# Patient Record
Sex: MALE | Race: WHITE | Employment: OTHER | URBAN - NONMETROPOLITAN AREA
[De-identification: names, ages, dates, MRNs, and addresses within clinical notes are randomized per-mention and may not be internally consistent; named-entity substitution may affect disease eponyms.]

---

## 2019-01-28 ENCOUNTER — OFFICE VISIT (OUTPATIENT)
Dept: ORTHOPEDIC SURGERY | Age: 64
End: 2019-01-28
Payer: MEDICARE

## 2019-01-28 VITALS — BODY MASS INDEX: 33.72 KG/M2 | WEIGHT: 249 LBS | HEIGHT: 72 IN

## 2019-01-28 DIAGNOSIS — M48.062 SPINAL STENOSIS OF LUMBAR REGION WITH NEUROGENIC CLAUDICATION: Primary | ICD-10-CM

## 2019-01-28 PROCEDURE — G8419 CALC BMI OUT NRM PARAM NOF/U: HCPCS | Performed by: ORTHOPAEDIC SURGERY

## 2019-01-28 PROCEDURE — 4004F PT TOBACCO SCREEN RCVD TLK: CPT | Performed by: ORTHOPAEDIC SURGERY

## 2019-01-28 PROCEDURE — 4040F PNEUMOC VAC/ADMIN/RCVD: CPT | Performed by: ORTHOPAEDIC SURGERY

## 2019-01-28 PROCEDURE — G8427 DOCREV CUR MEDS BY ELIG CLIN: HCPCS | Performed by: ORTHOPAEDIC SURGERY

## 2019-01-28 PROCEDURE — 99203 OFFICE O/P NEW LOW 30 MIN: CPT | Performed by: ORTHOPAEDIC SURGERY

## 2019-01-28 PROCEDURE — 3017F COLORECTAL CA SCREEN DOC REV: CPT | Performed by: ORTHOPAEDIC SURGERY

## 2019-01-28 PROCEDURE — 1101F PT FALLS ASSESS-DOCD LE1/YR: CPT | Performed by: ORTHOPAEDIC SURGERY

## 2019-01-28 PROCEDURE — G8484 FLU IMMUNIZE NO ADMIN: HCPCS | Performed by: ORTHOPAEDIC SURGERY

## 2019-01-28 PROCEDURE — 1123F ACP DISCUSS/DSCN MKR DOCD: CPT | Performed by: ORTHOPAEDIC SURGERY

## 2019-01-28 RX ORDER — BUPROPION HYDROCHLORIDE 100 MG/1
100 TABLET, EXTENDED RELEASE ORAL 2 TIMES DAILY
COMMUNITY
Start: 2018-11-21

## 2019-01-28 RX ORDER — DILTIAZEM HYDROCHLORIDE 240 MG/1
240 CAPSULE, EXTENDED RELEASE ORAL DAILY
Status: ON HOLD | COMMUNITY
Start: 2018-11-21 | End: 2022-05-02 | Stop reason: HOSPADM

## 2019-01-28 RX ORDER — LEVOTHYROXINE SODIUM 0.15 MG/1
150 TABLET ORAL DAILY
Status: ON HOLD | COMMUNITY
Start: 2019-01-19 | End: 2022-04-22 | Stop reason: CLARIF

## 2019-01-28 RX ORDER — NORTRIPTYLINE HYDROCHLORIDE 10 MG/1
10 CAPSULE ORAL NIGHTLY
Status: ON HOLD | COMMUNITY
Start: 2019-01-19 | End: 2022-06-22 | Stop reason: HOSPADM

## 2019-01-28 RX ORDER — CHLORAL HYDRATE 500 MG
3000 CAPSULE ORAL 3 TIMES DAILY
COMMUNITY

## 2019-01-28 RX ORDER — ATORVASTATIN CALCIUM 20 MG/1
80 TABLET, FILM COATED ORAL DAILY
COMMUNITY
Start: 2019-01-19

## 2019-01-28 RX ORDER — APIXABAN 5 MG/1
5 TABLET, FILM COATED ORAL 2 TIMES DAILY
Status: ON HOLD | COMMUNITY
Start: 2019-01-21 | End: 2022-04-15 | Stop reason: HOSPADM

## 2019-01-28 RX ORDER — LISINOPRIL 20 MG/1
20 TABLET ORAL DAILY
Status: ON HOLD | COMMUNITY
Start: 2019-01-19 | End: 2022-05-02 | Stop reason: HOSPADM

## 2019-01-28 RX ORDER — AMLODIPINE BESYLATE 10 MG/1
10 TABLET ORAL DAILY
Status: ON HOLD | COMMUNITY
Start: 2019-01-04 | End: 2022-04-15 | Stop reason: HOSPADM

## 2019-01-28 RX ORDER — NITROGLYCERIN 0.4 MG/1
0.4 TABLET SUBLINGUAL EVERY 5 MIN PRN
COMMUNITY
Start: 2019-01-04

## 2022-04-05 ENCOUNTER — TELEPHONE (OUTPATIENT)
Dept: PULMONOLOGY | Age: 67
End: 2022-04-05

## 2022-04-05 NOTE — TELEPHONE ENCOUNTER
Called pt daughter Carole Abbott to offer appt Thursday at 3:00 pm, but daughter needs to check with [facility] to make sure pt doesn't have an appt at the same time. Daughter to call back and let us know. If pt can't make it, will have to check with Ramu to see if pt can be scheduled in ICU week.

## 2022-04-07 ENCOUNTER — TELEPHONE (OUTPATIENT)
Dept: PULMONOLOGY | Age: 67
End: 2022-04-07

## 2022-04-07 ENCOUNTER — OFFICE VISIT (OUTPATIENT)
Dept: PULMONOLOGY | Age: 67
End: 2022-04-07
Payer: MEDICARE

## 2022-04-07 VITALS
RESPIRATION RATE: 22 BRPM | OXYGEN SATURATION: 90 % | SYSTOLIC BLOOD PRESSURE: 138 MMHG | BODY MASS INDEX: 23.3 KG/M2 | HEART RATE: 86 BPM | DIASTOLIC BLOOD PRESSURE: 82 MMHG | WEIGHT: 172 LBS | HEIGHT: 72 IN

## 2022-04-07 DIAGNOSIS — G47.34 NOCTURNAL HYPOXEMIA: ICD-10-CM

## 2022-04-07 DIAGNOSIS — R59.0 MEDIASTINAL ADENOPATHY: ICD-10-CM

## 2022-04-07 DIAGNOSIS — R91.8 LUNG MASS: ICD-10-CM

## 2022-04-07 DIAGNOSIS — J44.9 CHRONIC OBSTRUCTIVE PULMONARY DISEASE, UNSPECIFIED COPD TYPE (HCC): Primary | ICD-10-CM

## 2022-04-07 PROCEDURE — 4040F PNEUMOC VAC/ADMIN/RCVD: CPT | Performed by: INTERNAL MEDICINE

## 2022-04-07 PROCEDURE — 3017F COLORECTAL CA SCREEN DOC REV: CPT | Performed by: INTERNAL MEDICINE

## 2022-04-07 PROCEDURE — 99204 OFFICE O/P NEW MOD 45 MIN: CPT | Performed by: INTERNAL MEDICINE

## 2022-04-07 PROCEDURE — 4004F PT TOBACCO SCREEN RCVD TLK: CPT | Performed by: INTERNAL MEDICINE

## 2022-04-07 PROCEDURE — 1123F ACP DISCUSS/DSCN MKR DOCD: CPT | Performed by: INTERNAL MEDICINE

## 2022-04-07 PROCEDURE — 3023F SPIROM DOC REV: CPT | Performed by: INTERNAL MEDICINE

## 2022-04-07 PROCEDURE — G8427 DOCREV CUR MEDS BY ELIG CLIN: HCPCS | Performed by: INTERNAL MEDICINE

## 2022-04-07 PROCEDURE — G8420 CALC BMI NORM PARAMETERS: HCPCS | Performed by: INTERNAL MEDICINE

## 2022-04-07 RX ORDER — METOPROLOL SUCCINATE 25 MG/1
TABLET, EXTENDED RELEASE ORAL
COMMUNITY
Start: 2022-01-26 | End: 2022-04-08 | Stop reason: ALTCHOICE

## 2022-04-07 RX ORDER — DIGOXIN 125 MCG
0.12 TABLET ORAL DAILY
COMMUNITY
Start: 2021-11-08

## 2022-04-07 RX ORDER — DOXYCYCLINE HYCLATE 100 MG
100 TABLET ORAL 2 TIMES DAILY
Qty: 14 TABLET | Refills: 0 | Status: SHIPPED | OUTPATIENT
Start: 2022-04-07 | End: 2022-04-08 | Stop reason: ALTCHOICE

## 2022-04-07 RX ORDER — POTASSIUM CHLORIDE 750 MG/1
CAPSULE, EXTENDED RELEASE ORAL
Status: ON HOLD | COMMUNITY
Start: 2022-01-23 | End: 2022-05-02 | Stop reason: HOSPADM

## 2022-04-07 RX ORDER — TORSEMIDE 100 MG/1
TABLET ORAL
Status: ON HOLD | COMMUNITY
Start: 2022-03-12 | End: 2022-05-02 | Stop reason: HOSPADM

## 2022-04-07 RX ORDER — HYDROCODONE BITARTRATE AND ACETAMINOPHEN 5; 325 MG/1; MG/1
TABLET ORAL
Status: ON HOLD | COMMUNITY
Start: 2022-03-28 | End: 2022-04-22 | Stop reason: CLARIF

## 2022-04-07 RX ORDER — TIZANIDINE 2 MG/1
TABLET ORAL
Status: ON HOLD | COMMUNITY
Start: 2022-03-23 | End: 2022-04-15 | Stop reason: HOSPADM

## 2022-04-07 RX ORDER — HYDRALAZINE HYDROCHLORIDE 25 MG/1
TABLET, FILM COATED ORAL
Status: ON HOLD | COMMUNITY
Start: 2022-03-23 | End: 2022-05-02 | Stop reason: HOSPADM

## 2022-04-07 RX ORDER — PREDNISONE 10 MG/1
TABLET ORAL
Qty: 30 TABLET | Refills: 0 | Status: SHIPPED | OUTPATIENT
Start: 2022-04-07 | End: 2022-04-08 | Stop reason: ALTCHOICE

## 2022-04-07 NOTE — TELEPHONE ENCOUNTER
Bronch  is gone for the day. Will check tomorrow on bronch time. Spoke with Amanda Camacho at Dr. Gigi Mascorro office and Dr. Gigi Mascorro approved pt to hold Eliquis 3 days prior to bronch.

## 2022-04-07 NOTE — PATIENT INSTRUCTIONS
Bronchoscopy has been set up for __________ arrival time __________ at [facility]. Please enter at [facility]. Nothing to eat or drink after midnight prior to the procedure. Must have a  to bring you to and from the procedure. Hold blood thinners as instructed prior to the procedure. Start Spiriva inhaler - 2 puffs once daily. Prednisone taper - 40 mg for 3 days, then 30 mg for 3 days, then 20 mg for 3 days, then 10 mg for 3 days. Doxycycline 100 mg twice daily for 5 days.

## 2022-04-07 NOTE — LETTER
[facility] Pulmonary, Critical Care, and Sleep  [address]", [address]  [address] [address]  Phone: [phone]  Fax: [phone]    Nicky Hutchins MD    April 7, 2022    [address] [place] [address]    To Whom It May Concern:    Please be aware that Hallie Leon was seen in my office today, April 7, 2022. If you have any questions or concerns, please don't hesitate to call.     Sincerely,        Nicky Hutchins MD

## 2022-04-07 NOTE — TELEPHONE ENCOUNTER
Pt needs to be scheduled for bronch Tuesday at 11 am.  Will check date/time and inform pt. Pt has prep, just needs to be informed of date/time. Pt will need to be off Eliquis for 3 days, prescribed by Dr. Dayana Pereira.

## 2022-04-07 NOTE — PROGRESS NOTES
[facility], Critical Care and Sleep [facility]                                                                    CHIEF COMPLAINT: Lung mass     Consulting provider: Dr. Chito Alba     HPI:   Patient was evaluated for SOB. CT chest done 3/22/22 imaging reviewed by me and showed large L sided mass, measuring 12 cm, associated with mediastinal/hilar LAD. Not sure what makes better or worse. Has WILKERSON, able to walk for few feet. Cough with no sputum. No hemoptysis. Lost 50 pounds lately. Smoker 1 ppd for 40 years. Uses O2 at home at night 2LPM     PMH:   Afib   CHF       Past Surgical History:    History reviewed. No pertinent surgical history. Allergies:  is allergic to codeine. Social History:    TOBACCO:   reports that he has been smoking. He has a 40.00 pack-year smoking history. He has never used smokeless tobacco.  ETOH:   reports previous alcohol use.       Family History:   No lung cancer       Current Medications:    Current Outpatient Medications:     torsemide (DEMADEX) 100 MG tablet, TAKE 1/2 (ONE-HALF) TABLET BY MOUTH ONCE DAILY, Disp: , Rfl:     tiZANidine (ZANAFLEX) 2 MG tablet, TAKE 1 TABLET BY MOUTH TWICE DAILY AS NEEDED, Disp: , Rfl:     potassium chloride (MICRO-K) 10 MEQ extended release capsule, TAKE 1 CAPSULE BY MOUTH TWICE DAILY, Disp: , Rfl:     metoprolol succinate (TOPROL XL) 25 MG extended release tablet, TAKE 1 TABLET BY MOUTH ONCE DAILY, Disp: , Rfl:     Meloxicam 15 MG TBDP, Take 1 tablet by mouth daily, Disp: , Rfl:     HYDROcodone-acetaminophen (NORCO) 5-325 MG per tablet, TAKE 1 TABLET BY MOUTH TWICE DAILY AS NEEDED, Disp: , Rfl:     hydrALAZINE (APRESOLINE) 25 MG tablet, TAKE 1 TABLET BY MOUTH THREE TIMES DAILY, Disp: , Rfl:     digoxin (LANOXIN) 125 MCG tablet, Take 0.125 mg by mouth daily, Disp: , Rfl:     amLODIPine (NORVASC) 10 MG tablet, , Disp: , Rfl:     ELIQUIS 5 MG TABS tablet, , Disp: , Rfl:     atorvastatin (LIPITOR) 20 MG tablet, , Disp: , Rfl:     buPROPion (WELLBUTRIN SR) 100 MG extended release tablet, , Disp: , Rfl:     CARTIA  MG extended release capsule, , Disp: , Rfl:     levothyroxine (SYNTHROID) 150 MCG tablet, , Disp: , Rfl:     lisinopril (PRINIVIL;ZESTRIL) 20 MG tablet, , Disp: , Rfl:     metFORMIN (GLUCOPHAGE) 850 MG tablet, , Disp: , Rfl:     metoprolol tartrate (LOPRESSOR) 25 MG tablet, , Disp: , Rfl:     nitroGLYCERIN (NITROSTAT) 0.4 MG SL tablet, , Disp: , Rfl:     nortriptyline (PAMELOR) 10 MG capsule, , Disp: , Rfl:     Omega-3 Fatty Acids (FISH OIL) 1000 MG CAPS, Take 3,000 mg by mouth 3 times daily, Disp: , Rfl:     aspirin 81 MG tablet, Take 81 mg by mouth daily, Disp: , Rfl:       REVIEW OF SYSTEMS:  Constitutional: Negative for fever  HENT: Negative for sore throat  Eyes: Negative for redness   Respiratory:+ dyspnea, cough  Cardiovascular: Negative for chest pain  Gastrointestinal: Negative for vomiting, diarrhea   Genitourinary: Negative for hematuria   Musculoskeletal: +  arthralgias   Skin: Negative for rash  Neurological: Negative for syncope  Hematological: Negative for adenopathy  Psychiatric/Behavorial: Negative for anxiety      Objective:   PHYSICAL EXAM:    Blood pressure 138/82, pulse 86, resp. rate 22, height 6' (1.829 m), weight 172 lb (78 kg), SpO2 90 %.' on RA  Gen: No distress. Ill-appearing  Eyes: PERRL. No sclera icterus. No conjunctival injection. ENT: No discharge. Pharynx clear. Neck: Trachea midline. No obvious mass. Resp: No accessory muscle use. No crackles. Bilateral wheezes. No rhonchi. No dullness on percussion. Good air entry. CV: Regular rate. Regular rhythm. No murmur or rub. No edema. GI: Non-tender. Non-distended. No hernia. Skin: Warm and dry. No nodule on exposed extremities. Lymph: No cervical LAD. No supraclavicular LAD. M/S: No cyanosis. No joint deformity. No clubbing. Neuro: Awake. Alert. Moves all four extremities. Psych: Oriented x 3. No anxiety. DATA reviewed by me:   CT chest 3/22/22  images reviewed by me and showed:   Large left lower lobe/lingula mass measuring 12 cm with mediastinal/hilar adenopathy  Possible pericardial, pleural and chest wall involvement  Partially loculated left-sided effusion  Left mainstem/left lower lobe/lingular airway obstruction      Assessment:       · COPD-not well controlled  · Nocturnal hypoxia - 2L at night   · Left lower lobe/lingula 12 cm mass with hilar/mediastinal adenopathy -likely bronchogenic carcinoma  · Left mainstem/left lower lobe/lingular airway obstruction  · Small loculated pleural effusion-secondary to above  · Probable post obstructive PNA   · 50 pounds weight loss   · Chronic diastolic CHF and Afib followed by cardiology   · Smoker > 1 ppd for 40 years      Plan:       · PET scan as planned tomorrow  · Trial Spiriva Respimat: Two inhalations (5 mcg) once daily (maximum: 2 inhalations per 24 hours)  · Prednisone taper to optimize pulmonary functions before bronchoscopy  · Trial of Albuterol INH/Neb hrs PRN  · Doxycycline 100 mg PO BID for 7 days-suspecting postobstructive pneumonia  · Bronch with EBUS TBNA next week, unless PET scan tomorrow reveals more accessible lesion. The risks and benefits of Bronchoscopy , alternatives to the procedure and doing nothing have been discussed with patient including complications (collapse lung, bleed, infection, mechanical ventilation, hospitalization, cardiopulmonary arrest and death). We also discussed the risks of sedation/anesthesia. It is my assessment that the risk of the invasive procedure is outweighed by the benefit. The patient understands and wishes to proceed. · Off Eliquis for 3 days if okay with cardiology   · O2 2L on exertion. Advised to titrate O2 using her pulse oximeter- target O2 sat 90-92%. · Smoking cessation counseling.

## 2022-04-08 ENCOUNTER — TELEPHONE (OUTPATIENT)
Dept: PULMONOLOGY | Age: 67
End: 2022-04-08

## 2022-04-08 RX ORDER — INSULIN DETEMIR 100 [IU]/ML
90 INJECTION, SOLUTION SUBCUTANEOUS 2 TIMES DAILY
Status: ON HOLD | COMMUNITY
End: 2022-05-02 | Stop reason: HOSPADM

## 2022-04-08 NOTE — TELEPHONE ENCOUNTER
Scheduled bronch for 4/12 arrival time 10:00 am.  Pt needs to hold Eliquis starting 4/9/22. Also made pt f/u appt (if pt can't make it due to transportation, can change to VV/tele). Patient called with message left for patient to call back to office to inform. Pt is having PET scan at [facility] today 4pm, will watch for result and request imaging to be sent to [facility].

## 2022-04-08 NOTE — TELEPHONE ENCOUNTER
Spoke with Brinaalejo Nogueira daughter informed of date time and prep. Aware to hold Eliquis starting 4/9/22. Pt is having PET scan at [facility] today 4pm, will watch for result and request imaging to be sent to [facility].

## 2022-04-11 ENCOUNTER — ANESTHESIA EVENT (OUTPATIENT)
Dept: ENDOSCOPY | Age: 67
DRG: 189 | End: 2022-04-11
Payer: MEDICARE

## 2022-04-11 ASSESSMENT — COPD QUESTIONNAIRES: CAT_SEVERITY: MODERATE

## 2022-04-11 NOTE — TELEPHONE ENCOUNTER
PET scan report rec'd and scanned for review. Imaging is available at [facility]. Pt has bronch 4/12/22. Will watch for results.

## 2022-04-11 NOTE — ANESTHESIA PRE PROCEDURE
Department of Anesthesiology  Preprocedure Note       Name:  Albin Burden   Age:  74 y.o.  :  1955                                          MRN:  [id number]         Date:  2022      Surgeon:  Sinan Fuchs MD    Procedure:  EBUS WF W/CRYSTAL. (11:00)    HPI:  75 y/o male who was evaluated for SOB. CT chest done 3/22/22  which showed a large left sided mass, measuring 12 cm, associated with mediastinal/hilar LAD. He has WILKERSON, able to walk for few feet. Cough with no sputum. No hemoptysis. Lost 50 pounds lately. Smoker 1 ppd for 40 years.  Uses O2 at home at night 2LPM.    [id number]   Intervals                              Axis            Rate:         87                      P:              CT:                                     QRS:       95   QRSD:    104                      T:            19   QT:          374                                      QTc:        450                                                                Atrial fibrillation   Ventricular premature complex versus aberrant be   Right axis deviation   Nonspecific ST changes     Medications prior to admission:    insulin detemir (LEVEMIR) 100 UNIT/ML injection vial Inject into the skin nightly   insulin aspart (NOVOLOG) 100 UNIT/ML injection vial Inject into the skin 3 times daily (before meals)   torsemide (DEMADEX) 100 MG tablet TAKE 1/2 (ONE-HALF) TABLET BY MOUTH ONCE DAILY   tiZANidine (ZANAFLEX) 2 MG tablet TAKE 1 TABLET BY MOUTH TWICE DAILY AS NEEDED   potassium chloride (MICRO-K) 10 MEQ extended release capsule TAKE 1 CAPSULE BY MOUTH TWICE DAILY   Meloxicam 15 MG TBDP Take 1 tablet by mouth daily   HYDROcodone-acetaminophen (NORCO) 5-325 MG per tablet TAKE 1 TABLET BY MOUTH TWICE DAILY AS NEEDED   hydrALAZINE (APRESOLINE) 25 MG tablet TAKE 1 TABLET BY MOUTH THREE TIMES DAILY   digoxin (LANOXIN) 125 MCG tablet Take 0.125 mg by mouth daily   tiotropium (SPIRIVA RESPIMAT) 2.5 MCG/ACT AERS inhaler Inhale 2 puffs into the lungs daily   amLODIPine (NORVASC) 10 MG tablet Take 10 mg by mouth daily    ELIQUIS 5 MG LD: LD 3 days ago Take 5 mg by mouth 2 times daily    atorvastatin (LIPITOR) 20 MG tablet Take 20 mg by mouth daily    buPROPion (WELLBUTRIN SR) 100 MG extended release tablet Take 100 mg by mouth 2 times daily    CARTIA  MG extended release capsule Take 240 mg by mouth daily    levothyroxine (SYNTHROID) 150 MCG tablet Take 150 mcg by mouth Daily    lisinopril (PRINIVIL;ZESTRIL) 20 MG tablet Take 20 mg by mouth    metFORMIN (GLUCOPHAGE) 850 MG tablet Take 850 mg by mouth daily (with breakfast)    metoprolol tartrate (LOPRESSOR) 25 MG tablet Take 25 mg by mouth 2 times daily    nitroGLYCERIN (NITROSTAT) 0.4 MG SL tablet Place 0.4 mg under the tongue every 5 minutes as needed    nortriptyline (PAMELOR) 10 MG capsule Take 10 mg by mouth nightly    Omega-3 Fatty Acids (FISH OIL) 1000 MG CAPS Take 3,000 mg by mouth 3 times daily   aspirin 81 MG tablet Take 81 mg by mouth daily     Allergies:     Codeine      Problem List:     Spinal stenosis of lumbar region with neurogenic claudication     Past Medical History:     A-fib ([facility]     CAD (coronary artery disease)     CH[facility]congestive heart failure) ([facility])     COPD (chronic obstructive pulmonary disease) ([facility])     Diabetes mellitus ([facility]     Hyperlipidemia     Hypertension     Thyroid disease      Past Surgical History:  History reviewed. No pertinent surgical history.     Social History:     Smoking status: Current Every Day Smoker     Packs/day: 1.00     Years: 40.00     Pack years: 40.00    Smokeless tobacco: Never Used   Substance Use Topics    Alcohol use: Not Currently                                Ready to quit: Not Answered  Counseling given: Not Answered      Vital Signs (Current):        BP: 150/85 Pulse: 84   Resp: 18 SpO2: 94   Temp: 97.3 °F (36.3 °C)   Height: 6' (1.829 m) (04/12/22) Weight: 272 lb (123.4 kg) (04/12/22)   BMI: 36.88              BP Readings from Last 3 Encounters:   04/07/22 138/82     NPO Status: >8 hrs                        BMI:   Wt Readings from Last 3 Encounters:   04/07/22 172 lb (78 kg)   01/28/19 249 lb (112.9 kg)     Body mass index is 36.89 kg/m². PLT:    4/12/2022 10:05   Platelet Count 156      Coags:   4/12/2022 10:05   Prothrombin Time 12.9 (H)   INR 1.14 (H)     COVID-19 Screening (If Applicable): No results found for: COVID19    Anesthesia Evaluation  Patient summary reviewed and Nursing notes reviewed  Airway: Mallampati: II  TM distance: >3 FB   Neck ROM: limited  Mouth opening: > = 3 FB Dental:    (+) edentulous      Pulmonary: breath sounds clear to auscultation  (+) COPD (O2 at home at night 2LPM): moderate and severe,      (-) recent URI                          ROS comment: +Lung mass- see HPI   Cardiovascular:  Exercise tolerance: poor (<4 METS),   (+) hypertension:, dysrhythmias: atrial fibrillation, CHF:, hyperlipidemia      ECG reviewed  Rhythm: irregular                      Neuro/Psych:   (+) TIA (Per patient H/O TIA w/o residuals),             GI/Hepatic/Renal:   (+) morbid obesity          Endo/Other:    (+) DiabetesType II DM, , hypothyroidism, blood dyscrasia: anticoagulation therapy:., .                 Abdominal:             Vascular: Other Findings:           Anesthesia Plan      general     ASA 3           MIPS: Prophylactic antiemetics administered. Anesthetic plan and risks discussed with patient. Plan discussed with CRNA.             Kendra Vargas MD

## 2022-04-12 ENCOUNTER — APPOINTMENT (OUTPATIENT)
Dept: GENERAL RADIOLOGY | Age: 67
DRG: 189 | End: 2022-04-12
Attending: INTERNAL MEDICINE
Payer: MEDICARE

## 2022-04-12 ENCOUNTER — HOSPITAL ENCOUNTER (OUTPATIENT)
Age: 67
Setting detail: OUTPATIENT SURGERY
Discharge: ANOTHER HOSPITAL | DRG: 189 | End: 2022-04-12
Attending: INTERNAL MEDICINE | Admitting: INTERNAL MEDICINE
Payer: MEDICARE

## 2022-04-12 ENCOUNTER — HOSPITAL ENCOUNTER (INPATIENT)
Age: 67
LOS: 3 days | Discharge: HOME | DRG: 189 | End: 2022-04-15
Attending: INTERNAL MEDICINE | Admitting: INTERNAL MEDICINE
Payer: MEDICARE

## 2022-04-12 ENCOUNTER — ANESTHESIA (OUTPATIENT)
Dept: ENDOSCOPY | Age: 67
DRG: 189 | End: 2022-04-12
Payer: MEDICARE

## 2022-04-12 VITALS
HEIGHT: 72 IN | OXYGEN SATURATION: 100 % | TEMPERATURE: 97.5 F | BODY MASS INDEX: 36.84 KG/M2 | DIASTOLIC BLOOD PRESSURE: 95 MMHG | WEIGHT: 272 LBS | RESPIRATION RATE: 16 BRPM | SYSTOLIC BLOOD PRESSURE: 127 MMHG | HEART RATE: 73 BPM

## 2022-04-12 VITALS — SYSTOLIC BLOOD PRESSURE: 149 MMHG | DIASTOLIC BLOOD PRESSURE: 74 MMHG | OXYGEN SATURATION: 100 %

## 2022-04-12 PROBLEM — J96.21 ACUTE ON CHRONIC RESPIRATORY FAILURE WITH HYPOXIA (HCC): Status: ACTIVE | Noted: 2022-04-12

## 2022-04-12 LAB
ALBUMIN SERPL-MCNC: 3.2 G/DL (ref 3.4–5)
ALP BLD-CCNC: 187 U/L (ref 40–129)
ALT SERPL-CCNC: 27 U/L (ref 10–40)
ANION GAP SERPL CALCULATED.3IONS-SCNC: 7 MMOL/L (ref 3–16)
APPEARANCE BAL (LAVAGE): ABNORMAL
AST SERPL-CCNC: 23 U/L (ref 15–37)
BASE EXCESS ARTERIAL: 0.6 MMOL/L (ref -3–3)
BASE EXCESS ARTERIAL: 1.5 MMOL/L (ref -3–3)
BASE EXCESS ARTERIAL: 2.7 MMOL/L (ref -3–3)
BASOPHILS ABSOLUTE: 0.1 K/UL (ref 0–0.2)
BASOPHILS RELATIVE PERCENT: 0.6 %
BILIRUB SERPL-MCNC: <0.2 MG/DL (ref 0–1)
BILIRUBIN DIRECT: <0.2 MG/DL (ref 0–0.3)
BILIRUBIN, INDIRECT: ABNORMAL MG/DL (ref 0–1)
BUN BLDV-MCNC: 15 MG/DL (ref 7–20)
CALCIUM IONIZED: 1.3 MMOL/L (ref 1.12–1.32)
CALCIUM SERPL-MCNC: 13.7 MG/DL (ref 8.3–10.6)
CARBOXYHEMOGLOBIN ARTERIAL: 2.8 % (ref 0–1.5)
CARBOXYHEMOGLOBIN ARTERIAL: 3.2 % (ref 0–1.5)
CARBOXYHEMOGLOBIN ARTERIAL: 4.6 % (ref 0–1.5)
CHLORIDE BLD-SCNC: 99 MMOL/L (ref 99–110)
CLOT EVALUATION BAL: ABNORMAL
CO2: 30 MMOL/L (ref 21–32)
COLOR LAVAGE: ABNORMAL
CREAT SERPL-MCNC: 0.8 MG/DL (ref 0.8–1.3)
EKG ATRIAL RATE: 101 BPM
EKG DIAGNOSIS: NORMAL
EKG Q-T INTERVAL: 338 MS
EKG QRS DURATION: 100 MS
EKG QTC CALCULATION (BAZETT): 411 MS
EKG R AXIS: 85 DEGREES
EKG T AXIS: -53 DEGREES
EKG VENTRICULAR RATE: 89 BPM
EOSIN: 1 %
EOSINOPHILS ABSOLUTE: 0 K/UL (ref 0–0.6)
EOSINOPHILS RELATIVE PERCENT: 0.2 %
EPITHELIAL CELLS FLUID: 9 %
GFR AFRICAN AMERICAN: >60
GFR NON-AFRICAN AMERICAN: >60
GLUCOSE BLD-MCNC: 120 MG/DL (ref 70–99)
GLUCOSE BLD-MCNC: 137 MG/DL (ref 70–99)
GLUCOSE BLD-MCNC: 138 MG/DL (ref 70–99)
GLUCOSE BLD-MCNC: 141 MG/DL (ref 70–99)
GLUCOSE BLD-MCNC: 143 MG/DL (ref 70–99)
HCO3 ARTERIAL: 30.4 MMOL/L (ref 21–29)
HCO3 ARTERIAL: 31.4 MMOL/L (ref 21–29)
HCO3 ARTERIAL: 32.1 MMOL/L (ref 21–29)
HCT VFR BLD CALC: 44 % (ref 40.5–52.5)
HEMOGLOBIN, ART, EXTENDED: 15.3 G/DL (ref 13.5–17.5)
HEMOGLOBIN, ART, EXTENDED: 15.5 G/DL (ref 13.5–17.5)
HEMOGLOBIN, ART, EXTENDED: 16.4 G/DL (ref 13.5–17.5)
HEMOGLOBIN: 14 G/DL (ref 13.5–17.5)
INR BLD: 1.14 (ref 0.88–1.12)
LACTIC ACID: 1.6 MMOL/L (ref 0.4–2)
LYMPHOCYTES ABSOLUTE: 0.4 K/UL (ref 1–5.1)
LYMPHOCYTES RELATIVE PERCENT: 3.4 %
LYMPHOCYTES, BAL: 9 % (ref 5–10)
MACROPHAGES, BAL: 15 % (ref 90–95)
MCH RBC QN AUTO: 29.1 PG (ref 26–34)
MCHC RBC AUTO-ENTMCNC: 32 G/DL (ref 31–36)
MCV RBC AUTO: 91.1 FL (ref 80–100)
METHEMOGLOBIN ARTERIAL: 0.1 %
MONOCYTES ABSOLUTE: 0.2 K/UL (ref 0–1.3)
MONOCYTES RELATIVE PERCENT: 1.3 %
NEUTROPHILS ABSOLUTE: 12.4 K/UL (ref 1.7–7.7)
NEUTROPHILS RELATIVE PERCENT: 94.5 %
NUMBER OF CELLS COUNTED BAL (LAVAGE): 100
O2 SAT, ARTERIAL: 93 %
O2 SAT, ARTERIAL: 93.6 %
O2 SAT, ARTERIAL: 95.7 %
O2 THERAPY: ABNORMAL
PCO2 ARTERIAL: 71.3 MMHG (ref 35–45)
PCO2 ARTERIAL: 71.5 MMHG (ref 35–45)
PCO2 ARTERIAL: 74.3 MMHG (ref 35–45)
PDW BLD-RTO: 16.9 % (ref 12.4–15.4)
PERFORMED ON: ABNORMAL
PH ARTERIAL: 7.24 (ref 7.35–7.45)
PH ARTERIAL: 7.25 (ref 7.35–7.45)
PH ARTERIAL: 7.27 (ref 7.35–7.45)
PH VENOUS: 7.27 (ref 7.35–7.45)
PLATELET # BLD: 341 K/UL (ref 135–450)
PLATELET # BLD: 439 K/UL (ref 135–450)
PMV BLD AUTO: 6.4 FL (ref 5–10.5)
PO2 ARTERIAL: 78.3 MMHG (ref 75–108)
PO2 ARTERIAL: 79 MMHG (ref 75–108)
PO2 ARTERIAL: 93.8 MMHG (ref 75–108)
POTASSIUM REFLEX MAGNESIUM: 4.3 MMOL/L (ref 3.5–5.1)
PRO-BNP: 592 PG/ML (ref 0–124)
PROTHROMBIN TIME: 12.9 SEC (ref 9.9–12.7)
RBC # BLD: 4.83 M/UL (ref 4.2–5.9)
RBC, BAL: ABNORMAL /CUMM
SEGMENTED NEUTROPHILS, BAL: 66 % (ref 5–10)
SODIUM BLD-SCNC: 136 MMOL/L (ref 136–145)
TCO2 ARTERIAL: 32.6 MMOL/L
TCO2 ARTERIAL: 33.7 MMOL/L
TCO2 ARTERIAL: 34.3 MMOL/L
TOTAL PROTEIN: 7.3 G/DL (ref 6.4–8.2)
TROPONIN: 0.01 NG/ML
TROPONIN: <0.01 NG/ML
WBC # BLD: 13.1 K/UL (ref 4–11)
WBC/EPI CELLS BAL: 300 /CUMM

## 2022-04-12 PROCEDURE — 7100000000 HC PACU RECOVERY - FIRST 15 MIN: Performed by: INTERNAL MEDICINE

## 2022-04-12 PROCEDURE — 3700000001 HC ADD 15 MINUTES (ANESTHESIA): Performed by: INTERNAL MEDICINE

## 2022-04-12 PROCEDURE — 87205 SMEAR GRAM STAIN: CPT

## 2022-04-12 PROCEDURE — 88177 CYTP FNA EVAL EA ADDL: CPT

## 2022-04-12 PROCEDURE — 88173 CYTOPATH EVAL FNA REPORT: CPT

## 2022-04-12 PROCEDURE — 93010 ELECTROCARDIOGRAM REPORT: CPT | Performed by: INTERNAL MEDICINE

## 2022-04-12 PROCEDURE — 87070 CULTURE OTHR SPECIMN AEROBIC: CPT

## 2022-04-12 PROCEDURE — 3609010800 HC BRONCHOSCOPY ALVEOLAR LAVAGE: Performed by: INTERNAL MEDICINE

## 2022-04-12 PROCEDURE — 6360000002 HC RX W HCPCS: Performed by: NURSE ANESTHETIST, CERTIFIED REGISTERED

## 2022-04-12 PROCEDURE — 6360000002 HC RX W HCPCS: Performed by: NURSE PRACTITIONER

## 2022-04-12 PROCEDURE — 80048 BASIC METABOLIC PNL TOTAL CA: CPT

## 2022-04-12 PROCEDURE — 31625 BRONCHOSCOPY W/BIOPSY(S): CPT | Performed by: INTERNAL MEDICINE

## 2022-04-12 PROCEDURE — 2500000003 HC RX 250 WO HCPCS: Performed by: NURSE ANESTHETIST, CERTIFIED REGISTERED

## 2022-04-12 PROCEDURE — 83880 ASSAY OF NATRIURETIC PEPTIDE: CPT

## 2022-04-12 PROCEDURE — 2580000003 HC RX 258: Performed by: NURSE ANESTHETIST, CERTIFIED REGISTERED

## 2022-04-12 PROCEDURE — 3603165200 HC BRNCHSC EBUS GUIDED SAMPL 1/2 NODE STATION/STRUX: Performed by: INTERNAL MEDICINE

## 2022-04-12 PROCEDURE — 31624 DX BRONCHOSCOPE/LAVAGE: CPT | Performed by: INTERNAL MEDICINE

## 2022-04-12 PROCEDURE — 31652 BRONCH EBUS SAMPLNG 1/2 NODE: CPT | Performed by: INTERNAL MEDICINE

## 2022-04-12 PROCEDURE — 88312 SPECIAL STAINS GROUP 1: CPT

## 2022-04-12 PROCEDURE — 85025 COMPLETE CBC W/AUTO DIFF WBC: CPT

## 2022-04-12 PROCEDURE — 85049 AUTOMATED PLATELET COUNT: CPT

## 2022-04-12 PROCEDURE — 87015 SPECIMEN INFECT AGNT CONCNTJ: CPT

## 2022-04-12 PROCEDURE — 87040 BLOOD CULTURE FOR BACTERIA: CPT

## 2022-04-12 PROCEDURE — 6370000000 HC RX 637 (ALT 250 FOR IP): Performed by: INTERNAL MEDICINE

## 2022-04-12 PROCEDURE — 82330 ASSAY OF CALCIUM: CPT

## 2022-04-12 PROCEDURE — 3609011300 HC BRONCHOSCOPY BRONCHIAL/ENDOBRNCL BX 1+ SITES: Performed by: INTERNAL MEDICINE

## 2022-04-12 PROCEDURE — 88305 TISSUE EXAM BY PATHOLOGIST: CPT

## 2022-04-12 PROCEDURE — 87206 SMEAR FLUORESCENT/ACID STAI: CPT

## 2022-04-12 PROCEDURE — 3609011900 HC BRONCHOSCOPY NEEDLE BX TRACHEA MAIN STEM&/BRON: Performed by: INTERNAL MEDICINE

## 2022-04-12 PROCEDURE — 83036 HEMOGLOBIN GLYCOSYLATED A1C: CPT

## 2022-04-12 PROCEDURE — 0BB78ZX EXCISION OF LEFT MAIN BRONCHUS, VIA NATURAL OR ARTIFICIAL OPENING ENDOSCOPIC, DIAGNOSTIC: ICD-10-PCS | Performed by: INTERNAL MEDICINE

## 2022-04-12 PROCEDURE — 82803 BLOOD GASES ANY COMBINATION: CPT

## 2022-04-12 PROCEDURE — 83605 ASSAY OF LACTIC ACID: CPT

## 2022-04-12 PROCEDURE — 7100000001 HC PACU RECOVERY - ADDTL 15 MIN: Performed by: INTERNAL MEDICINE

## 2022-04-12 PROCEDURE — 3700000000 HC ANESTHESIA ATTENDED CARE: Performed by: INTERNAL MEDICINE

## 2022-04-12 PROCEDURE — 93005 ELECTROCARDIOGRAM TRACING: CPT | Performed by: ANESTHESIOLOGY

## 2022-04-12 PROCEDURE — 80076 HEPATIC FUNCTION PANEL: CPT

## 2022-04-12 PROCEDURE — 94660 CPAP INITIATION&MGMT: CPT

## 2022-04-12 PROCEDURE — 2000000000 HC ICU R&B

## 2022-04-12 PROCEDURE — 94640 AIRWAY INHALATION TREATMENT: CPT

## 2022-04-12 PROCEDURE — 36415 COLL VENOUS BLD VENIPUNCTURE: CPT

## 2022-04-12 PROCEDURE — 88112 CYTOPATH CELL ENHANCE TECH: CPT

## 2022-04-12 PROCEDURE — 87116 MYCOBACTERIA CULTURE: CPT

## 2022-04-12 PROCEDURE — 99223 1ST HOSP IP/OBS HIGH 75: CPT | Performed by: INTERNAL MEDICINE

## 2022-04-12 PROCEDURE — 6360000002 HC RX W HCPCS: Performed by: INTERNAL MEDICINE

## 2022-04-12 PROCEDURE — 85610 PROTHROMBIN TIME: CPT

## 2022-04-12 PROCEDURE — 0B9L8ZX DRAINAGE OF LEFT LUNG, VIA NATURAL OR ARTIFICIAL OPENING ENDOSCOPIC, DIAGNOSTIC: ICD-10-PCS | Performed by: INTERNAL MEDICINE

## 2022-04-12 PROCEDURE — 36600 WITHDRAWAL OF ARTERIAL BLOOD: CPT

## 2022-04-12 PROCEDURE — 88172 CYTP DX EVAL FNA 1ST EA SITE: CPT

## 2022-04-12 PROCEDURE — 6370000000 HC RX 637 (ALT 250 FOR IP): Performed by: NURSE PRACTITIONER

## 2022-04-12 PROCEDURE — 6370000000 HC RX 637 (ALT 250 FOR IP): Performed by: ANESTHESIOLOGY

## 2022-04-12 PROCEDURE — 2580000003 HC RX 258: Performed by: NURSE PRACTITIONER

## 2022-04-12 PROCEDURE — 2720000010 HC SURG SUPPLY STERILE: Performed by: INTERNAL MEDICINE

## 2022-04-12 PROCEDURE — 31645 BRNCHSC W/THER ASPIR 1ST: CPT | Performed by: INTERNAL MEDICINE

## 2022-04-12 PROCEDURE — 71045 X-RAY EXAM CHEST 1 VIEW: CPT

## 2022-04-12 PROCEDURE — 2709999900 HC NON-CHARGEABLE SUPPLY: Performed by: INTERNAL MEDICINE

## 2022-04-12 PROCEDURE — 88342 IMHCHEM/IMCYTCHM 1ST ANTB: CPT

## 2022-04-12 PROCEDURE — 87102 FUNGUS ISOLATION CULTURE: CPT

## 2022-04-12 PROCEDURE — 84484 ASSAY OF TROPONIN QUANT: CPT

## 2022-04-12 PROCEDURE — 89051 BODY FLUID CELL COUNT: CPT

## 2022-04-12 RX ORDER — IPRATROPIUM BROMIDE AND ALBUTEROL SULFATE 2.5; .5 MG/3ML; MG/3ML
1 SOLUTION RESPIRATORY (INHALATION) ONCE
Status: DISCONTINUED | OUTPATIENT
Start: 2022-04-12 | End: 2022-04-12 | Stop reason: HOSPADM

## 2022-04-12 RX ORDER — METHYLPREDNISOLONE SODIUM SUCCINATE 40 MG/ML
40 INJECTION, POWDER, LYOPHILIZED, FOR SOLUTION INTRAMUSCULAR; INTRAVENOUS EVERY 12 HOURS
Status: DISCONTINUED | OUTPATIENT
Start: 2022-04-12 | End: 2022-04-14

## 2022-04-12 RX ORDER — LEVOTHYROXINE SODIUM 0.15 MG/1
150 TABLET ORAL DAILY
Status: DISCONTINUED | OUTPATIENT
Start: 2022-04-13 | End: 2022-04-15 | Stop reason: HOSPADM

## 2022-04-12 RX ORDER — IPRATROPIUM BROMIDE AND ALBUTEROL SULFATE 2.5; .5 MG/3ML; MG/3ML
1 SOLUTION RESPIRATORY (INHALATION)
Status: DISCONTINUED | OUTPATIENT
Start: 2022-04-12 | End: 2022-04-12

## 2022-04-12 RX ORDER — IPRATROPIUM BROMIDE AND ALBUTEROL SULFATE 2.5; .5 MG/3ML; MG/3ML
1 SOLUTION RESPIRATORY (INHALATION) 4 TIMES DAILY
Status: DISCONTINUED | OUTPATIENT
Start: 2022-04-13 | End: 2022-04-14

## 2022-04-12 RX ORDER — MEPERIDINE HYDROCHLORIDE 25 MG/ML
12.5 INJECTION INTRAMUSCULAR; INTRAVENOUS; SUBCUTANEOUS EVERY 5 MIN PRN
Status: DISCONTINUED | OUTPATIENT
Start: 2022-04-12 | End: 2022-04-12 | Stop reason: HOSPADM

## 2022-04-12 RX ORDER — SODIUM CHLORIDE 0.9 % (FLUSH) 0.9 %
10 SYRINGE (ML) INJECTION PRN
Status: DISCONTINUED | OUTPATIENT
Start: 2022-04-12 | End: 2022-04-12 | Stop reason: HOSPADM

## 2022-04-12 RX ORDER — SODIUM CHLORIDE 0.9 % (FLUSH) 0.9 %
5-40 SYRINGE (ML) INJECTION PRN
Status: DISCONTINUED | OUTPATIENT
Start: 2022-04-12 | End: 2022-04-12 | Stop reason: HOSPADM

## 2022-04-12 RX ORDER — LEVOFLOXACIN 5 MG/ML
500 INJECTION, SOLUTION INTRAVENOUS ONCE
Status: COMPLETED | OUTPATIENT
Start: 2022-04-12 | End: 2022-04-12

## 2022-04-12 RX ORDER — SODIUM CHLORIDE, SODIUM LACTATE, POTASSIUM CHLORIDE, CALCIUM CHLORIDE 600; 310; 30; 20 MG/100ML; MG/100ML; MG/100ML; MG/100ML
INJECTION, SOLUTION INTRAVENOUS CONTINUOUS
Status: DISCONTINUED | OUTPATIENT
Start: 2022-04-12 | End: 2022-04-12 | Stop reason: SDUPTHER

## 2022-04-12 RX ORDER — ROCURONIUM BROMIDE 10 MG/ML
INJECTION, SOLUTION INTRAVENOUS PRN
Status: DISCONTINUED | OUTPATIENT
Start: 2022-04-12 | End: 2022-04-12 | Stop reason: SDUPTHER

## 2022-04-12 RX ORDER — SODIUM CHLORIDE 0.9 % (FLUSH) 0.9 %
5-40 SYRINGE (ML) INJECTION EVERY 12 HOURS SCHEDULED
Status: DISCONTINUED | OUTPATIENT
Start: 2022-04-12 | End: 2022-04-12 | Stop reason: HOSPADM

## 2022-04-12 RX ORDER — ACETAMINOPHEN 650 MG/1
650 SUPPOSITORY RECTAL EVERY 6 HOURS PRN
Status: DISCONTINUED | OUTPATIENT
Start: 2022-04-12 | End: 2022-04-15 | Stop reason: HOSPADM

## 2022-04-12 RX ORDER — IPRATROPIUM BROMIDE AND ALBUTEROL SULFATE 2.5; .5 MG/3ML; MG/3ML
1 SOLUTION RESPIRATORY (INHALATION)
Status: DISCONTINUED | OUTPATIENT
Start: 2022-04-12 | End: 2022-04-12 | Stop reason: SDUPTHER

## 2022-04-12 RX ORDER — SODIUM CHLORIDE 0.9 % (FLUSH) 0.9 %
5-40 SYRINGE (ML) INJECTION EVERY 12 HOURS SCHEDULED
Status: DISCONTINUED | OUTPATIENT
Start: 2022-04-12 | End: 2022-04-15 | Stop reason: HOSPADM

## 2022-04-12 RX ORDER — FENTANYL CITRATE 50 UG/ML
25 INJECTION, SOLUTION INTRAMUSCULAR; INTRAVENOUS EVERY 5 MIN PRN
Status: DISCONTINUED | OUTPATIENT
Start: 2022-04-12 | End: 2022-04-12 | Stop reason: HOSPADM

## 2022-04-12 RX ORDER — DEXTROSE MONOHYDRATE 50 MG/ML
100 INJECTION, SOLUTION INTRAVENOUS PRN
Status: DISCONTINUED | OUTPATIENT
Start: 2022-04-12 | End: 2022-04-15 | Stop reason: HOSPADM

## 2022-04-12 RX ORDER — ATORVASTATIN CALCIUM 10 MG/1
20 TABLET, FILM COATED ORAL DAILY
Status: DISCONTINUED | OUTPATIENT
Start: 2022-04-12 | End: 2022-04-15 | Stop reason: HOSPADM

## 2022-04-12 RX ORDER — BUPROPION HYDROCHLORIDE 100 MG/1
100 TABLET, EXTENDED RELEASE ORAL 2 TIMES DAILY
Status: DISCONTINUED | OUTPATIENT
Start: 2022-04-12 | End: 2022-04-15 | Stop reason: HOSPADM

## 2022-04-12 RX ORDER — LIDOCAINE HYDROCHLORIDE 20 MG/ML
INJECTION, SOLUTION INFILTRATION; PERINEURAL PRN
Status: DISCONTINUED | OUTPATIENT
Start: 2022-04-12 | End: 2022-04-12 | Stop reason: SDUPTHER

## 2022-04-12 RX ORDER — IPRATROPIUM BROMIDE AND ALBUTEROL SULFATE 2.5; .5 MG/3ML; MG/3ML
1 SOLUTION RESPIRATORY (INHALATION) EVERY 4 HOURS PRN
Status: DISCONTINUED | OUTPATIENT
Start: 2022-04-12 | End: 2022-04-15 | Stop reason: HOSPADM

## 2022-04-12 RX ORDER — HYDRALAZINE HYDROCHLORIDE 25 MG/1
25 TABLET, FILM COATED ORAL EVERY 8 HOURS SCHEDULED
Status: DISCONTINUED | OUTPATIENT
Start: 2022-04-12 | End: 2022-04-15 | Stop reason: HOSPADM

## 2022-04-12 RX ORDER — IPRATROPIUM BROMIDE AND ALBUTEROL SULFATE 2.5; .5 MG/3ML; MG/3ML
SOLUTION RESPIRATORY (INHALATION)
Status: DISPENSED
Start: 2022-04-12 | End: 2022-04-13

## 2022-04-12 RX ORDER — SODIUM CHLORIDE 9 MG/ML
INJECTION, SOLUTION INTRAVENOUS PRN
Status: DISCONTINUED | OUTPATIENT
Start: 2022-04-12 | End: 2022-04-12 | Stop reason: HOSPADM

## 2022-04-12 RX ORDER — SODIUM CHLORIDE 0.9 % (FLUSH) 0.9 %
5-40 SYRINGE (ML) INJECTION PRN
Status: DISCONTINUED | OUTPATIENT
Start: 2022-04-12 | End: 2022-04-15 | Stop reason: HOSPADM

## 2022-04-12 RX ORDER — PROPOFOL 10 MG/ML
INJECTION, EMULSION INTRAVENOUS PRN
Status: DISCONTINUED | OUTPATIENT
Start: 2022-04-12 | End: 2022-04-12 | Stop reason: SDUPTHER

## 2022-04-12 RX ORDER — ONDANSETRON 2 MG/ML
INJECTION INTRAMUSCULAR; INTRAVENOUS PRN
Status: DISCONTINUED | OUTPATIENT
Start: 2022-04-12 | End: 2022-04-12 | Stop reason: SDUPTHER

## 2022-04-12 RX ORDER — LIDOCAINE HYDROCHLORIDE 10 MG/ML
1 INJECTION, SOLUTION EPIDURAL; INFILTRATION; INTRACAUDAL; PERINEURAL
Status: DISCONTINUED | OUTPATIENT
Start: 2022-04-12 | End: 2022-04-12 | Stop reason: HOSPADM

## 2022-04-12 RX ORDER — SODIUM CHLORIDE 0.9 % (FLUSH) 0.9 %
10 SYRINGE (ML) INJECTION EVERY 12 HOURS SCHEDULED
Status: DISCONTINUED | OUTPATIENT
Start: 2022-04-12 | End: 2022-04-12 | Stop reason: HOSPADM

## 2022-04-12 RX ORDER — SODIUM CHLORIDE 9 MG/ML
25 INJECTION, SOLUTION INTRAVENOUS PRN
Status: DISCONTINUED | OUTPATIENT
Start: 2022-04-12 | End: 2022-04-12 | Stop reason: HOSPADM

## 2022-04-12 RX ORDER — ASPIRIN 81 MG/1
81 TABLET, CHEWABLE ORAL DAILY
Status: DISCONTINUED | OUTPATIENT
Start: 2022-04-13 | End: 2022-04-15 | Stop reason: HOSPADM

## 2022-04-12 RX ORDER — POLYETHYLENE GLYCOL 3350 17 G/17G
17 POWDER, FOR SOLUTION ORAL DAILY PRN
Status: DISCONTINUED | OUTPATIENT
Start: 2022-04-12 | End: 2022-04-15 | Stop reason: HOSPADM

## 2022-04-12 RX ORDER — ONDANSETRON 2 MG/ML
4 INJECTION INTRAMUSCULAR; INTRAVENOUS EVERY 6 HOURS PRN
Status: DISCONTINUED | OUTPATIENT
Start: 2022-04-12 | End: 2022-04-15 | Stop reason: HOSPADM

## 2022-04-12 RX ORDER — DIGOXIN 125 MCG
0.12 TABLET ORAL DAILY
Status: DISCONTINUED | OUTPATIENT
Start: 2022-04-12 | End: 2022-04-15 | Stop reason: HOSPADM

## 2022-04-12 RX ORDER — IPRATROPIUM BROMIDE AND ALBUTEROL SULFATE 2.5; .5 MG/3ML; MG/3ML
1 SOLUTION RESPIRATORY (INHALATION) ONCE
Status: COMPLETED | OUTPATIENT
Start: 2022-04-12 | End: 2022-04-12

## 2022-04-12 RX ORDER — LEVOFLOXACIN 5 MG/ML
500 INJECTION, SOLUTION INTRAVENOUS DAILY
Status: DISCONTINUED | OUTPATIENT
Start: 2022-04-13 | End: 2022-04-14

## 2022-04-12 RX ORDER — TORSEMIDE 20 MG/1
50 TABLET ORAL DAILY
Status: DISCONTINUED | OUTPATIENT
Start: 2022-04-13 | End: 2022-04-15 | Stop reason: HOSPADM

## 2022-04-12 RX ORDER — ACETAMINOPHEN 325 MG/1
650 TABLET ORAL EVERY 6 HOURS PRN
Status: DISCONTINUED | OUTPATIENT
Start: 2022-04-12 | End: 2022-04-15 | Stop reason: HOSPADM

## 2022-04-12 RX ORDER — NICOTINE POLACRILEX 4 MG
15 LOZENGE BUCCAL PRN
Status: DISCONTINUED | OUTPATIENT
Start: 2022-04-12 | End: 2022-04-12 | Stop reason: ALTCHOICE

## 2022-04-12 RX ORDER — SODIUM CHLORIDE, SODIUM LACTATE, POTASSIUM CHLORIDE, CALCIUM CHLORIDE 600; 310; 30; 20 MG/100ML; MG/100ML; MG/100ML; MG/100ML
INJECTION, SOLUTION INTRAVENOUS CONTINUOUS PRN
Status: DISCONTINUED | OUTPATIENT
Start: 2022-04-12 | End: 2022-04-12 | Stop reason: SDUPTHER

## 2022-04-12 RX ORDER — NORTRIPTYLINE HYDROCHLORIDE 10 MG/1
10 CAPSULE ORAL NIGHTLY
Status: DISCONTINUED | OUTPATIENT
Start: 2022-04-12 | End: 2022-04-15 | Stop reason: HOSPADM

## 2022-04-12 RX ORDER — ONDANSETRON 4 MG/1
4 TABLET, ORALLY DISINTEGRATING ORAL EVERY 8 HOURS PRN
Status: DISCONTINUED | OUTPATIENT
Start: 2022-04-12 | End: 2022-04-15 | Stop reason: HOSPADM

## 2022-04-12 RX ORDER — SODIUM CHLORIDE 9 MG/ML
INJECTION, SOLUTION INTRAVENOUS PRN
Status: DISCONTINUED | OUTPATIENT
Start: 2022-04-12 | End: 2022-04-15 | Stop reason: HOSPADM

## 2022-04-12 RX ORDER — ONDANSETRON 2 MG/ML
4 INJECTION INTRAMUSCULAR; INTRAVENOUS
Status: DISCONTINUED | OUTPATIENT
Start: 2022-04-12 | End: 2022-04-12 | Stop reason: HOSPADM

## 2022-04-12 RX ORDER — METHYLPREDNISOLONE SODIUM SUCCINATE 40 MG/ML
60 INJECTION, POWDER, LYOPHILIZED, FOR SOLUTION INTRAMUSCULAR; INTRAVENOUS ONCE
Status: COMPLETED | OUTPATIENT
Start: 2022-04-12 | End: 2022-04-12

## 2022-04-12 RX ORDER — DILTIAZEM HYDROCHLORIDE 240 MG/1
240 CAPSULE, COATED, EXTENDED RELEASE ORAL DAILY
Status: DISCONTINUED | OUTPATIENT
Start: 2022-04-12 | End: 2022-04-15 | Stop reason: HOSPADM

## 2022-04-12 RX ORDER — LISINOPRIL 20 MG/1
20 TABLET ORAL DAILY
Status: DISCONTINUED | OUTPATIENT
Start: 2022-04-12 | End: 2022-04-15 | Stop reason: HOSPADM

## 2022-04-12 RX ORDER — SODIUM CHLORIDE, SODIUM LACTATE, POTASSIUM CHLORIDE, CALCIUM CHLORIDE 600; 310; 30; 20 MG/100ML; MG/100ML; MG/100ML; MG/100ML
INJECTION, SOLUTION INTRAVENOUS CONTINUOUS
Status: DISCONTINUED | OUTPATIENT
Start: 2022-04-12 | End: 2022-04-12 | Stop reason: HOSPADM

## 2022-04-12 RX ORDER — DEXTROSE MONOHYDRATE 25 G/50ML
12.5 INJECTION, SOLUTION INTRAVENOUS PRN
Status: DISCONTINUED | OUTPATIENT
Start: 2022-04-12 | End: 2022-04-12 | Stop reason: ALTCHOICE

## 2022-04-12 RX ADMIN — IPRATROPIUM BROMIDE AND ALBUTEROL SULFATE 1 AMPULE: 2.5; .5 SOLUTION RESPIRATORY (INHALATION) at 14:31

## 2022-04-12 RX ADMIN — SUGAMMADEX 200 MG: 100 INJECTION, SOLUTION INTRAVENOUS at 12:35

## 2022-04-12 RX ADMIN — PHENYLEPHRINE HYDROCHLORIDE 200 MCG: 10 INJECTION INTRAVENOUS at 11:30

## 2022-04-12 RX ADMIN — METHYLPREDNISOLONE SODIUM SUCCINATE 60 MG: 40 INJECTION, POWDER, FOR SOLUTION INTRAMUSCULAR; INTRAVENOUS at 14:36

## 2022-04-12 RX ADMIN — LIDOCAINE HYDROCHLORIDE 100 MG: 20 INJECTION, SOLUTION INFILTRATION; PERINEURAL at 11:13

## 2022-04-12 RX ADMIN — PHENYLEPHRINE HYDROCHLORIDE 200 MCG: 10 INJECTION INTRAVENOUS at 11:28

## 2022-04-12 RX ADMIN — Medication 10 ML: at 20:04

## 2022-04-12 RX ADMIN — PHENYLEPHRINE HYDROCHLORIDE 200 MCG: 10 INJECTION INTRAVENOUS at 11:25

## 2022-04-12 RX ADMIN — METHYLPREDNISOLONE SODIUM SUCCINATE 40 MG: 40 INJECTION, POWDER, FOR SOLUTION INTRAMUSCULAR; INTRAVENOUS at 21:22

## 2022-04-12 RX ADMIN — ROCURONIUM BROMIDE 60 MG: 10 INJECTION, SOLUTION INTRAVENOUS at 11:14

## 2022-04-12 RX ADMIN — PHENYLEPHRINE HYDROCHLORIDE 200 MCG: 10 INJECTION INTRAVENOUS at 11:49

## 2022-04-12 RX ADMIN — PROPOFOL 200 MG: 10 INJECTION, EMULSION INTRAVENOUS at 11:14

## 2022-04-12 RX ADMIN — PHENYLEPHRINE HYDROCHLORIDE 200 MCG: 10 INJECTION INTRAVENOUS at 11:39

## 2022-04-12 RX ADMIN — SUGAMMADEX 200 MG: 100 INJECTION, SOLUTION INTRAVENOUS at 11:56

## 2022-04-12 RX ADMIN — SODIUM CHLORIDE, POTASSIUM CHLORIDE, SODIUM LACTATE AND CALCIUM CHLORIDE: 600; 310; 30; 20 INJECTION, SOLUTION INTRAVENOUS at 11:10

## 2022-04-12 RX ADMIN — LEVOFLOXACIN 500 MG: 500 INJECTION, SOLUTION INTRAVENOUS at 15:49

## 2022-04-12 RX ADMIN — IPRATROPIUM BROMIDE AND ALBUTEROL SULFATE 1 AMPULE: 2.5; .5 SOLUTION RESPIRATORY (INHALATION) at 19:16

## 2022-04-12 RX ADMIN — IPRATROPIUM BROMIDE AND ALBUTEROL SULFATE 1 AMPULE: 2.5; .5 SOLUTION RESPIRATORY (INHALATION) at 12:51

## 2022-04-12 RX ADMIN — ONDANSETRON HYDROCHLORIDE 4 MG: 2 INJECTION, SOLUTION INTRAMUSCULAR; INTRAVENOUS at 11:20

## 2022-04-12 ASSESSMENT — PULMONARY FUNCTION TESTS
PIF_VALUE: 14
PIF_VALUE: 4
PIF_VALUE: 33
PIF_VALUE: 10
PIF_VALUE: 32
PIF_VALUE: 12
PIF_VALUE: 1
PIF_VALUE: 2
PIF_VALUE: 27
PIF_VALUE: 28
PIF_VALUE: 25
PIF_VALUE: 32
PIF_VALUE: 32
PIF_VALUE: 9
PIF_VALUE: 25
PIF_VALUE: 25
PIF_VALUE: 32
PIF_VALUE: 5
PIF_VALUE: 32
PIF_VALUE: 0
PIF_VALUE: 28
PIF_VALUE: 2
PIF_VALUE: 32
PIF_VALUE: 24
PIF_VALUE: 25
PIF_VALUE: 25
PIF_VALUE: 37
PIF_VALUE: 21
PIF_VALUE: 18
PIF_VALUE: 17
PIF_VALUE: 30
PIF_VALUE: 32
PIF_VALUE: 32
PIF_VALUE: 25
PIF_VALUE: 25
PIF_VALUE: 33
PIF_VALUE: 8
PIF_VALUE: 26
PIF_VALUE: 32
PIF_VALUE: 25
PIF_VALUE: 27
PIF_VALUE: 35
PIF_VALUE: 25
PIF_VALUE: 0
PIF_VALUE: 32
PIF_VALUE: 32
PIF_VALUE: 25
PIF_VALUE: 28
PIF_VALUE: 2
PIF_VALUE: 35
PIF_VALUE: 0

## 2022-04-12 ASSESSMENT — PAIN SCALES - GENERAL
PAINLEVEL_OUTOF10: 0

## 2022-04-12 ASSESSMENT — PAIN - FUNCTIONAL ASSESSMENT: PAIN_FUNCTIONAL_ASSESSMENT: 0-10

## 2022-04-12 NOTE — PROGRESS NOTES
Pt transferred up to ICU bed 3008 via stretcher, monitor, and NC 4L. VSS with Sp02 95%. Pt placed on bipap in room. VSS with Sp02 95% on bipap. Report given to Ifeanyi Vilchis RN.

## 2022-04-12 NOTE — PROGRESS NOTES
04/12/22 1913   NIV Type   NIV Started/Stopped On   Equipment Type v60   Mode Bilevel   Mask Type Full face mask   Mask Size Medium   Settings/Measurements   IPAP 24 cmH20   CPAP/EPAP 8 cmH2O   Rate Ordered 16   Resp 16   FiO2  35 %   Vt Exhaled 772 mL   Minute Volume 12.3 Liters   Mask Leak (lpm) 0 lpm   Comfort Level Good   Using Accessory Muscles No   SpO2 99   Breath Sounds   Right Upper Lobe Diminished   Right Middle Lobe Diminished   Right Lower Lobe Diminished   Left Upper Lobe Diminished   Left Lower Lobe Diminished   Alarm Settings   Alarms On Y   Press Low Alarm 8 cmH2O   High Pressure Alarm 35 cmH2O   Delay Alarm 20 sec(s)   Resp Rate Low Alarm 14   High Respiratory Rate 40 br/min

## 2022-04-12 NOTE — CONSULTS
Patient is being seen at the request of MELISSA Barros CNP  for a consultation for hypercapnic respiratory failure    HISTORY OF PRESENT ILLNESS:   74years old with history of CAD, A. fib, COPD underwent a bronchoscopy with biopsy of left lower lobe mass/mediastinal adenopathy. Post bronchoscopy patient harder to wake up ABG showed hypercapnia placed on BiPAP. Upon my examination patient responded to painful stimuli opens eyes follows commands. Patient is noncommunicative unable to obtain further HPI. PAST MEDICAL HISTORY:  Past Medical History:   Diagnosis Date    A-fib [facility])     CAD (coronary artery disease)     CHF (congestive heart failure) ([facility])     COPD (chronic obstructive pulmonary disease) (HCC)     Diabetes mellitus ([facility]     Hyperlipidemia     Hypertension     Thyroid disease      PAST SURGICAL HISTORY:  History reviewed. No pertinent surgical history. FAMILY HISTORY:  Patient is noncommunicative unable to obtain      SOCIAL HISTORY:   reports that he has been smoking. He has a 40.00 pack-year smoking history. He has never used smokeless tobacco.    Scheduled Meds:   sodium chloride flush  10 mL IntraVENous 2 times per day    ipratropium-albuterol  1 ampule Inhalation Once    sodium chloride flush  5-40 mL IntraVENous 2 times per day    ipratropium-albuterol  1 ampule Inhalation Q4H WA    ipratropium-albuterol  1 ampule Inhalation Q4H WA    levoFLOXacin  500 mg IntraVENous Once     Continuous Infusions:   lactated ringers      sodium chloride      sodium chloride       PRN Meds:  lidocaine PF, sodium chloride flush, sodium chloride, sodium chloride flush, sodium chloride, meperidine, fentanNYL, HYDROmorphone, ondansetron    ALLERGIES:  Patient is allergic to codeine. REVIEW OF SYSTEMS: Patient is noncommunicative unable to obtain      PHYSICAL EXAM:  Blood pressure (!) 120/108, pulse 81, temperature 97.5 °F (36.4 °C), temperature source Temporal, resp.  rate 16, height 6' (1.829 m), weight 272 lb (123.4 kg), SpO2 96 %.' on BiPAP 16/6 FiO2 50%  Gen: No distress. Morbidly obese  Eyes: PERRL. No sclera icterus. No conjunctival injection. ENT: No discharge. Pharynx clear. Neck: Trachea midline. No obvious mass. Resp: Mild accessory muscle use. No crackles. Bilateral wheezes. Few rhonchi. Left dullness on percussion. CV: Regular rate. Regular rhythm. No murmur or rub. No edema. GI: Non-tender. Non-distended. No hernia. Skin: Warm and dry. No nodule on exposed extremities. Lymph: No cervical LAD. No supraclavicular LAD. M/S: No cyanosis. No joint deformity. No clubbing. Neuro: Lethargic. Responded to painful stimuli followed commands. Psych: No agitation or anxiety. LABS:  CBC:   Recent Labs     04/12/22  1005        BMP: No results for input(s): NA, K, CL, CO2, PHOS, BUN, CREATININE, CA in the last 72 hours. LIVER PROFILE: No results for input(s): AST, ALT, LIPASE, BILIDIR, BILITOT, ALKPHOS in the last 72 hours. Invalid input(s): AMYLASE,  ALB  PT/INR:   Recent Labs     04/12/22  1005   PROTIME 12.9*   INR 1.14*     APTT: No results for input(s): APTT in the last 72 hours. UA:No results for input(s): NITRITE, COLORU, PHUR, LABCAST, WBCUA, RBCUA, MUCUS, TRICHOMONAS, YEAST, BACTERIA, CLARITYU, SPECGRAV, LEUKOCYTESUR, UROBILINOGEN, BILIRUBINUR, BLOODU, GLUCOSEU, AMORPHOUS in the last 72 hours.     Invalid input(s): Sima Small  Recent Labs     04/12/22  1421   PHART 7.244*   MVV9HTL 74.3*   PO2ART 78.3       Chest x-ray 4/12 imaging was reviewed by me and showed   Left lower lobe mass with effusion  Right basilar airspace disease    ASSESSMENT:  · Acute hypercapnic respiratory failure-probably slow to wake up from anesthesia and underlying obesity hypoventilation syndrome/CODY/COPD  · Acute encephalopathy/metabolic encephalopathy  · COPD with acute exacerbation  · Nocturnal hypoxemia on 2 L O2  · Left lower lobe/lingula 12 cm mass with hilar/mediastinal adenopathy post bronchoscopy/12/22  · Left mainstem endobronchial mass oozing blood on bronchoscopy/12/22  · Postobstructive pneumonia  · 50 pound weight loss  · Chronic diastolic CHF and A. fib on Eliquis  · More than 40-pack-year history of smoking    PLAN:  Bilevel non-invasive positive pressure ventilation per my orders-adjusted by me to 20/8. The ventilator was adjusted by me at the bedside for unstable, life threatening respiratory failure. Wean oxygen as tolerated. Nasal airway  Repeat ABG in 2 hours  Might need to head CT if no improvement  Inhaled bronchodilators  IV Solu-Medrol  · Levaquin IV  · Keep holding Eliquis/AC given friable easy to bleed endobronchial lesion until may be tomorrow.   · Discussed with internal medicine

## 2022-04-12 NOTE — PROCEDURES
PROCEDURE: Fiberoptic bronchoscopy with endobronchial ultrasound-guided biopsy of lymph nodes    DESCRIPTION OF PROCEDURE: Informed consent was obtained from the patient after explaining the risks and benefits. A time out was taken. Total intravenous anesthesia was kindly provided by the anesthetist.     The scope was passed with ease via the ETT. Direct visualization of the lymph nodes was obtained using endobronchial ultrasound (EBUS) guidance. A complete ultrasound lymph node exam was performed for lymph node stations 4, 7, 10 and 11. The following lymph nodes were subjected to EBUS guided biopsy using standard technique and in the following order:    1. Subcarinal (approximately > 3 cm in short axis)      Subsequently, a standard bronchoscope was used to perform a complete airway inspection. Friable fungating L main stem endobronchial lesion oozing some blood with touching and after Bx, entirely occluding the L main stem with inability to visualized beyond. Bilateral purulent secretions with mucus plugs. Multiple endobronchial Bx were obtain from L main stem endobronchial lesion. Ice saline and 1/18061 Epi was used to control blood oozing from  L main stem endobronchial lesion. Salnie injection followed therapeutic aspiration was performed. 1.  Washings were obtained from throughout the airways. 3.  A bronchoalveolar lavage was obtained from the L lung   4. Endobronchial biopsies were obtained from  L main stem endobronchial lesion    The patient tolerated the procedure well. Estimated blood loss was less than 10-15 ml. Recovery will be per the anesthesia team.      FOLLOW UP:  is with me in approximately three to five days. Patient is instructed to call with concerns and if follow up has not already been scheduled. In the event of severe symptoms or if the patient is unable to reach my office, instructions are given to proceed to the emergency department.    Advised family not to resume Eliquis until follow up with me on Monday given above findings

## 2022-04-12 NOTE — PLAN OF CARE
Admit to ICU    S/p Bronch today  Acute hypercapnic respiratory failure post bronch  Placed on BIPAP  Levaquin  IV solumedrol  Labs ordered   Discussed plan of care with Dr. Estuardo Lewis anticoagulation today    Olya Valladares, APRN - CNP

## 2022-04-12 NOTE — H&P
Fiberoptic bronchoscopy history:     Nursing History and Physical reviewed and agreed upon: For additional findings, please see my notes in Epic 4/7/22    Patient is allergic to codeine. Past Medical History:   Diagnosis Date    A-fib [facility])     CAD (coronary artery disease)     CHF (congestive heart failure) (HCC)     [facility]) [facility]     Diabetes mellitus ([facility]     Hyperlipidemia     Hypertension     Thyroid disease        History reviewed. No pertinent surgical history.     Allergies   Allergen Reactions    Codeine        Current Facility-Administered Medications   Medication Dose Route Frequency Provider Last Rate Last Admin    lidocaine PF 1 % injection 1 mL  1 mL IntraDERmal Once PRN Isai Moise MD        lactated ringers infusion   IntraVENous Continuous Isai Moise MD        sodium chloride flush 0.9 % injection 10 mL  10 mL IntraVENous 2 times per day Isai Moise MD        sodium chloride flush 0.9 % injection 10 mL  10 mL IntraVENous PRN Isai Moise MD        0.9 % sodium chloride infusion  25 mL IntraVENous PRN Isai Moise MD        ipratropium-albuterol (DUONEB) nebulizer solution 1 ampule  1 ampule Inhalation Once Isai Moise MD        sodium chloride flush 0.9 % injection 5-40 mL  5-40 mL IntraVENous 2 times per day Isai Moise MD        sodium chloride flush 0.9 % injection 5-40 mL  5-40 mL IntraVENous PRN Isai Moise MD        0.9 % sodium chloride infusion   IntraVENous PRN Isai Moise MD        meperidine (DEMEROL) injection 12.5 mg  12.5 mg IntraVENous Q5 Min PRN Isai Moise MD        fentaNYL (SUBLIMAZE) injection 25 mcg  25 mcg IntraVENous Q5 Min PRN Isai Moise MD        HYDROmorphone (DILAUDID) injection 0.5 mg  0.5 mg IntraVENous Q5 Min PRN Isai Moise MD        ondansetron [facility]) injection 4 mg  4 mg IntraVENous Once PRN Isai Moise MD           Medication list was reviewed and updated as needed in Epic     reports that he has been smoking. He has a 40.00 pack-year smoking history. He has never used smokeless tobacco.    family history is not on file. HENT: Airway patent and reviewed. Mallampati IV  Cardiovascular: Normal rate, regular rhythm, normal heart sounds. Pulmonary/Chest: No wheezes. Few rhonchi. No rales. Abdominal: Soft. Bowel sounds are normal. No distension. ASA CLASS    I. Normal, healthy adult    II. Mild systemic disease  X  III. Severe Systemic Disease    IV. Severe Systemic Disease which is a threat to life. Kandice Mirza Moribund      Sedation plan:   No sedation   Minimal   Moderate  X Sedation per anesthesia   Continue ICU sedation      Post Procedure Plan   Return to same level of care   ______________________       The risks and benefits of procedure, alternatives to the procedure and doing nothing have been discussed with patient including complications (collapse lung, bleed, mechanical ventilation and cardiopulmonary arrest). The patient understands and agrees to proceed.

## 2022-04-12 NOTE — PROGRESS NOTES
RT Inhaler-Nebulizer Bronchodilator Protocol Note    There is a bronchodilator order in the chart from a provider indicating to follow the RT Bronchodilator Protocol and there is an Initiate RT Inhaler-Nebulizer Bronchodilator Protocol order as well (see protocol at bottom of note). CXR Findings:  XR CHEST PORTABLE    Result Date: 4/12/2022  Moderate congestion and/or infiltrates identified in the lungs. Moderate left and trace right pleural effusions. The findings from the last RT Protocol Assessment were as follows:   History Pulmonary Disease: Chronic pulmonary disease  Respiratory Pattern: Regular pattern and RR 12-20 bpm  Breath Sounds: Slightly diminished and/or crackles  Cough: Strong, spontaneous, non-productive  Indication for Bronchodilator Therapy: Decreased or absent breath sounds  Bronchodilator Assessment Score: 4    Aerosolized bronchodilator medication orders have been revised according to the RT Inhaler-Nebulizer Bronchodilator Protocol below. Respiratory Therapist to perform RT Therapy Protocol Assessment initially then follow the protocol. Repeat RT Therapy Protocol Assessment PRN for score 0-3 or on second treatment, BID, and PRN for scores above 3. No Indications - adjust the frequency to every 6 hours PRN wheezing or bronchospasm, if no treatments needed after 48 hours then discontinue using Per Protocol order mode. If indication present, adjust the RT bronchodilator orders based on the Bronchodilator Assessment Score as indicated below. Use Inhaler orders unless patient has one or more of the following: on home nebulizer, not able to hold breath for 10 seconds, is not alert and oriented, cannot activate and use MDI correctly, or respiratory rate 25 breaths per minute or more, then use the equivalent nebulizer order(s) with same Frequency and PRN reasons based on the score.   If a patient is on this medication at home then do not decrease Frequency below that used at home.    0-3 - enter or revise RT bronchodilator order(s) to equivalent RT Bronchodilator order with Frequency of every 4 hours PRN for wheezing or increased work of breathing using Per Protocol order mode. 4-6 - enter or revise RT Bronchodilator order(s) to two equivalent RT bronchodilator orders with one order with BID Frequency and one order with Frequency of every 4 hours PRN wheezing or increased work of breathing using Per Protocol order mode. 7-10 - enter or revise RT Bronchodilator order(s) to two equivalent RT bronchodilator orders with one order with TID Frequency and one order with Frequency of every 4 hours PRN wheezing or increased work of breathing using Per Protocol order mode. 11-13 - enter or revise RT Bronchodilator order(s) to one equivalent RT bronchodilator order with QID Frequency and an Albuterol order with Frequency of every 4 hours PRN wheezing or increased work of breathing using Per Protocol order mode. Greater than 13 - enter or revise RT Bronchodilator order(s) to one equivalent RT bronchodilator order with every 4 hours Frequency and an Albuterol order with Frequency of every 2 hours PRN wheezing or increased work of breathing using Per Protocol order mode.          Electronically signed by Sugey Olmedo RCP on 4/12/2022 at 7:20 PM

## 2022-04-12 NOTE — H&P
Hospital Medicine History & Physical      PCP: Bayron Boogie MD    Date of Service: Pt seen/examined on 4/12/22 and admitted on 4/12/22 to Inpatient    CC: Lethargy after bronchoscopy today. History Of Present Illness: The patient is a 74 y.o. male with PMH below, presents with MS change following bronchoscopy today. Pt is not able to give much Hx 2/2 being on BIPAP. He does awaken to light physical stimuli which is an improvement over previously documented MS. He underwent bronchoscopy and Bx of LLL lung mass and mediastinal LN under the care of Dr. Tono Dowd today. Following that procedure he was difficult to arouse and was found to be hypercapnic. He is normaly only on 2L O2 ON only. No additional Hx available at this time. Past Medical History:        Diagnosis Date    [facility]-fib [facility])     CAD (coronary artery disease)     [facility]     [facility] obstructive [facility]) [facility] [address] [facility]     Diabetes mellitus ([facility] [address] [facility]     Hyperlipidemia     Hypertension     Thyroid disease        Past Surgical History:    History reviewed. No pertinent surgical history. Medications Prior to Admission:    Prior to Admission medications    Medication Sig Start Date End Date Taking?  Authorizing Provider   insulin detemir (LEVEMIR) 100 UNIT/ML injection vial Inject into the skin nightly    Historical Provider, MD   insulin aspart (NOVOLOG) 100 UNIT/ML injection vial Inject into the skin 3 times daily (before meals)    Historical Provider, MD   torsemide (DEMADEX) 100 MG tablet TAKE 1/2 (ONE-HALF) TABLET BY MOUTH ONCE DAILY 3/12/22   Historical Provider, MD   tiZANidine (ZANAFLEX) 2 MG tablet TAKE 1 TABLET BY MOUTH TWICE DAILY AS NEEDED 3/23/22   Historical Provider, MD   potassium chloride (MICRO-K) 10 MEQ extended release capsule TAKE 1 CAPSULE BY MOUTH TWICE DAILY 1/23/22   Historical Provider, MD   Meloxicam 15 MG TBDP Take 1 tablet by mouth daily    Historical Provider, MD HYDROcodone-acetaminophen (NORCO) 5-325 MG per tablet TAKE 1 TABLET BY MOUTH TWICE DAILY AS NEEDED 3/28/22   Historical Provider, MD   hydrALAZINE (APRESOLINE) 25 MG tablet TAKE 1 TABLET BY MOUTH THREE TIMES DAILY 3/23/22   Historical Provider, MD   digoxin (LANOXIN) 125 MCG tablet Take 0.125 mg by mouth daily 11/8/21   Historical Provider, MD   tiotropium (SPIRIVA RESPIMAT) 2.5 MCG/ACT AERS inhaler Inhale 2 puffs into the lungs daily 4/7/22   Iman Tatum MD   amLODIPine (NORVASC) 10 MG tablet Take 10 mg by mouth daily  1/4/19   Historical Provider, MD   ELIQUIS 5 MG TABS tablet Take 5 mg by mouth 2 times daily  1/21/19   Historical Provider, MD   atorvastatin (LIPITOR) 20 MG tablet Take 20 mg by mouth daily  1/19/19   Historical Provider, MD   buPROPion [facility] SR) 100 MG extended release tablet Take 100 mg by mouth 2 times daily  11/21/18   Historical Provider, MD   CARTIA  MG extended release capsule Take 240 mg by mouth daily  11/21/18   Historical Provider, MD   levothyroxine (SYNTHROID) 150 MCG tablet Take 150 mcg by mouth Daily  1/19/19   Historical Provider, MD   lisinopril (PRINIVIL;ZESTRIL) 20 MG tablet Take 20 mg by mouth  1/19/19   Historical Provider, MD   metFORMIN (GLUCOPHAGE) 850 MG tablet Take 850 mg by mouth daily (with breakfast)  1/19/19   Historical Provider, MD   metoprolol tartrate (LOPRESSOR) 25 MG tablet Take 25 mg by mouth 2 times daily  11/10/18   Historical Provider, MD   nitroGLYCERIN (NITROSTAT) 0.4 MG SL tablet Place 0.4 mg under the tongue every 5 minutes as needed  1/4/19   Historical Provider, MD   nortriptyline (PAMELOR) 10 MG capsule Take 10 mg by mouth nightly  1/19/19   Historical Provider, MD   Omega-3 Fatty Acids (FISH OIL) 1000 MG CAPS Take 3,000 mg by mouth 3 times daily    Historical Provider, MD   aspirin 81 MG tablet Take 81 mg by mouth daily    Historical Provider, MD       Allergies:  Codeine    Social History:    TOBACCO:   reports that he has been smoking. He has a 40.00 pack-year smoking history. He has never used smokeless tobacco.  ETOH:   reports previous alcohol use. Family History:  Reviewed in detail and negative for DM, Early CAD, Cancer (except as below). Positive as follows:    History reviewed. No pertinent family history. REVIEW OF SYSTEMS:   Unable to obtain 2/2 pt MS and being on BIPAP. PHYSICAL EXAM PERFORMED:    /82   Pulse 79   Temp 96.7 °F (35.9 °C) (Axillary)   Resp 22   Ht 6' (1.829 m)   Wt 268 lb 1.3 oz (121.6 kg)   SpO2 99%   BMI 36.36 kg/m²     GEN:  Somnolent, awakens to name and light stimuli. On NIV. Morbidly obese. HEENT:  NC/AT,EOMI, MMM, no erythema/exudates or visible masses. CVS:  Normal S1,S2. RRR. Without M/G/R.   LUNG:   Diminished bilat. No wheezes, rales or rhonchi. Tachypnea. Mild increased WOB. ABD:  Soft, ND/NT, BS+ x4. Without G/R.  EXT: 2+ pulses, no c/c/e. Brisk cap refill. PSY:  Limited ability to assess. Thought process somewhat slowed, affect mildly flattened. JULIA:  Follows only basic commands. Limited exam.  Grossly: CN III-XII intact, moves all 4 spontaneously, sensory grossly intact. SKIN: No rash or lesions on visible skin. Chart review shows recent radiographs:  XR CHEST PORTABLE    Result Date: 4/12/2022  EXAMINATION: ONE XRAY VIEW OF THE CHEST 4/12/2022 2:33 pm COMPARISON: None. HISTORY: ORDERING SYSTEM PROVIDED HISTORY: decrease loc TECHNOLOGIST PROVIDED HISTORY: Reason for exam:->decrease loc Reason for Exam: Decreased loc FINDINGS: Low lung volumes/poor inspiratory effort limiting the study. Mild-to-moderate cardiomegaly. Moderate left pleural effusion and trace right pleural effusion. Moderate congestion and/or infiltrates identified in the lungs. No pneumothorax. Moderate osteopenic changes and degenerative changes noted in the bony structures. .     Moderate congestion and/or infiltrates identified in the lungs.   Moderate left and trace right pleural effusions. CBC:  Recent Labs     04/12/22  1005 04/12/22  1753   WBC  --  13.1*   HGB  --  14.0   HCT  --  44.0    341        RENAL  Recent Labs     04/12/22  1700      K 4.3   CL 99   CO2 30   BUN 15   CREATININE 0.8   GLUCOSE 137*     LFT'S:  Recent Labs     04/12/22  1700   AST 23   ALT 27   BILIDIR <0.2   BILITOT <0.2   ALKPHOS 187*       COAG:  Recent Labs     04/12/22  1005   INR 1.14*       CARDIAC ENZYMES:   Recent Labs     04/12/22  1700   TROPONINI 0.01     Lab Results   Component Value Date    PROBNP 592 (H) 04/12/2022     LACTIC ACID:  Recent Labs     04/12/22  1753   LACTA 1.6     ABG:   Recent Labs     04/12/22  1700 04/12/22  1839   PHART 7.246* 7.271*   ZIY0MJM 71.5* 71.3*   PO2ART 93.8 79.0   CBD0GEU 30.4* 32.1*   L9CYZQZC 95.7 93.6   ZCI9GXS 32.6 34.3   BEART 0.6 2.7       PHYSICIAN CERTIFICATION  I certify that Evangelist Abraham is expected to be hospitalized for 2 midnights based on the following assessment and plan:    ASSESSMENT/PLAN:  1. Acute on chronic respiratory failure with hypoxia and hypercapnia. Pt lethargic following bronchoscopy/Bx for large LLL mass (12 cm) and mediastinal lymphadenopathy today. Probable MOHS/CODY component. Supplemental O2 to maintain SPO2 ? 92%, continuous pulse ox. Wean as tolerated to home O2 of 2L ON. Continue BiPAP. Possible post obstructive PNA. IV Levaquin. IV solumedrol. IBD. Intensivist c/s. 2. Hypercalcemia, 13.7. Added on LFT's and will get ionized Ca. No previous for comparison. Bone mets? 3. LLL mass, 12 cm.  + compression of nearby structures, associated small loculated pleural effusion. Concerning for bronchogenic carcinoma. F/u path. 4. DM2, hold oral Rx, chk A1c, add Low SSI q4h.    5. Leukocytosis, 13.1. Repeat in am.   6. Acute encephalopathy, improving. Likely 2/2 hypercapnia. Monitor. 7. Hx a fib/CHF. Holding home Eliquis for now. Cont rest of home regimen.       DVT Prophylaxis: SCD until Eliquis

## 2022-04-12 NOTE — PROGRESS NOTES
Patient admitted from outpatient ebus to ICU #8. (2) Peripheral IV's were added #20 to left hand and right a/c. A blood gas was drawn. Patient was transported on 4 liters nasal canula and then switched to bi-pap. An external catheter was placed as patient was incontinent of urine when he arrived. Pt opens his eyes on command, but continues confused.  VSS, telemetry SR.

## 2022-04-12 NOTE — ANESTHESIA POSTPROCEDURE EVALUATION
Department of Anesthesiology  Postprocedure Note    Patient: Flor Ross  MRN: [id number]  YOB: 1955  Date of evaluation: 4/12/2022    Procedure Summary     Date: 04/12/22 Room / Location: [address] [place] [address]    Anesthesia Start: 1110 Anesthesia Stop: 1214    Procedures:       EBUS WF W/ANES. (11:00) (N/A )      BRONCHOSCOPY ALVEOLAR LAVAGE      BRONCHOSCOPY BIOPSY BRONCHUS      BRONCHOSCOPY/TRANSBRONCHIAL NEEDLE BIOPSY Diagnosis: (LUNG MASS)    Surgeons: Audrey Queen MD Responsible Provider: Jesse Rosales MD    Anesthesia Type: general ASA Status: 3        Anesthesia Type: general    Jose David Phase I: Jose David Score: 7    Jose David Phase II:      Last vitals: Reviewed and per EMR flowsheets. Anesthesia Post Evaluation   Anesthetic Problems: no   Last Vitals: See EMR   Cardiovascular System Stable: yes  Respiratory Function: Airway Patent yes  ETT no  Ventilator yes- BiPAP applied  Level of consciousness: awake and sleepy  Post-op pain: adequate analgesia  Hydration Adequate: yes  Nausea/Vomiting:no  Other Issues: Post-op patient appeared weak- additional reversal agent given and BiPAP applied. ABG showed acute on chronic respiratory acidosis. A left 7.5 F nasal trumpet was inserted atraumatically. Patient will go to the ICU tonoight to wean from the BiPAP.     Kayley Barker MD

## 2022-04-12 NOTE — PROGRESS NOTES
Pre-Operative:  1. Patient/Caregiver identifies - states name and date of birth. 2.  The patient is free from signs and symptoms of injury. 3.  The patient receives appropriate medication(s), safely administered during the Perioperative period. 4.  The patients's fluid, electrolyte, and acid-base balances are established preoperatively. 5.  The patient's pulmonary function is established preoperatively. 6.  The patient's cardiovascular status is established preoperatively. 7.  The patient / caregiver demonstrates knowledge of nutritional management related to the operative or other invasive procedure. 8.  The patient/caregiver demonstrates knowledge of medication management. 9.  The patient/caregiver demonstrates knowledge of pain management. 10.  The patient/caregiver participates in decisions affection his or her Perioperative plan of care. 11. The patient's care is consistent with the individualized Perioperative plan of care. 12.  The patient's right to privacy is maintained. 13. The patient is the recipient of competent and ethical care within legal standards of practice. 14.  The patient's value system, lifestyle, ethnicity, and culture are considered, respected, and incorporated in the Perioperative plan of care and understands special services available. 15.  The patient demonstrates and/or reports adequate pain control throughout the the Perioperative period. 16.  The patient's neurological status is established preoperatively. 17.  The patient/caregiver demonstrates knowledge of the expected responses to the endoscopy procedure. 18.  Patient/Caregiver has reduced anxiety. Interventions- Familiarize with environment and equipment. 19. Patient/Caregiver verbalizes understanding of Phase II and/or Phase I process. 20.  Patient pain level is established preoperatively using age appropriate pain scale. 21.   The patient will move to fall risk upon sedation- during and through the recovery phase.  Interventions- orient the patient to the environment, especially the location of the bathroom; provide treaded socks/non-skid footwear; demonstrate and teach back use of the nurse's call system; instruct the patient to call for help before getting out of bed; lock all movable equipment before transferring patient; keep bed in lowest position possible.

## 2022-04-12 NOTE — PROGRESS NOTES
Blood gas results called to Dr. Ana Pickett, to change bipap settings to 24/8 and check blood gas in one hour.

## 2022-04-12 NOTE — PROGRESS NOTES
Received report from Bridget Fuentes CRNA and Greg Haddad RN. Skin dusky grey. Pt placed on monitor with Sp02 sats in the 80's on NC. Placed simple mask at 11 L. Sp02 increased to 98%. Heart rhythm atrial fibrillation controlled. Pt moaning and moving head uncontrollably. Will continue to monitor.

## 2022-04-12 NOTE — PROGRESS NOTES
Pt not doing well.spoke to dr Parmjit Paula. He ordered bipap. rtd notified. dr Mandeep Salter notified of condition. orders received and completed. Spoke with clinical  chel and made her aware of need for icu bed.consult placed to dr Rena Baum for admission to hospital.Pt family ,lei made aware of pt condition and status .

## 2022-04-12 NOTE — PROGRESS NOTES
Pt was explained plan of care prior to bipap. Pt verbalized understanding. BIPAP 16/6 35%. VSS with Sp02 100%. will continue to monitor.

## 2022-04-13 ENCOUNTER — APPOINTMENT (OUTPATIENT)
Dept: GENERAL RADIOLOGY | Age: 67
DRG: 189 | End: 2022-04-13
Attending: INTERNAL MEDICINE
Payer: MEDICARE

## 2022-04-13 ENCOUNTER — APPOINTMENT (OUTPATIENT)
Dept: ULTRASOUND IMAGING | Age: 67
DRG: 189 | End: 2022-04-13
Attending: INTERNAL MEDICINE
Payer: MEDICARE

## 2022-04-13 LAB
A/G RATIO: 1 (ref 1.1–2.2)
ALBUMIN SERPL-MCNC: 3.4 G/DL (ref 3.4–5)
ALP BLD-CCNC: 186 U/L (ref 40–129)
ALT SERPL-CCNC: 23 U/L (ref 10–40)
ANION GAP SERPL CALCULATED.3IONS-SCNC: 7 MMOL/L (ref 3–16)
AST SERPL-CCNC: 19 U/L (ref 15–37)
BASE EXCESS ARTERIAL: 0.8 MMOL/L (ref -3–3)
BASOPHILS ABSOLUTE: 0 K/UL (ref 0–0.2)
BASOPHILS RELATIVE PERCENT: 0 %
BILIRUB SERPL-MCNC: 0.3 MG/DL (ref 0–1)
BUN BLDV-MCNC: 18 MG/DL (ref 7–20)
CALCIUM SERPL-MCNC: 13.6 MG/DL (ref 8.3–10.6)
CARBOXYHEMOGLOBIN ARTERIAL: 1.9 % (ref 0–1.5)
CHLORIDE BLD-SCNC: 100 MMOL/L (ref 99–110)
CO2: 30 MMOL/L (ref 21–32)
CREAT SERPL-MCNC: 1 MG/DL (ref 0.8–1.3)
EOSINOPHILS ABSOLUTE: 0 K/UL (ref 0–0.6)
EOSINOPHILS RELATIVE PERCENT: 0 %
ESTIMATED AVERAGE GLUCOSE: 134.1 MG/DL
GFR AFRICAN AMERICAN: >60
GFR NON-AFRICAN AMERICAN: >60
GLUCOSE BLD-MCNC: 131 MG/DL (ref 70–99)
GLUCOSE BLD-MCNC: 135 MG/DL (ref 70–99)
GLUCOSE BLD-MCNC: 138 MG/DL (ref 70–99)
GLUCOSE BLD-MCNC: 149 MG/DL (ref 70–99)
GLUCOSE BLD-MCNC: 150 MG/DL (ref 70–99)
GLUCOSE BLD-MCNC: 157 MG/DL (ref 70–99)
HBA1C MFR BLD: 6.3 %
HCO3 ARTERIAL: 28.5 MMOL/L (ref 21–29)
HCT VFR BLD CALC: 42.6 % (ref 40.5–52.5)
HEMOGLOBIN, ART, EXTENDED: 14.6 G/DL (ref 13.5–17.5)
HEMOGLOBIN: 13.5 G/DL (ref 13.5–17.5)
LACTATE DEHYDROGENASE: 261 U/L (ref 100–190)
LYMPHOCYTES ABSOLUTE: 0.4 K/UL (ref 1–5.1)
LYMPHOCYTES RELATIVE PERCENT: 3.4 %
MCH RBC QN AUTO: 28.9 PG (ref 26–34)
MCHC RBC AUTO-ENTMCNC: 31.7 G/DL (ref 31–36)
MCV RBC AUTO: 91.1 FL (ref 80–100)
METHEMOGLOBIN ARTERIAL: 0.3 %
MONOCYTES ABSOLUTE: 0.1 K/UL (ref 0–1.3)
MONOCYTES RELATIVE PERCENT: 1 %
NEUTROPHILS ABSOLUTE: 12.4 K/UL (ref 1.7–7.7)
NEUTROPHILS RELATIVE PERCENT: 95.6 %
O2 SAT, ARTERIAL: 91.8 %
O2 THERAPY: ABNORMAL
PCO2 ARTERIAL: 58.4 MMHG (ref 35–45)
PDW BLD-RTO: 16.6 % (ref 12.4–15.4)
PERFORMED ON: ABNORMAL
PH ARTERIAL: 7.31 (ref 7.35–7.45)
PLATELET # BLD: 313 K/UL (ref 135–450)
PMV BLD AUTO: 6.7 FL (ref 5–10.5)
PO2 ARTERIAL: 68.8 MMHG (ref 75–108)
POTASSIUM REFLEX MAGNESIUM: 4.9 MMOL/L (ref 3.5–5.1)
RBC # BLD: 4.67 M/UL (ref 4.2–5.9)
SODIUM BLD-SCNC: 137 MMOL/L (ref 136–145)
TCO2 ARTERIAL: 30.3 MMOL/L
TOTAL PROTEIN: 6.7 G/DL (ref 6.4–8.2)
TOTAL PROTEIN: 6.8 G/DL (ref 6.4–8.2)
WBC # BLD: 13 K/UL (ref 4–11)

## 2022-04-13 PROCEDURE — 36415 COLL VENOUS BLD VENIPUNCTURE: CPT

## 2022-04-13 PROCEDURE — 6370000000 HC RX 637 (ALT 250 FOR IP): Performed by: NURSE PRACTITIONER

## 2022-04-13 PROCEDURE — 3E0F8GC INTRODUCTION OF OTHER THERAPEUTIC SUBSTANCE INTO RESPIRATORY TRACT, VIA NATURAL OR ARTIFICIAL OPENING ENDOSCOPIC: ICD-10-PCS | Performed by: INTERNAL MEDICINE

## 2022-04-13 PROCEDURE — 76604 US EXAM CHEST: CPT

## 2022-04-13 PROCEDURE — 82652 VIT D 1 25-DIHYDROXY: CPT

## 2022-04-13 PROCEDURE — 94761 N-INVAS EAR/PLS OXIMETRY MLT: CPT

## 2022-04-13 PROCEDURE — 6360000002 HC RX W HCPCS: Performed by: INTERNAL MEDICINE

## 2022-04-13 PROCEDURE — 99233 SBSQ HOSP IP/OBS HIGH 50: CPT | Performed by: INTERNAL MEDICINE

## 2022-04-13 PROCEDURE — 0B978ZZ DRAINAGE OF LEFT MAIN BRONCHUS, VIA NATURAL OR ARTIFICIAL OPENING ENDOSCOPIC: ICD-10-PCS | Performed by: INTERNAL MEDICINE

## 2022-04-13 PROCEDURE — 94640 AIRWAY INHALATION TREATMENT: CPT

## 2022-04-13 PROCEDURE — 85025 COMPLETE CBC W/AUTO DIFF WBC: CPT

## 2022-04-13 PROCEDURE — 31645 BRNCHSC W/THER ASPIR 1ST: CPT | Performed by: INTERNAL MEDICINE

## 2022-04-13 PROCEDURE — 71045 X-RAY EXAM CHEST 1 VIEW: CPT

## 2022-04-13 PROCEDURE — 2700000000 HC OXYGEN THERAPY PER DAY

## 2022-04-13 PROCEDURE — 99153 MOD SED SAME PHYS/QHP EA: CPT | Performed by: INTERNAL MEDICINE

## 2022-04-13 PROCEDURE — 82542 COL CHROMOTOGRAPHY QUAL/QUAN: CPT

## 2022-04-13 PROCEDURE — 2580000003 HC RX 258: Performed by: INTERNAL MEDICINE

## 2022-04-13 PROCEDURE — 3609027000 HC BRONCHOSCOPY: Performed by: INTERNAL MEDICINE

## 2022-04-13 PROCEDURE — 6370000000 HC RX 637 (ALT 250 FOR IP): Performed by: INTERNAL MEDICINE

## 2022-04-13 PROCEDURE — 84155 ASSAY OF PROTEIN SERUM: CPT

## 2022-04-13 PROCEDURE — 2000000000 HC ICU R&B

## 2022-04-13 PROCEDURE — 2709999900 HC NON-CHARGEABLE SUPPLY: Performed by: INTERNAL MEDICINE

## 2022-04-13 PROCEDURE — 6360000002 HC RX W HCPCS

## 2022-04-13 PROCEDURE — 2580000003 HC RX 258: Performed by: NURSE PRACTITIONER

## 2022-04-13 PROCEDURE — 94669 MECHANICAL CHEST WALL OSCILL: CPT

## 2022-04-13 PROCEDURE — 82803 BLOOD GASES ANY COMBINATION: CPT

## 2022-04-13 PROCEDURE — 94660 CPAP INITIATION&MGMT: CPT

## 2022-04-13 PROCEDURE — 6360000002 HC RX W HCPCS: Performed by: NURSE PRACTITIONER

## 2022-04-13 PROCEDURE — 80053 COMPREHEN METABOLIC PANEL: CPT

## 2022-04-13 PROCEDURE — 83615 LACTATE (LD) (LDH) ENZYME: CPT

## 2022-04-13 PROCEDURE — 99152 MOD SED SAME PHYS/QHP 5/>YRS: CPT | Performed by: INTERNAL MEDICINE

## 2022-04-13 RX ORDER — FENTANYL CITRATE 50 UG/ML
INJECTION, SOLUTION INTRAMUSCULAR; INTRAVENOUS PRN
Status: DISCONTINUED | OUTPATIENT
Start: 2022-04-13 | End: 2022-04-13 | Stop reason: ALTCHOICE

## 2022-04-13 RX ORDER — GUAIFENESIN 600 MG/1
600 TABLET, EXTENDED RELEASE ORAL 2 TIMES DAILY
Status: DISCONTINUED | OUTPATIENT
Start: 2022-04-13 | End: 2022-04-15 | Stop reason: HOSPADM

## 2022-04-13 RX ORDER — SODIUM CHLORIDE 9 MG/ML
INJECTION, SOLUTION INTRAVENOUS CONTINUOUS
Status: DISCONTINUED | OUTPATIENT
Start: 2022-04-13 | End: 2022-04-14

## 2022-04-13 RX ORDER — EPINEPHRINE 1 MG/ML(1)
AMPUL (ML) INJECTION PRN
Status: DISCONTINUED | OUTPATIENT
Start: 2022-04-13 | End: 2022-04-13 | Stop reason: ALTCHOICE

## 2022-04-13 RX ORDER — FUROSEMIDE 10 MG/ML
20 INJECTION INTRAMUSCULAR; INTRAVENOUS ONCE
Status: COMPLETED | OUTPATIENT
Start: 2022-04-13 | End: 2022-04-13

## 2022-04-13 RX ORDER — MIDAZOLAM HYDROCHLORIDE 5 MG/ML
INJECTION INTRAMUSCULAR; INTRAVENOUS PRN
Status: DISCONTINUED | OUTPATIENT
Start: 2022-04-13 | End: 2022-04-13 | Stop reason: ALTCHOICE

## 2022-04-13 RX ADMIN — SODIUM CHLORIDE: 9 INJECTION, SOLUTION INTRAVENOUS at 07:57

## 2022-04-13 RX ADMIN — LEVOFLOXACIN 500 MG: 500 INJECTION, SOLUTION INTRAVENOUS at 14:54

## 2022-04-13 RX ADMIN — SODIUM CHLORIDE: 9 INJECTION, SOLUTION INTRAVENOUS at 16:21

## 2022-04-13 RX ADMIN — GUAIFENESIN 600 MG: 600 TABLET, EXTENDED RELEASE ORAL at 20:40

## 2022-04-13 RX ADMIN — INSULIN LISPRO 1 UNITS: 100 INJECTION, SOLUTION INTRAVENOUS; SUBCUTANEOUS at 08:21

## 2022-04-13 RX ADMIN — INSULIN LISPRO 1 UNITS: 100 INJECTION, SOLUTION INTRAVENOUS; SUBCUTANEOUS at 19:43

## 2022-04-13 RX ADMIN — IPRATROPIUM BROMIDE AND ALBUTEROL SULFATE 1 AMPULE: .5; 3 SOLUTION RESPIRATORY (INHALATION) at 14:49

## 2022-04-13 RX ADMIN — IPRATROPIUM BROMIDE AND ALBUTEROL SULFATE 1 AMPULE: .5; 3 SOLUTION RESPIRATORY (INHALATION) at 07:22

## 2022-04-13 RX ADMIN — HYDRALAZINE HYDROCHLORIDE 25 MG: 25 TABLET, FILM COATED ORAL at 05:56

## 2022-04-13 RX ADMIN — IPRATROPIUM BROMIDE AND ALBUTEROL SULFATE 1 AMPULE: .5; 3 SOLUTION RESPIRATORY (INHALATION) at 19:45

## 2022-04-13 RX ADMIN — GUAIFENESIN 600 MG: 600 TABLET, EXTENDED RELEASE ORAL at 12:38

## 2022-04-13 RX ADMIN — INSULIN LISPRO 1 UNITS: 100 INJECTION, SOLUTION INTRAVENOUS; SUBCUTANEOUS at 01:22

## 2022-04-13 RX ADMIN — METOPROLOL TARTRATE 25 MG: 25 TABLET, FILM COATED ORAL at 20:40

## 2022-04-13 RX ADMIN — FUROSEMIDE 20 MG: 10 INJECTION, SOLUTION INTRAMUSCULAR; INTRAVENOUS at 12:37

## 2022-04-13 RX ADMIN — DIGOXIN 0.12 MG: 125 TABLET ORAL at 08:04

## 2022-04-13 RX ADMIN — MUPIROCIN: 20 OINTMENT TOPICAL at 12:38

## 2022-04-13 RX ADMIN — METHYLPREDNISOLONE SODIUM SUCCINATE 40 MG: 40 INJECTION, POWDER, FOR SOLUTION INTRAMUSCULAR; INTRAVENOUS at 20:40

## 2022-04-13 RX ADMIN — Medication 10 ML: at 20:40

## 2022-04-13 RX ADMIN — DILTIAZEM HYDROCHLORIDE 240 MG: 240 CAPSULE, COATED, EXTENDED RELEASE ORAL at 08:04

## 2022-04-13 RX ADMIN — EPINEPHRINE: 1 INJECTION INTRAMUSCULAR; INTRAVENOUS; SUBCUTANEOUS at 14:34

## 2022-04-13 RX ADMIN — MUPIROCIN: 20 OINTMENT TOPICAL at 20:40

## 2022-04-13 RX ADMIN — ATORVASTATIN CALCIUM 20 MG: 10 TABLET, FILM COATED ORAL at 08:04

## 2022-04-13 RX ADMIN — METOPROLOL TARTRATE 25 MG: 25 TABLET, FILM COATED ORAL at 08:04

## 2022-04-13 RX ADMIN — BUPROPION HYDROCHLORIDE 100 MG: 100 TABLET, EXTENDED RELEASE ORAL at 08:04

## 2022-04-13 RX ADMIN — BUPROPION HYDROCHLORIDE 100 MG: 100 TABLET, EXTENDED RELEASE ORAL at 20:40

## 2022-04-13 RX ADMIN — METHYLPREDNISOLONE SODIUM SUCCINATE 40 MG: 40 INJECTION, POWDER, FOR SOLUTION INTRAMUSCULAR; INTRAVENOUS at 08:05

## 2022-04-13 RX ADMIN — LEVOTHYROXINE SODIUM 150 MCG: 150 TABLET ORAL at 08:21

## 2022-04-13 RX ADMIN — ZOLEDRONIC ACID 4 MG: 0.8 INJECTION, SOLUTION, CONCENTRATE INTRAVENOUS at 09:33

## 2022-04-13 ASSESSMENT — PAIN SCALES - GENERAL
PAINLEVEL_OUTOF10: 0

## 2022-04-13 NOTE — PROGRESS NOTES
04/13/22 0317   NIV Type   NIV Started/Stopped On   Equipment Type v60   Mode Bilevel   Mask Type Full face mask   Mask Size Medium   Settings/Measurements   IPAP 24 cmH20   CPAP/EPAP 8 cmH2O   Rate Ordered 16   Resp 18   FiO2  35 %   Vt Exhaled 1034 mL   Minute Volume 16.6 Liters   Mask Leak (lpm) 8 lpm   Comfort Level Good   Using Accessory Muscles No   SpO2 96   Alarm Settings   Alarms On Y   Press Low Alarm 8 cmH2O   High Pressure Alarm 35 cmH2O   Delay Alarm 20 sec(s)   Resp Rate Low Alarm 14   High Respiratory Rate 40 br/min   Oxygen Therapy/Pulse Ox   SpO2 96 %

## 2022-04-13 NOTE — PROGRESS NOTES
Patient tolerated bronchoscopy well, he is drowsy, decision was made to place him on bi-pap for now.

## 2022-04-13 NOTE — H&P
Admission [address];  MRN: [id number] ; Admit Date: 4/12/2022  5:13 PM   Current Date and Time: 4/13/2022 7:04 AM    PCP  --  Sima Henry MD         CC - unable to wake up post bronch    HPI:  Patient is a 74 y.o.  male  With known h.o  Tobacco use, copd  Presented for left lower lobe mass evaluation by bronchoscopy yesterday. Post bronch pt was harder to awake and had increasing dyspnea noted. ABG showed hypercarbia, ph of 7.24, pco2 of 74 and was admitted to ICU for bipap treatment. Imaging with no pneumothorax     Overnight ABG shows improvement and mentation improved  Pt reports he uses 2 L o2 at home and recently quit smoking also lost about 50 lbs recently   Denies any chest pain but has sob today   No fevers or chills    Allergies   Allergen Reactions    Codeine        Past Medical History:   Diagnosis Date    [facility]     CAD (coronary artery disease)     CHF (congestive heart failure) (HCC)     COPD (chronic obstructive pulmonary disease) ([facility]     Diabetes mellitus (HCC)     Hyperlipidemia     Hypertension     Thyroid disease       History reviewed. No pertinent surgical history.    Medications Prior to Admission: insulin detemir (LEVEMIR) 100 UNIT/ML injection vial, Inject into the skin nightly  insulin aspart (NOVOLOG) 100 UNIT/ML injection vial, Inject into the skin 3 times daily (before meals)  torsemide (DEMADEX) 100 MG tablet, TAKE 1/2 (ONE-HALF) TABLET BY MOUTH ONCE DAILY  tiZANidine (ZANAFLEX) 2 MG tablet, TAKE 1 TABLET BY MOUTH TWICE DAILY AS NEEDED  potassium chloride (MICRO-K) 10 MEQ extended release capsule, TAKE 1 CAPSULE BY MOUTH TWICE DAILY  Meloxicam 15 MG TBDP, Take 1 tablet by mouth daily  HYDROcodone-acetaminophen (NORCO) 5-325 MG per tablet, TAKE 1 TABLET BY MOUTH TWICE DAILY AS NEEDED  hydrALAZINE (APRESOLINE) 25 MG tablet, TAKE 1 TABLET BY MOUTH THREE TIMES DAILY  digoxin (LANOXIN) 125 MCG tablet, Take 0.125 mg by mouth daily  tiotropium (SPIRIVA RESPIMAT) 2.5 MCG/ACT AERS inhaler, Inhale 2 puffs into the lungs daily  amLODIPine (NORVASC) 10 MG tablet, Take 10 mg by mouth daily   ELIQUIS 5 MG TABS tablet, Take 5 mg by mouth 2 times daily   atorvastatin (LIPITOR) 20 MG tablet, Take 20 mg by mouth daily   buPROPion (WELLBUTRIN SR) 100 MG extended release tablet, Take 100 mg by mouth 2 times daily   CARTIA  MG extended release capsule, Take 240 mg by mouth daily   levothyroxine (SYNTHROID) 150 MCG tablet, Take 150 mcg by mouth Daily   lisinopril (PRINIVIL;ZESTRIL) 20 MG tablet, Take 20 mg by mouth   metFORMIN (GLUCOPHAGE) 850 MG tablet, Take 850 mg by mouth daily (with breakfast)   metoprolol tartrate (LOPRESSOR) 25 MG tablet, Take 25 mg by mouth 2 times daily   nitroGLYCERIN (NITROSTAT) 0.4 MG SL tablet, Place 0.4 mg under the tongue every 5 minutes as needed   nortriptyline (PAMELOR) 10 MG capsule, Take 10 mg by mouth nightly   Omega-3 Fatty Acids (FISH OIL) 1000 MG CAPS, Take 3,000 mg by mouth 3 times daily  aspirin 81 MG tablet, Take 81 mg by mouth daily  Allergies   Allergen Reactions    Codeine       Social History     Tobacco Use    Smoking status: Current Every Day Smoker     Packs/day: 1.00     Years: 40.00     Pack years: 40.00    Smokeless tobacco: Never Used   Substance Use Topics    Alcohol use: Not Currently      History reviewed. No pertinent family history. Review of systems    As mentioned above, otherwise limited  Pt was dx with lung mass and recently lost 50 lbs   Objective:     VS: Temp: 96.7 °F (35.9 °C)  Pulse: 86  BP: (!) 127/95  SpO2: 98 %  FiO2 : 35 %        Physical Exam:  General:  Elderly obese male, Awake, alert and oriented.  Appears to be not in any distress  Mucous Membranes:  Pink , anicteric  Neck: No JVD, no carotid bruit, no thyromegaly  Chest:  Diminished in left side with scattered olamide wheeze   Cardiovascular:  Irregular S1S2 heard, no murmurs or gallops  Abdomen:  Soft, undistended, non tender, no organomegaly, BS present  Extremities: No edema or cyanosis. Distal pulses well felt  Neurological : no focal deficits        LABS:  CBC:  Lab Results   Component Value Date    WBC 13.0 04/13/2022    HGB 13.5 04/13/2022    HCT 42.6 04/13/2022    MCV 91.1 04/13/2022     04/13/2022    NEUTOPHILPCT 95.6 04/13/2022    LYMPHOPCT 3.4 04/13/2022    MONOPCT 1.0 04/13/2022    BASOPCT 0.0 04/13/2022    NEUTROABS 12.4 04/13/2022    LYMPHSABS 0.4 04/13/2022    MONOSABS 0.1 04/13/2022    EOSABS 0.0 04/13/2022    BASOSABS 0.0 04/13/2022     BMP:   Lab Results   Component Value Date     04/13/2022    K 4.9 04/13/2022    CO2 30 04/13/2022    BUN 18 04/13/2022    CREATININE 1.0 04/13/2022    CALCIUM 13.6 04/13/2022     Coagulation:   Lab Results   Component Value Date    INR 1.14 04/12/2022     Cardiac markers:   Lab Results   Component Value Date    TROPONINI <0.01 04/12/2022     ABGs: No results found for: POCPH, POCPCO2, POCPO2, POCHCO3, POCTCO2, POCFIO2    Lab Results   Component Value Date    ALT 23 04/13/2022    AST 19 04/13/2022    ALKPHOS 186 (H) 04/13/2022    BILITOT 0.3 04/13/2022       Lab Results   Component Value Date    INR 1.14 (H) 04/12/2022    PROTIME 12.9 (H) 04/12/2022         EKG: Afibb with PVC    Radiology:    Chest xray   Moderate congestion and/or infiltrates identified in the lungs.  Moderate   left and trace right pleural effusions. ASSESMENT & PLAN:     Acute hypercarbic respiratory failure  Acute copd exacerbation   - hypercarbic and hard to awake post bronchoscopy  ABG with hypercarbia, admitted to ICU and started on bipap.  Started steroids, HHN  pulm consulted  Improved mentation and hypercarbia  Wean bipap and steroids    Possible post obstructive pna - started on levaquin by pulm      Acute metabolic encephalopathy - sec to hypercarbia, resolved     Left LL lung mass and mediastinal LN - s.p bronch with biopsy - path pending    Hypercalcemia - calcium at 13.6- likely with bone mets . Check PTH related peptide, vit d levels  Start IVF and lasix   Oncology consulted- plans for zometa IV today       Atrial fibrillation - rate controlled - on BB and digoxin and .cardizem Resume ELIQUIS when ok by pulm    Chronic CHF - diastolic with preserved EF -   Non obstructive CAD  - f.w [place] [facility]  - resume statins, BB and lasix     DM 2 - resume home levemir and SSI    sugars stable    Chronic anxiety  on wellbutrin ,resume     Hypothyroid - on synthroid         Diet: diabetic   GI/DVT PX holding eliquis for procedures  CODE STATUS: full code    Bailey Penn MD,  4/13/2022 7:04 AM

## 2022-04-13 NOTE — PROGRESS NOTES
Pulmonary & Critical Care Medicine ICU Progress Note    CC: Hypercapnic respiratory failure    Events of Last 24 hours:   BiPAP overnight   CO2 is better this am   5L this am- uses 2L at home       Vascular lines: IV: PIVs         / / /FiO2 : 35 %  Recent Labs     22  1839 22  0500   PHART 7.271* 7.306*   IQE8NGA 71.3* 58.4*   PO2ART 79.0 68.8*       IV:   sodium chloride      dextrose      sodium chloride         Vitals:  Blood pressure 110/76, pulse 74, temperature 98.1 °F (36.7 °C), temperature source Axillary, resp. rate 20, height 6' (1.829 m), weight 265 lb 3.4 oz (120.3 kg), SpO2 95 %. on 5LPM      Temp  Av.5 °F (36.4 °C)  Min: 96.7 °F (35.9 °C)  Max: 98.1 °F (36.7 °C)    Intake/Output Summary (Last 24 hours) at 2022 0738  Last data filed at 2022 0601  Gross per 24 hour   Intake 360 ml   Output 300 ml   Net 60 ml     PE:  /76   Pulse 69   Temp 98 °F (36.7 °C) (Oral)   Resp 19   Ht 6' (1.829 m)   Wt 265 lb 3.4 oz (120.3 kg)   SpO2 95%   BMI 35.97 kg/m²   Gen: No distress. Eyes: PERRL. No sclera icterus. No conjunctival injection. ENT: No discharge. Pharynx clear. Neck: Trachea midline. No obvious mass. Resp: No accessory muscle use. No crackles. Few wheezes. Few rhonchi. L dullness on percussion. Good air entry. CV: Regular rate. Regular rhythm. No murmur or rub. No edema. GI: Non-tender. Non-distended. No hernia. Skin: Warm and dry. No nodule on exposed extremities. Lymph: No cervical LAD. No supraclavicular LAD. M/S: No cyanosis. No joint deformity. No clubbing. Neuro: Awake. Alert. Moves all four extremities. Psych: Oriented x 3. No anxiety.        Scheduled Meds:   [Held by provider] aspirin  81 mg Oral Daily    atorvastatin  20 mg Oral Daily    buPROPion  100 mg Oral BID    dilTIAZem  240 mg Oral Daily    digoxin  0.125 mg Oral Daily    hydrALAZINE  25 mg Oral 3 times per day    levothyroxine  150 mcg Oral Daily    [Held by provider] lisinopril  20 mg Oral Daily    metoprolol tartrate  25 mg Oral BID    [Held by provider] nortriptyline  10 mg Oral Nightly    [Held by provider] torsemide  50 mg Oral Daily    levofloxacin  500 mg IntraVENous Daily    sodium chloride flush  5-40 mL IntraVENous 2 times per day    methylPREDNISolone  40 mg IntraVENous Q12H    insulin lispro  0-6 Units SubCUTAneous Q4H    ipratropium-albuterol  1 ampule Inhalation 4x daily       Data:  CBC:   Recent Labs     04/12/22  1005 04/12/22  1753 04/13/22  0420   WBC  --  13.1* 13.0*   HGB  --  14.0 13.5   HCT  --  44.0 42.6   MCV  --  91.1 91.1    341 313     BMP:   Recent Labs     04/12/22  1700 04/13/22  0520    137   K 4.3 4.9   CL 99 100   CO2 30 30   BUN 15 18   CREATININE 0.8 1.0     LIVER PROFILE:   Recent Labs     04/12/22  1700 04/13/22  0520   AST 23 19   ALT 27 23   BILIDIR <0.2  --    BILITOT <0.2 0.3   ALKPHOS 187* 186*       Microbiology:  4/12 respiratory    Imaging:  Chest x-ray 4/12 imaging was reviewed by me and showed   Left lower lobe mass with effusion  Right basilar airspace disease     ASSESSMENT:  · Acute hypoxemic hypercapnic respiratory failure-probably slow to wake up from anesthesia and underlying obesity hypoventilation syndrome/CODY/COPD  · Acute encephalopathy/metabolic encephalopathy  · COPD with acute exacerbation  · Left lung collapse on chest x-ray today-likely due to left mainstem endobronchial lesion  · Postobstructive pneumonia  · Left mainstem endobronchial mass oozing blood on bronchoscopy/12/22  · Left lower lobe/lingula 12 cm mass with hilar/mediastinal adenopathy post bronchoscopy/12/22  · Nocturnal hypoxemia on 2 L O2  · Hypercalcemia concerning for bone mets  · 50 pound weight loss  · Chronic diastolic CHF and A. fib on Eliquis  · More than 40-pack-year history of smoking       PLAN:  · Bilevel non-invasive positive pressure ventilation per my orders-adjusted by me to 20/8.    · Inhaled bronchodilators  · IV Solu-Medrol  · Levaquin IV D#2  · L thoracentesis   · Mucinex and chest physiotherapy. Keep NPO for repeat surveillance/therapeutic bronchoscopy today in an effort to reexpand left lung.   · Zometa and Oncology consult   · Keep holding Eliquis/AC/ASA given friable easy to bleed endobronchial lesion   · PT/OT  · Home medications were addressed   · Blood sugar control ISS, with goal 150-180  · DVT prophylaxis: SCDs  · MRSA prophylaxis: Bactroban

## 2022-04-13 NOTE — PROGRESS NOTES
Inpatient Occupational Therapy  Evaluation and Treatment    Unit: PCU  Date:  4/13/2022  Patient Name:    Alibn Burden  Admitting diagnosis:  Acute on chronic respiratory failure with hypoxia [facility]) [J96.21]  Admit Date:  4/12/2022  Precautions/Restrictions/WB Status/ Lines/ Wounds/ Oxygen: Fall risk, Lines -IV and Supplemental O2 (3), Telemetry and Continuous pulse oximetry    Treatment Time:  2397-9103  Treatment Number: 1   Timed code treatment minutes 30 minutes   Total Treatment minutes:   40   minutes    Patient Goals for Therapy:  \" go home  \"      Discharge Recommendations: Home 24 hr assist  and Home OT  DME needs for discharge: Needs Met       Therapy recommendations for staff:   Stand by assist with use of rolling walker (RW) for all transfers to/from bathroom    History of Present Illness: Per H&P  74 y.o.  male  With known h.o  Tobacco use, copd  Presented for left lower lobe mass evaluation by bronchoscopy yesterday. Post bronch pt was harder to awake and had increasing dyspnea noted. ABG showed hypercarbia, ph of 7.24, pco2 of 74 and was admitted to ICU for bipap treatment. Imaging with no pneumothorax   The patient was found on imaging last month to have a L lung mass as well as hilar and mediastinal LAD. Home Health S4 Level Recommendation:  Level 1 Standard  AM-PAC Score: 21  Preadmission Environment    Pt. Lives Alone  Home environment:  one story home  Steps to enter first floor: One steps to enter  Steps to second floor: N/A  Bathroom: tub/shower unit, walk in shower, grab bars, standard height commode and  shower seat   Equipment owned: [facility], hospital bed and home O2 (2L) continous    Preadmission Status:  Pt. Able to drive: No  Pt Fully independent with ADLs: No needs  assist shoes and socks aide will assist with bath   Pt. Required assistance from aide for: Cleaning, Cooking and Laundry .  Pt can cook something simple   Pt. independent for transfers and gait and walked with Cane  History of falls Yes   Aide every other day for 3-4 hrs   Nurse 2x a week     Pain  No        Cognition    A&O x4   Able to follow 2 step commands    Subjective  Patient lying supine in bed with no family present. Pt agreeable to this OT eval & tx. Upper Extremity ROM:    WFL    Upper Extremity Strength:    WFL through observation     Upper Extremity Sensation    WFL    Upper Extremity Proprioception:  WFL    Coordination and Tone  WFL    Balance  Functional Sitting Balance:  WFL  Functional Standing Balance:Diminished    Bed mobility:    Supine to sit:   CGA  Sit to supine:   Not Tested  Rolling:    Not Tested  Scooting in sitting:  SBA  Scooting to head of bed:   Not Tested    Bridging:   Not Tested    Transfers:    Sit to stand:  SBA  Stand to sit:  SBA with VC for hand placement   Bed to chair:   SBA with RW   Standard toilet: Not Tested  Bed to [facility]:  Not Tested    Dressing:      UE:   Not Tested  LE:    CGA to pull pants over hips, supervision to pull over feet     Bathing:    UE:  Not Tested  LE:  Not Tested    Eating:   Independent    Toileting:  Not Tested    Activity Tolerance   Pt completed therapy session with No adverse symptoms noted w/activity  /119 SP02 94% , 126/111 Sp02 93% HR 66     Sp02 after ambulation 87% HR 68 after 20 second rest in chair Sp02 93-95% HR 67   Positioning Needs:   Pt up in chair, no alarm needed, positioned in proper neutral alignment and pressure relief provided. Exercise / Activities Initiated:   N/A    Patient/Family Education:   Role of OT    Assessment of Deficits: Pt seen for Occupational therapy evaluation in acute care setting. Pt demonstrated decreased Activity tolerance, ADLs, Balance , Bed mobility and Transfers. Pt functioning below baseline and will likely benefit from skilled occupational therapy services to maximize safety and independence. Goal(s) : To be met in 3 Visits:  1).  Bed to toilet/BSC: Supervision with RW     To be met in 5 Visits:  1). Supine to/from Sit:  Modified Independent  2). Upper Body Bathing:   Supervision  3). Lower Body Bathing:   CGA  4). Upper Body Dressing:  Supervision  5). Lower Body Dressing:  CGA  6). Pt to demonstrate UE exs x 15 reps with minimal cues    Rehabilitation Potential:  Fair for goals listed above. Strengths for achieving goals include: Pt cooperative  Barriers to achieving goals include:  Complexity of condition     Plan: To be seen 3-5 x/wk while in acute care setting for therapeutic exercises, bed mobility, transfers, dressing, bathing, family/patient education, ADL/IADL retraining, energy conservation training.      Osito Pearl OTR/L [id number]          If patient discharges from this facility prior to next visit, this note will serve as the Discharge Summary

## 2022-04-13 NOTE — PROGRESS NOTES
Awake, pulled bipap off, attempted to get out of bed. Placed on 6 liters nasal cannula and readjusted in bed. Concerned about needing to urinate. Instructed on placement of external urinary device. Patient updated on days events. Verbalized understanding and asked if daughter had been updated. Reassured and questions answered. Maria Guadalupe care and bath. Oxygen saturations decreasing. Now 86% on 6 liters nasal cannula at rest. Explained need for bipap mask. Nodded in understanding. Placed back on bipap. Oxygen sats up to 98%. Appears comfortable. In bed, low position, call light in reach. Denies needs at present. Bed alarm on. Safety and comfort maintained.

## 2022-04-13 NOTE — CARE COORDINATION
Case Management Assessment  Initial Evaluation      Patient Name: Augustine Valladares  YOB: 1955  Diagnosis: Acute on chronic respiratory failure with hypoxia [facility]) [J96.21]  Date / Time: 4/12/2022  5:13 PM    Admission status/Date: 04/12/2022 Inpatient   Chart Reviewed: Yes      Patient Judy Rodarte: No-pt sleeping when writer attempted assessment this AM and currently has staff at bedside  Family Interviewed:  Yes - pt's daughter Jose Yeager via phone call at [phone]     Hospitalization in the last 30 days:  No    Health Care Decision Maker : Pt wasn't available to address. Pt's daughter states she doesn't believe pt has a living will or POA. She states pt is  and has other children besides her. Met with: pt's daughter Jose Yeager via phone call     Current PCP: Chana Geronimo MD    Financial  Medicare and Medicaid  Precert required for SNF : N          3 night stay required -  N    ADLS  Support Systems/Care Needs:    Transportation: FRS or transportation to doctors appointments. his daughter states pt normally only goes to the doctor    Meal Preparation: his [id number]: pt lives at home alone  Steps: 0  Intent for return to present living arrangements: Yes  Identified Issues: [id number] [address] [facility] with [address] : Yes - [facility]:(Services)     Passport/Waiver : No  :                      Phone Number:    Passport/Waiver Services: [address]   DME Provider: Jorge Baeza ([phone])- Kristyn Stockton confirmed pt has orders for 2 liters continuous. She states that the order is good through November and only needs orders if he would increase in O2.    Equipment:   Walker___Cane__x_RTS___ BSC___Shower Chair___Hospital Bed___W/C____Other________  02 at _2___Liter(s)---wears(frequency)____cont___ [facility] ___ CPAP___ BiPap___   N/A____      Home O2 Use :  Yes    If No for home O2---if presently on O2 during hospitalization:  Yes  if yes CM to follow for potential DC O2 need  Informed of need for care provider to bring portable home O2 tank on day of discharge for nursing to connect prior to leaving:   Yes  Verbalized agreement/Understanding:   Yes    Community Service Affiliation  Dialysis:  No    · Agency:  · Location:  · Dialysis Schedule:  · Phone:   · Fax: Other Community Services: [facility]    DISCHARGE PLAN: Explained Case Management role/services. Chart review completed. Completed Interdisciplinary rounds with ICU staff. Attempted to meet with pt at bedside and he was sleeping. Called and spoke with pt's daughter Keke Aragon. She states pt is normally independent at home and plans on returning home. She is aware no current discharge order placed. She denied needs for CM at this time. CM will continue to follow and assist. Please notify CM if needs or concerns arise.     Jermaine SHEPPARD, NOEMY-S

## 2022-04-13 NOTE — PROGRESS NOTES
Spoke with daughter Ankushtim Yasmin, updated daughter about procedure of left thoracentesis to be performed later today.

## 2022-04-13 NOTE — CONSULTS
HEMATOLOGY/ONCOLOGY CONSULTATION:     4/13/2022 8:07 AM    REASON FOR CONSULT: Lung mass    PROVIDERS:  Balta Rodriguez MD    CHIEF COMPLAINT:     No chief complaint on file. HISTORY OF PRESENT ILLNESS:     HPI:  74 WM with pmh smoking, CAD, COPD, A fib and DM2. The patient was found on imaging last month to have a L lung mass as well as hilar and mediastinal LAD. He has seen Dr. Reilly Belcher at [facility]. He was seen by pulmonary yesterday and underwent EBUS and biopsy. He was unresponsive after his procedure and had hypercapnia on ABG. He was admitted to ICU and placed on Bipap. He was also noted to have an elevated calcium of 13.6. PAST MEDICAL HISTORY:     Past Medical History:   Diagnosis Date    A-fib [facility])     CAD (coronary artery disease)     CHF (congestive heart failure) (HCC)     [facility]ch[facility]     Diabetes mellitus ([facility]     Hyperlipidemia     Hypertension     Thyroid disease        PAST SURGICAL HISTORY:        Past Surgical History:   Procedure Laterality Date    BRONCHOSCOPY N/A 4/12/2022    EBUS WF W/ANES.  (11:00) performed by Palmira Michael MD at [address] [place] [address]  4/12/2022    BRONCHOSCOPY ALVEOLAR LAVAGE performed by Palmira Michael MD at [address] [place] [address]  4/12/2022    BRONCHOSCOPY BIOPSY BRONCHUS performed by Palmira Michael MD at [address] [place] [address]  4/12/2022    BRONCHOSCOPY/TRANSBRONCHIAL NEEDLE BIOPSY performed by Palmira Michael MD at [address] [place] [address] Avenue:     Social History     Socioeconomic History    Marital status:      Spouse name: Not on file    Number of children: Not on file    Years of education: Not on file    Highest education level: Not on file   Occupational History    Not on file   Tobacco Use    Smoking status: Current Every Day Smoker     Packs/day: 1.00     Years: 40.00     Pack years: 40.00    Smokeless tobacco: Never Used   Vaping Use  Vaping Use: Never used   Substance and Sexual Activity    Alcohol use: Not Currently    Drug use: Never    Sexual activity: Not Currently   Other Topics Concern    Not on file   Social History Narrative    Not on file     Social Determinants of Health     Financial Resource Strain:     Difficulty of Paying Living Expenses: Not on file   Food Insecurity:     Worried About Running Out of Food in the Last Year: Not on file    Ric of Food in the Last Year: Not on file   Transportation Needs:     Lack of Transportation (Medical): Not on file    Lack of Transportation (Non-Medical): Not on file   Physical Activity:     Days of Exercise per Week: Not on file    Minutes of Exercise per Session: Not on file   Stress:     Feeling of Stress : Not on file   Social Connections:     Frequency of Communication with Friends and Family: Not on file    Frequency of Social Gatherings with Friends and Family: Not on file    Attends Mandaeism Services: Not on file    Active Member of [address] [place] [address] or Organizations: Not on file    Attends Club or Organization Meetings: Not on file    Marital Status: Not on file   Intimate Partner Violence:     Fear of Current or Ex-Partner: Not on file    Emotionally Abused: Not on file    Physically Abused: Not on file    Sexually Abused: Not on file   Housing Stability:     Unable to Pay for Housing in the Last Year: Not on file    Number of [place] in the Last Year: Not on file    Unstable Housing in the Last Year: Not on file       FAMILY HISTORY:     History reviewed. No pertinent family history. ALLERGIES:     Allergies as of 04/12/2022 - Fully Reviewed 04/12/2022   Allergen Reaction Noted    Codeine  01/28/2019       MEDICATIONS:     No current facility-administered medications on file prior to encounter.      Current Outpatient Medications on File Prior to Encounter   Medication Sig Dispense Refill    insulin detemir (LEVEMIR) 100 UNIT/ML injection vial Inject into the skin nightly      insulin aspart (NOVOLOG) 100 UNIT/ML injection vial Inject into the skin 3 times daily (before meals)      torsemide (DEMADEX) 100 MG tablet TAKE 1/2 (ONE-HALF) TABLET BY MOUTH ONCE DAILY      tiZANidine (ZANAFLEX) 2 MG tablet TAKE 1 TABLET BY MOUTH TWICE DAILY AS NEEDED      potassium chloride (MICRO-K) 10 MEQ extended release capsule TAKE 1 CAPSULE BY MOUTH TWICE DAILY      Meloxicam 15 MG TBDP Take 1 tablet by mouth daily      HYDROcodone-acetaminophen (NORCO) 5-325 MG per tablet TAKE 1 TABLET BY MOUTH TWICE DAILY AS NEEDED      hydrALAZINE (APRESOLINE) 25 MG tablet TAKE 1 TABLET BY MOUTH THREE TIMES DAILY      digoxin (LANOXIN) 125 MCG tablet Take 0.125 mg by mouth daily      tiotropium (SPIRIVA RESPIMAT) 2.5 MCG/ACT AERS inhaler Inhale 2 puffs into the lungs daily 4 g 5    amLODIPine (NORVASC) 10 MG tablet Take 10 mg by mouth daily       ELIQUIS 5 MG TABS tablet Take 5 mg by mouth 2 times daily       atorvastatin (LIPITOR) 20 MG tablet Take 20 mg by mouth daily       buPROPion (WELLBUTRIN SR) 100 MG extended release tablet Take 100 mg by mouth 2 times daily       CARTIA  MG extended release capsule Take 240 mg by mouth daily       levothyroxine (SYNTHROID) 150 MCG tablet Take 150 mcg by mouth Daily       lisinopril (PRINIVIL;ZESTRIL) 20 MG tablet Take 20 mg by mouth       metFORMIN (GLUCOPHAGE) 850 MG tablet Take 850 mg by mouth daily (with breakfast)       metoprolol tartrate (LOPRESSOR) 25 MG tablet Take 25 mg by mouth 2 times daily       nitroGLYCERIN (NITROSTAT) 0.4 MG SL tablet Place 0.4 mg under the tongue every 5 minutes as needed       nortriptyline (PAMELOR) 10 MG capsule Take 10 mg by mouth nightly       Omega-3 Fatty Acids (FISH OIL) 1000 MG CAPS Take 3,000 mg by mouth 3 times daily      aspirin 81 MG tablet Take 81 mg by mouth daily       REVIEW OF SYSTEMS:       10 point ROS completed. Pertinent positives in HPI, otherwise negative. PHYSICAL EXAM:       Vitals:    04/13/22 0724   BP:    Pulse:    Resp: 20   Temp:    SpO2:        General appearance: alert and cooperative  Head: Normocephalic, without obvious abnormality, atraumatic  Neck: No palpable lymphadenopathy in supraclavicular or cervical chains  Lungs: Clear to auscultation bilaterally, no audible rales, wheezes or crackles  Heart: Regular rate and rhythm, S1, S2 normal  Abdomen: Soft, non-tender; bowel sounds normal; no masses,  no organomegaly  Extremities: without cyanosis, clubbing, edema or asymmetry  Skin: No jaundice, purpura or petechiae      LABS:     Lab Results   Component Value Date    WBC 13.0 (H) 04/13/2022    HGB 13.5 04/13/2022    HCT 42.6 04/13/2022    MCV 91.1 04/13/2022     04/13/2022       Lab Results   Component Value Date    GLUCOSE 157 (H) 04/13/2022    BUN 18 04/13/2022    CREATININE 1.0 04/13/2022    K 4.9 04/13/2022       Lab Results   Component Value Date    ALKPHOS 186 (H) 04/13/2022    ALT 23 04/13/2022    AST 19 04/13/2022    BILITOT 0.3 04/13/2022    BILIDIR <0.2 04/12/2022    PROT 6.8 04/13/2022         IMAGING:     XR CHEST PORTABLE  Result Date: 4/12/2022  Moderate congestion and/or infiltrates identified in the lungs. Moderate left and trace right pleural effusions. CT chest 3/22/22  (from pulmonary notes):   Large left lower lobe/lingula mass measuring 12 cm with mediastinal/hilar adenopathy  Possible pericardial, pleural and chest wall involvement  Partially loculated left-sided effusion  Left mainstem/left lower lobe/lingular airway obstruction    PATHOLOGY:     EBUS/Lung Biopsy 4/12/22: pending    ASSESSMENT/PLAN:     1. Respiratory Failure  - hypercapnia on ABG after EBUS  - on Bipap   - mgmt per pulmonary    2. L Lung Mass  3.  Hilar/Mediastinal LAD  - appears he has had CT scan and PET scan but cannot view results  - suspected lung malignancy  - he is already seeing Dr. Guerita Freeman at [facility]  - appears to likely have at least N2 disease based on pulmonary notes, possibly metastatic disease  - need to await biopsy results  - need to obtain PET results  - treatment recommendations to follow when diagnosis and stage established  - follow up with his primary oncologist after d/c    4.  Hypercalcemia  - presumably from malignancy  - will give Peter Rebolledo MD

## 2022-04-13 NOTE — PROGRESS NOTES
Occupational Therapy/Physical Therapy  Attempted OT /PT eval for 2nd time today and nurse stated to hold pt for today.   Judy Yip OTR/L [id number]

## 2022-04-13 NOTE — PROGRESS NOTES
RT Inhaler-Nebulizer Bronchodilator Protocol Note    There is a bronchodilator order in the chart from a provider indicating to follow the RT Bronchodilator Protocol and there is an Initiate RT Inhaler-Nebulizer Bronchodilator Protocol order as well (see protocol at bottom of note). CXR Findings:  XR CHEST PORTABLE    Result Date: 4/13/2022  No evidence of pneumothorax. No complications status post bronchoscopy. Findings are similar to 04/12/2022. XR CHEST PORTABLE    Result Date: 4/13/2022  1. Complete opacification of the left lung. XR CHEST PORTABLE    Result Date: 4/12/2022  Moderate congestion and/or infiltrates identified in the lungs. Moderate left and trace right pleural effusions. The findings from the last RT Protocol Assessment were as follows:   History Pulmonary Disease: (P) Chronic pulmonary disease  Respiratory Pattern: (P) Regular pattern and RR 12-20 bpm  Breath Sounds: (P) Slightly diminished and/or crackles  Cough: (P) Strong, spontaneous, non-productive  Indication for Bronchodilator Therapy: (P) Decreased or absent breath sounds  Bronchodilator Assessment Score: (P) 4    Aerosolized bronchodilator medication orders have been revised according to the RT Inhaler-Nebulizer Bronchodilator Protocol below. Respiratory Therapist to perform RT Therapy Protocol Assessment initially then follow the protocol. Repeat RT Therapy Protocol Assessment PRN for score 0-3 or on second treatment, BID, and PRN for scores above 3. No Indications - adjust the frequency to every 6 hours PRN wheezing or bronchospasm, if no treatments needed after 48 hours then discontinue using Per Protocol order mode. If indication present, adjust the RT bronchodilator orders based on the Bronchodilator Assessment Score as indicated below.   Use Inhaler orders unless patient has one or more of the following: on home nebulizer, not able to hold breath for 10 seconds, is not alert and oriented, cannot activate and use MDI correctly, or respiratory rate 25 breaths per minute or more, then use the equivalent nebulizer order(s) with same Frequency and PRN reasons based on the score. If a patient is on this medication at home then do not decrease Frequency below that used at home. 0-3 - enter or revise RT bronchodilator order(s) to equivalent RT Bronchodilator order with Frequency of every 4 hours PRN for wheezing or increased work of breathing using Per Protocol order mode. 4-6 - enter or revise RT Bronchodilator order(s) to two equivalent RT bronchodilator orders with one order with BID Frequency and one order with Frequency of every 4 hours PRN wheezing or increased work of breathing using Per Protocol order mode. 7-10 - enter or revise RT Bronchodilator order(s) to two equivalent RT bronchodilator orders with one order with TID Frequency and one order with Frequency of every 4 hours PRN wheezing or increased work of breathing using Per Protocol order mode. 11-13 - enter or revise RT Bronchodilator order(s) to one equivalent RT bronchodilator order with QID Frequency and an Albuterol order with Frequency of every 4 hours PRN wheezing or increased work of breathing using Per Protocol order mode. Greater than 13 - enter or revise RT Bronchodilator order(s) to one equivalent RT bronchodilator order with every 4 hours Frequency and an Albuterol order with Frequency of every 2 hours PRN wheezing or increased work of breathing using Per Protocol order mode. RT to enter RT Home Evaluation for COPD & MDI Assessment order using Per Protocol order mode.     Electronically signed by Kiet Aguilar RCP on 4/13/2022 at 4:12 PM

## 2022-04-13 NOTE — PROGRESS NOTES
Handoff received from [facility], assessment to follow. Pt resting in bed comfortably at this time. Denies any needs. Will continue to follow.

## 2022-04-13 NOTE — PROGRESS NOTES
04/12/22 2307   NIV Type   Equipment Type v60   Mode Bilevel   Mask Type Full face mask   Mask Size Medium   Settings/Measurements   IPAP 24 cmH20   CPAP/EPAP 8 cmH2O   Rate Ordered 16   Resp 28   Vt Exhaled 1101 mL   Minute Volume 186 Liters   Mask Leak (lpm) 14 lpm   Comfort Level Good   Using Accessory Muscles No   SpO2 98   Breath Sounds   Right Upper Lobe Diminished   Right Middle Lobe Diminished   Right Lower Lobe Diminished   Left Upper Lobe Diminished   Left Lower Lobe Diminished   Alarm Settings   Alarms On Y   Press Low Alarm 8 cmH2O   High Pressure Alarm 35 cmH2O   Delay Alarm 20 sec(s)   Resp Rate Low Alarm 14   High Respiratory Rate 40 br/min   Oxygen Therapy/Pulse Ox   SpO2 98 %

## 2022-04-13 NOTE — PROGRESS NOTES
Gave patient a bipap break, he required 8-10 liters high flow to achieve o2 saturations of 93%. Radiology called with chest x-ray results, patient placed back to [facility].

## 2022-04-13 NOTE — PROCEDURES
PROCEDURE: Therapeutic bronchoscopy    PRE-PROCEDURE EVALUATION:  Nursing History and Physical reviewed and agreed upon     Allergies and medications have been reviewed    HENT: Airway patent and reviewed. Mallampati class IV  Cardiovascular: Normal rate, regular rhythm, normal heart sounds. Pulmonary/Chest: No wheezes. Few rhonchi. No rales. Left-sided dullness  Abdominal: Soft. Bowel sounds are normal. No distension. ASA CLASS    I. Normal, healthy adult    II. Mild systemic disease  X  III. Severe Systemic Disease    IV. Severe Systemic Disease which is a threat to life. Kandice Mirza Moribund    Sedation plan:   No sedation   Minimal  X Moderate   Sedation per anesthesia   Continue ICU sedation    Post Procedure Plan   Return to same level of care     The risks and benefits as well as alternatives to the procedure have been discussed with the patient and or family. The patient and or next of kin understands and agrees to proceed. DESCRIPTION OF PROCEDURE: A time out was taken. Type of sedation used: Moderate Sedation:   Conscious sedation with 2 mg versed and 50 mcg fentanyl. Patient was monitored continuously 1:1 throughout the entire procedure while sedation was administered    The scope was passed with ease via the mouth. Large mucous/blood plug occluding left mainstem. Hard to suction and was able to pull it out applying scope suctioning all the way outside the airways. Left mainstem friable endobronchial lesion at the level of the KYLIE/LLL bifurcation was visualized occluding completely left lower lobe and extending into the left upper lobe. Barely able to visualize the left upper lobe segments due to endobronchial lesion and multiple small mucous plugs suctioned. 1/20,000 epinephrine I saline injected over the friable easy to bleed left mainstem endobronchial lesion. Ice saline/normal saline were injected followed by therapeutic aspiration. The patient tolerated the procedure well. Estimated blood loss was less than 5 ml. Recovery will be per endoscopy protocol.     FOLLOW UP:   Chest x-ray

## 2022-04-14 LAB
ANION GAP SERPL CALCULATED.3IONS-SCNC: 7 MMOL/L (ref 3–16)
BASOPHILS ABSOLUTE: 0.1 K/UL (ref 0–0.2)
BASOPHILS RELATIVE PERCENT: 0.8 %
BUN BLDV-MCNC: 24 MG/DL (ref 7–20)
CALCIUM SERPL-MCNC: 12.2 MG/DL (ref 8.3–10.6)
CHLORIDE BLD-SCNC: 100 MMOL/L (ref 99–110)
CO2: 29 MMOL/L (ref 21–32)
CREAT SERPL-MCNC: 0.9 MG/DL (ref 0.8–1.3)
CULTURE, RESPIRATORY: NORMAL
CULTURE, RESPIRATORY: NORMAL
EOSINOPHILS ABSOLUTE: 0 K/UL (ref 0–0.6)
EOSINOPHILS RELATIVE PERCENT: 0 %
GFR AFRICAN AMERICAN: >60
GFR NON-AFRICAN AMERICAN: >60
GLUCOSE BLD-MCNC: 122 MG/DL (ref 70–99)
GLUCOSE BLD-MCNC: 141 MG/DL (ref 70–99)
GLUCOSE BLD-MCNC: 151 MG/DL (ref 70–99)
GLUCOSE BLD-MCNC: 210 MG/DL (ref 70–99)
GLUCOSE BLD-MCNC: 279 MG/DL (ref 70–99)
GLUCOSE BLD-MCNC: 85 MG/DL (ref 70–99)
GRAM STAIN RESULT: NORMAL
GRAM STAIN RESULT: NORMAL
HCT VFR BLD CALC: 40.7 % (ref 40.5–52.5)
HEMOGLOBIN: 13.1 G/DL (ref 13.5–17.5)
LYMPHOCYTES ABSOLUTE: 0.5 K/UL (ref 1–5.1)
LYMPHOCYTES RELATIVE PERCENT: 2.6 %
MCH RBC QN AUTO: 29.2 PG (ref 26–34)
MCHC RBC AUTO-ENTMCNC: 32.1 G/DL (ref 31–36)
MCV RBC AUTO: 91.2 FL (ref 80–100)
MONOCYTES ABSOLUTE: 0.5 K/UL (ref 0–1.3)
MONOCYTES RELATIVE PERCENT: 2.7 %
NEUTROPHILS ABSOLUTE: 17.6 K/UL (ref 1.7–7.7)
NEUTROPHILS RELATIVE PERCENT: 93.9 %
PDW BLD-RTO: 16.7 % (ref 12.4–15.4)
PERFORMED ON: ABNORMAL
PERFORMED ON: NORMAL
PLATELET # BLD: 310 K/UL (ref 135–450)
PMV BLD AUTO: 6.7 FL (ref 5–10.5)
POTASSIUM SERPL-SCNC: 4.5 MMOL/L (ref 3.5–5.1)
RBC # BLD: 4.47 M/UL (ref 4.2–5.9)
SODIUM BLD-SCNC: 136 MMOL/L (ref 136–145)
WBC # BLD: 18.8 K/UL (ref 4–11)

## 2022-04-14 PROCEDURE — 94761 N-INVAS EAR/PLS OXIMETRY MLT: CPT

## 2022-04-14 PROCEDURE — 94640 AIRWAY INHALATION TREATMENT: CPT

## 2022-04-14 PROCEDURE — 2700000000 HC OXYGEN THERAPY PER DAY

## 2022-04-14 PROCEDURE — 97116 GAIT TRAINING THERAPY: CPT

## 2022-04-14 PROCEDURE — 2580000003 HC RX 258: Performed by: INTERNAL MEDICINE

## 2022-04-14 PROCEDURE — 6370000000 HC RX 637 (ALT 250 FOR IP): Performed by: INTERNAL MEDICINE

## 2022-04-14 PROCEDURE — 97530 THERAPEUTIC ACTIVITIES: CPT

## 2022-04-14 PROCEDURE — 94669 MECHANICAL CHEST WALL OSCILL: CPT

## 2022-04-14 PROCEDURE — 99233 SBSQ HOSP IP/OBS HIGH 50: CPT | Performed by: INTERNAL MEDICINE

## 2022-04-14 PROCEDURE — 97162 PT EVAL MOD COMPLEX 30 MIN: CPT

## 2022-04-14 PROCEDURE — 2580000003 HC RX 258: Performed by: NURSE PRACTITIONER

## 2022-04-14 PROCEDURE — 6360000002 HC RX W HCPCS: Performed by: INTERNAL MEDICINE

## 2022-04-14 PROCEDURE — 36415 COLL VENOUS BLD VENIPUNCTURE: CPT

## 2022-04-14 PROCEDURE — 6360000002 HC RX W HCPCS: Performed by: NURSE PRACTITIONER

## 2022-04-14 PROCEDURE — 97166 OT EVAL MOD COMPLEX 45 MIN: CPT

## 2022-04-14 PROCEDURE — 85025 COMPLETE CBC W/AUTO DIFF WBC: CPT

## 2022-04-14 PROCEDURE — 94660 CPAP INITIATION&MGMT: CPT

## 2022-04-14 PROCEDURE — 80048 BASIC METABOLIC PNL TOTAL CA: CPT

## 2022-04-14 PROCEDURE — 6370000000 HC RX 637 (ALT 250 FOR IP): Performed by: NURSE PRACTITIONER

## 2022-04-14 PROCEDURE — 2060000000 HC ICU INTERMEDIATE R&B

## 2022-04-14 PROCEDURE — 97535 SELF CARE MNGMENT TRAINING: CPT

## 2022-04-14 RX ORDER — ASPIRIN 81 MG/1
81 TABLET, CHEWABLE ORAL ONCE
Status: COMPLETED | OUTPATIENT
Start: 2022-04-14 | End: 2022-04-14

## 2022-04-14 RX ORDER — PREDNISONE 20 MG/1
40 TABLET ORAL DAILY
Status: DISCONTINUED | OUTPATIENT
Start: 2022-04-15 | End: 2022-04-15 | Stop reason: HOSPADM

## 2022-04-14 RX ORDER — IPRATROPIUM BROMIDE AND ALBUTEROL SULFATE 2.5; .5 MG/3ML; MG/3ML
1 SOLUTION RESPIRATORY (INHALATION) 2 TIMES DAILY
Status: DISCONTINUED | OUTPATIENT
Start: 2022-04-14 | End: 2022-04-15 | Stop reason: HOSPADM

## 2022-04-14 RX ORDER — LEVOFLOXACIN 500 MG/1
500 TABLET, FILM COATED ORAL DAILY
Status: DISCONTINUED | OUTPATIENT
Start: 2022-04-14 | End: 2022-04-15 | Stop reason: HOSPADM

## 2022-04-14 RX ORDER — FUROSEMIDE 10 MG/ML
40 INJECTION INTRAMUSCULAR; INTRAVENOUS ONCE
Status: COMPLETED | OUTPATIENT
Start: 2022-04-14 | End: 2022-04-14

## 2022-04-14 RX ADMIN — LEVOFLOXACIN 500 MG: 500 TABLET, FILM COATED ORAL at 10:03

## 2022-04-14 RX ADMIN — DIGOXIN 0.12 MG: 125 TABLET ORAL at 08:42

## 2022-04-14 RX ADMIN — METOPROLOL TARTRATE 25 MG: 25 TABLET, FILM COATED ORAL at 08:42

## 2022-04-14 RX ADMIN — BUPROPION HYDROCHLORIDE 100 MG: 100 TABLET, EXTENDED RELEASE ORAL at 20:41

## 2022-04-14 RX ADMIN — GUAIFENESIN 600 MG: 600 TABLET, EXTENDED RELEASE ORAL at 08:29

## 2022-04-14 RX ADMIN — ASPIRIN 81 MG 81 MG: 81 TABLET ORAL at 10:03

## 2022-04-14 RX ADMIN — Medication 10 ML: at 20:46

## 2022-04-14 RX ADMIN — METHYLPREDNISOLONE SODIUM SUCCINATE 40 MG: 40 INJECTION, POWDER, FOR SOLUTION INTRAMUSCULAR; INTRAVENOUS at 08:30

## 2022-04-14 RX ADMIN — METOPROLOL TARTRATE 25 MG: 25 TABLET, FILM COATED ORAL at 20:41

## 2022-04-14 RX ADMIN — FUROSEMIDE 40 MG: 10 INJECTION, SOLUTION INTRAMUSCULAR; INTRAVENOUS at 10:03

## 2022-04-14 RX ADMIN — HYDRALAZINE HYDROCHLORIDE 25 MG: 25 TABLET, FILM COATED ORAL at 22:54

## 2022-04-14 RX ADMIN — SODIUM CHLORIDE: 9 INJECTION, SOLUTION INTRAVENOUS at 08:43

## 2022-04-14 RX ADMIN — MUPIROCIN: 20 OINTMENT TOPICAL at 20:46

## 2022-04-14 RX ADMIN — IPRATROPIUM BROMIDE AND ALBUTEROL SULFATE 1 AMPULE: .5; 3 SOLUTION RESPIRATORY (INHALATION) at 18:59

## 2022-04-14 RX ADMIN — INSULIN LISPRO 3 UNITS: 100 INJECTION, SOLUTION INTRAVENOUS; SUBCUTANEOUS at 20:41

## 2022-04-14 RX ADMIN — INSULIN LISPRO 2 UNITS: 100 INJECTION, SOLUTION INTRAVENOUS; SUBCUTANEOUS at 15:51

## 2022-04-14 RX ADMIN — MUPIROCIN: 20 OINTMENT TOPICAL at 08:30

## 2022-04-14 RX ADMIN — GUAIFENESIN 600 MG: 600 TABLET, EXTENDED RELEASE ORAL at 20:41

## 2022-04-14 RX ADMIN — ATORVASTATIN CALCIUM 20 MG: 10 TABLET, FILM COATED ORAL at 08:30

## 2022-04-14 RX ADMIN — BUPROPION HYDROCHLORIDE 100 MG: 100 TABLET, EXTENDED RELEASE ORAL at 08:29

## 2022-04-14 RX ADMIN — Medication 10 ML: at 08:29

## 2022-04-14 RX ADMIN — INSULIN LISPRO 1 UNITS: 100 INJECTION, SOLUTION INTRAVENOUS; SUBCUTANEOUS at 08:30

## 2022-04-14 RX ADMIN — LEVOTHYROXINE SODIUM 150 MCG: 150 TABLET ORAL at 08:29

## 2022-04-14 ASSESSMENT — PAIN SCALES - GENERAL
PAINLEVEL_OUTOF10: 0

## 2022-04-14 NOTE — PROGRESS NOTES
IM Progress Note    Admit Date:  4/12/2022  2    Interval history:  Admitted for resp failure post bronch for lung mass bx  Improving with Bipap  Repeat bronch yesterday     Subjective:  Mr. Cindi Morales seen up in bed on 3 L   Low HR holding digoxin  Feels fine today . No cough or hemoptysis  Used bipap overnight . Wishes to go home      Objective:   /68   Pulse 58   Temp 98.1 °F (36.7 °C) (Oral)   Resp 19   Ht 6' (1.829 m)   Wt 265 lb 3.4 oz (120.3 kg)   SpO2 98%   BMI 35.97 kg/m²     Intake/Output Summary (Last 24 hours) at 4/14/2022 0735  Last data filed at 4/14/2022 0600  Gross per 24 hour   Intake 2582.07 ml   Output 1550 ml   Net 1032.07 ml       Physical Exam:    General:  Elderly obese male, Awake, alert and oriented. Appears to be not in any distress  Mucous Membranes:  Pink , anicteric  Neck: No JVD, no carotid bruit, no thyromegaly  Chest:  Diminished in left side with scattered olamide wheeze   Cardiovascular:  Irregular S1S2 heard, no murmurs or gallops  Abdomen:  Soft, undistended, non tender, no organomegaly, BS present  Extremities: No edema or cyanosis.  Distal pulses well felt  Neurological : no focal deficits    Medications:   Scheduled Medications:    mupirocin   Nasal BID    guaiFENesin  600 mg Oral BID    [Held by provider] aspirin  81 mg Oral Daily    atorvastatin  20 mg Oral Daily    buPROPion  100 mg Oral BID    dilTIAZem  240 mg Oral Daily    digoxin  0.125 mg Oral Daily    hydrALAZINE  25 mg Oral 3 times per day    levothyroxine  150 mcg Oral Daily    [Held by provider] lisinopril  20 mg Oral Daily    metoprolol tartrate  25 mg Oral BID    [Held by provider] nortriptyline  10 mg Oral Nightly    [Held by provider] torsemide  50 mg Oral Daily    levofloxacin  500 mg IntraVENous Daily    sodium chloride flush  5-40 mL IntraVENous 2 times per day    methylPREDNISolone  40 mg IntraVENous Q12H    insulin lispro  0-6 Units SubCUTAneous Q4H    ipratropium-albuterol  1 ampule Inhalation 4x daily     I   sodium chloride 125 mL/hr at 04/13/22 1621    dextrose      sodium chloride       glucagon (rDNA), dextrose, sodium chloride flush, sodium chloride, ondansetron **OR** ondansetron, polyethylene glycol, acetaminophen **OR** acetaminophen, glucose, dextrose bolus (hypoglycemia) **OR** dextrose bolus (hypoglycemia), ipratropium-albuterol    Lab Data:  Recent Labs     04/12/22  1005 04/12/22  1753 04/13/22  0420   WBC  --  13.1* 13.0*   HGB  --  14.0 13.5   HCT  --  44.0 42.6   MCV  --  91.1 91.1    341 313     Recent Labs     04/12/22  1700 04/13/22  0520    137   K 4.3 4.9   CL 99 100   CO2 30 30   BUN 15 18   CREATININE 0.8 1.0     Recent Labs     04/12/22  1700 04/12/22 2000   TROPONINI 0.01 <0.01       Coagulation:   Lab Results   Component Value Date    INR 1.14 04/12/2022     Cardiac markers:   Lab Results   Component Value Date    TROPONINI <0.01 04/12/2022         Lab Results   Component Value Date    ALT 23 04/13/2022    AST 19 04/13/2022    ALKPHOS 186 (H) 04/13/2022    BILITOT 0.3 04/13/2022       Lab Results   Component Value Date    INR 1.14 (H) 04/12/2022    PROTIME 12.9 (H) 04/12/2022       Radiology    Bronch 4/13-  Large mucous/blood plug occluding left mainstem. Hard to suction and was able to pull it out applying scope suctioning all the way outside the airways. Left mainstem friable endobronchial lesion at the level of the KYLIE/LLL bifurcation was visualized occluding completely left lower lobe and extending into the left upper lobe. Barely able to visualize the left upper lobe segments due to endobronchial lesion and multiple small mucous plugs suctioned. 1/20,000 epinephrine I saline injected over the friable easy to bleed left mainstem endobronchial lesion. Ice saline/normal saline were injected followed by therapeutic aspiration.        Bronch 4/12- Friable fungating L main stem endobronchial lesion oozing some blood with touching and after Bx, entirely occluding the L main stem with inability to visualized beyond. Bilateral purulent secretions with mucus plugs. Multiple endobronchial Bx were obtain from L main stem endobronchial lesion. Ice saline and 1/20000 Epi was used to control blood oozing from  L main stem endobronchial lesion. Salnie injection followed therapeutic aspiration was performed      Pathology - - Squamous cell carcinoma, moderately differentiated.         EKG: Afibb with PVC     Radiology:     Chest xray   Moderate congestion and/or infiltrates identified in the lungs.  Moderate   left and trace right pleural effusions.               ASSESMENT & PLAN:      Acute hypercarbic respiratory failure  Acute copd exacerbation and left lung collapse  - hypercarbic and hard to awake post bronchoscopy  ABG with hypercarbia, admitted to ICU and started on bipap. Started steroids, HHN  pulm consulted  Improved mentation and hypercarbia  Wean bipap and steroids      Left lung collapse   Possible post obstructive pna -   S.p repeat bronch with therapeutic aspiration of mucous plugs - re expanded   started on levaquin by pulm  Wbc high likely with steroids  F.w cx         Acute metabolic encephalopathy - sec to hypercarbia, resolved      Left lung cancer - Squamous cell carcinoma, moderately differentiated. Oncology consulted  Pt fw Dr. Zunilda Hanson. Seen by Dr. Meet Yip here     Hypercalcemia - calcium at 13.6- likely with bone mets .  Check PTH related peptide, vit d levels  Started IVF and given lasix   Oncology consulted- s/p zometa - improving to 12        Atrial fibrillation - rate controlled - on BB and cardizem, holding digoxin   holding eliquis for tumor being friable and easy bleed   Now on ASA only      Chronic CHF - diastolic with preserved EF -   Non obstructive CAD  - f.w [facility]  - resume statins, BB and lasix , ASA      DM 2 - resumed home levemir and SSI    sugars stable     Chronic anxiety  on wellbutrin ,resume      Hypothyroid - on synthroid     Start PT  Out of bed    Dc planning            Diet: diabetic   GI/DVT PX on ASA .  Holding ELIQUIS  CODE STATUS: full code           Natalia Abreu MD, 4/14/2022 7:35 AM

## 2022-04-14 NOTE — PROGRESS NOTES
This note also relates to the following rows which could not be included:  SpO2 - Cannot attach notes to unvalidated device data       04/14/22 0256   NIV Type   Skin Assessment Clean, dry, & intact   Skin Protection for O2 Device Yes   NIV Started/Stopped On   Equipment Type V60   Mode Bilevel   Mask Type Full face mask   Mask Size Large   Settings/Measurements   IPAP 22 cmH20   CPAP/EPAP 10 cmH2O   Rate Ordered 16   Resp 20   FiO2  35 %   Vt Exhaled 641 mL   Mask Leak (lpm) 19 lpm   Comfort Level Good   Using Accessory Muscles No   SpO2 98   Alarm Settings   Alarms On Y

## 2022-04-14 NOTE — PROGRESS NOTES
RT Inhaler-Nebulizer Bronchodilator Protocol Note    There is a bronchodilator order in the chart from a provider indicating to follow the RT Bronchodilator Protocol and there is an Initiate RT Inhaler-Nebulizer Bronchodilator Protocol order as well (see protocol at bottom of note). CXR Findings:  XR CHEST PORTABLE    Result Date: 4/13/2022  No evidence of pneumothorax. No complications status post bronchoscopy. Findings are similar to 04/12/2022. XR CHEST PORTABLE    Result Date: 4/13/2022  1. Complete opacification of the left lung. XR CHEST PORTABLE    Result Date: 4/12/2022  Moderate congestion and/or infiltrates identified in the lungs. Moderate left and trace right pleural effusions. The findings from the last RT Protocol Assessment were as follows:   History Pulmonary Disease: Chronic pulmonary disease  Respiratory Pattern: Dyspnea on exertion or RR 21-25 bpm  Breath Sounds: Slightly diminished and/or crackles  Cough: Strong, spontaneous, non-productive  Indication for Bronchodilator Therapy: Decreased or absent breath sounds  Bronchodilator Assessment Score: 6    Aerosolized bronchodilator medication orders have been revised according to the RT Inhaler-Nebulizer Bronchodilator Protocol below. Respiratory Therapist to perform RT Therapy Protocol Assessment initially then follow the protocol. Repeat RT Therapy Protocol Assessment PRN for score 0-3 or on second treatment, BID, and PRN for scores above 3. No Indications - adjust the frequency to every 6 hours PRN wheezing or bronchospasm, if no treatments needed after 48 hours then discontinue using Per Protocol order mode. If indication present, adjust the RT bronchodilator orders based on the Bronchodilator Assessment Score as indicated below.   Use Inhaler orders unless patient has one or more of the following: on home nebulizer, not able to hold breath for 10 seconds, is not alert and oriented, cannot activate and use MDI correctly, or respiratory rate 25 breaths per minute or more, then use the equivalent nebulizer order(s) with same Frequency and PRN reasons based on the score. If a patient is on this medication at home then do not decrease Frequency below that used at home. 0-3 - enter or revise RT bronchodilator order(s) to equivalent RT Bronchodilator order with Frequency of every 4 hours PRN for wheezing or increased work of breathing using Per Protocol order mode. 4-6 - enter or revise RT Bronchodilator order(s) to two equivalent RT bronchodilator orders with one order with BID Frequency and one order with Frequency of every 4 hours PRN wheezing or increased work of breathing using Per Protocol order mode. 7-10 - enter or revise RT Bronchodilator order(s) to two equivalent RT bronchodilator orders with one order with TID Frequency and one order with Frequency of every 4 hours PRN wheezing or increased work of breathing using Per Protocol order mode. 11-13 - enter or revise RT Bronchodilator order(s) to one equivalent RT bronchodilator order with QID Frequency and an Albuterol order with Frequency of every 4 hours PRN wheezing or increased work of breathing using Per Protocol order mode. Greater than 13 - enter or revise RT Bronchodilator order(s) to one equivalent RT bronchodilator order with every 4 hours Frequency and an Albuterol order with Frequency of every 2 hours PRN wheezing or increased work of breathing using Per Protocol order mode. Patient will benefit from treatments.      Electronically signed by Hafsa Reece RCP on 4/13/2022 at 9:08 PM

## 2022-04-14 NOTE — PROGRESS NOTES
activate and use MDI correctly, or respiratory rate 25 breaths per minute or more, then use the equivalent nebulizer order(s) with same Frequency and PRN reasons based on the score. If a patient is on this medication at home then do not decrease Frequency below that used at home. 0-3 - enter or revise RT bronchodilator order(s) to equivalent RT Bronchodilator order with Frequency of every 4 hours PRN for wheezing or increased work of breathing using Per Protocol order mode. 4-6 - enter or revise RT Bronchodilator order(s) to two equivalent RT bronchodilator orders with one order with BID Frequency and one order with Frequency of every 4 hours PRN wheezing or increased work of breathing using Per Protocol order mode. 7-10 - enter or revise RT Bronchodilator order(s) to two equivalent RT bronchodilator orders with one order with TID Frequency and one order with Frequency of every 4 hours PRN wheezing or increased work of breathing using Per Protocol order mode. 11-13 - enter or revise RT Bronchodilator order(s) to one equivalent RT bronchodilator order with QID Frequency and an Albuterol order with Frequency of every 4 hours PRN wheezing or increased work of breathing using Per Protocol order mode. Greater than 13 - enter or revise RT Bronchodilator order(s) to one equivalent RT bronchodilator order with every 4 hours Frequency and an Albuterol order with Frequency of every 2 hours PRN wheezing or increased work of breathing using Per Protocol order mode.          Electronically signed by Vivien Decker RCP on 4/14/2022 at 8:24 AM

## 2022-04-14 NOTE — PROGRESS NOTES
8074  Shift assessment, completed, see flow sheet. Pt is alert and oriented x4. Following commands. A-Fib 60's, /81 (87), SpO2 96% on LPM via NC. Respirations are easy, even, and unlabored. Bilateral lung sounds are diminished. Took pills whole with thin water this AM w/o difficulty, remains NPO aside from taking medications. PIV x3 remain patent with sites and dressings C/D/I with 0.9% NS infusing. Pt continues to use urinal as needed. Call light within reach. Bed in lowest position. Bed alarm on. Will continue to monitor. [address] rounds completed with Dr. Jody Walter and multidisciplinary team. Reviewed labs, meds, VS, assessment, & plan of care for today. See dictated note and new orders for details. New orders received:  -may resume ASA. -digoxin on hold.  -change solu-medrol to prednisone.  -change IV ATB to PO (continue with levaquin). -d/c IVF. -give lasix 40mg x1 today.  -may d/c NPO, start on regular diet. -02 via NC adjusted down to 2LPM, if pt can move around with PT/OT w/o significant desat may go home today, otherwise may move out of ICU. 1044  [employer] sent to Dr. Jairo Fox re critically elevated calcium level from this AM (12.2). NNO received, monitoring at this time. 1600  Pt continues to rest comfortably in bed. Denies any needs at this time. VSS. Pt has transfer orders for PCU, awaiting bed assignment at this time. Daughter has been updated.

## 2022-04-14 NOTE — PROGRESS NOTES
Pulmonary & Critical Care Medicine ICU Progress Note    CC: Hypercapnic respiratory failure    Events of Last 24 hours:   Not liking BIPAP  On 3L - uses 2L at home   No hemoptysis       Vascular lines: IV: PIVs         / / /FiO2 : 35 %  Recent Labs     22  1839 22  0500   PHART 7.271* 7.306*   AQW5JST 71.3* 58.4*   PO2ART 79.0 68.8*       IV:   sodium chloride 125 mL/hr at 22 1621    dextrose      sodium chloride         Vitals:  Blood pressure 119/67, pulse 63, temperature 98.1 °F (36.7 °C), temperature source Oral, resp. rate 20, height 6' (1.829 m), weight 265 lb 3.4 oz (120.3 kg), SpO2 93 %. on 3 LPM      Temp  Av.3 °F (36.8 °C)  Min: 98 °F (36.7 °C)  Max: 98.8 °F (37.1 °C)    Intake/Output Summary (Last 24 hours) at 2022 0715  Last data filed at 2022 0600  Gross per 24 hour   Intake 2582.07 ml   Output 1550 ml   Net 1032.07 ml     PE:  /67   Pulse 63   Temp 98.1 °F (36.7 °C) (Oral)   Resp 20   Ht 6' (1.829 m)   Wt 265 lb 3.4 oz (120.3 kg)   SpO2 93%   BMI 35.97 kg/m²   Gen: No distress. Eyes: PERRL. No sclera icterus. No conjunctival injection. ENT: No discharge. Pharynx clear. Neck: Trachea midline. No obvious mass. Resp: No accessory muscle use. No crackles. No wheezes. Few rhonchi. Good air entry. CV: Regular rate. Regular rhythm. No murmur or rub. No edema. GI: Non-tender. Non-distended. No hernia. Skin: Warm and dry. No nodule on exposed extremities. Lymph: No cervical LAD. No supraclavicular LAD. M/S: No cyanosis. No joint deformity. No clubbing. Neuro: Awake. Alert. Moves all four extremities. Psych: Oriented x 3. No anxiety.        Scheduled Meds:   mupirocin   Nasal BID    guaiFENesin  600 mg Oral BID    [Held by provider] aspirin  81 mg Oral Daily    atorvastatin  20 mg Oral Daily    buPROPion  100 mg Oral BID    dilTIAZem  240 mg Oral Daily    digoxin  0.125 mg Oral Daily    hydrALAZINE  25 mg Oral 3 times per day    levothyroxine  150 mcg Oral Daily    [Held by provider] lisinopril  20 mg Oral Daily    metoprolol tartrate  25 mg Oral BID    [Held by provider] nortriptyline  10 mg Oral Nightly    [Held by provider] torsemide  50 mg Oral Daily    levofloxacin  500 mg IntraVENous Daily    sodium chloride flush  5-40 mL IntraVENous 2 times per day    methylPREDNISolone  40 mg IntraVENous Q12H    insulin lispro  0-6 Units SubCUTAneous Q4H    ipratropium-albuterol  1 ampule Inhalation 4x daily       Data:  CBC:   Recent Labs     04/12/22  1005 04/12/22  1753 04/13/22  0420   WBC  --  13.1* 13.0*   HGB  --  14.0 13.5   HCT  --  44.0 42.6   MCV  --  91.1 91.1    341 313     BMP:   Recent Labs     04/12/22  1700 04/13/22  0520    137   K 4.3 4.9   CL 99 100   CO2 30 30   BUN 15 18   CREATININE 0.8 1.0     LIVER PROFILE:   Recent Labs     04/12/22  1700 04/13/22  0520   AST 23 19   ALT 27 23   BILIDIR <0.2  --    BILITOT <0.2 0.3   ALKPHOS 187* 186*       Microbiology:  4/12 respiratory      Left lower lobe lung mass, endobronchial biopsies:   - Squamous cell carcinoma, moderately differentiated        Imaging:  Chest x-ray 4/13 imaging was reviewed by me and showed   There is improved aeration of the left upper lung compared to the previous day's exam.        ASSESSMENT:  · Acute hypoxemic hypercapnic respiratory failure-probably slow to wake up from anesthesia and underlying obesity hypoventilation syndrome/CODY/COPD  · Acute encephalopathy/metabolic encephalopathy  · COPD with acute exacerbation  · Left lung collapse on chest x-ray today-likely due to left mainstem endobronchial lesion (at KYLIE/LLL bifurcation) - reexpansion post Bronch 4/14/22   · L effusion not enough on US 4/13/22  · Postobstructive pneumonia  · Left mainstem endobronchial mass oozing blood on bronchoscopy/12/22  · Left lower lobe/lingula 12 cm mass with hilar/mediastinal adenopathy post bronchoscopy/12/22 Bx Squamous cell carcinoma  · Nocturnal hypoxemia on 2 L O2  · Hypercalcemia concerning for bone mets  · 50 pound weight loss  · Chronic diastolic CHF and A. fib on Eliquis  · More than 40-pack-year history of smoking       PLAN:  · BiPAP PRN   · Supplemental oxygen to maintain SaO2 >92%; wean as tolerated  · Inhaled bronchodilators  · IV Solu-Medrol--> prednisone   · Levaquin IV D#3  · Mucinex and chest physiotherapy.   · Received Zometa and Oncology consult   · Keep holding Eliquis/AC given friable easy to bleed endobronchial lesion   · Resume ASA   · PT/OT  · D/C IVF  · Lasix 40 mg IV x 1   · Hold Digoxin given bradycardia   · Home medications were addressed   · Blood sugar control ISS, with goal 150-180  · DVT prophylaxis: SCDs  · MRSA prophylaxis: Bactroban  · Okay to move out of the ICU from our perspective

## 2022-04-14 NOTE — TELEPHONE ENCOUNTER
Pt is still admitted. F/u appt today with Dr. Tono Dowd cancelled. Will watch for hospital d/c then r/s appt. Render Note In Bullet Format When Appropriate: No Consent: The patient's consent was obtained including but not limited to risks of crusting, scabbing, blistering, scarring, darker or lighter pigmentary change, recurrence, incomplete removal and infection. Medical Necessity Information: It is in your best interest to select a reason for this procedure from the list below. All of these items fulfill various CMS LCD requirements except the new and changing color options. Detail Level: Detailed Post-Care Instructions: I reviewed with the patient in detail post-care instructions. Patient is to wear sunprotection, and avoid picking at any of the treated lesions. Pt may apply Vaseline to crusted or scabbing areas. Show Applicator Variable?: Yes Pared With?: razor blade Medical Necessity Clause: This procedure was medically necessary because the lesions that were treated were: Number Of Freeze-Thaw Cycles: 2 freeze-thaw cycles

## 2022-04-14 NOTE — PROGRESS NOTES
ONCOLOGY FOLLOW-UP:       Primary Oncologist:  Dr. Gonzales [facility])    PROBLEM LIST:       Patient Active Problem List   Diagnosis Code    Spinal stenosis of lumbar region with neurogenic claudication M48.062    Mediastinal adenopathy R59.0    Lung mass R91.8    Multiple tracheobronchial mucus plugs T17.800A    Mass of left lung R91.8    Acute on chronic respiratory failure with hypoxia (HCC) J96.21    Acute hypercapnic respiratory failure (HCC) J96.02    COPD exacerbation (HCC) J44.1    Acute encephalopathy G93.40    Recurrent left pleural effusion G04    Metabolic encephalopathy N34.55    Atrial fibrillation, permanent (HCC) I48.21    Acute respiratory failure with hypoxia and hypercapnia (HCC) J96.01, J96.02    Lung collapse J98.19    Postobstructive pneumonia J18.9    Mucus plugging of bronchi T17.500A       INTERVAL HISTORY:       Remains admitted in ICU. Received zometa yesterday. CMP pending this AM.     REVIEW OF SYSTEMS:       10 point ROS completed. Pertinent positives in HPI, otherwise negative.      PHYSICAL EXAM:       Vitals:    04/14/22 0700   BP: 122/68   Pulse: 58   Resp: 19   Temp:    SpO2: 98%       General appearance: alert and cooperative  Head: Normocephalic, without obvious abnormality, atraumatic  Neck: No palpable lymphadenopathy in supraclavicular or cervical chains  Lungs: Clear to auscultation bilaterally, no audible rales, wheezes or crackles  Heart: Regular rate and rhythm, S1, S2 normal  Abdomen: Soft, non-tender; bowel sounds normal; no masses,  no organomegaly  Extremities: without cyanosis, clubbing, edema or asymmetry  Skin: No jaundice, purpura or petechiae      LABS:     Lab Results   Component Value Date    WBC 13.0 (H) 04/13/2022    HGB 13.5 04/13/2022    HCT 42.6 04/13/2022    MCV 91.1 04/13/2022     04/13/2022       Lab Results   Component Value Date    GLUCOSE 157 (H) 04/13/2022    BUN 18 04/13/2022    CREATININE 1.0 04/13/2022    K 4.9 04/13/2022       Lab Results   Component Value Date    ALKPHOS 186 (H) 04/13/2022    ALT 23 04/13/2022    AST 19 04/13/2022    BILITOT 0.3 04/13/2022    BILIDIR <0.2 04/12/2022    PROT 6.8 04/13/2022         IMAGING:     XR CHEST PORTABLE  Result Date: 4/12/2022  Moderate congestion and/or infiltrates identified in the lungs. Moderate left and trace right pleural effusions. CT chest 3/22/22  (from pulmonary notes):   Large left lower lobe/lingula mass measuring 12 cm with mediastinal/hilar adenopathy  Possible pericardial, pleural and chest wall involvement  Partially loculated left-sided effusion  Left mainstem/left lower lobe/lingular airway obstruction    PATHOLOGY:     EBUS/Lung Biopsy 4/12/22:     FINAL DIAGNOSIS:     Left lower lobe lung mass, endobronchial biopsies:   - Squamous cell carcinoma, moderately differentiated. COMMENT:    Case discussed with Dr. Irene Avitia on 4/13/2022 at 1600. BUCCA/BUCCA     ASSESSMENT/PLAN:     1. Respiratory Failure  - hypercapnia on ABG after EBUS  - mgmt per pulmonary    2. Squamous Cell Carcinoma L Lung  3. Hilar/Mediastinal LAD  - appears he has had CT scan and PET scan but cannot view results  - suspected lung malignancy  - he is already seeing Dr. Reilly [facility] at [facility]  - appears to likely have at least N2 disease based on pulmonary notes, possibly metastatic disease  - biopsy shows squamous cell carcinoma  - follow up with his primary oncologist after d/c for treatment recommendations    4.  Hypercalcemia  - presumably from malignancy  - given zometa 4/13  - updated his primary oncologist Dr. Jaye Gupta MD

## 2022-04-14 NOTE — PROGRESS NOTES
Inpatient Physical Therapy Evaluation and Treatment    Unit: ICU  Date:  4/14/2022  Patient Name:    Donna Rice  Admitting diagnosis:  Acute on chronic respiratory failure with hypoxia [facility]) [J96.21]  Admit Date:  4/12/2022  Precautions/Restrictions/WB Status/ Lines/ Wounds/ Oxygen: Fall risk, Bed/chair alarm, Lines -IV and Supplemental O2 (2L), Telemetry, Continuous pulse oximetry and ICU monitoring    Treatment Time:  11:37-12:25  Treatment Number:  1   Timed Code Treatment Minutes: 38 minutes  Total Treatment Minutes:  48  minutes    Patient Goals for Therapy: return home; denies needs. Discharge Recommendations: Home 24 hr assist initially (PRN ongoing)  DME needs for discharge: Needs Met       Therapy recommendation for EMS Transport: can transport by wheelchair    Therapy recommendations for staff:   Assist of 1 with use of rolling walker (RW) for all transfers and ambulation within room. History of Present Illness: Per Dr. Clay Arriaga H&P 4/13: \"18 y.o.  male  With known h.o  Tobacco use, copd  Presented for left lower lobe mass evaluation by bronchoscopy yesterday. Post bronch pt was harder to awake and had increasing dyspnea noted. ABG showed hypercarbia, ph of 7.24, pco2 of 74 and was admitted to ICU for bipap treatment. Imaging with no pneumothorax   Overnight ABG shows improvement and mentation improved  Pt reports he uses 2 L o2 at home and recently quit smoking also lost about 50 lbs recently   Denies any chest pain but has sob today   No fevers or chills. \"   PMHx: diastolic CHF, DMII, afib    Home Health S4 Level Recommendation:  NA  AM-PAC Mobility Score       AM-PAC Inpatient Mobility without Stair Climbing Raw Score : 18    Preadmission Environment    Pt.  Lives Alone  Home environment:    one story home  Steps to enter first floor: One steps to enter  Steps to second floor: N/A  Bathroom: tub/shower unit, walk in shower, grab bars, standard height commode and  shower seat Equipment owned: [address] [place] [address], hospital bed and home O2 (2L) continous     Preadmission Status:  Pt. Able to drive: No  Pt Fully independent with ADLs: No needs  assist shoes and socks aide will assist with bath   Pt. Required assistance from aide for: Cleaning, Cooking and Laundry . Pt can cook something simple   Pt. independent for transfers and gait and walked with Kolby Soto  History of falls Yes   Aide every other day for 3-4 hrs   Nurse 2x a week     Pain   No    Cognition    A&O x4   Able to follow 2 step commands    Subjective  Patient lying supine in bed with no family present. Pt agreeable to this PT eval & tx. Eager to eat. Feels fatigued since he hasn't eaten in 4 days. Upper Extremity ROM/Strength  Please see OT evaluation. Lower Extremity ROM / Strength   AROM WFL: Yes    BLE strength WFL, but not formally assessed with MMT. Assessed grossly in supine. Lower Extremity Sensation    NT    Lower Extremity Proprioception:   WFL    Coordination and Tone  WFL    Balance  Sitting:  Normal; Supervision  Comments: at EOB ~10 minutes    Standing: Fair +; CGA  Comments: Steady with light UE support of walker. Bed Mobility   Supine to Sit:    Min A   Sit to Supine:   Not Tested  Rolling:   Not Tested  Scooting in sitting: Supervision  Scooting in supine:  Not Tested    Transfer Training     Sit to stand:   SBA  Stand to sit:   SBA  Bed to Chair:   SBA with use of gait belt and rolling walker (RW)    Gait gait completed as indicated below  Distance:      15 ft + 25 ft  Deviations (firm surface/linoleum):  decreased nhi, shuffles and decreased step length bilaterally  Assistive Device Used:    rolling walker (RW)  Level of Assist:    SBA  Comment: steady throughout. Stair Training deferred. Expect that pt has sufficient functional strength, balance, and endurance to complete home step with light assist/supervision.     Activity Tolerance   Pt completed therapy session with No adverse symptoms noted w/activity. SpO2 min 87% after ambulation, no overt signs of fatigue, RR WFL, easy/quick recovery with seated rest.    Positioning Needs   Pt up in chair, ICU monitoring, positioned in proper neutral alignment and pressure relief provided. Call light provided and all needs within reach    Exercises Initiated  Clarissa deferred secondary to treatment focus on functional mobility  NA    Other  None. Patient/Family Education   Pt educated on role of inpatient PT, POC, importance of continued activity, DC recommendations and calling for assist with mobility. Assessment  Pt seen for Physical Therapy evaluation in acute care setting. Pt lives alone with regular aide assistance for household tasks and sometimes ADLs. At baseline he mobilizes indep with walker or cane. On 2L O2 baseline. Today he mobilized fairly well. Balance and strength are WFL. He desaturated to 87% with ~25 ft ambulation but did not appear fatigued and SpO2 recovered quickly with seated rest. Expect pt will progress well and be safe for home d/c. Recommending Home with initial 24/7 assist, ongoing PRN assist as per prior arrangements,  upon discharge as patient functioning close to baseline level. Goals : To be met in 3 visits:  1). Independent with LE Ex x 10 reps    To be met in 6 visits:  1). Supine to/from sit: Independent  2). Sit to/from stand: Modified Independent  3). Bed to chair: Modified Independent  4). Gait: Ambulate 150 ft.  with  Modified Independent and use of LRAD (least restrictive assistive device)  5). Tolerate B LE exercises 3 sets of 10-15 reps  6).   Ascend/descend 1 steps with Modified Independent with use of hand rail bilateral and LRAD (least restrictive assistive device)    Rehabilitation Potential: Good  Strengths for achieving goals include:   Pt motivated, PLOF, Family Support and Pt cooperative   Barriers to achieving goals include:    No Barriers    Plan    To be seen 2-3 x / week  while in acute care setting for therapeutic exercises, bed mobility, transfers, progressive gait training, balance training, and family/patient education. Signature: Marilyn Mooney PT, DPT    If patient discharges from this facility prior to next visit, this note will serve as the Discharge Summary.

## 2022-04-15 VITALS
RESPIRATION RATE: 23 BRPM | TEMPERATURE: 98.4 F | WEIGHT: 268.96 LBS | DIASTOLIC BLOOD PRESSURE: 80 MMHG | SYSTOLIC BLOOD PRESSURE: 104 MMHG | HEART RATE: 84 BPM | BODY MASS INDEX: 36.43 KG/M2 | HEIGHT: 72 IN | OXYGEN SATURATION: 90 %

## 2022-04-15 LAB
ANION GAP SERPL CALCULATED.3IONS-SCNC: 9 MMOL/L (ref 3–16)
BASOPHILS ABSOLUTE: 0 K/UL (ref 0–0.2)
BASOPHILS RELATIVE PERCENT: 0.1 %
BUN BLDV-MCNC: 27 MG/DL (ref 7–20)
CALCIUM SERPL-MCNC: 11.3 MG/DL (ref 8.3–10.6)
CHLORIDE BLD-SCNC: 99 MMOL/L (ref 99–110)
CO2: 30 MMOL/L (ref 21–32)
CREAT SERPL-MCNC: 1 MG/DL (ref 0.8–1.3)
EOSINOPHILS ABSOLUTE: 0 K/UL (ref 0–0.6)
EOSINOPHILS RELATIVE PERCENT: 0 %
GFR AFRICAN AMERICAN: >60
GFR NON-AFRICAN AMERICAN: >60
GLUCOSE BLD-MCNC: 118 MG/DL (ref 70–99)
GLUCOSE BLD-MCNC: 129 MG/DL (ref 70–99)
GLUCOSE BLD-MCNC: 138 MG/DL (ref 70–99)
GLUCOSE BLD-MCNC: 139 MG/DL (ref 70–99)
HCT VFR BLD CALC: 42.7 % (ref 40.5–52.5)
HEMOGLOBIN: 13.7 G/DL (ref 13.5–17.5)
LYMPHOCYTES ABSOLUTE: 0.3 K/UL (ref 1–5.1)
LYMPHOCYTES RELATIVE PERCENT: 2.2 %
MCH RBC QN AUTO: 28.9 PG (ref 26–34)
MCHC RBC AUTO-ENTMCNC: 32.1 G/DL (ref 31–36)
MCV RBC AUTO: 90.1 FL (ref 80–100)
MONOCYTES ABSOLUTE: 0.8 K/UL (ref 0–1.3)
MONOCYTES RELATIVE PERCENT: 5 %
NEUTROPHILS ABSOLUTE: 13.9 K/UL (ref 1.7–7.7)
NEUTROPHILS RELATIVE PERCENT: 92.7 %
PDW BLD-RTO: 16.6 % (ref 12.4–15.4)
PERFORMED ON: ABNORMAL
PLATELET # BLD: 283 K/UL (ref 135–450)
PMV BLD AUTO: 6.4 FL (ref 5–10.5)
POTASSIUM SERPL-SCNC: 4.1 MMOL/L (ref 3.5–5.1)
RBC # BLD: 4.73 M/UL (ref 4.2–5.9)
SODIUM BLD-SCNC: 138 MMOL/L (ref 136–145)
VITAMIN D 1,25-DIHYDROXY: 41.4 PG/ML (ref 19.9–79.3)
WBC # BLD: 15 K/UL (ref 4–11)

## 2022-04-15 PROCEDURE — 94640 AIRWAY INHALATION TREATMENT: CPT

## 2022-04-15 PROCEDURE — 2700000000 HC OXYGEN THERAPY PER DAY

## 2022-04-15 PROCEDURE — 6370000000 HC RX 637 (ALT 250 FOR IP): Performed by: INTERNAL MEDICINE

## 2022-04-15 PROCEDURE — 6370000000 HC RX 637 (ALT 250 FOR IP): Performed by: NURSE PRACTITIONER

## 2022-04-15 PROCEDURE — 99233 SBSQ HOSP IP/OBS HIGH 50: CPT | Performed by: INTERNAL MEDICINE

## 2022-04-15 PROCEDURE — 36415 COLL VENOUS BLD VENIPUNCTURE: CPT

## 2022-04-15 PROCEDURE — 80048 BASIC METABOLIC PNL TOTAL CA: CPT

## 2022-04-15 PROCEDURE — 85025 COMPLETE CBC W/AUTO DIFF WBC: CPT

## 2022-04-15 PROCEDURE — 94660 CPAP INITIATION&MGMT: CPT

## 2022-04-15 PROCEDURE — 94761 N-INVAS EAR/PLS OXIMETRY MLT: CPT

## 2022-04-15 PROCEDURE — 99239 HOSP IP/OBS DSCHRG MGMT >30: CPT | Performed by: INTERNAL MEDICINE

## 2022-04-15 RX ORDER — GUAIFENESIN 600 MG/1
600 TABLET, EXTENDED RELEASE ORAL 2 TIMES DAILY
Qty: 20 TABLET | Refills: 0 | Status: ON HOLD | OUTPATIENT
Start: 2022-04-15 | End: 2022-08-10

## 2022-04-15 RX ORDER — LEVOFLOXACIN 500 MG/1
500 TABLET, FILM COATED ORAL DAILY
Qty: 4 TABLET | Refills: 0 | Status: ON HOLD | OUTPATIENT
Start: 2022-04-15 | End: 2022-04-22 | Stop reason: CLARIF

## 2022-04-15 RX ORDER — PREDNISONE 10 MG/1
TABLET ORAL
Qty: 18 TABLET | Refills: 0 | Status: ON HOLD | OUTPATIENT
Start: 2022-04-15 | End: 2022-05-02 | Stop reason: HOSPADM

## 2022-04-15 RX ADMIN — PREDNISONE 40 MG: 20 TABLET ORAL at 08:49

## 2022-04-15 RX ADMIN — ASPIRIN 81 MG 81 MG: 81 TABLET ORAL at 08:49

## 2022-04-15 RX ADMIN — IPRATROPIUM BROMIDE AND ALBUTEROL SULFATE 1 AMPULE: .5; 3 SOLUTION RESPIRATORY (INHALATION) at 00:45

## 2022-04-15 RX ADMIN — IPRATROPIUM BROMIDE AND ALBUTEROL SULFATE 1 AMPULE: .5; 3 SOLUTION RESPIRATORY (INHALATION) at 07:29

## 2022-04-15 RX ADMIN — ATORVASTATIN CALCIUM 20 MG: 10 TABLET, FILM COATED ORAL at 08:49

## 2022-04-15 RX ADMIN — GUAIFENESIN 600 MG: 600 TABLET, EXTENDED RELEASE ORAL at 08:49

## 2022-04-15 RX ADMIN — LEVOTHYROXINE SODIUM 150 MCG: 150 TABLET ORAL at 05:24

## 2022-04-15 RX ADMIN — HYDRALAZINE HYDROCHLORIDE 25 MG: 25 TABLET, FILM COATED ORAL at 05:19

## 2022-04-15 RX ADMIN — LEVOFLOXACIN 500 MG: 500 TABLET, FILM COATED ORAL at 08:49

## 2022-04-15 RX ADMIN — BUPROPION HYDROCHLORIDE 100 MG: 100 TABLET, EXTENDED RELEASE ORAL at 08:49

## 2022-04-15 ASSESSMENT — PAIN SCALES - GENERAL
PAINLEVEL_OUTOF10: 0
PAINLEVEL_OUTOF10: 0

## 2022-04-15 NOTE — PROGRESS NOTES
RT Inhaler-Nebulizer Bronchodilator Protocol Note    There is a bronchodilator order in the chart from a provider indicating to follow the RT Bronchodilator Protocol and there is an Initiate RT Inhaler-Nebulizer Bronchodilator Protocol order as well (see protocol at bottom of note). CXR Findings:  XR CHEST PORTABLE    Result Date: 4/13/2022  No evidence of pneumothorax. No complications status post bronchoscopy. Findings are similar to 04/12/2022. XR CHEST PORTABLE    Result Date: 4/13/2022  1. Complete opacification of the left lung. The findings from the last RT Protocol Assessment were as follows:   History Pulmonary Disease: Chronic pulmonary disease  Respiratory Pattern: Dyspnea on exertion or RR 21-25 bpm  Breath Sounds: Slightly diminished and/or crackles  Cough: Strong, spontaneous, non-productive  Indication for Bronchodilator Therapy: Decreased or absent breath sounds  Bronchodilator Assessment Score: 6    Aerosolized bronchodilator medication orders have been revised according to the RT Inhaler-Nebulizer Bronchodilator Protocol below. Respiratory Therapist to perform RT Therapy Protocol Assessment initially then follow the protocol. Repeat RT Therapy Protocol Assessment PRN for score 0-3 or on second treatment, BID, and PRN for scores above 3. No Indications - adjust the frequency to every 6 hours PRN wheezing or bronchospasm, if no treatments needed after 48 hours then discontinue using Per Protocol order mode. If indication present, adjust the RT bronchodilator orders based on the Bronchodilator Assessment Score as indicated below. Use Inhaler orders unless patient has one or more of the following: on home nebulizer, not able to hold breath for 10 seconds, is not alert and oriented, cannot activate and use MDI correctly, or respiratory rate 25 breaths per minute or more, then use the equivalent nebulizer order(s) with same Frequency and PRN reasons based on the score.   If a

## 2022-04-15 NOTE — PROGRESS NOTES
Family here to pick pt up and spoke with Dr Latoya Sharma. Pt will discharge on Eliquis 2.5mg twice a day to start 4/16. Pt daughter brought oxygen tank for discharge. Pt educated to stopping smoking and dangers of smoking with oxygen.

## 2022-04-15 NOTE — PROGRESS NOTES
240 mg Oral Daily    [Held by provider] digoxin  0.125 mg Oral Daily    hydrALAZINE  25 mg Oral 3 times per day    levothyroxine  150 mcg Oral Daily    [Held by provider] lisinopril  20 mg Oral Daily    metoprolol tartrate  25 mg Oral BID    [Held by provider] nortriptyline  10 mg Oral Nightly    [Held by provider] torsemide  50 mg Oral Daily    sodium chloride flush  5-40 mL IntraVENous 2 times per day    insulin lispro  0-6 Units SubCUTAneous Q4H       Data:  CBC:   Recent Labs     04/13/22  0420 04/14/22  0758 04/15/22  0432   WBC 13.0* 18.8* 15.0*   HGB 13.5 13.1* 13.7   HCT 42.6 40.7 42.7   MCV 91.1 91.2 90.1    310 283     BMP:   Recent Labs     04/13/22  0520 04/14/22  0758 04/15/22  0432    136 138   K 4.9 4.5 4.1    100 99   CO2 30 29 30   BUN 18 24* 27*   CREATININE 1.0 0.9 1.0     LIVER PROFILE:   Recent Labs     04/12/22  1700 04/13/22  0520   AST 23 19   ALT 27 23   BILIDIR <0.2  --    BILITOT <0.2 0.3   ALKPHOS 187* 186*       Microbiology:  4/12 respiratory  4/12 BC NGTD     Left lower lobe lung mass, endobronchial biopsies:   - Squamous cell carcinoma, moderately differentiated        Imaging:  Chest x-ray 4/13 imaging was reviewed by me and showed   There is improved aeration of the left upper lung compared to the previous day's exam.        ASSESSMENT:  · Acute hypoxemic hypercapnic respiratory failure-probably slow to wake up from anesthesia on underlying obesity hypoventilation syndrome/CODY/COPD  · Acute encephalopathy/metabolic encephalopathy-improved and back to baseline  · COPD with acute exacerbation  · Left lung collapse on chest x-ray 4/13/22 due to-likely due to left mainstem endobronchial lesion (at KYLIE/LLL bifurcation) - reexpansion post Bronch 4/13/22 with mucous plug removal.  · L effusion not enough on US 4/13/22  · Postobstructive pneumonia  · Left mainstem endobronchial mass oozing blood on bronchoscopy/12/22  · Left lower lobe/lingula 12 cm mass with hilar/mediastinal adenopathy post bronchoscopy/12/22 Bx Squamous cell carcinoma  · Nocturnal hypoxemia on 2 L O2  · Hypercalcemia concerning for bone mets  · 50 pound weight loss  · Chronic diastolic CHF and A. fib on Eliquis  · More than 40-pack-year history of smoking       PLAN:  · D/C BiPAP PRN   · Supplemental oxygen to maintain SaO2 >92%; wean as tolerated  · Inhaled bronchodilators  · Prednisone taper   · Levaquin IV D#4/10  · Mucinex and chest physiotherapy. · Received Zometa and Oncology consult -calcium is trending down  · Patient now with no hemoptysis for 2 days, patient may resume Eliquis at half dose 2.5 twice daily until he sees oncology on Monday-discussed with his oncologist today, Dr. Karin Munroe who will see patient on Monday and plan to start chemoradiation ASAP. Discussed the option of debulking/laser therapy for the endobronchial lesion and plan to hold off given poor tolerance to anesthesia. · Resumed ASA   · Advised patient and family to hold aspirin/Eliquis for any hemoptysis. · PT/OT  · Hold Digoxin given bradycardia   · Blood sugar control ISS, with goal 150-180  · DVT prophylaxis: SCDs  · MRSA prophylaxis: Bactroban  · Discussed with family member at the bedside and multiple good questions were answered. · D/C planning is okay with pulmonary.

## 2022-04-15 NOTE — PROGRESS NOTES
IM Progress Note    Admit Date:  4/12/2022  3    Interval history:  Admitted for resp failure post bronch for lung mass bx  Improving with Bipap  Repeat bronch 4/13    Subjective:  Mr. Peg Chambers seen up in bed on 3 L     Did not use bipap, removed Telemetry as he did not like wires  Wishes to go home today     Feels fine today . No cough or hemoptysis        Objective:   /65   Pulse 76   Temp 98.5 °F (36.9 °C) (Axillary)   Resp 23   Ht 6' (1.829 m)   Wt 268 lb 15.4 oz (122 kg)   SpO2 93%   BMI 36.48 kg/m²       Intake/Output Summary (Last 24 hours) at 4/15/2022 0749  Last data filed at 4/15/2022 0015  Gross per 24 hour   Intake 1351.56 ml   Output 1750 ml   Net -398.44 ml       Physical Exam:    General:  Elderly obese male, Awake, alert and oriented. Appears to be not in any distress  Mucous Membranes:  Pink , anicteric  Neck: No JVD, no carotid bruit, no thyromegaly  Chest:  Diminished in left side with resolving  olamide wheeze   Cardiovascular:  Irregular S1S2 heard, no murmurs or gallops  Abdomen:  Soft, obese undistended, non tender, no organomegaly, BS present  Extremities: No edema or cyanosis.  Distal pulses well felt  Neurological : no focal deficits    Medications:   Scheduled Medications:    ipratropium-albuterol  1 ampule Inhalation BID    predniSONE  40 mg Oral Daily    levoFLOXacin  500 mg Oral Daily    mupirocin   Nasal BID    guaiFENesin  600 mg Oral BID    aspirin  81 mg Oral Daily    atorvastatin  20 mg Oral Daily    buPROPion  100 mg Oral BID    dilTIAZem  240 mg Oral Daily    [Held by provider] digoxin  0.125 mg Oral Daily    hydrALAZINE  25 mg Oral 3 times per day    levothyroxine  150 mcg Oral Daily    [Held by provider] lisinopril  20 mg Oral Daily    metoprolol tartrate  25 mg Oral BID    [Held by provider] nortriptyline  10 mg Oral Nightly    [Held by provider] torsemide  50 mg Oral Daily    sodium chloride flush  5-40 mL IntraVENous 2 times per day    insulin lispro  0-6 Units SubCUTAneous Q4H     I   dextrose      sodium chloride       glucagon (rDNA), dextrose, sodium chloride flush, sodium chloride, ondansetron **OR** ondansetron, polyethylene glycol, acetaminophen **OR** acetaminophen, glucose, dextrose bolus (hypoglycemia) **OR** dextrose bolus (hypoglycemia), ipratropium-albuterol    Lab Data:  Recent Labs     04/13/22  0420 04/14/22  0758 04/15/22  0432   WBC 13.0* 18.8* 15.0*   HGB 13.5 13.1* 13.7   HCT 42.6 40.7 42.7   MCV 91.1 91.2 90.1    310 283     Recent Labs     04/13/22  0520 04/14/22  0758 04/15/22  0432    136 138   K 4.9 4.5 4.1    100 99   CO2 30 29 30   BUN 18 24* 27*   CREATININE 1.0 0.9 1.0     Recent Labs     04/12/22  1700 04/12/22 2000   TROPONINI 0.01 <0.01       Coagulation:   Lab Results   Component Value Date    INR 1.14 04/12/2022     Cardiac markers:   Lab Results   Component Value Date    TROPONINI <0.01 04/12/2022         Lab Results   Component Value Date    ALT 23 04/13/2022    AST 19 04/13/2022    ALKPHOS 186 (H) 04/13/2022    BILITOT 0.3 04/13/2022       Lab Results   Component Value Date    INR 1.14 (H) 04/12/2022    PROTIME 12.9 (H) 04/12/2022       Radiology    Bronch 4/13-  Large mucous/blood plug occluding left mainstem. Hard to suction and was able to pull it out applying scope suctioning all the way outside the airways. Left mainstem friable endobronchial lesion at the level of the KYLIE/LLL bifurcation was visualized occluding completely left lower lobe and extending into the left upper lobe. Barely able to visualize the left upper lobe segments due to endobronchial lesion and multiple small mucous plugs suctioned. 1/20,000 epinephrine I saline injected over the friable easy to bleed left mainstem endobronchial lesion. Ice saline/normal saline were injected followed by therapeutic aspiration.        Bronch 4/12- Friable fungating L main stem endobronchial lesion oozing some blood with touching and after Bx, entirely occluding the L main stem with inability to visualized beyond. Bilateral purulent secretions with mucus plugs. Multiple endobronchial Bx were obtain from L main stem endobronchial lesion. Ice saline and 1/20000 Epi was used to control blood oozing from  L main stem endobronchial lesion. Salnie injection followed therapeutic aspiration was performed      Pathology - - Squamous cell carcinoma, moderately differentiated.         EKG: Afibb with PVC     Radiology:     Chest xray   Moderate congestion and/or infiltrates identified in the lungs.  Moderate   left and trace right pleural effusions.               ASSESMENT & PLAN:      Acute hypercarbic respiratory failure  Acute copd exacerbation and left lung collapse  - hypercarbic and hard to awake post bronchoscopy  ABG with hypercarbia, admitted to ICU and started on bipap. Started steroids, HHN  pulm consulted  Improved mentation and hypercarbia  Weaned bipap and weaning steroids   Improved resp status , back to home o2 , dc home today       Left lung collapse   Possible post obstructive pna -   S.p repeat bronch with therapeutic aspiration of mucous plugs - re expanded in repeat imaging  started on levaquin by pulm  Wbc high likely with steroids- improving           Acute metabolic encephalopathy - sec to hypercarbia, resolved      Left lung cancer - Squamous cell carcinoma, moderately differentiated. Oncology consulted   Seen by Dr. Willem Martinez here   Pt fw Dr. Iman Coyle as outpt      Hypercalcemia - calcium at 13.6- likely with bone mets .  Check PTH related peptide, vit d levels  Started IVF and given lasix   Oncology consulted- s/p zometa - improving to 11  asymptomatic        Atrial fibrillation - rate controlled - on BB and cardizem, resume digoxin   holding eliquis for tumor being friable and easy bleed   Now on ASA only        Chronic CHF - diastolic with preserved EF -   Non obstructive CAD  - f.w [facility]  - resume statins, BB and lasix , ASA    DM 2 - resumed home levemir and SSI    sugars stable     Chronic anxiety  on wellbutrin ,resume      Hypothyroid - on synthroid     Dc home    Cut down eliquis to 2.5 mg bid    F/w Dr. Mercy Gomez next week to plan for cancer treatment     No smoking    Increase insulin to keep sugars less than 150 while on steroids    Nam Gotti MD, 4/15/2022 7:49 AM

## 2022-04-15 NOTE — PROGRESS NOTES
Patient not picking up on cardiac monitor. Went to room and found patient had taken gown, monitor leads, oxygen, and pulsox off and walked across room to chair. He appeared short of breath. Patient agreed to move closer to monitor so that the leads would reach and could be replaced. His Spo2 was 86% on monitor. Explained to patient that he needs to keep all leads and monitoring devices on so that he could be monitored and what was being monitored. The patient agreed to keep everything on. This nurse walked out from room and to nurse's station and found patient on video monitor with his cardiac monitoring leads in his hand and off his chest.     Placed a telesitter in room for continuous visual monitoring and updated the telesitter monitor personal, Lorraine Holguin. Patient continued to pull off monitor leads multiple times and no notice received from telesitter. Each time staff replaced the leads and attempted to educate patient. Call placed to Magdi Arreaga, Clinical  and updated about safety concern. She will evaluate.     Electronically signed by Hoa Castaneda RN on 4/14/2022 at 10:18 PM

## 2022-04-15 NOTE — PROGRESS NOTES
@0200 Pt was asleep. Charge RN was okay with removing sitter at bedside. Pt room remained close to nursing station and tele-sitter Gloria Bolton) was notified.

## 2022-04-15 NOTE — PROGRESS NOTES
Discharge reviewed with pt and family. Pt hooked to home O2 daughter brought in. Pt wheeled out by transport.

## 2022-04-15 NOTE — PROGRESS NOTES
ONCOLOGY FOLLOW-UP:       Primary Oncologist:  Dr. Sandy RUDD [facility])    PROBLEM LIST:       Patient Active Problem List   Diagnosis Code    Spinal stenosis of lumbar region with neurogenic claudication M48.062    Mediastinal adenopathy R59.0    Lung mass R91.8    Multiple tracheobronchial mucus plugs T17.800A    Mass of left lung R91.8    Acute on chronic respiratory failure with hypoxia (HCC) J96.21    Acute hypercapnic respiratory failure (HCC) J96.02    COPD exacerbation (HCC) J44.1    Acute encephalopathy G93.40    Recurrent left pleural effusion M46    Metabolic encephalopathy J74.04    Atrial fibrillation, permanent ([facility]) I48.21    Acute respiratory failure with hypoxia and hypercapnia ([facility]) J96.01, J96.02    Lung collapse J98.19    Postobstructive pneumonia J18.9    Mucus plugging of bronchi T17.500A       INTERVAL HISTORY:       Remains admitted in ICU. Received zometa 4/13. He states that he feels much better than on admission and realizes he is in the hospital.     REVIEW OF SYSTEMS:       10 point ROS completed. Pertinent positives in HPI, otherwise negative.      PHYSICAL EXAM:       Vitals:    04/15/22 0732   BP:    Pulse:    Resp: 23   Temp:    SpO2: 93%       General appearance: alert and cooperative  Head: Normocephalic, without obvious abnormality, atraumatic  Neck: No palpable lymphadenopathy in supraclavicular or cervical chains  Lungs: Clear to auscultation bilaterally, no audible rales, wheezes or crackles  Heart: Regular rate and rhythm, S1, S2 normal  Abdomen: Soft, non-tender; bowel sounds normal; no masses,  no organomegaly  Extremities: without cyanosis, clubbing, edema or asymmetry  Skin: No jaundice, purpura or petechiae      LABS:     Lab Results   Component Value Date    WBC 15.0 (H) 04/15/2022    HGB 13.7 04/15/2022    HCT 42.7 04/15/2022    MCV 90.1 04/15/2022     04/15/2022       Lab Results   Component Value Date    GLUCOSE 138 (H) 04/15/2022 BUN 27 (H) 04/15/2022    CREATININE 1.0 04/15/2022    K 4.1 04/15/2022       Lab Results   Component Value Date    ALKPHOS 186 (H) 04/13/2022    ALT 23 04/13/2022    AST 19 04/13/2022    BILITOT 0.3 04/13/2022    BILIDIR <0.2 04/12/2022    PROT 6.8 04/13/2022         IMAGING:     XR CHEST PORTABLE  Result Date: 4/12/2022  Moderate congestion and/or infiltrates identified in the lungs. Moderate left and trace right pleural effusions. CT chest 3/22/22  (from pulmonary notes):   Large left lower lobe/lingula mass measuring 12 cm with mediastinal/hilar adenopathy  Possible pericardial, pleural and chest wall involvement  Partially loculated left-sided effusion  Left mainstem/left lower lobe/lingular airway obstruction    PATHOLOGY:     EBUS/Lung Biopsy 4/12/22:     FINAL DIAGNOSIS:     Left lower lobe lung mass, endobronchial biopsies:   - Squamous cell carcinoma, moderately differentiated. COMMENT:    Case discussed with Dr. Tu Rodriguez on 4/13/2022 at 1600. BUCCA/BUCCA     ASSESSMENT/PLAN:     1. Respiratory Failure  - hypercapnia on ABG after EBUS  - mgmt per pulmonary    2. Squamous Cell Carcinoma L Lung  3. Hilar/Mediastinal LAD  - appears he has had CT scan and PET scan but cannot view results  - suspected lung malignancy  - he is already seeing [facility]  - appears to likely have at least N2 disease based on pulmonary notes, possibly metastatic disease  - biopsy shows squamous cell carcinoma  - follow up with his primary oncologist after d/c for treatment recommendations    4. Hypercalcemia  - presumably from malignancy  - given zometa 4/13  - Calcium is down to 11/3 this morning  -Mentally he says that he feels better.     Ricardo Vital MD

## 2022-04-15 NOTE — CARE COORDINATION
DISCHARGE ORDER  Date/Time 4/15/2022 8:57 AM  Completed by: RAFAEL Mckeon   Case Management    Patient Name: Nima Heart      : 1955  Admitting Diagnosis: Acute on chronic respiratory failure with hypoxia ([facility] [address] [J96.21]      Admit order Date and Status:2022  (verify MD's last order for status of admission)    Noted discharge order. If applicable PT/OT recommendation at Discharge: n/a  DME recommendation by PT/OT:n/a    Confirmed discharge plan: Yes  with whom pt at bedside  If pt confirmed DC plan does family need to be contacted by CM No if yes who- RN notified his daughter    Discharge Plan: Reviewed chart. Role of discharge planner explained and patient verbalized understanding. Discharge order is noted. Completed Interdisciplinary rounds with ICU staff. No Bipap needed per Dr. Scooter Conn    Met with pt at bedside. Pt confirmed he is returning home and his daughter is coming to pick pt up. [facility]. He denied additional needs from CM. RN states pt's daughter is on her way with a portable o2 tank. Spoke with Gregory Tracey at [facility] ([phone]) who states to fax resumption of care orders to her at [phone]. She is aware RN/PT/OT will be ordered and she states pt will likely refuse PT/OT but they will attempt. Order and AVS faxed to [phone] O2 in place on admit:  Yes    Pt is being d/c'd to home today. Pt's O2 sats are 92% on 2 liters of o2 per note from RN. Discharge timeout done with Jignesh Keyes. All discharge needs and concerns addressed.

## 2022-04-15 NOTE — DISCHARGE INSTR - COC
Continuity of Care Form    Patient Name: Franklin Jayjay   :  1955  MRN:  [id number]    Admit date:  2022  Discharge date:  04/15/2022    Code Status Order: Full Code   Advance Directives:   [facility] Documentation       Date/Time Healthcare Directive Type of Healthcare Directive Copy in [facility] [address] Agent's Name Healthcare Agent's Phone Number    22 1343 No, patient does not have an advance directive for healthcare treatment -- -- -- -- --            Admitting Physician:  Edgard Shields MD  PCP: Bayron Boogie MD    Discharging Nurse: [facility] Unit/Room#: 3008/3008-01  Discharging Unit Phone Number: ***    Emergency Contact:   Extended Emergency Contact Information  Primary Emergency Contact: Makayla Burk  Mobile Phone: [phone]  Relation: Child   needed? No    Past Surgical History:  Past Surgical History:   Procedure Laterality Date    BRONCHOSCOPY N/A 2022    EBUS WF W/ANES. (11:00) performed by [facility] MD at  [facility]  2022    BRONCHOSCOPY ALVEOLAR LAVAGE performed by Tiffanie [facility] MD at  [facility]  2022    BRONCHOSCOPY BIOPSY BRONCHUS performed by T[facility]anie [facility] MD at  [facility]  2022    BRONCHOSCOPY/TRANSBRONCHIAL NEEDLE BIOPSY performed by T[facility]anie [facility] MD at  [facility] N/A 2022    Roscoe GUEVARA performed by Tiffanie Chandler MD at [address] [facility]       Immunization History: There is no immunization history on file for this patient.     Active Problems:  Patient Active Problem List   Diagnosis Code    Spinal stenosis of lumbar region with neurogenic claudication M48.062    Mediastinal adenopathy R59.0    Lung mass R91.8    Multiple tracheobronchial mucus plugs T17.800A    Mass of left lung R91.8    Acute on chronic respiratory failure with hypoxia (HCC) J96.21    Acute hypercapnic respiratory failure ([facility]) J96.02    COPD exacerbation ([facility]) J44.1    Acute encephalopathy G93.40    Recurrent left pleural effusion U32    Metabolic encephalopathy B37.66    Atrial fibrillation, permanent ([facility]) I48.21    Acute respiratory failure with hypoxia and hypercapnia ([facility]) J96.01, J96.02    Lung collapse J98.19    Postobstructive pneumonia J18.9    Mucus plugging of bronchi T17.500A       Isolation/Infection:   Isolation            No Isolation          Patient Infection Status       None to display            Nurse Assessment:  Last Vital Signs: /65   Pulse 72   Temp 98.4 °F (36.9 °C) (Oral)   Resp 23   Ht 6' (1.829 m)   Wt 268 lb 15.4 oz (122 kg)   SpO2 95%   BMI 36.48 kg/m²     Last documented pain score (0-10 scale): Pain Level: 0  Last Weight:   Wt Readings from Last 1 Encounters:   04/15/22 268 lb 15.4 oz (122 kg)     Mental Status:  {IP PT MENTAL STATUS:}    IV Access:  { NIRMAL IV ACCESS:949828168}    Nursing Mobility/ADLs:  Walking   {CHP DME RMT}  Transfer  {CHP DME ZYQU:640838550}  Bathing  {CHP DME QKV}  Dressing  {CHP DME FMJV:408927647}  Toileting  {CHP DME WWOZ:507704355}  Feeding  {CHP DME WLPB:888167938}  Med Admin  {P DME VJZQ:457418148}  Med Delivery   {[facility] MED Delivery:923169725}    Wound Care Documentation and Therapy:        Elimination:  Continence: Bowel: {YES / JS:91508}  Bladder: {YES / PA:66444}  Urinary Catheter: {Urinary Catheter:650480503}   Colostomy/Ileostomy/Ileal Conduit: {YES / JI:36175}       Date of Last BM: ***    Intake/Output Summary (Last 24 hours) at 4/15/2022 0845  Last data filed at 4/15/2022 0015  Gross per 24 hour   Intake 1351.56 ml   Output 1750 ml   Net -398.44 ml     I/O last 3 completed shifts: In: 3933.6 [P.O.:725; I.V.:3157.1;  IV Piggyback:51.5]  Out: 2400 [Urine:2400]    Safety Concerns:     [address] Safety Concerns:777509607}    Impairments/Disabilities:      [address] K[address] Impairments/Disabilities:833252215}    Nutrition Therapy:  Current Nutrition Therapy:   [address] NIRMAL Diet List:282667321}    Routes of Feeding: {CHP DME Other Feedings:659290359}  Liquids: {Slp liquid thickness:60634}  Daily Fluid Restriction: {CHP DME Yes amt example:233438232}  Last Modified Barium Swallow with Video (Video Swallowing Test): {Done Not Done XNJL:220919836}    Treatments at the Time of Hospital Discharge:   Respiratory Treatments: ***  Oxygen Therapy:  {Therapy; copd oxygen:93022}  Ventilator:    { CC Vent EJCU:794424877}    Rehab Therapies: {THERAPEUTIC INTERVENTION:5154565897}  Weight Bearing Status/Restrictions: [address] CC Weight Bearin}  Other Medical Equipment (for information only, NOT a DME order):  {EQUIPMENT:613004516}  Other Treatments: ***    Patient's personal belongings (please select all that are sent with patient):  {[facility] DME Belongings:817785519}    RN SIGNATURE:  {Esignature:687844536}    CASE MANAGEMENT/SOCIAL WORK SECTION    Inpatient Status Date: 2022    Readmission Risk Assessment Score:  Readmission Risk              Risk of Unplanned Readmission:  12           Discharging to Facility/ Agency   Name: [facility]  Address:  [address] [place] [address] [address] [place], [address] [place] [address]  Phone: [phone]  Fax: [phone]    / signature: Electronically signed by VALARIE Gonzales LISW-S on 4/15/22 at 9:15 AM EDT    PHYSICIAN SECTION    Prognosis: Good    Condition at Discharge: Stable    Rehab Potential (if transferring to Rehab): Good    Recommended Labs or Other Treatments After Discharge:   resume eliquis at half the dose - 2.5 mg bid     F/w Dr. Cody Conde next week to plan for cancer treatment     No smoking    Increase insulin to keep sugars less than 150 while on steroids      Physician Certification: I certify the above information and transfer of Lisa Rankin  is necessary for the continuing treatment of the diagnosis listed and that he requires Home Care for less 30 days.      Update Admission H&P: No change in H&P    PHYSICIAN SIGNATURE:  Electronically signed by Yusuf Chen MD on 4/15/22 at 8:46 AM EDT

## 2022-04-15 NOTE — DISCHARGE SUMMARY
Name:  Paco Pereira  Room:  3008/3008-01  MRN:    [id number]    Discharge Summary      This discharge summary is in conjunction with a complete physical exam done on the day of discharge. Discharging Physician: Dr. Nelly Charles MD    Admit: 4/12/2022  Discharge:  4/15/2022     HPI taken from admission H&P:    Patient is a 74 y.o.  male  With known h.o  Tobacco use, copd  Presented for left lower lobe mass evaluation by bronchoscopy yesterday. Post bronch pt was harder to awake and had increasing dyspnea noted. ABG showed hypercarbia, ph of 7.24, pco2 of 74 and was admitted to ICU for bipap treatment. Imaging with no pneumothorax      Overnight ABG shows improvement and mentation improved  Pt reports he uses 2 L o2 at home and recently quit smoking also lost about 50 lbs recently   Denies any chest pain but has sob today   No fevers or chills    Diagnoses this Admission and Hospital Course     Acute hypercarbic respiratory failure  Acute copd exacerbation and left lung collapse  - hypercarbic and hard to awake post bronchoscopy  ABG with hypercarbia, - admitted to ICU and started on bipap. Started steroids, HHN  - pulm consulted  - Improved mentation and hypercarbia  - Weaned bipap and weaning steroids   - Improved resp status , back to home o2 , dc home today       Left lung collapse   Possible post obstructive pna    - S.p repeat bronch with therapeutic aspiration of mucous plugs - re expanded in repeat imaging  - started on levaquin by pulm  - Wbc high likely with steroids- improving         Acute metabolic encephalopathy - sec to hypercarbia, resolved      Left lung cancer - Squamous cell carcinoma, moderately differentiated.     - Oncology consulted  - Seen by Dr. Jackson Bell here   - F/w Dr. Quin Cabrera next week to plan for cancer treatment      Hypercalcemia - calcium at 13.6- likely with bone mets .   - Check PTH related peptide, vit d levels  - Started IVF and given lasix   - Oncology consulted- s/p zometa - improving to 11  - asymptomatic        Atrial fibrillation - rate controlled   - on BB and cardizem, resume digoxin   - held eliquis for tumor being friable and easy bleed   - Cut down eliquis to 2.5 mg bid     Chronic CHF - diastolic with preserved EF   - Non obstructive CAD  - [facility] heart  - resume statins, BB and lasix , ASA      DM 2   - resumed home levemir and SSI   - Increase insulin to keep sugars less than 150 while on steroids     Chronic anxiety    - on wellbutrin     Hypothyroid   - on synthroid     Procedures (Please Review Full Report for Details)  Fiberoptic bronchoscopy with endobronchial ultrasound-guided biopsy of lymph nodes 4/12/22    Therapeutic bronchoscopy 4/13/22    Consults    Pulmonology  Heme/Onc    Physical Exam at Discharge:    /80   Pulse 84   Temp 98.4 °F (36.9 °C) (Oral)   Resp 23   Ht 6' (1.829 m)   Wt 268 lb 15.4 oz (122 kg)   SpO2 90%   BMI 36.48 kg/m²     General:  Elderly obese male, Awake, alert and oriented. Appears to be not in any distress  Mucous Membranes:  Pink , anicteric  Neck: No JVD, no carotid bruit, no thyromegaly  Chest:  Diminished in left side with resolving  olamide wheeze   Cardiovascular:  Irregular S1S2 heard, no murmurs or gallops  Abdomen:  Soft, obese undistended, non tender, no organomegaly, BS present  Extremities: No edema or cyanosis.  Distal pulses well felt  Neurological : no focal deficits    CBC:   Recent Labs     04/13/22  0420 04/14/22  0758 04/15/22  0432   WBC 13.0* 18.8* 15.0*   HGB 13.5 13.1* 13.7   HCT 42.6 40.7 42.7   MCV 91.1 91.2 90.1    310 283     BMP:   Recent Labs     04/13/22  0520 04/14/22  0758 04/15/22  0432    136 138   K 4.9 4.5 4.1    100 99   CO2 30 29 30   BUN 18 24* 27*   CREATININE 1.0 0.9 1.0     LIVER PROFILE:   Recent Labs     04/12/22  1700 04/13/22  0520   AST 23 19   ALT 27 23   BILIDIR <0.2  --    BILITOT <0.2 0.3   ALKPHOS 187* 186*       CULTURES  Blood cx x2 NGTD    AFB Smear No AFB observed by Fluorescent stain     CULTURE, RESPIRATORY Normal respiratory brianda  [facility]  Gram Stain Result 4+ RBC's   2+ WBC's (Polymorphonuclear)   No organisms seen   No Epithelial Cells seen      Fungus Stain No Fungal elements seen       RADIOLOGY  XR CHEST PORTABLE   Final Result   No evidence of pneumothorax. No complications status post bronchoscopy. Findings are similar to 04/12/2022. US CHEST INCLUDING MEDIASTINUM   Final Result   Trace left pleural effusion. No procedure performed. XR CHEST PORTABLE   Final Result   1. Complete opacification of the left lung. Bronch 4/13Leitha Levee mucous/blood plug occluding left mainstem.  Hard to suction and was able to pull it out applying scope suctioning all the way outside the airways.  Left mainstem friable endobronchial lesion at the level of the KYLIE/LLL bifurcation was visualized occluding completely left lower lobe and extending into the left upper lobe.  Barely able to visualize the left upper lobe segments due to endobronchial lesion and multiple small mucous plugs suctioned.  1/20,000 epinephrine I saline injected over the friable easy to bleed left mainstem endobronchial lesion.  Ice saline/normal saline were injected followed by therapeutic aspiration.         Bronch 4/12- Friable fungating L main stem endobronchial lesion oozing some blood with touching and after Bx, entirely occluding the L main stem with inability to visualized beyond. Bilateral purulent secretions with mucus plugs. Multiple endobronchial Bx were obtain from L main stem endobronchial lesion. Ice saline and 1/58274 Epi was used to control blood oozing from  L main stem endobronchial lesion.  Salnie injection followed therapeutic aspiration was performed         Discharge Medications     Medication List      START taking these medications    guaiFENesin 600 MG extended release tablet  Commonly known as: MUCINEX  Take 1 tablet by mouth 2 times daily     levoFLOXacin 500 MG tablet  Commonly known as: LEVAQUIN  Take 1 tablet by mouth daily for 4 doses  Notes to patient: Levofloxacin (Levaquin)  Use: treats infections  Side effects: headache, nausea, diarrhea      predniSONE 10 MG tablet  Commonly known as: DELTASONE  30 mg x 3 days, 20 mg x 3 days, 10 mg x 3 days then stop  Notes to patient: Prednisone   Use:  decreases inflammation, treat asthma and copd  Side effects: upset stomach, high blood sugars, weight gain, mood changes, and increased chances of infection        CHANGE how you take these medications    apixaban 2.5 MG Tabs tablet  Commonly known as: Eliquis  Take 1 tablet by mouth 2 times daily  What changed:   · medication strength  · how much to take        CONTINUE taking these medications    aspirin 81 MG tablet     atorvastatin 20 MG tablet  Commonly known as: LIPITOR     buPROPion 100 MG extended release tablet  Commonly known as: WELLBUTRIN SR     Cartia  MG extended release capsule  Generic drug: dilTIAZem     digoxin 125 MCG tablet  Commonly known as: LANOXIN  Notes to patient: PLEASE MONITOR BP!      fish oil 1000 MG Caps     hydrALAZINE 25 MG tablet  Commonly known as: APRESOLINE     HYDROcodone-acetaminophen 5-325 MG per tablet  Commonly known as: NORCO     Levemir 100 UNIT/ML injection vial  Generic drug: insulin detemir     levothyroxine 150 MCG tablet  Commonly known as: SYNTHROID     lisinopril 20 MG tablet  Commonly known as: PRINIVIL;ZESTRIL     metFORMIN 850 MG tablet  Commonly known as: GLUCOPHAGE     metoprolol tartrate 25 MG tablet  Commonly known as: LOPRESSOR     nitroGLYCERIN 0.4 MG SL tablet  Commonly known as: NITROSTAT     nortriptyline 10 MG capsule  Commonly known as: PAMELOR     NovoLOG 100 UNIT/ML injection vial  Generic drug: insulin aspart     potassium chloride 10 MEQ extended release capsule  Commonly known as: MICRO-K     tiotropium 2.5 MCG/ACT Aers inhaler  Commonly known as: Spiriva Respimat  Inhale 2 puffs into the lungs daily     torsemide 100 MG tablet  Commonly known as: DEMADEX        STOP taking these medications    amLODIPine 10 MG tablet  Commonly known as: NORVASC     Meloxicam 15 MG Tbdp     tiZANidine 2 MG tablet  Commonly known as: Candelario Half           Where to Get Your Medications      These medications were sent to [address] [facility]    Phone: [phone]   · guaiFENesin 600 MG extended release tablet  · levoFLOXacin 500 MG tablet  · predniSONE 10 MG tablet     Information about where to get these medications is not yet available    Ask your nurse or doctor about these medications  · apixaban 2.5 MG Tabs tablet           Discharged in stable condition to home    Follow Up: Follow up with PCP in 1 week.      Hemant Carreon MD, 5/19/2022 7:12 AM

## 2022-04-16 ENCOUNTER — HOSPITAL ENCOUNTER (INPATIENT)
Age: 67
LOS: 3 days | Discharge: ANOTHER HOSPITAL | DRG: 193 | End: 2022-04-19
Attending: INTERNAL MEDICINE | Admitting: INTERNAL MEDICINE
Payer: MEDICARE

## 2022-04-16 PROBLEM — R09.02 HYPOXIA: Status: ACTIVE | Noted: 2022-04-16

## 2022-04-16 PROBLEM — J44.9 COPD (CHRONIC OBSTRUCTIVE PULMONARY DISEASE) (HCC): Status: ACTIVE | Noted: 2022-04-16

## 2022-04-16 LAB
BLOOD CULTURE, ROUTINE: NORMAL
GLUCOSE BLD-MCNC: 254 MG/DL (ref 70–99)
PERFORMED ON: ABNORMAL
SARS-COV-2, NAAT: NOT DETECTED

## 2022-04-16 PROCEDURE — 6360000002 HC RX W HCPCS: Performed by: INTERNAL MEDICINE

## 2022-04-16 PROCEDURE — 6370000000 HC RX 637 (ALT 250 FOR IP): Performed by: INTERNAL MEDICINE

## 2022-04-16 PROCEDURE — 87635 SARS-COV-2 COVID-19 AMP PRB: CPT

## 2022-04-16 PROCEDURE — 87641 MR-STAPH DNA AMP PROBE: CPT

## 2022-04-16 PROCEDURE — 94660 CPAP INITIATION&MGMT: CPT

## 2022-04-16 PROCEDURE — 94761 N-INVAS EAR/PLS OXIMETRY MLT: CPT

## 2022-04-16 PROCEDURE — 2000000000 HC ICU R&B

## 2022-04-16 PROCEDURE — 2580000003 HC RX 258: Performed by: INTERNAL MEDICINE

## 2022-04-16 PROCEDURE — 94640 AIRWAY INHALATION TREATMENT: CPT

## 2022-04-16 PROCEDURE — 2700000000 HC OXYGEN THERAPY PER DAY

## 2022-04-16 RX ORDER — ONDANSETRON 4 MG/1
4 TABLET, ORALLY DISINTEGRATING ORAL EVERY 8 HOURS PRN
Status: DISCONTINUED | OUTPATIENT
Start: 2022-04-16 | End: 2022-04-19 | Stop reason: HOSPADM

## 2022-04-16 RX ORDER — DEXTROSE MONOHYDRATE 50 MG/ML
100 INJECTION, SOLUTION INTRAVENOUS PRN
Status: DISCONTINUED | OUTPATIENT
Start: 2022-04-16 | End: 2022-04-19 | Stop reason: HOSPADM

## 2022-04-16 RX ORDER — SODIUM CHLORIDE 0.9 % (FLUSH) 0.9 %
5-40 SYRINGE (ML) INJECTION EVERY 12 HOURS SCHEDULED
Status: DISCONTINUED | OUTPATIENT
Start: 2022-04-16 | End: 2022-04-19 | Stop reason: HOSPADM

## 2022-04-16 RX ORDER — METHYLPREDNISOLONE SODIUM SUCCINATE 40 MG/ML
40 INJECTION, POWDER, LYOPHILIZED, FOR SOLUTION INTRAMUSCULAR; INTRAVENOUS EVERY 8 HOURS
Status: DISCONTINUED | OUTPATIENT
Start: 2022-04-16 | End: 2022-04-16

## 2022-04-16 RX ORDER — IPRATROPIUM BROMIDE AND ALBUTEROL SULFATE 2.5; .5 MG/3ML; MG/3ML
1 SOLUTION RESPIRATORY (INHALATION)
Status: DISCONTINUED | OUTPATIENT
Start: 2022-04-16 | End: 2022-04-16

## 2022-04-16 RX ORDER — ONDANSETRON 2 MG/ML
4 INJECTION INTRAMUSCULAR; INTRAVENOUS EVERY 6 HOURS PRN
Status: DISCONTINUED | OUTPATIENT
Start: 2022-04-16 | End: 2022-04-19 | Stop reason: HOSPADM

## 2022-04-16 RX ORDER — NICOTINE POLACRILEX 4 MG
15 LOZENGE BUCCAL PRN
Status: DISCONTINUED | OUTPATIENT
Start: 2022-04-16 | End: 2022-04-16

## 2022-04-16 RX ORDER — DIGOXIN 125 MCG
0.12 TABLET ORAL DAILY
Status: DISCONTINUED | OUTPATIENT
Start: 2022-04-16 | End: 2022-04-19 | Stop reason: HOSPADM

## 2022-04-16 RX ORDER — ACETAMINOPHEN 325 MG/1
650 TABLET ORAL EVERY 6 HOURS PRN
Status: DISCONTINUED | OUTPATIENT
Start: 2022-04-16 | End: 2022-04-19 | Stop reason: HOSPADM

## 2022-04-16 RX ORDER — IPRATROPIUM BROMIDE AND ALBUTEROL SULFATE 2.5; .5 MG/3ML; MG/3ML
1 SOLUTION RESPIRATORY (INHALATION)
Status: DISCONTINUED | OUTPATIENT
Start: 2022-04-16 | End: 2022-04-18

## 2022-04-16 RX ORDER — DILTIAZEM HYDROCHLORIDE 240 MG/1
240 CAPSULE, COATED, EXTENDED RELEASE ORAL DAILY
Status: DISCONTINUED | OUTPATIENT
Start: 2022-04-16 | End: 2022-04-19 | Stop reason: HOSPADM

## 2022-04-16 RX ORDER — ASPIRIN 81 MG/1
81 TABLET, CHEWABLE ORAL DAILY
Status: DISCONTINUED | OUTPATIENT
Start: 2022-04-16 | End: 2022-04-19 | Stop reason: HOSPADM

## 2022-04-16 RX ORDER — SODIUM CHLORIDE 0.9 % (FLUSH) 0.9 %
5-40 SYRINGE (ML) INJECTION PRN
Status: DISCONTINUED | OUTPATIENT
Start: 2022-04-16 | End: 2022-04-19 | Stop reason: HOSPADM

## 2022-04-16 RX ORDER — NORTRIPTYLINE HYDROCHLORIDE 10 MG/1
10 CAPSULE ORAL NIGHTLY
Status: DISCONTINUED | OUTPATIENT
Start: 2022-04-16 | End: 2022-04-19 | Stop reason: HOSPADM

## 2022-04-16 RX ORDER — DEXTROSE MONOHYDRATE 25 G/50ML
12.5 INJECTION, SOLUTION INTRAVENOUS PRN
Status: DISCONTINUED | OUTPATIENT
Start: 2022-04-16 | End: 2022-04-16

## 2022-04-16 RX ORDER — LISINOPRIL 20 MG/1
20 TABLET ORAL DAILY
Status: DISCONTINUED | OUTPATIENT
Start: 2022-04-16 | End: 2022-04-19 | Stop reason: HOSPADM

## 2022-04-16 RX ORDER — ACETAMINOPHEN 650 MG/1
650 SUPPOSITORY RECTAL EVERY 6 HOURS PRN
Status: DISCONTINUED | OUTPATIENT
Start: 2022-04-16 | End: 2022-04-19 | Stop reason: HOSPADM

## 2022-04-16 RX ORDER — ATORVASTATIN CALCIUM 10 MG/1
20 TABLET, FILM COATED ORAL DAILY
Status: DISCONTINUED | OUTPATIENT
Start: 2022-04-16 | End: 2022-04-19 | Stop reason: HOSPADM

## 2022-04-16 RX ORDER — METHYLPREDNISOLONE SODIUM SUCCINATE 40 MG/ML
40 INJECTION, POWDER, LYOPHILIZED, FOR SOLUTION INTRAMUSCULAR; INTRAVENOUS EVERY 12 HOURS
Status: DISCONTINUED | OUTPATIENT
Start: 2022-04-16 | End: 2022-04-19

## 2022-04-16 RX ORDER — INSULIN GLARGINE 100 [IU]/ML
10 INJECTION, SOLUTION SUBCUTANEOUS NIGHTLY
Status: DISCONTINUED | OUTPATIENT
Start: 2022-04-16 | End: 2022-04-19 | Stop reason: HOSPADM

## 2022-04-16 RX ORDER — SODIUM CHLORIDE 9 MG/ML
INJECTION, SOLUTION INTRAVENOUS PRN
Status: DISCONTINUED | OUTPATIENT
Start: 2022-04-16 | End: 2022-04-19 | Stop reason: HOSPADM

## 2022-04-16 RX ORDER — LEVOTHYROXINE SODIUM 0.15 MG/1
150 TABLET ORAL DAILY
Status: DISCONTINUED | OUTPATIENT
Start: 2022-04-16 | End: 2022-04-19 | Stop reason: HOSPADM

## 2022-04-16 RX ORDER — BUPROPION HYDROCHLORIDE 100 MG/1
100 TABLET, EXTENDED RELEASE ORAL 2 TIMES DAILY
Status: DISCONTINUED | OUTPATIENT
Start: 2022-04-16 | End: 2022-04-19 | Stop reason: HOSPADM

## 2022-04-16 RX ORDER — POLYETHYLENE GLYCOL 3350 17 G/17G
17 POWDER, FOR SOLUTION ORAL DAILY PRN
Status: DISCONTINUED | OUTPATIENT
Start: 2022-04-16 | End: 2022-04-19 | Stop reason: HOSPADM

## 2022-04-16 RX ORDER — LEVOFLOXACIN 5 MG/ML
500 INJECTION, SOLUTION INTRAVENOUS EVERY 24 HOURS
Status: DISCONTINUED | OUTPATIENT
Start: 2022-04-16 | End: 2022-04-19 | Stop reason: HOSPADM

## 2022-04-16 RX ADMIN — NORTRIPTYLINE HYDROCHLORIDE 10 MG: 10 CAPSULE ORAL at 20:46

## 2022-04-16 RX ADMIN — METHYLPREDNISOLONE SODIUM SUCCINATE 40 MG: 40 INJECTION, POWDER, FOR SOLUTION INTRAMUSCULAR; INTRAVENOUS at 18:23

## 2022-04-16 RX ADMIN — ATORVASTATIN CALCIUM 20 MG: 10 TABLET, FILM COATED ORAL at 18:24

## 2022-04-16 RX ADMIN — CEFEPIME 2000 MG: 2 INJECTION, POWDER, FOR SOLUTION INTRAVENOUS at 20:36

## 2022-04-16 RX ADMIN — DIGOXIN 0.12 MG: 125 TABLET ORAL at 18:24

## 2022-04-16 RX ADMIN — Medication 1500 MG: at 17:36

## 2022-04-16 RX ADMIN — INSULIN GLARGINE 10 UNITS: 100 INJECTION, SOLUTION SUBCUTANEOUS at 20:49

## 2022-04-16 RX ADMIN — LEVOFLOXACIN 500 MG: 500 INJECTION, SOLUTION INTRAVENOUS at 18:36

## 2022-04-16 RX ADMIN — IPRATROPIUM BROMIDE AND ALBUTEROL SULFATE 1 AMPULE: .5; 3 SOLUTION RESPIRATORY (INHALATION) at 22:42

## 2022-04-16 RX ADMIN — DILTIAZEM HYDROCHLORIDE 240 MG: 240 CAPSULE, COATED, EXTENDED RELEASE ORAL at 18:44

## 2022-04-16 RX ADMIN — LISINOPRIL 20 MG: 20 TABLET ORAL at 18:24

## 2022-04-16 RX ADMIN — LEVOTHYROXINE SODIUM 150 MCG: 150 TABLET ORAL at 18:24

## 2022-04-16 RX ADMIN — ACETAMINOPHEN 650 MG: 325 TABLET ORAL at 20:46

## 2022-04-16 RX ADMIN — BUPROPION HYDROCHLORIDE 100 MG: 100 TABLET, EXTENDED RELEASE ORAL at 20:45

## 2022-04-16 RX ADMIN — ENOXAPARIN SODIUM 30 MG: 100 INJECTION SUBCUTANEOUS at 23:31

## 2022-04-16 RX ADMIN — IPRATROPIUM BROMIDE AND ALBUTEROL SULFATE 1 AMPULE: .5; 3 SOLUTION RESPIRATORY (INHALATION) at 18:49

## 2022-04-16 RX ADMIN — METOPROLOL TARTRATE 25 MG: 25 TABLET, FILM COATED ORAL at 20:45

## 2022-04-16 ASSESSMENT — PAIN SCALES - GENERAL: PAINLEVEL_OUTOF10: 10

## 2022-04-16 NOTE — PLAN OF CARE
73 y/o male     -hypoxic, 80% on baseline O2 3 L, requiring BIPAP   -CXR with left lung white out   -elevated WBC, procalcitonin, lactic acid    -ABG with hypoxia and hypercarbia   -report per Dr. Stephanie Fuentes     Admitted to ICU from [facility]   -pulmonology and intensivist consulted       Katy Ortega [place]  04/16/22  12:05 PM

## 2022-04-16 NOTE — PROGRESS NOTES
[address] [facility] Pharmacokinetic Monitoring Service - Vancomycin     Randell Cabrera is a 74 y.o. male starting on vancomycin therapy for HAP. Pharmacy consulted by Dr. Wynell Cogan for monitoring and adjustment. Target Concentration: Goal AUC/RODRÍGUEZ 400-600 mg*hr/L    Additional Antimicrobials: cefepime    Pertinent Laboratory Values: Wt Readings from Last 1 Encounters:   04/15/22 268 lb 15.4 oz (122 kg)     Temp Readings from Last 1 Encounters:   04/16/22 98 °F (36.7 °C) (Oral)     Estimated Creatinine Clearance: 95 mL/min (based on SCr of 1 mg/dL).   Recent Labs     04/14/22  0758 04/15/22  0432   CREATININE 0.9 1.0   WBC 18.8* 15.0*     Procalcitonin: NA    Pertinent Cultures:  Culture Date Source Results   4/12 blood NG       Plan:  Dosing recommendations based on Bayesian software  Start vancomycin 3184qtl2, then vanco 1250mg q12h  Anticipated AUC of 534 and trough concentration of 17.9 at steady state  Renal labs as indicated   Vancomycin concentration ordered for 41/7/22 @ 1600   Pharmacy will continue to monitor patient and adjust therapy as indicated    Thank you for the consult,  Aditya Valladares, [facility]  4/16/2022 5:18 PM

## 2022-04-16 NOTE — PROGRESS NOTES
Bedside report and transfer of care given to [facility], RN. Pt currently resting in bed with the call light within reach. Pt denies any other care needs at this time. Pt stable at this time.

## 2022-04-16 NOTE — PROGRESS NOTES
4 Eyes Skin Assessment   I agree that 2 RN's have performed a thorough Head to Toe Skin Assessment on the patient. ALL assessment sites listed below have been assessed. Areas assessed for pressure by both nurses:   [x]   Head, Face, and Ears   [x]   Shoulders, Back, and Chest, Abdomen  [x]   Arms, Elbows, and Hands   [x]   Coccyx, Sacrum, and Ischium  [x]   Legs, Feet, and Heels     Left leg abrasions, callous feet, dry flaky skin, scattered bruising and abrasions. Skin Assessed Under all Medical Devices by both nurses:  O2 device tubing              All Mepilex Borders were peeled back and area peeked at by both nurses:  No: N/A  Please list where Mepilex Borders are located:  LOBO       Co-signer eSignature: Electronically signed by Rito Velasquez RN on 4/18/22 at 8:47 PM EDT    Does the Patient have Skin Breakdown related to pressure?   LOBO  Jhonny Prevention initiated:  LOBO   Wound Care Orders initiated:  LOBO      WOC nurse consulted for Pressure Injury (Stage 3,4, Unstageable, DTI, NWPT, Complex wounds)and New or Established Ostomies:  LOBO      Primary Nurse eSignature: Electronically signed by Jose A Barajas RN on 4/16/22 at 4:44 PM EDT

## 2022-04-16 NOTE — PROGRESS NOTES
RT Inhaler-Nebulizer Bronchodilator Protocol Note    There is a bronchodilator order in the chart from a provider indicating to follow the RT Bronchodilator Protocol and there is an Initiate RT Inhaler-Nebulizer Bronchodilator Protocol order as well (see protocol at bottom of note). CXR Findings:  No results found. The findings from the last RT Protocol Assessment were as follows:   History Pulmonary Disease: Chronic pulmonary disease  Respiratory Pattern: Mild dyspnea at rest, irregular pattern, or RR 21-25 bpm  Breath Sounds: Intermittent or unilateral wheezes  Cough: Strong, spontaneous, non-productive  Indication for Bronchodilator Therapy: Wheezing associated with pulm disorder  Bronchodilator Assessment Score: 10    Aerosolized bronchodilator medication orders have been revised according to the RT Inhaler-Nebulizer Bronchodilator Protocol below. Respiratory Therapist to perform RT Therapy Protocol Assessment initially then follow the protocol. Repeat RT Therapy Protocol Assessment PRN for score 0-3 or on second treatment, BID, and PRN for scores above 3. No Indications - adjust the frequency to every 6 hours PRN wheezing or bronchospasm, if no treatments needed after 48 hours then discontinue using Per Protocol order mode. If indication present, adjust the RT bronchodilator orders based on the Bronchodilator Assessment Score as indicated below. Use Inhaler orders unless patient has one or more of the following: on home nebulizer, not able to hold breath for 10 seconds, is not alert and oriented, cannot activate and use MDI correctly, or respiratory rate 25 breaths per minute or more, then use the equivalent nebulizer order(s) with same Frequency and PRN reasons based on the score. If a patient is on this medication at home then do not decrease Frequency below that used at home.     0-3 - enter or revise RT bronchodilator order(s) to equivalent RT Bronchodilator order with Frequency of every 4 hours PRN for wheezing or increased work of breathing using Per Protocol order mode. 4-6 - enter or revise RT Bronchodilator order(s) to two equivalent RT bronchodilator orders with one order with BID Frequency and one order with Frequency of every 4 hours PRN wheezing or increased work of breathing using Per Protocol order mode. 7-10 - enter or revise RT Bronchodilator order(s) to two equivalent RT bronchodilator orders with one order with TID Frequency and one order with Frequency of every 4 hours PRN wheezing or increased work of breathing using Per Protocol order mode. 11-13 - enter or revise RT Bronchodilator order(s) to one equivalent RT bronchodilator order with QID Frequency and an Albuterol order with Frequency of every 4 hours PRN wheezing or increased work of breathing using Per Protocol order mode. Greater than 13 - enter or revise RT Bronchodilator order(s) to one equivalent RT bronchodilator order with every 4 hours Frequency and an Albuterol order with Frequency of every 2 hours PRN wheezing or increased work of breathing using Per Protocol order mode.          Electronically signed by Kale Pérez RCP on 4/16/2022 at 6:55 PM

## 2022-04-16 NOTE — PROGRESS NOTES
Admission complete, med rec completed from printed discharge AVS from yesterday - pt states he does not recall all of his medications and his daughter is at work. Pharmacy updated on med rec. Pt. Denies needs at this time. Call light within reach.

## 2022-04-16 NOTE — PROGRESS NOTES
Pt placed on NC at 2lpm (home O2)         04/16/22 1614   Oxygen Therapy/Pulse Ox   O2 Therapy Oxygen   O2 Device Nasal cannula   O2 Flow Rate (L/min) 2 L/min   Resp 24   SpO2 99 %

## 2022-04-16 NOTE — FLOWSHEET NOTE
04/16/22 1615   Vital Signs   Temp 98 °F (36.7 °C)   Temp Source Oral   Pulse 90   Heart Rate Source Monitor   Resp 22   /76   BP Location Right upper arm   MAP (mmHg) 88   Patient Position Sitting   Level of Consciousness Alert (0)   MEWS Score 2   Oxygen Therapy   SpO2 99 %   Pulse Oximeter Device Mode Continuous   Pulse Oximeter Device Location Finger   O2 Device Nasal cannula   O2 Flow Rate (L/min) 3 L/min     Patient admitted to room 3004 from [facility]. Patient oriented to room, call light, bed rails, phone, lights and bathroom. Patient instructed about the schedule of the day including: vital sign frequency, lab draws, possible tests, frequency of MD and staff rounds, daily weights, I &O's and prescribed diet. Bed alarm in place, patient aware of placement and reason. Bedside ICU Telemetry in place, patient aware of placement and reason. Bed locked, in lowest position, side rails up 2/4, call light within reach. Pt. Denies needs at this time. Recliner Assessment  Patient is able to demonstrate the ability to move from a reclining position to an upright position within the recliner.

## 2022-04-17 ENCOUNTER — APPOINTMENT (OUTPATIENT)
Dept: GENERAL RADIOLOGY | Age: 67
DRG: 193 | End: 2022-04-17
Attending: INTERNAL MEDICINE
Payer: MEDICARE

## 2022-04-17 LAB
ANION GAP SERPL CALCULATED.3IONS-SCNC: 7 MMOL/L (ref 3–16)
BASE EXCESS ARTERIAL: 4.5 MMOL/L (ref -3–3)
BASOPHILS ABSOLUTE: 0 K/UL (ref 0–0.2)
BASOPHILS RELATIVE PERCENT: 0.1 %
BUN BLDV-MCNC: 22 MG/DL (ref 7–20)
CALCIUM SERPL-MCNC: 9.3 MG/DL (ref 8.3–10.6)
CARBOXYHEMOGLOBIN ARTERIAL: 0.4 % (ref 0–1.5)
CHLORIDE BLD-SCNC: 97 MMOL/L (ref 99–110)
CO2: 31 MMOL/L (ref 21–32)
CREAT SERPL-MCNC: 0.9 MG/DL (ref 0.8–1.3)
CULTURE, BLOOD 2: NORMAL
EOSINOPHILS ABSOLUTE: 0 K/UL (ref 0–0.6)
EOSINOPHILS RELATIVE PERCENT: 0.1 %
GFR AFRICAN AMERICAN: >60
GFR NON-AFRICAN AMERICAN: >60
GLUCOSE BLD-MCNC: 171 MG/DL (ref 70–99)
GLUCOSE BLD-MCNC: 173 MG/DL (ref 70–99)
GLUCOSE BLD-MCNC: 178 MG/DL (ref 70–99)
GLUCOSE BLD-MCNC: 190 MG/DL (ref 70–99)
GLUCOSE BLD-MCNC: 233 MG/DL (ref 70–99)
GLUCOSE BLD-MCNC: 266 MG/DL (ref 70–99)
HCO3 ARTERIAL: 30.5 MMOL/L (ref 21–29)
HCT VFR BLD CALC: 39.1 % (ref 40.5–52.5)
HEMOGLOBIN, ART, EXTENDED: 13.6 G/DL (ref 13.5–17.5)
HEMOGLOBIN: 12.5 G/DL (ref 13.5–17.5)
LYMPHOCYTES ABSOLUTE: 0.3 K/UL (ref 1–5.1)
LYMPHOCYTES RELATIVE PERCENT: 2.7 %
MCH RBC QN AUTO: 28.9 PG (ref 26–34)
MCHC RBC AUTO-ENTMCNC: 32 G/DL (ref 31–36)
MCV RBC AUTO: 90.3 FL (ref 80–100)
METHEMOGLOBIN ARTERIAL: 0.3 %
MONOCYTES ABSOLUTE: 0.4 K/UL (ref 0–1.3)
MONOCYTES RELATIVE PERCENT: 3.2 %
NEUTROPHILS ABSOLUTE: 11.4 K/UL (ref 1.7–7.7)
NEUTROPHILS RELATIVE PERCENT: 93.9 %
O2 SAT, ARTERIAL: 92.9 %
O2 THERAPY: ABNORMAL
PCO2 ARTERIAL: 51.1 MMHG (ref 35–45)
PDW BLD-RTO: 17 % (ref 12.4–15.4)
PERFORMED ON: ABNORMAL
PH ARTERIAL: 7.39 (ref 7.35–7.45)
PLATELET # BLD: 219 K/UL (ref 135–450)
PMV BLD AUTO: 6.9 FL (ref 5–10.5)
PO2 ARTERIAL: 66.6 MMHG (ref 75–108)
POTASSIUM SERPL-SCNC: 4.2 MMOL/L (ref 3.5–5.1)
RBC # BLD: 4.33 M/UL (ref 4.2–5.9)
SODIUM BLD-SCNC: 135 MMOL/L (ref 136–145)
TCO2 ARTERIAL: 32.1 MMOL/L
WBC # BLD: 12.1 K/UL (ref 4–11)

## 2022-04-17 PROCEDURE — 2000000000 HC ICU R&B

## 2022-04-17 PROCEDURE — 2700000000 HC OXYGEN THERAPY PER DAY

## 2022-04-17 PROCEDURE — 94761 N-INVAS EAR/PLS OXIMETRY MLT: CPT

## 2022-04-17 PROCEDURE — 2580000003 HC RX 258: Performed by: INTERNAL MEDICINE

## 2022-04-17 PROCEDURE — 94640 AIRWAY INHALATION TREATMENT: CPT

## 2022-04-17 PROCEDURE — 6370000000 HC RX 637 (ALT 250 FOR IP): Performed by: INTERNAL MEDICINE

## 2022-04-17 PROCEDURE — 82803 BLOOD GASES ANY COMBINATION: CPT

## 2022-04-17 PROCEDURE — 94669 MECHANICAL CHEST WALL OSCILL: CPT

## 2022-04-17 PROCEDURE — 85025 COMPLETE CBC W/AUTO DIFF WBC: CPT

## 2022-04-17 PROCEDURE — 6360000002 HC RX W HCPCS: Performed by: INTERNAL MEDICINE

## 2022-04-17 PROCEDURE — 71045 X-RAY EXAM CHEST 1 VIEW: CPT

## 2022-04-17 PROCEDURE — 99223 1ST HOSP IP/OBS HIGH 75: CPT | Performed by: INTERNAL MEDICINE

## 2022-04-17 PROCEDURE — 80048 BASIC METABOLIC PNL TOTAL CA: CPT

## 2022-04-17 PROCEDURE — 94660 CPAP INITIATION&MGMT: CPT

## 2022-04-17 PROCEDURE — 36415 COLL VENOUS BLD VENIPUNCTURE: CPT

## 2022-04-17 RX ADMIN — Medication 10 ML: at 08:18

## 2022-04-17 RX ADMIN — LEVOTHYROXINE SODIUM 150 MCG: 150 TABLET ORAL at 05:04

## 2022-04-17 RX ADMIN — IPRATROPIUM BROMIDE AND ALBUTEROL SULFATE 1 AMPULE: .5; 3 SOLUTION RESPIRATORY (INHALATION) at 11:40

## 2022-04-17 RX ADMIN — BUPROPION HYDROCHLORIDE 100 MG: 100 TABLET, EXTENDED RELEASE ORAL at 08:17

## 2022-04-17 RX ADMIN — IPRATROPIUM BROMIDE AND ALBUTEROL SULFATE 1 AMPULE: .5; 3 SOLUTION RESPIRATORY (INHALATION) at 07:28

## 2022-04-17 RX ADMIN — METHYLPREDNISOLONE SODIUM SUCCINATE 40 MG: 40 INJECTION, POWDER, FOR SOLUTION INTRAMUSCULAR; INTRAVENOUS at 17:41

## 2022-04-17 RX ADMIN — METOPROLOL TARTRATE 25 MG: 25 TABLET, FILM COATED ORAL at 20:53

## 2022-04-17 RX ADMIN — BUPROPION HYDROCHLORIDE 100 MG: 100 TABLET, EXTENDED RELEASE ORAL at 20:48

## 2022-04-17 RX ADMIN — Medication 10 ML: at 20:54

## 2022-04-17 RX ADMIN — ATORVASTATIN CALCIUM 20 MG: 10 TABLET, FILM COATED ORAL at 08:21

## 2022-04-17 RX ADMIN — VANCOMYCIN HYDROCHLORIDE 1250 MG: 500 INJECTION, POWDER, LYOPHILIZED, FOR SOLUTION INTRAVENOUS at 05:11

## 2022-04-17 RX ADMIN — DIGOXIN 0.12 MG: 125 TABLET ORAL at 08:17

## 2022-04-17 RX ADMIN — LEVOFLOXACIN 500 MG: 500 INJECTION, SOLUTION INTRAVENOUS at 17:44

## 2022-04-17 RX ADMIN — IPRATROPIUM BROMIDE AND ALBUTEROL SULFATE 1 AMPULE: .5; 3 SOLUTION RESPIRATORY (INHALATION) at 16:27

## 2022-04-17 RX ADMIN — IPRATROPIUM BROMIDE AND ALBUTEROL SULFATE 1 AMPULE: .5; 3 SOLUTION RESPIRATORY (INHALATION) at 23:10

## 2022-04-17 RX ADMIN — METHYLPREDNISOLONE SODIUM SUCCINATE 40 MG: 40 INJECTION, POWDER, FOR SOLUTION INTRAMUSCULAR; INTRAVENOUS at 05:15

## 2022-04-17 RX ADMIN — NORTRIPTYLINE HYDROCHLORIDE 10 MG: 10 CAPSULE ORAL at 20:48

## 2022-04-17 RX ADMIN — CEFEPIME 2000 MG: 2 INJECTION, POWDER, FOR SOLUTION INTRAVENOUS at 08:17

## 2022-04-17 RX ADMIN — INSULIN GLARGINE 10 UNITS: 100 INJECTION, SOLUTION SUBCUTANEOUS at 21:00

## 2022-04-17 RX ADMIN — MUPIROCIN: 20 OINTMENT TOPICAL at 20:48

## 2022-04-17 RX ADMIN — IPRATROPIUM BROMIDE AND ALBUTEROL SULFATE 1 AMPULE: .5; 3 SOLUTION RESPIRATORY (INHALATION) at 19:26

## 2022-04-17 ASSESSMENT — PAIN SCALES - GENERAL
PAINLEVEL_OUTOF10: 0

## 2022-04-17 NOTE — FLOWSHEET NOTE
04/16/22 2000   Vital Signs   Temp 97.9 °F (36.6 °C)   Temp Source Oral   Pulse 92   Heart Rate Source Monitor   Resp 28   BP (!) 126/94   BP Location Right upper arm   Patient Position Semi fowlers   Level of Consciousness Alert (0)   MEWS Score 2   Oxygen Therapy   SpO2 94 %   Pulse Oximeter Device Mode Continuous   Pulse Oximeter Device Location Finger   O2 Device Nasal cannula   O2 Flow Rate (L/min) 4 L/min     Shift assessment completed see flow sheet. Patient in bed alert and oriented X4. Patient on 4L O2, showing no signs of distress but having some pain in his lower right back . Evening medications given per order. Tylenol given for pain.  lantus given . 93% on 4L at this time. 350 yellow output. Lung sounds diminished. Inspiratory + Expiratory wheezing noted in upper lungs. No other needs at this time. Call light in reach.

## 2022-04-17 NOTE — FLOWSHEET NOTE
04/17/22 1700   Assessment   Charting Type Reassessment   Neurological   Neuro (WDL) WDL   Level of Consciousness Alert (0)   Orientation Level Oriented X4   Cognition Appropriate judgement; Appropriate safety awareness; Appropriate attention/concentration; Appropriate for developmental age; Follows commands   Symptoms of Dysphagia    Symptoms of Dysphagia  None   Swallow Screening   Is the patient unable to remain alert for testing? No   Is the patient on a modified diet (thickened liquids) due to pre-existing dysphagia? No   Is there presence of existing enteral tube feeding via the stomach or nose? No   Is there presence of head-of-bed restrictions (less than 30 degrees)? No   Is there presence of tracheotomy tube? No   Is the patient ordered nothing-by-mouth status? No   3 oz Water Swallow Screen Pass   [place] Coma Scale   Eye Opening 4   Best Verbal Response 5   Best Motor Response 6   Yarelis Coma Scale Score 15   HEENT   HEENT (WDL) X   Teeth Edentulous   Right Ear Impaired hearing   Left Ear Impaired hearing   Respiratory   Respiratory (WDL) X   Respiratory Pattern Regular   Respiratory Depth Normal   Respiratory Quality/Effort Unlabored   Chest Assessment Chest expansion symmetrical;Trachea midline   L Breath Sounds Diminished   R Breath Sounds Diminished   Cardiac   Cardiac (WDL) X   Cardiac Regularity Irregular   Cardiac Rhythm Atrial fib   Rhythm Interpretation   Pulse 74   Cardiac Monitor   Telemetry Monitor On Yes   Telemetry Audible Yes   Telemetry Alarms Set Yes   Telemetry Box Number ICU 4   Telemetry Monitor Alarm Parameters ICU   Gastrointestinal   Abdominal (WDL) X   RUQ Bowel Sounds Active   LUQ Bowel Sounds Active   RLQ Bowel Sounds Active   LLQ Bowel Sounds Active   Abdomen Inspection Rounded;Rotund; Soft   Tenderness Soft; No guarding;Nontender; No rebound   Peripheral Vascular   Peripheral Vascular (WDL) X   Edema Generalized   Edema Generalized Non-pitting   Skin Color/Condition   Skin Color/Condition (WDL) X   Skin Integrity   Skin Integrity (WDL) X   Skin Integrity Abrasion;Bruising   Location scattered   Musculoskeletal   Musculoskeletal (WDL) X  (generalized weakness)   Genitourinary   Genitourinary (WDL) WDL   Flank Tenderness No   Suprapubic Tenderness No   Dysuria No   Anus/Rectum   Anus/Rectum (WDL) WDL   Psychosocial   Psychosocial (WDL) WDL   Patient Behaviors Calm; Cooperative       Reassessment completed; no changes from previous assessment. Pt stable.     Magalie Jimenez RN

## 2022-04-17 NOTE — PROGRESS NOTES
Pt complaining to RN about wanting bipap off. RN took pt off bipap around 1am. Pt refusing to go back on.

## 2022-04-17 NOTE — CARE COORDINATION
Case Management Assessment  Initial Evaluation      Patient Name: Lisa Rankin  YOB: 1955  Diagnosis: Hypoxia [R09.02]  COPD (chronic obstructive pulmonary disease) ([facility] [J44.9]  Date / Time: 4/16/2022  4:10 PM    Admission status/Date: IP 04/16/2022  Chart Reviewed: YES   Patient Interviewed: Nidhi Pollard:  Yes - daughter David Lou      Hospitalization in the last 30 days:  Yes      Health Care Decision Maker :   Primary Decision Maker: Kevin Mistry - Child - [phone]    (CM - must 1st enter selection under Navigator - emergency contact- [place] Relationship and pick relationship)   Who do you trust or have selected to make healthcare decisions for you    Current PCP: [address] [place] [address] [address] required for SNF : NA        3 night stay required - WAIVED    ADLS  Support Systems/Care Needs:    Transportation: [employer]    Meal Preparation: self    Housing  Living Arrangements: Lives alone Steps: none  Intent for return to present living arrangements: Yes  Identified Issues: recurrent admissions    Home Care Information  Active with [address] : Yes - [facility] p: [phone]      Passport/Waiver : No  :                      Phone Number:    Passport/Waiver Services: NA          Durable Medical Equiptment   DME Provider: [facility]  Equipment:   Walker___Cane_X__RTS___ BSC___Shower Chair___Hospital Bed___W/C____Other________  02 at __2__Liter(s)---wears(frequency)__continuously_____ HHN ___ CPAP___ BiPap___   N/A____      Home O2 Use :  Yes      Informed of need for care provider to bring portable home O2 tank on day of discharge for nursing to connect prior to leaving:   Needs reinforcement  Verbalized agreement/Understanding:   Needs reinforcement    Community Service Affiliation  Dialysis:  No    · Agency:  · Location:  · Dialysis Schedule:  · Phone:   · Fax:     Other Community Services: (ex:PT/OT,Mental Health,Wound Clinic, Cardio/Pul [facility])    DISCHARGE PLAN: Explained Case Management role/services. Chart reviewed. Frequent readmissions. Pt was DC here 5 days ago then admitted to [facility] and TXF'd to [facility] ICU. Pt in ICU. Met with pt's daughter, Xiomy by phone. Plan: return home. Lives alone. Frequent readmissions. PC consult requested for COPD/lung mass and goals of care discussion. +Active with [facility] (SN only) and Has [facility] O2 (flow rate needs to be confirmed).  +CM following

## 2022-04-17 NOTE — PROGRESS NOTES
Daughter, Flor 9950 updated on care plan. Addressed all Patient and family concerns at this time. Pt stable.     Geovanna Gilbert RN

## 2022-04-17 NOTE — PROGRESS NOTES
Bedside report and transfer of care given to [facility], RN. Pt currently resting in bed with the call light within reach. Pt denies any other care needs at this time. Pt stable at this time.     Dannie Mora RN

## 2022-04-17 NOTE — FLOWSHEET NOTE
04/17/22 0000   Vital Signs   Pulse 59   Resp 20   BP (!) 70/62   MAP (mmHg) 65   Level of Consciousness Alert (0)   Oxygen Therapy   SpO2 97 %   O2 Device PAP (positive airway pressure)   FiO2  45 %     Vitals taken, shown above. Map of 66. Will continue to watch soft BP. No other s/s of distress. Patient is stable with no current needs at this time. Offered to get patient cleaned up, refusing @ this time and wishes to rest. Call light in reach. Bed in lowest position.

## 2022-04-17 NOTE — PROGRESS NOTES
Hand off report give to Ifeanyi Vilchis RN. Patient is stable showing no signs of distress and has no current needs at this time. Call light is in reach and bed is in lowest position. Care is transferred at this time.

## 2022-04-17 NOTE — PROGRESS NOTES
04/16/22 2243   NIV Type   $NIV $Daily Charge   Skin Protection for O2 Device Yes   Location Nose   NIV Started/Stopped On   Equipment Type V60   Mode Bilevel   Mask Type Full face mask   Mask Size Medium   Settings/Measurements   IPAP 16 cmH20   CPAP/EPAP 8 cmH2O   Rate Ordered 16   Resp 22   FiO2  45 %   I Time/ I Time % 1 s   Vt Exhaled 565 mL   Minute Volume 17 Liters   Mask Leak (lpm) 7 lpm   Comfort Level Good   Using Accessory Muscles No   SpO2 99   Alarm Settings   Alarms On Y   Press Low Alarm 8 cmH2O   High Pressure Alarm 35 cmH2O   Delay Alarm 20 sec(s)   Resp Rate Low Alarm 16   High Respiratory Rate 40 br/min   Oxygen Therapy/Pulse Ox   SpO2 98 %

## 2022-04-17 NOTE — PROGRESS NOTES
Spoke with patients daughter lei on the phone. Patient has expressed to daughter that he is wanting to refuse bronch tomorrow and just go through with the removal process. I asked patient if he didn't want it and he said it depends on how he feels about it in the morning. I expressed to daughter that if patient isnt wanting to have the bronch tomorrow he can speak more about it with doctor to get more information if he wants educated on the procedure.  Will pass along in report so patient can speak with rounding doctors in the AM.

## 2022-04-17 NOTE — PROGRESS NOTES
Patients Bp very inconsistent, per patient this is normal and happens a lot with his nurse at home. Showing no signs of distress at this time.

## 2022-04-17 NOTE — PROGRESS NOTES
RT Nebulizer Bronchodilator Protocol Note    There is a bronchodilator order in the chart from a provider indicating to follow the RT Bronchodilator Protocol and there is an Initiate RT Bronchodilator Protocol order as well (see protocol at bottom of note). CXR Findings:  XR CHEST PORTABLE    Result Date: 4/17/2022  1. Worsening left-sided atelectasis/pneumonia and increasing parapneumonic effusion. 2. Developing right basilar pneumonia. The findings from the last RT Protocol Assessment were as follows:  Smoking: (P) Chronic pulmonary disease  Respiratory Pattern: (P) Regular pattern and RR 12-20 bpm  Breath Sounds: (P) Intermittent or unilateral wheezes  Cough: (P) Strong, spontaneous, non-productive  Indication for Bronchodilator Therapy: (P) Decreased or absent breath sounds  Bronchodilator Assessment Score: (P) 6    Aerosolized bronchodilator medication orders have been revised according to the RT Nebulizer Bronchodilator Protocol below. Respiratory Therapist to perform RT Therapy Protocol Assessment initially then follow the protocol. Repeat RT Therapy Protocol Assessment PRN for score 0-3 or on second treatment, BID, and PRN for scores above 3. No Indications - adjust the frequency to every 6 hours PRN wheezing or bronchospasm, if no treatments needed after 48 hours then discontinue using Per Protocol order mode. If indication present, adjust the RT bronchodilator orders based on the Bronchodilator Assessment Score as indicated below. If a patient is on this medication at home then do not decrease Frequency below that used at home. 0-3 - enter or revise RT bronchodilator order(s) to equivalent RT Bronchodilator order with Frequency of every 4 hours PRN for wheezing or increased work of breathing using Per Protocol order mode.        4-6 - enter or revise RT Bronchodilator order(s) to two equivalent RT bronchodilator orders with one order with BID Frequency and one order with Frequency of every 4 hours PRN wheezing or increased work of breathing using Per Protocol order mode. 7-10 - enter or revise RT Bronchodilator order(s) to two equivalent RT bronchodilator orders with one order with TID Frequency and one order with Frequency of every 4 hours PRN wheezing or increased work of breathing using Per Protocol order mode. 11-13 - enter or revise RT Bronchodilator order(s) to one equivalent RT bronchodilator order with QID Frequency and an Albuterol order with Frequency of every 4 hours PRN wheezing or increased work of breathing using Per Protocol order mode. Greater than 13 - enter or revise RT Bronchodilator order(s) to one equivalent RT bronchodilator order with every 4 hours Frequency and an Albuterol order with Frequency of every 2 hours PRN wheezing or increased work of breathing using Per Protocol order mode. RT to enter RT Home Evaluation for COPD & MDI Assessment order using Per Protocol order mode.     Electronically signed by Alysa Arriaga RCP on 4/17/2022 at 5:09 PM

## 2022-04-17 NOTE — ACP (ADVANCE CARE PLANNING)
Advance Care Planning   Healthcare Decision Maker:    Primary Decision Maker: Luis E Marcos - Child - [phone]    Click here to complete Healthcare Decision Makers including selection of the Healthcare Decision Maker Relationship (ie \"Primary\"). Today we documented Decision Maker(s) consistent with Legal Next of Kin hierarchy.

## 2022-04-17 NOTE — PROGRESS NOTES
Blood pressure (!) 145/82, pulse 97, temperature 97.9 °F (36.6 °C), temperature source Oral, resp. rate 29, height 6' (1.829 m), weight 268 lb (121.6 kg), SpO2 94 %. Received report from dayshift nurse. While going through patients chart I hear patient screaming help. We go in and patient got his leg stuck in foot rest trying to climb out out bed. Patient was helped to get back in bed and comfortable. Showing no signs of distress, increased WOB/ dyspena @ rest and with exertion from being very anxious about wanting to go home and about his procedures. Night time medications given. Patient stable @ this time. Call light in reach bed alarm on.

## 2022-04-17 NOTE — FLOWSHEET NOTE
04/17/22 1245   Assessment   Charting Type Reassessment   Neurological   Neuro (WDL) WDL   Level of Consciousness Alert (0)   Orientation Level Oriented X4   Cognition Appropriate judgement; Appropriate safety awareness; Appropriate attention/concentration; Appropriate for developmental age; Follows commands   Symptoms of Dysphagia    Symptoms of Dysphagia  None   Swallow Screening   Is the patient unable to remain alert for testing? No   Is the patient on a modified diet (thickened liquids) due to pre-existing dysphagia? No   Is there presence of existing enteral tube feeding via the stomach or nose? No   Is there presence of head-of-bed restrictions (less than 30 degrees)? No   Is there presence of tracheotomy tube? No   Is the patient ordered nothing-by-mouth status? No   HEENT   HEENT (WDL) X   Teeth Edentulous   Right Ear Impaired hearing   Left Ear Impaired hearing   Respiratory   Respiratory (WDL) X   Respiratory Pattern Regular   Respiratory Depth Normal   Respiratory Quality/Effort Unlabored   Chest Assessment Chest expansion symmetrical;Trachea midline   L Breath Sounds Diminished   R Breath Sounds Diminished   Cardiac   Cardiac (WDL) X   Cardiac Regularity Irregular   Cardiac Rhythm Atrial fib   Cardiac Monitor   Telemetry Monitor On Yes   Telemetry Audible Yes   Telemetry Alarms Set Yes   Telemetry Box Number ICU 4   Telemetry Monitor Alarm Parameters ICU   Gastrointestinal   Abdominal (WDL) X   RUQ Bowel Sounds Active   LUQ Bowel Sounds Active   RLQ Bowel Sounds Active   LLQ Bowel Sounds Active   Abdomen Inspection Rounded;Rotund; Soft   Tenderness Soft; No guarding;Nontender; No rebound   Peripheral Vascular   Peripheral Vascular (WDL) X   Edema Generalized   Edema Generalized Non-pitting   Skin Color/Condition   Skin Color/Condition (WDL) X   Skin Color Pale;Ashen   Skin Condition/Temp Warm;Dry;Flaky   Skin Integrity   Skin Integrity (WDL) X   Skin Integrity Abrasion;Bruising   Location scattered Musculoskeletal   Musculoskeletal (WDL) X  (generalized weakness)   Genitourinary   Genitourinary (WDL) WDL   Flank Tenderness No   Suprapubic Tenderness No   Dysuria No   Anus/Rectum   Anus/Rectum (WDL) WDL   Psychosocial   Psychosocial (WDL) WDL   Patient Behaviors Calm; Cooperative       Reassessment completed. No changes. Pt stable.     Alessio Chilel RN

## 2022-04-17 NOTE — PROGRESS NOTES
Patient care rounds completed with Dr. Sasha Benoit. Pt stable. Care plan explained to Patient.     Geovanna Gilbert RN

## 2022-04-17 NOTE — PLAN OF CARE
Problem: Falls - Risk of:  Goal: Will remain free from falls  Description: Will remain free from falls  Outcome: Ongoing  Goal: Absence of physical injury  Description: Absence of physical injury  Outcome: Ongoing     Problem: Infection:  Goal: Will remain free from infection  Description: Will remain free from infection  Outcome: Ongoing     Problem: Daily Care:  Goal: Daily care needs are met  Description: Daily care needs are met  Outcome: Ongoing     Problem: Skin Integrity:  Goal: Skin integrity will stabilize  Description: Skin integrity will stabilize  Outcome: Ongoing

## 2022-04-17 NOTE — FLOWSHEET NOTE
04/17/22 0800   Vital Signs   Temp 97.9 °F (36.6 °C)   Temp Source Oral   Pulse 65   Heart Rate Source Monitor   Resp 22   /70   BP Location Right lower arm   MAP (mmHg) 83   Patient Position Semi fowlers   Level of Consciousness Alert (0)   MEWS Score 2   Patient Currently in Pain Denies   Pain Assessment   Pain Assessment 0-10   Pain Level 0   Patient's Stated Pain Goal No pain   Oxygen Therapy   SpO2 97 %   Pulse Oximeter Device Mode Continuous   O2 Device Nasal cannula   Skin Assessment Clean, dry, & intact   O2 Flow Rate (L/min) 3 L/min       Pt assessment complete; see flow sheets. Pt states that his work of breathing is easy. Denies any chest pain or pressure. Vitals completed. Pt NPO with sips of water with meds for possible bronch. IV cefepime, PO wellbutrin and Dig given for parameters being met. Pt repositioned self in bed. Does not want to get washed right now. Pt stable.     Ashwini Navarrete RN

## 2022-04-17 NOTE — H&P
History and Physical        HISTORY OF PRESENT ILLNESS:        Patient transferred here from [facility]. 25-year-old male with recent diagnosis of squamous cell lung cancer. Patient was admitted here at [facility] From 4/12 to 4/15. He has a left lung mass which was biopsied, pathology positive for squamous cell lung cancer. Patient has left lung collapse. He was discharged home on home oxygen 3L with plan to follow-up with oncology/radiation oncology this week. Patient presented back next day 4/16 to [facility] with increased shortness of breath. Patient continues to smoke tobacco. He had taken his O2 off. He was hypoxic, with sats in the 80s. . Placed on O2 3 L; required BIPAP on transfer . CXR with left lung white out   Labs with elevated WBC, procalcitonin, lactic acid  . ABG with hypoxia and hypercarbia     Admitted to ICU. Pulmonology  consulted   Currently on supplemental oxygen 3 L. sats stable . Patient is a poor historian. Denies any complaints. Past Medical History:   Diagnosis Date    A-fib [facility])     CAD (coronary artery disease)     CHF (congestive heart failure) (HCC)     [facility] [facility]     Diabetes mellitus ([facility]     Hyperlipidemia     Hypertension     Thyroid disease        Past Surgical History:   Procedure Laterality Date    BRONCHOSCOPY N/A 4/12/2022    EBUS WF W/ANES. (11:00) performed by Britni Bill MD at [address]  4/12/2022    BRONCHOSCOPY ALVEOLAR LAVAGE performed by Britni Bill MD at [address]  4/12/2022    BRONCHOSCOPY BIOPSY BRONCHUS performed by Britni Bill MD at [address]  4/12/2022    BRONCHOSCOPY/TRANSBRONCHIAL NEEDLE BIOPSY performed by Britni Bill MD at [address] N/A 4/13/2022    BRONCH NF performed by Britni Bill MD at [id number] [facility]       Patient is allergic to codeine.     Prior to Admission medications    Medication Sig Start Date End Date Taking?  Authorizing Provider   guaiFENesin (MUCINEX) 600 MG extended release tablet Take 1 tablet by mouth 2 times daily 4/15/22   Vance Figueroa MD   levoFLOXacin [facility]) 500 MG tablet Take 1 tablet by mouth daily for 4 doses 4/15/22 4/19/22  aVnce Figueroa MD   predniSONE (DELTASONE) 10 MG tablet 30 mg x 3 days, 20 mg x 3 days, 10 mg x 3 days then stop 4/15/22   Vance Figueroa MD   apixaban (ELIQUIS) 2.5 MG TABS tablet Take 1 tablet by mouth 2 times daily 4/15/22   Vance Figueroa MD   insulin detemir (LEVEMIR) 100 UNIT/ML injection vial Inject into the skin nightly    Historical Provider, MD   insulin aspart (NOVOLOG) 100 UNIT/ML injection vial Inject into the skin 3 times daily (before meals)     Historical Provider, MD   torsemide (DEMADEX) 100 MG tablet TAKE 1/2 (ONE-HALF) TABLET BY MOUTH ONCE DAILY 3/12/22   Historical Provider, MD   potassium chloride (MICRO-K) 10 MEQ extended release capsule TAKE 1 CAPSULE BY MOUTH TWICE DAILY 1/23/22   Historical Provider, MD   HYDROcodone-acetaminophen ([facility]) 5-325 MG per tablet TAKE 1 TABLET BY MOUTH TWICE DAILY AS NEEDED 3/28/22   Historical Provider, MD   hydrALAZINE (APRESOLINE) 25 MG tablet TAKE 1 TABLET BY MOUTH THREE TIMES DAILY 3/23/22   Historical Provider, MD   digoxin (LANOXIN) 125 MCG tablet Take 0.125 mg by mouth daily 11/8/21   Historical Provider, MD   tiotropium (SPIRIVA RESPIMAT) 2.5 MCG/ACT AERS inhaler Inhale 2 puffs into the lungs daily 4/7/22   Palmira Michael MD   atorvastatin (LIPITOR) 20 MG tablet Take 20 mg by mouth daily  1/19/19   Historical Provider, MD   [facility]ion [facility]) 100 MG extended release tablet Take 100 mg by mouth 2 times daily  11/21/18   Historical Provider, MD   CARTIA  MG extended release capsule Take 240 mg by mouth daily  11/21/18   Historical Provider, MD   levothyroxine (SYNTHROID) 150 MCG tablet Take 150 mcg by mouth Daily  1/19/19 Historical Provider, MD   lisinopril (PRINIVIL;ZESTRIL) 20 MG tablet Take 20 mg by mouth  1/19/19   Historical Provider, MD   metFORMIN (GLUCOPHAGE) 850 MG tablet Take 850 mg by mouth daily (with breakfast)  1/19/19   Historical Provider, MD   metoprolol tartrate (LOPRESSOR) 25 MG tablet Take 25 mg by mouth 2 times daily  11/10/18   Historical Provider, MD   nitroGLYCERIN (NITROSTAT) 0.4 MG SL tablet Place 0.4 mg under the tongue every 5 minutes as needed  1/4/19   Historical Provider, MD   nortriptyline (PAMELOR) 10 MG capsule Take 10 mg by mouth nightly  1/19/19   Historical Provider, MD   Omega-3 Fatty Acids (FISH OIL) 1000 MG CAPS Take 3,000 mg by mouth 3 times daily    Historical Provider, MD   aspirin 81 MG tablet Take 81 mg by mouth daily    Historical Provider, MD       Social History     Socioeconomic History    Marital status:      Spouse name: None    Number of children: None    Years of education: None    Highest education level: None   Occupational History    None   Tobacco Use    Smoking status: Current Every Day Smoker     Packs/day: 1.00     Years: 40.00     Pack years: 40.00    Smokeless tobacco: Never Used   Vaping Use    Vaping Use: Never used   Substance and Sexual Activity    Alcohol use: Not Currently    Drug use: Never    Sexual activity: Not Currently   Other Topics Concern    None   Social History Narrative    None     Social Determinants of Health     Financial Resource Strain:     Difficulty of Paying Living Expenses: Not on file   Food Insecurity:     Worried About Running Out of Food in the Last Year: Not on file    Ric of Food in the Last Year: Not on file   Transportation Needs:     Lack of Transportation (Medical): Not on file    Lack of Transportation (Non-Medical):  Not on file   Physical Activity:     Days of Exercise per Week: Not on file    Minutes of Exercise per Session: Not on file   Stress:     Feeling of Stress : Not on file   Social Connections:     Frequency of Communication with Friends and Family: Not on file    Frequency of Social Gatherings with Friends and Family: Not on file    Attends Evangelical Services: Not on file    Active Member of Clubs or Organizations: Not on file    Attends Club or Organization Meetings: Not on file    Marital Status: Not on file   Intimate Partner Violence:     Fear of Current or Ex-Partner: Not on file    Emotionally Abused: Not on file    Physically Abused: Not on file    Sexually Abused: Not on file   Housing Stability:     Unable to Pay for Housing in the Last Year: Not on file    Number of [place] in the Last Year: Not on file    Unstable Housing in the Last Year: Not on file       History reviewed. No pertinent family history. REVIEW OF SYSTEMS:   Constitutional: Negative for fever   HEENT: Negative for sore throat   Eyes: Negative for redness   Respiratory:  + for dyspnea,  No cough   Cardiovascular: Negative for chest pain   Gastrointestinal: Negative for vomiting, diarrhea   Genitourinary: Negative for hematuria   Musculoskeletal: Negative for arthralgias   Skin: Negative for rash   Neurological: Negative for syncope   Hematological: Negative for adenopathy   Psychiatric/Behavorial: Negative for anxiety     On Physical Examination    Vitals:    04/17/22 0900   BP: 109/72   Pulse: 77   Resp: 24   Temp:    SpO2: 94%     afebrile  Gen: obese . No distress. Alert. Awake, oriented X3  Eyes: PERRL. No sclera icterus. No conjunctival injection. ENT: No discharge. Pharynx clear. Neck: Trachea midline. Normal thyroid. Resp: No accessory muscle use. Diminished breath sounds, olamide wheezes +. No rhonchi. No dullness on percussion. CV: Regular rate. Regular rhythm. No murmur or rub. No edema. GI: Non-tender. Non-distended. No masses. No organomegaly. Normal bowel sounds. No hernia. Skin: Warm and dry. No nodule on exposed extremities. No rash on exposed extremities.    Lymph: No cervical LAD. No supraclavicular LAD. M/S: No cyanosis. No joint deformity. No clubbing. Intact peripheral pulses. Brisk cap refill, < 2 secs  Neuro: Awake. Reflexes 2+ symmetric bilaterally   Psych: Oriented x 3. No anxiety or agitation. CBC:   Recent Labs     04/15/22  0432 04/17/22  0808   WBC 15.0* 12.1*   HGB 13.7 12.5*   HCT 42.7 39.1*   MCV 90.1 90.3    219     BMP:   Recent Labs     04/15/22  0432 04/17/22  0809    135*   K 4.1 4.2   CL 99 97*   CO2 30 31   BUN 27* 22*   CREATININE 1.0 0.9     LIVER PROFILE: No results for input(s): AST, ALT, LIPASE, BILIDIR, BILITOT, ALKPHOS in the last 72 hours. Invalid input(s): AMYLASE,  ALB  PT/INR: No results for input(s): PROTIME, INR in the last 72 hours. APTT: No results for input(s): APTT in the last 72 hours. UA:No results for input(s): NITRITE, COLORU, PHUR, LABCAST, WBCUA, RBCUA, MUCUS, TRICHOMONAS, YEAST, BACTERIA, CLARITYU, SPECGRAV, LEUKOCYTESUR, UROBILINOGEN, BILIRUBINUR, BLOODU, GLUCOSEU, AMORPHOUS in the last 72 hours. Invalid input(s): Fermin Davidson    Chest imaging was reviewed by me and showed    XR CHEST PORTABLE   Final Result   1. Worsening left-sided atelectasis/pneumonia and increasing parapneumonic   effusion. 2. Developing right basilar pneumonia. XR CHEST PORTABLE    (Results Pending)       Pathology   4/12/22  Left lower lobe lung mass, endobronchial biopsies:   - Squamous cell carcinoma, moderately differentiated. FINAL DIAGNOSIS:     A. Lymph node, subcarinal, transbronchial ultrasound guided fine needle   aspiration:   - Positive for malignant cells, squamous cell carcinoma. B. Lung, left lower lobe, bronchial alveolar lavage:   - Positive for malignant cells, squamous cell carcinoma. - [place] methenamine silver stain is negative for fungal elements and   Pneumocystis jiroveci organisms. C. Bronchial washings:   - Positive for malignant cells, squamous cell carcinoma.            ASSESSMENT and PLAN:    # Acute Hypoxic Respiratory  Failure   # COPD exacerbation  # continued tobacco abuse  # Lung Cancer-> squamous cell CA  # Left lung collapse  # post obstructive PNA   -hypoxic 80%, requiring BIPAP. Admitted to ICU . Seen by pulmonology. Off biPap now. sats stable on O2 3L   -CXR with left lung white out -> plan Bronchoscopy in AM  - cont IBD, IV steroids  - IV abx  - IV levaquin   - Squamous cell carcinoma, moderately differentiated.   seen by Oncology last admit  .  Plan to F/w Dr. Gopal Lovelace next week to plan for cancer treatment      # H/O Hypercalcemia   - Calcium elevated last admit,  likely with bone mets .  required Zometa   Levels stable now      #Atrial fibrillation   - rate controlled , on BB, cardizem, digoxin   - on eliquis-> hold   Plan bronch in AM .  Previous  Bronch 4/12 tumor noted to be friable, oozing bleed      #Chronic CHF - diastolic with preserved EF   # Non obstructive CAD  - f.w [facility]  - cont  statins, BB, lisinopril  -hold  ASA , torsemide     # DM 2   - on lantus,  SSI      #Chronic anxiety    - on wellbutrin     #Hypothyroid   - on synthroid        Diet NPO after MN  ADULT DIET; Easy to Chew; 4 carb choices (60 gm/meal)   Full Code    Claudene Rose, MD 4/17/2022 9:43 AM

## 2022-04-17 NOTE — CONSULTS
Patient is being seen at the request of Pipe Ramos MD for a consultation for left lung opacification      HISTORY OF PRESENT ILLNESS:   74years old with history of recently diagnosed squamous cell lung cancer presented to [facility] with hypoxemia, saturation in the 80s on 3 L O2 started yesterday with weakness and SOB. Severe. No sputum or hemoptysis. Worse with exertion and better with resting. No cough or sputum. No hemoptysis. No chest pain. Chest x-ray showed left lung collapse. Placed on BiPAP transfer to ICU for further evaluation. Refusing BiPAP overnight. His O2 is down to 2L at baseline. Smoking 2 pack/day- smoked on Friday 3 pack when he left the hospital.       PAST MEDICAL HISTORY:  Past Medical History:   Diagnosis Date    A-fib [facility])     CAD (coronary artery disease)     CHF (congestive heart failure) (HCC)     [facility]     Diabetes mellitus ([facility] [facility]     Hyperlipidemia     Hypertension     Thyroid disease      PAST SURGICAL HISTORY:  Past Surgical History:   Procedure Laterality Date    BRONCHOSCOPY N/A 4/12/2022    EBUS WF W/ANES. (11:00) performed by Bebe Shankar MD at [address]  4/12/2022    BRONCHOSCOPY ALVEOLAR LAVAGE performed by Bebe Shankar MD at [address]  4/12/2022    BRONCHOSCOPY BIOPSY BRONCHUS performed by Bebe Shankar MD at [address]  4/12/2022    BRONCHOSCOPY/TRANSBRONCHIAL NEEDLE BIOPSY performed by Bebe Shankar MD at [address] N/A 4/13/2022    [place] [facility] NF performed by Bebe Shankar MD at [address]:  Uncle with lung cancer         SOCIAL HISTORY:   reports that he has been smoking. He has a 40.00 pack-year smoking history.  He has never used smokeless tobacco.    Scheduled Meds:   aspirin  81 mg Oral Daily    atorvastatin  20 mg Oral Daily    buPROPion  100 mg Oral BID    dilTIAZem 240 mg Oral Daily    digoxin  0.125 mg Oral Daily    insulin glargine  10 Units SubCUTAneous Nightly    levothyroxine  150 mcg Oral Daily    lisinopril  20 mg Oral Daily    metoprolol tartrate  25 mg Oral BID    nortriptyline  10 mg Oral Nightly    cefepime  2,000 mg IntraVENous Q12H    sodium chloride flush  5-40 mL IntraVENous 2 times per day    enoxaparin  30 mg SubCUTAneous Q12H    vancomycin  1,250 mg IntraVENous Q12H    levofloxacin  500 mg IntraVENous Q24H    methylPREDNISolone  40 mg IntraVENous Q12H    ipratropium-albuterol  1 ampule Inhalation Q4H WA     Continuous Infusions:   sodium chloride      dextrose       PRN Meds:  sodium chloride flush, sodium chloride, ondansetron **OR** ondansetron, polyethylene glycol, acetaminophen **OR** acetaminophen, glucagon (rDNA), dextrose, dextrose bolus (hypoglycemia) **OR** dextrose bolus (hypoglycemia), glucose    ALLERGIES:  Patient is allergic to codeine. REVIEW OF SYSTEMS:  Constitutional: Negative for fever  HENT: Negative for sore throat  Eyes: Negative for redness   Respiratory:+ dyspnea, cough  Cardiovascular: Negative for chest pain  Gastrointestinal: Negative for vomiting, diarrhea   Genitourinary: Negative for hematuria   Musculoskeletal: Negative for arthralgias   Skin: Negative for rash  Neurological: Negative for syncope  Hematological: Negative for adenopathy  Psychiatric/Behavorial: Negative for anxiety    PHYSICAL EXAM:  Blood pressure (!) 101/50, pulse 60, temperature 97.7 °F (36.5 °C), temperature source Oral, resp. rate 17, SpO2 96 %.' on 2LPM   Gen:  mild distress. Morbidly obese ill-appearing  Eyes: PERRL. No sclera icterus. No conjunctival injection. ENT: No discharge. Pharynx clear. Neck: Trachea midline. No obvious mass. Resp: Mild accessory muscle use. No crackles. Bilateral wheezes. No rhonchi. Left dullness on percussion. CV: Regular rate. Regular rhythm. No murmur or rub. No edema. GI: Non-tender. Non-distended. No hernia. Skin: Warm and dry. No nodule on exposed extremities. Lymph: No cervical LAD. No supraclavicular LAD. M/S: No cyanosis. No joint deformity. No clubbing. Neuro: Awake. Alert. Moves all four extremities. Psych: Oriented x 3. No anxiety. LABS:  CBC:   Recent Labs     04/14/22  0758 04/15/22  0432   WBC 18.8* 15.0*   HGB 13.1* 13.7   HCT 40.7 42.7   MCV 91.2 90.1    283     BMP:   Recent Labs     04/14/22  0758 04/15/22  0432    138   K 4.5 4.1    99   CO2 29 30   BUN 24* 27*   CREATININE 0.9 1.0     LIVER PROFILE: No results for input(s): AST, ALT, LIPASE, BILIDIR, BILITOT, ALKPHOS in the last 72 hours. Invalid input(s): AMYLASE,  ALB  PT/INR: No results for input(s): PROTIME, INR in the last 72 hours. APTT: No results for input(s): APTT in the last 72 hours. UA:No results for input(s): NITRITE, COLORU, PHUR, LABCAST, WBCUA, RBCUA, MUCUS, TRICHOMONAS, YEAST, BACTERIA, CLARITYU, SPECGRAV, LEUKOCYTESUR, UROBILINOGEN, BILIRUBINUR, BLOODU, GLUCOSEU, AMORPHOUS in the last 72 hours. Invalid input(s): KETONESU  No results for input(s): PHART, FNQ9DYB, PO2ART in the last 72 hours. Microbiology:  4/16 COVID-19 not detected  4/16 respiratory      Bronchoscopy 4/12  BAL/washing no growth to date    Left lower lobe lung mass, endobronchial biopsies:   - Squamous cell carcinoma, moderately differentiated    A. Lymph node, subcarinal, transbronchial ultrasound guided fine needle   aspiration:   - Positive for malignant cells, squamous cell carcinoma. B. Lung, left lower lobe, bronchial alveolar lavage:   - Positive for malignant cells, squamous cell carcinoma. - [place] methenamine silver stain is negative for fungal elements and   Pneumocystis jiroveci organisms.      C. Bronchial washings:   - Positive for malignant cells, squamous cell carcinoma      Imaging:  Chest x-ray 4/13 imaging was reviewed by me and showed   There is improved aeration of the left upper lung compared to the previous day's exam.        ASSESSMENT:  · Acute hypoxemic respiratory failure  · COPD with acute exacerbation  · Left lung collapse on chest x-ray 4/13/22 due to-likely due to left mainstem endobronchial lesion (at KYLIE/LLL bifurcation) - reexpansion post Bronch 4/13/22 with mucous plug removal.  Recollapsed on repeat imaging today. · L effusion- not enough fluid for thoracentesis on US 4/13/22  · Postobstructive pneumonia  · Left mainstem endobronchial mass oozing blood on bronchoscopy 4/12/22  · Left lower lobe/lingula 12 cm mass with hilar/mediastinal adenopathy post bronchoscopy/12/22 Bx Squamous cell carcinoma at least stage IIIa and possible for given hypercalcemia  · Nocturnal hypoxemia on 2 L O2  · Hypercalcemia - concerning for bone mets received Zometa last admission with improvement  · 50 pound weight loss  · Chronic diastolic CHF and A. fib on Eliquis  · > 40-pack-year history of smoking- smoked 3 pack when he left the hospital on Friday        PLAN:  · Encouraged to use BiPAP- 16/8 cmH2O nightly and as needed during the day  · Supplemental oxygen to maintain SaO2 >92%; wean as tolerated  · Inhaled bronchodilators  · IV Solumedrol 40 Q12 hrs   · Levaquin IV D#6/10- D/C Vanc and Cefepime   · Mucinex and chest physiotherapy- MetaNeb   · Holding Eliquis   · N.p.o. after midnight for bronchoscopy in a.m. · Discussed with CT surgery today the possibility of debulking/laser therapy for 5 days of Eliquis.     · Blood sugar control ISS, with goal 150-180  · MRSA prophylaxis: Bactroban  · Smoking cessation counseling

## 2022-04-18 LAB
ANION GAP SERPL CALCULATED.3IONS-SCNC: 10 MMOL/L (ref 3–16)
BASOPHILS ABSOLUTE: 0 K/UL (ref 0–0.2)
BASOPHILS RELATIVE PERCENT: 0.1 %
BUN BLDV-MCNC: 23 MG/DL (ref 7–20)
CALCIUM SERPL-MCNC: 9.1 MG/DL (ref 8.3–10.6)
CHLORIDE BLD-SCNC: 98 MMOL/L (ref 99–110)
CO2: 28 MMOL/L (ref 21–32)
CREAT SERPL-MCNC: 0.9 MG/DL (ref 0.8–1.3)
EOSINOPHILS ABSOLUTE: 0 K/UL (ref 0–0.6)
EOSINOPHILS RELATIVE PERCENT: 0.2 %
GFR AFRICAN AMERICAN: >60
GFR NON-AFRICAN AMERICAN: >60
GLUCOSE BLD-MCNC: 132 MG/DL (ref 70–99)
GLUCOSE BLD-MCNC: 147 MG/DL (ref 70–99)
GLUCOSE BLD-MCNC: 163 MG/DL (ref 70–99)
GLUCOSE BLD-MCNC: 194 MG/DL (ref 70–99)
GLUCOSE BLD-MCNC: 214 MG/DL (ref 70–99)
GLUCOSE BLD-MCNC: 216 MG/DL (ref 70–99)
HCT VFR BLD CALC: 40.8 % (ref 40.5–52.5)
HEMOGLOBIN: 13.1 G/DL (ref 13.5–17.5)
LYMPHOCYTES ABSOLUTE: 0.3 K/UL (ref 1–5.1)
LYMPHOCYTES RELATIVE PERCENT: 2 %
MCH RBC QN AUTO: 28.9 PG (ref 26–34)
MCHC RBC AUTO-ENTMCNC: 32.1 G/DL (ref 31–36)
MCV RBC AUTO: 90.1 FL (ref 80–100)
MONOCYTES ABSOLUTE: 0.6 K/UL (ref 0–1.3)
MONOCYTES RELATIVE PERCENT: 4 %
MRSA SCREEN RT-PCR: NORMAL
NEUTROPHILS ABSOLUTE: 13.6 K/UL (ref 1.7–7.7)
NEUTROPHILS RELATIVE PERCENT: 93.7 %
PDW BLD-RTO: 16.6 % (ref 12.4–15.4)
PERFORMED ON: ABNORMAL
PLATELET # BLD: 252 K/UL (ref 135–450)
PMV BLD AUTO: 6.4 FL (ref 5–10.5)
POTASSIUM SERPL-SCNC: 4.5 MMOL/L (ref 3.5–5.1)
RBC # BLD: 4.53 M/UL (ref 4.2–5.9)
SODIUM BLD-SCNC: 136 MMOL/L (ref 136–145)
WBC # BLD: 14.5 K/UL (ref 4–11)

## 2022-04-18 PROCEDURE — 2700000000 HC OXYGEN THERAPY PER DAY

## 2022-04-18 PROCEDURE — 94669 MECHANICAL CHEST WALL OSCILL: CPT

## 2022-04-18 PROCEDURE — 94640 AIRWAY INHALATION TREATMENT: CPT

## 2022-04-18 PROCEDURE — 80048 BASIC METABOLIC PNL TOTAL CA: CPT

## 2022-04-18 PROCEDURE — 94761 N-INVAS EAR/PLS OXIMETRY MLT: CPT

## 2022-04-18 PROCEDURE — 99233 SBSQ HOSP IP/OBS HIGH 50: CPT | Performed by: INTERNAL MEDICINE

## 2022-04-18 PROCEDURE — 6370000000 HC RX 637 (ALT 250 FOR IP): Performed by: INTERNAL MEDICINE

## 2022-04-18 PROCEDURE — 36415 COLL VENOUS BLD VENIPUNCTURE: CPT

## 2022-04-18 PROCEDURE — 85025 COMPLETE CBC W/AUTO DIFF WBC: CPT

## 2022-04-18 PROCEDURE — 6360000002 HC RX W HCPCS: Performed by: INTERNAL MEDICINE

## 2022-04-18 PROCEDURE — 2000000000 HC ICU R&B

## 2022-04-18 PROCEDURE — 2580000003 HC RX 258: Performed by: INTERNAL MEDICINE

## 2022-04-18 RX ORDER — PEN NEEDLE, DIABETIC 31 GX5/16"
NEEDLE, DISPOSABLE MISCELLANEOUS
COMMUNITY
Start: 2022-04-03

## 2022-04-18 RX ORDER — LEVOTHYROXINE SODIUM 0.2 MG/1
150 TABLET ORAL DAILY
COMMUNITY
Start: 2022-04-11

## 2022-04-18 RX ORDER — LEVOTHYROXINE SODIUM 50 UG/1
50 TABLET ORAL DAILY
COMMUNITY
Start: 2022-04-11

## 2022-04-18 RX ORDER — IPRATROPIUM BROMIDE AND ALBUTEROL SULFATE 2.5; .5 MG/3ML; MG/3ML
1 SOLUTION RESPIRATORY (INHALATION) 4 TIMES DAILY
Status: DISCONTINUED | OUTPATIENT
Start: 2022-04-18 | End: 2022-04-19 | Stop reason: HOSPADM

## 2022-04-18 RX ADMIN — IPRATROPIUM BROMIDE AND ALBUTEROL SULFATE 1 AMPULE: 2.5; .5 SOLUTION RESPIRATORY (INHALATION) at 14:58

## 2022-04-18 RX ADMIN — DIGOXIN 0.12 MG: 125 TABLET ORAL at 10:56

## 2022-04-18 RX ADMIN — LEVOFLOXACIN 500 MG: 500 INJECTION, SOLUTION INTRAVENOUS at 18:04

## 2022-04-18 RX ADMIN — BUPROPION HYDROCHLORIDE 100 MG: 100 TABLET, EXTENDED RELEASE ORAL at 19:50

## 2022-04-18 RX ADMIN — ATORVASTATIN CALCIUM 20 MG: 10 TABLET, FILM COATED ORAL at 10:56

## 2022-04-18 RX ADMIN — DILTIAZEM HYDROCHLORIDE 240 MG: 240 CAPSULE, COATED, EXTENDED RELEASE ORAL at 10:56

## 2022-04-18 RX ADMIN — NORTRIPTYLINE HYDROCHLORIDE 10 MG: 10 CAPSULE ORAL at 19:50

## 2022-04-18 RX ADMIN — IPRATROPIUM BROMIDE AND ALBUTEROL SULFATE 1 AMPULE: 2.5; .5 SOLUTION RESPIRATORY (INHALATION) at 18:41

## 2022-04-18 RX ADMIN — INSULIN GLARGINE 10 UNITS: 100 INJECTION, SOLUTION SUBCUTANEOUS at 20:32

## 2022-04-18 RX ADMIN — IPRATROPIUM BROMIDE AND ALBUTEROL SULFATE 1 AMPULE: .5; 3 SOLUTION RESPIRATORY (INHALATION) at 07:04

## 2022-04-18 RX ADMIN — SODIUM CHLORIDE 250 ML: 9 INJECTION, SOLUTION INTRAVENOUS at 19:56

## 2022-04-18 RX ADMIN — BUPROPION HYDROCHLORIDE 100 MG: 100 TABLET, EXTENDED RELEASE ORAL at 10:56

## 2022-04-18 RX ADMIN — METHYLPREDNISOLONE SODIUM SUCCINATE 40 MG: 40 INJECTION, POWDER, FOR SOLUTION INTRAMUSCULAR; INTRAVENOUS at 18:22

## 2022-04-18 ASSESSMENT — PAIN SCALES - GENERAL: PAINLEVEL_OUTOF10: 0

## 2022-04-18 NOTE — PROGRESS NOTES
RT Inhaler-Nebulizer Bronchodilator Protocol Note    There is a bronchodilator order in the chart from a provider indicating to follow the RT Bronchodilator Protocol and there is an Initiate RT Inhaler-Nebulizer Bronchodilator Protocol order as well (see protocol at bottom of note). CXR Findings:  XR CHEST PORTABLE    Result Date: 4/17/2022  1. Worsening left-sided atelectasis/pneumonia and increasing parapneumonic effusion. 2. Developing right basilar pneumonia. The findings from the last RT Protocol Assessment were as follows:   History Pulmonary Disease: (P) Chronic pulmonary disease  Respiratory Pattern: (P) Dyspnea on exertion or RR 21-25 bpm  Breath Sounds: (P) Slightly diminished and/or crackles  Cough: (P) Strong, spontaneous, non-productive  Indication for Bronchodilator Therapy: (P) Decreased or absent breath sounds  Bronchodilator Assessment Score: (P) 6    Aerosolized bronchodilator medication orders have been revised according to the RT Inhaler-Nebulizer Bronchodilator Protocol below. Respiratory Therapist to perform RT Therapy Protocol Assessment initially then follow the protocol. Repeat RT Therapy Protocol Assessment PRN for score 0-3 or on second treatment, BID, and PRN for scores above 3. No Indications - adjust the frequency to every 6 hours PRN wheezing or bronchospasm, if no treatments needed after 48 hours then discontinue using Per Protocol order mode. If indication present, adjust the RT bronchodilator orders based on the Bronchodilator Assessment Score as indicated below. Use Inhaler orders unless patient has one or more of the following: on home nebulizer, not able to hold breath for 10 seconds, is not alert and oriented, cannot activate and use MDI correctly, or respiratory rate 25 breaths per minute or more, then use the equivalent nebulizer order(s) with same Frequency and PRN reasons based on the score.   If a patient is on this medication at home then do not decrease Frequency below that used at home. 0-3 - enter or revise RT bronchodilator order(s) to equivalent RT Bronchodilator order with Frequency of every 4 hours PRN for wheezing or increased work of breathing using Per Protocol order mode. 4-6 - enter or revise RT Bronchodilator order(s) to two equivalent RT bronchodilator orders with one order with BID Frequency and one order with Frequency of every 4 hours PRN wheezing or increased work of breathing using Per Protocol order mode. 7-10 - enter or revise RT Bronchodilator order(s) to two equivalent RT bronchodilator orders with one order with TID Frequency and one order with Frequency of every 4 hours PRN wheezing or increased work of breathing using Per Protocol order mode. 11-13 - enter or revise RT Bronchodilator order(s) to one equivalent RT bronchodilator order with QID Frequency and an Albuterol order with Frequency of every 4 hours PRN wheezing or increased work of breathing using Per Protocol order mode. Greater than 13 - enter or revise RT Bronchodilator order(s) to one equivalent RT bronchodilator order with every 4 hours Frequency and an Albuterol order with Frequency of every 2 hours PRN wheezing or increased work of breathing using Per Protocol order mode.          Electronically signed by Lizbeth Reynolds RCP on 4/18/2022 at 7:10 AM

## 2022-04-18 NOTE — FLOWSHEET NOTE
04/18/22 0400   Vital Signs   Temp 97.6 °F (36.4 °C)   Temp Source Axillary   Pulse 66   Heart Rate Source Monitor   Resp 22   /62   MAP (mmHg) 76   Patient Position Semi fowlers; Lying left side   Level of Consciousness Alert (0)   MEWS Score 2   Oxygen Therapy   SpO2 97 %   O2 Device Nasal cannula   O2 Flow Rate (L/min) 3 L/min     Reassessment completed. No changes. Vitals taken, shown above. Patient is stable with no current needs at this time. Call light in reach. Bed in lowest position.

## 2022-04-18 NOTE — PROGRESS NOTES
Pt continues to refuse IV placement. Educated pt on need for IV placement. Pt states, \"I don't care\" and has stop speaking to me at this time.

## 2022-04-18 NOTE — PROGRESS NOTES
Patient getting aggravatted with being here, not cooperating with questions when asked. Is A/o refusing to be cleaned up at this time and doesnt want to be repositioned. Pillow support under left side of body as he favors that side.

## 2022-04-18 NOTE — PROGRESS NOTES
Admit: 2022    Name:  Nerissa Lopez  Room:  3004/3004-01  MRN:    [id number]    Critical Care Daily Progress Note for 2022     A 49-year-old male with history of recently diagnosed, cell lung cancer with recent admission, discharged on 3 L of oxygen with plans to follow-up with oncology, presented to [place] emergency room with shortness of breath. Required BiPAP on transfer. ABG with hypoxia and hypercarbia      Interval History:     Was refusing routine care earlier this morning. Was refusing IV. Has refused fiberoptic bronchoscopy. Now he tells me that he is okay with the procedures because he wants to get better and go home.     Scheduled Meds:   ipratropium-albuterol  1 ampule Inhalation 4x daily    mupirocin   Nasal BID    [Held by provider] aspirin  81 mg Oral Daily    atorvastatin  20 mg Oral Daily    buPROPion  100 mg Oral BID    dilTIAZem  240 mg Oral Daily    digoxin  0.125 mg Oral Daily    insulin glargine  10 Units SubCUTAneous Nightly    levothyroxine  150 mcg Oral Daily    lisinopril  20 mg Oral Daily    metoprolol tartrate  25 mg Oral BID    nortriptyline  10 mg Oral Nightly    sodium chloride flush  5-40 mL IntraVENous 2 times per day    [Held by provider] enoxaparin  30 mg SubCUTAneous Q12H    levofloxacin  500 mg IntraVENous Q24H    methylPREDNISolone  40 mg IntraVENous Q12H       Continuous Infusions:   sodium chloride      dextrose         PRN Meds:  sodium chloride flush, sodium chloride, ondansetron **OR** ondansetron, polyethylene glycol, acetaminophen **OR** acetaminophen, glucagon (rDNA), dextrose, dextrose bolus (hypoglycemia) **OR** dextrose bolus (hypoglycemia), glucose                  Objective:     Temp  Av.7 °F (36.5 °C)  Min: 97.5 °F (36.4 °C)  Max: 97.9 °F (36.6 °C)  Pulse  Av.3  Min: 57  Max: 97  BP  Min: 89/60  Max: 145/82  SpO2  Av %  Min: 90 %  Max: 100 %  Patient Vitals for the past 4 hrs:   BP Pulse Resp SpO2 Height 04/18/22 1313 -- -- -- -- 6' (1.829 m)   04/18/22 1300 100/74 74 20 100 % --   04/18/22 1200 123/80 79 -- 100 % --   04/18/22 1100 100/85 72 -- 99 % --         Intake/Output Summary (Last 24 hours) at 4/18/2022 1411  Last data filed at 4/18/2022 1300  Gross per 24 hour   Intake 480 ml   Output 1125 ml   Net -645 ml       Physical Exam:  General:  Awake, alert and oriented. Appears to be in mild respiratory distress  Mucous Membranes:  Pink , anicteric  Neck: No JVD, no carotid bruit, no thyromegaly  Chest:  Clear to auscultation bilaterally, no added sounds, diminished on the left  Cardiovascular:  RRR S1S2 heard, no murmurs or gallops  Abdomen:  Soft, undistended, non tender, no organomegaly, BS present  Extremities: 1+ pedal edema edema. No cyanosis. Distal pulses well felt  Neurological : no focal deficits    Lab Data:  CBC:   Recent Labs     04/17/22  0808 04/18/22  0420   WBC 12.1* 14.5*   RBC 4.33 4.53   HGB 12.5* 13.1*   HCT 39.1* 40.8   MCV 90.3 90.1   RDW 17.0* 16.6*    252     BMP:   Recent Labs     04/17/22  0809 04/18/22  0420   * 136   K 4.2 4.5   CL 97* 98*   CO2 31 28   BUN 22* 23*   CREATININE 0.9 0.9     BNP: No results for input(s): BNP in the last 72 hours. PT/INR: No results for input(s): PROTIME, INR in the last 72 hours. APTT:No results for input(s): APTT in the last 72 hours. CARDIAC ENZYMES: No results for input(s): CKMB, CKMBINDEX, TROPONINI in the last 72 hours. Invalid input(s): CKTOTAL;3  FASTING LIPID PANEL:No results found for: CHOL, HDL, TRIG  LIVER PROFILE: No results for input(s): AST, ALT, ALB, BILIDIR, BILITOT, ALKPHOS in the last 72 hours. XR CHEST PORTABLE   Final Result   1. Worsening left-sided atelectasis/pneumonia and increasing parapneumonic   effusion. 2. Developing right basilar pneumonia.                Assessment & Plan:     Patient Active Problem List    Diagnosis Date Noted    Acute on chronic respiratory failure with hypoxia and hypercapnia ([address]     Acute hypoxemic respiratory failure (HCC)     Squamous cell carcinoma of left lung ([address]     Hypoxia 04/16/2022    COPD (chronic obstructive pulmonary disease) ([facility] [address] 04/16/2022    Endobronchial mass     Recurrent left pleural effusion     Metabolic encephalopathy     Atrial fibrillation, permanent (HCC)     Acute respiratory failure with hypoxia and hypercapnia (HCC)     Lung collapse     Postobstructive pneumonia     Mucus plugging of bronchi     Acute on chronic respiratory failure with hypoxia ([address] 04/12/2022    Mediastinal adenopathy     Lung mass     Multiple tracheobronchial mucus plugs     Mass of left lung     Acute hypercapnic respiratory failure (HCC)     COPD exacerbation (HCC)     Acute encephalopathy     Spinal stenosis of lumbar region with neurogenic claudication 01/28/2019         # Acute on chronic hypoxic Respiratory  Failure   # COPD exacerbation  # Left lung collapse  # post obstructive PNA   -hypoxic 80%, requiring BIPAP. Admitted to ICU . Seen by pulmonology. Off biPap now. sats now stable on O2 3L   -CXR with left lung white out -> bronchoscopy planned for today. Patient refused. - cont IBD, IV steroids  - IV abx  - IV levaquin day 7/10  As left-sided pleural effusion-not enough for thoracentesis.     Squamous cell lung cancer left lung    seen by Oncology last admit  . Plan to F/w Dr. Keyshawn Larson next week to plan for cancer treatment      # H/O Hypercalcemia   Secondary to squamous cell lung cancer  - Calcium elevated last admit,  likely with bone mets .  required Zometa   Levels stable now      #Atrial fibrillation   - rate controlled , on BB, cardizem, digoxin   - on eliquis-> hold   Plan bronch in AM .  Previous  Bronch 4/12 tumor noted to be friable, oozing bleed      #Chronic CHF - diastolic with preserved EF   # Non obstructive CAD  - f.w [facility]  - cont  statins, BB, lisinopril  -hold  ASA , torsemide     # DM 2   - on lantus, SSI      #Chronic anxiety    - on wellbutrin     #Hypothyroid   - on synthroid        Diet NPO after MN  ADULT DIET; Easy to Chew; 4 carb choices (60 gm/meal)   Full Code    He is now agreeable to having procedures done.     Shelby Acuna MD

## 2022-04-18 NOTE — PROGRESS NOTES
Patient A/o continues to pull of pulse ox. Patient pulled out left hand IV. Catheter intact. After being educated on the need for having an IV patient is refusing to let me put another in.

## 2022-04-18 NOTE — PROGRESS NOTES
Pt's daughter called and states pt has a difficult time trusting anyone. Requests that Dr. Edward Ochoa speak with pt again regarding procedures and plan of care. Pt states after speaking with his daughter, \"Do what you have to do. \" Request pt to elaborate. Pt states, \"I just want to go home, so you do whatever you have to do to make that happen. \" Pt did allow IV to placed in right AC.

## 2022-04-18 NOTE — PROGRESS NOTES
Hand off report give to [facility] , RN. Patient is stable showing no signs of distress and has no current needs at this time. Call light is in reach and bed is in lowest position. Care is transferred at this time.

## 2022-04-18 NOTE — PROGRESS NOTES
04/18/22 0100   RT Protocol   History Pulmonary Disease 2   Respiratory pattern 0   Breath sounds 6   Cough 0   Indications for Bronchodilator Therapy Wheezing associated with pulm disorder   Bronchodilator Assessment Score 8   RT Inhaler-Nebulizer Bronchodilator Protocol Note    There is a bronchodilator order in the chart from a provider indicating to follow the RT Bronchodilator Protocol and there is an Initiate RT Inhaler-Nebulizer Bronchodilator Protocol order as well (see protocol at bottom of note). CXR Findings:  XR CHEST PORTABLE    Result Date: 4/17/2022  1. Worsening left-sided atelectasis/pneumonia and increasing parapneumonic effusion. 2. Developing right basilar pneumonia. The findings from the last RT Protocol Assessment were as follows:   History Pulmonary Disease: Chronic pulmonary disease  Respiratory Pattern: Regular pattern and RR 12-20 bpm  Breath Sounds: Inspiratory and expiratory or bilateral wheezing and/or rhonchi  Cough: Strong, spontaneous, non-productive  Indication for Bronchodilator Therapy: Wheezing associated with pulm disorder  Bronchodilator Assessment Score: 8    Aerosolized bronchodilator medication orders have been revised according to the RT Inhaler-Nebulizer Bronchodilator Protocol below. Respiratory Therapist to perform RT Therapy Protocol Assessment initially then follow the protocol. Repeat RT Therapy Protocol Assessment PRN for score 0-3 or on second treatment, BID, and PRN for scores above 3. No Indications - adjust the frequency to every 6 hours PRN wheezing or bronchospasm, if no treatments needed after 48 hours then discontinue using Per Protocol order mode. If indication present, adjust the RT bronchodilator orders based on the Bronchodilator Assessment Score as indicated below.   Use Inhaler orders unless patient has one or more of the following: on home nebulizer, not able to hold breath for 10 seconds, is not alert and oriented, cannot activate and use MDI correctly, or respiratory rate 25 breaths per minute or more, then use the equivalent nebulizer order(s) with same Frequency and PRN reasons based on the score. If a patient is on this medication at home then do not decrease Frequency below that used at home. 0-3 - enter or revise RT bronchodilator order(s) to equivalent RT Bronchodilator order with Frequency of every 4 hours PRN for wheezing or increased work of breathing using Per Protocol order mode. 4-6 - enter or revise RT Bronchodilator order(s) to two equivalent RT bronchodilator orders with one order with BID Frequency and one order with Frequency of every 4 hours PRN wheezing or increased work of breathing using Per Protocol order mode. 7-10 - enter or revise RT Bronchodilator order(s) to two equivalent RT bronchodilator orders with one order with TID Frequency and one order with Frequency of every 4 hours PRN wheezing or increased work of breathing using Per Protocol order mode. 11-13 - enter or revise RT Bronchodilator order(s) to one equivalent RT bronchodilator order with QID Frequency and an Albuterol order with Frequency of every 4 hours PRN wheezing or increased work of breathing using Per Protocol order mode. Greater than 13 - enter or revise RT Bronchodilator order(s) to one equivalent RT bronchodilator order with every 4 hours Frequency and an Albuterol order with Frequency of every 2 hours PRN wheezing or increased work of breathing using Per Protocol order mode.          Electronically signed by Ro Frye RCP on 4/18/2022 at 1:14 AM

## 2022-04-18 NOTE — PROGRESS NOTES
RT Inhaler-Nebulizer Bronchodilator Protocol Note    There is a bronchodilator order in the chart from a provider indicating to follow the RT Bronchodilator Protocol and there is an Initiate RT Inhaler-Nebulizer Bronchodilator Protocol order as well (see protocol at bottom of note). CXR Findings:  XR CHEST PORTABLE    Result Date: 4/17/2022  1. Worsening left-sided atelectasis/pneumonia and increasing parapneumonic effusion. 2. Developing right basilar pneumonia. The findings from the last RT Protocol Assessment were as follows:   History Pulmonary Disease: Chronic pulmonary disease  Respiratory Pattern: Dyspnea on exertion or RR 21-25 bpm  Breath Sounds: Slightly diminished and/or crackles  Cough: Strong, spontaneous, non-productive  Indication for Bronchodilator Therapy: Wheezing associated with pulm disorder  Bronchodilator Assessment Score: 6    Aerosolized bronchodilator medication orders have been revised according to the RT Inhaler-Nebulizer Bronchodilator Protocol below. Respiratory Therapist to perform RT Therapy Protocol Assessment initially then follow the protocol. Repeat RT Therapy Protocol Assessment PRN for score 0-3 or on second treatment, BID, and PRN for scores above 3. No Indications - adjust the frequency to every 6 hours PRN wheezing or bronchospasm, if no treatments needed after 48 hours then discontinue using Per Protocol order mode. If indication present, adjust the RT bronchodilator orders based on the Bronchodilator Assessment Score as indicated below. Use Inhaler orders unless patient has one or more of the following: on home nebulizer, not able to hold breath for 10 seconds, is not alert and oriented, cannot activate and use MDI correctly, or respiratory rate 25 breaths per minute or more, then use the equivalent nebulizer order(s) with same Frequency and PRN reasons based on the score.   If a patient is on this medication at home then do not decrease Frequency below that used at home. 0-3 - enter or revise RT bronchodilator order(s) to equivalent RT Bronchodilator order with Frequency of every 4 hours PRN for wheezing or increased work of breathing using Per Protocol order mode. 4-6 - enter or revise RT Bronchodilator order(s) to two equivalent RT bronchodilator orders with one order with BID Frequency and one order with Frequency of every 4 hours PRN wheezing or increased work of breathing using Per Protocol order mode. 7-10 - enter or revise RT Bronchodilator order(s) to two equivalent RT bronchodilator orders with one order with TID Frequency and one order with Frequency of every 4 hours PRN wheezing or increased work of breathing using Per Protocol order mode. 11-13 - enter or revise RT Bronchodilator order(s) to one equivalent RT bronchodilator order with QID Frequency and an Albuterol order with Frequency of every 4 hours PRN wheezing or increased work of breathing using Per Protocol order mode. Greater than 13 - enter or revise RT Bronchodilator order(s) to one equivalent RT bronchodilator order with every 4 hours Frequency and an Albuterol order with Frequency of every 2 hours PRN wheezing or increased work of breathing using Per Protocol order mode.        Electronically signed by Lazarus Morgans, RCP on 4/18/2022 at 6:45 PM

## 2022-04-18 NOTE — PROGRESS NOTES
Comprehensive Nutrition Assessment    Type and Reason for Visit:  Initial (patient recently admitted and diagnosed with squamous cell lung cancer)    Nutrition Recommendations/Plan:   1. Continue ADULT DIET; Easy to Chew; 4 carb choices diet order. 2. Added Ensure high-protein with meals. 3. Monitor appetite, meal intake, and acceptance/intake of ONS. 4. Monitor nutrition-related labs, bowel function, and weight trends. Nutrition Assessment:  patient is nutritionally compromised AEB patient recently diagnosed with squamous cell lung cancer and frequent readmissions + poor appetite and po intake; patient is at risk for further compromise d/t refusal of interventions (example: IV placed), altered nutrition-related labs, and increased nutrition needs r/t catabolic illness; will continue ADULT DIET; Easy to Chew; 4 carb choices diet order + will add Ensure high-protein with meals    Malnutrition Assessment:  Malnutrition Status: At risk for malnutrition    Context:  Acute Illness     Findings of the 6 clinical characteristics of malnutrition:  Energy Intake:  Mild decrease in energy intake (decreased appetite/po intake PTA)  Weight Loss:  No significant weight loss     Body Fat Loss:  No significant body fat loss     Muscle Mass Loss:  No significant muscle mass loss    Fluid Accumulation:  No significant fluid accumulation     Strength:  Not Performed    Estimated Daily Nutrient Needs:  Energy (kcal):  1375 - 1750 kcals based on 11-14 kcals/kg/CBW; Weight Used for Energy Requirements:  Current     Protein (g):  162 - 178 g protein based on 2.0-2.2 g/kg/IBW;  Weight Used for Protein Requirements:  Ideal        Fluid (ml/day):  1375 - 1750 ml; Method Used for Fluid Requirements:  1 ml/kcal      Nutrition Related Findings:  patient was reportedly A & O x 4 prior to rounds this am; patient appeared somewhat confused + sleepy during rounds this am; he is [facility] and edentulous; patient recently found out that he has squamous cell lung cancer; abdomen is round, soft, and bowel sounds are active; palliative care was consulted; Dr. Lucía Apodaca spoke with patient's daughter this am; patient lives alone; patient has been refusing interventions like having an IV placed; po diet order started this am; blood glucose trends were elevated this am; patient has 10 units Lantus nightly, synthroid, and solumedrol ordered at this time; patient consumed 51-75% x 1 meal and % x 1 meal on 4/17/22      Wounds:  None       Current Nutrition Therapies:    ADULT DIET; Easy to Chew; 4 carb choices (60 gm/meal)  ADULT ORAL NUTRITION SUPPLEMENT; Breakfast, Lunch, Dinner;  Low Calorie/High Protein Oral Supplement    Anthropometric Measures:  · Height: 6' (182.9 cm)  · Current Body Weight: 276 lb 8 oz (125.4 kg) (obtained on 4/18/22; actual weight)   · Admission Body Weight: 268 lb (121.6 kg) (obtained on 4/17/22; weight method not specified)    · Usual Body Weight: 268 lb 1 oz (121.6 kg) (obtained on 4/12/22; actual weight)     · Ideal Body Weight: 178 lbs; % Ideal Body Weight 155.3 %   · BMI: 37.5   · BMI Categories: Obese Class 2 (BMI 35.0 -39.9)       Nutrition Diagnosis:   · Inadequate oral intake related to inadequate protein-energy intake,impaired respiratory function,catabolic illness,psychological cause or life stress,increase demand for energy/nutrients as evidenced by poor intake prior to admission,intake 51-75%,lab values,other (comment) (recent diagnosis of squamous cell lung cancer)      Nutrition Interventions:   Food and/or Nutrient Delivery:  Continue Current Diet,Start Oral Nutrition Supplement  Nutrition Education/Counseling:  No recommendation at this time   Coordination of Nutrition Care:  Continue to monitor while inpatient,Interdisciplinary Rounds    Goals:  patient will accept and consume 50% or greater of meals on ADULT DIET; Easy to Chew; 4 carb choices per meal diet order x 3 meals per day + he will consume 50% or greater of Ensure high-protein with meals       Nutrition Monitoring and Evaluation:   Behavioral-Environmental Outcomes:  None Identified   Food/Nutrient Intake Outcomes:  Food and Nutrient Intake,Supplement Intake  Physical Signs/Symptoms Outcomes:  Biochemical Data,Hemodynamic Status,Meal Time Behavior,Nutrition Focused Physical Findings,Skin,Weight     Discharge Planning:    Continue current diet,Continue Oral Nutrition Supplement     Electronically signed by Vandana Aviles RD, LD on 4/18/22 at 1:35 PM EDT    Contact: [phone]

## 2022-04-18 NOTE — FLOWSHEET NOTE
04/18/22 0000   Vital Signs   Temp 97.7 °F (36.5 °C)   Temp Source Oral   Pulse 67   Heart Rate Source Monitor   Resp 20   /74   BP Location Right lower arm   MAP (mmHg) 84   Level of Consciousness Alert (0)   MEWS Score 1   Oxygen Therapy   SpO2 91 %   O2 Device Nasal cannula   O2 Flow Rate (L/min) 3 L/min   reassessment completed. No changes from beginning shift assessment. Vitals taken, shown above. Patient is stable with no current needs at this time. Call light in reach. Bed in lowest position.

## 2022-04-18 NOTE — PROGRESS NOTES
Care rounds completed with Dr. Bela Arenas and multidisciplinary team. Reviewed labs, meds, VS, I/O's, assessment, & plan of care. See progress note & new orders for details.

## 2022-04-18 NOTE — PLAN OF CARE
Nutrition Problem #1: Inadequate oral intake  Intervention: Food and/or Nutrient Delivery: Continue Current Diet,Start Oral Nutrition Supplement  Nutritional Goals: patient will accept and consume 50% or greater of meals on ADULT DIET; Easy to Chew; 4 carb choices per meal diet order x 3 meals per day + he will consume 50% or greater of Ensure high-protein with meals

## 2022-04-18 NOTE — CARE COORDINATION
INTERDISCIPLINARY PLAN OF CARE CONFERENCE    Date/Time: 4/18/2022 9:03 AM  Completed by: Blane Meckel, Case Management      Patient Name:  Padmaja Tolentino  YOB: 1955  Admitting Diagnosis: Hypoxia [R09.02]  COPD (chronic obstructive pulmonary disease) ([facility] [J44.9]     Admit Date/Time:  4/16/2022  4:10 PM    Chart reviewed. Interdisciplinary team contacted or reviewed plan related to patient progress and discharge plans. Disciplines included Case Management, Nursing, and Dietitian. Current Status:ongoing. Palliative care eval  PT/OT recommendation for discharge plan of care: TBD    Expected D/C Disposition:  Home vs TBD    Discharge Plan Comments: Chart review completed. Completed Interdisciplinary rounds with ICU staff. Pt was recently discharged on 4/15/2022. Palliative Care eval pending; message left for Palliative Care RN to address. MD maza RN notified. PT is active with [facility]. CM will continue to follow and assist. Please notify CM if needs or concerns arise. Home O2 in place on admit: Yes at 2 liters continuous; writer verified this liter flow with Shelby on 4/13/2022 during his last admission.

## 2022-04-18 NOTE — PROGRESS NOTES
Patient refusing synthroid this AM. Offered to clean up patient and patient isnt willing to let me clean him up @ this time. New blankets provided.

## 2022-04-18 NOTE — PROGRESS NOTES
AM assessment completed. See flowsheet. A/O x 4. Refuses to allow IV to be placed, but did allow assessment. Lung sounds diminished throughout, rhonchi in right lower lobe. AFib on bedside monitor. Bowel sounds active. No edema noted. Urinary continence. Labs reviewed. Cont to monitor.

## 2022-04-18 NOTE — CONSULTS
Palliative Care Initial Note  Palliative Care Admit date: 04/18/2022    Advance Directives: Full Code    Plan of care/goals: tbd, will likely need rehab    Social/Spiritual: Prayer Support    Plan: Reviewed chart. Met with the pt at bedside. Pt has multiple comorbidities and recently diagnosis of lung cancer, lymph nodes involved. Pt active with  and plans to f/u next week to pursue cancer treatment. Pt states he may want to change to a different Oncology group as he wants to go slowly with treatments and current specialist wants him to have the treatment all at one time. PT/OT ordered as pt will likely need short term rehab prior to ca treatment. Code status discussed with the pt and pt would like aggressive measures taken in case of an emergency, requests to stay a Full Code at this time. Pt states he has grandchildren that he wants to enjoy and hopeful to have more time with them. Per pt request writer spoke with pt's daughter,Zainab, to update her on above conversation and POC. PC following peripherally.     Reason for consult: Goals of Care,Code Status    ___ Advance Care Planning  _x_ Transition of Care Planning  ___ Psychosocial/Spiritual Support  _x_ Symptom Management    Sumit Elise RN Palliative Care

## 2022-04-18 NOTE — PROGRESS NOTES
Pulmonary & Critical Care Medicine ICU Progress Note    CC: Left lung collapse, squamous cell carcinoma    Events of Last 24 hours: Refusing routine care, has no IV as he has refused. Has refused fiberoptic bronchoscopy. 3 L oxygen. Refused BiPAP overnight  Refused fiberoptic bronchoscopy   Refused IV     Vascular lines: IV: Peripheral    MV: None     / / /FiO2 : 45 %  Recent Labs     22  0855   PHART 7.394   [id number] 51.1*   PO2ART 66.6*       IV:   sodium chloride      dextrose         Vitals:  Blood pressure 109/70, pulse 63, temperature 97.6 °F (36.4 °C), temperature source Axillary, resp. rate 16, height 6' (1.829 m), weight 276 lb 8 oz (125.4 kg), SpO2 99 %. on 3 L  Temp  Av.8 °F (36.6 °C)  Min: 97.6 °F (36.4 °C)  Max: 97.9 °F (36.6 °C)    Intake/Output Summary (Last 24 hours) at 2022 0647  Last data filed at 2022 0437  Gross per 24 hour   Intake 610 ml   Output 1525 ml   Net -915 ml     PE:  General: ill appearing    Eyes: PERRL. No sclera icterus. No conjunctival injection. ENT: No discharge. Pharynx clear. Neck: Trachea midline. Normal thyroid. Resp: No accessory muscle use. No crackles. No wheezing. No rhonchi. No dullness on percussion. Diminished on left   CV: Regular rate. Regular rhythm. No mumur or rub. + edema. Peripheral pulses are 2+. Capillary refill is less than 3 seconds. GI: Non-tender. Non-distended. No masses. No organomegaly. Normal bowel sounds. No hernia. Skin: Warm and dry. No nodule on exposed extremities. No rash on exposed extremities. Lymph: No cervical LAD. No supraclavicular LAD. M/S: No cyanosis. No joint deformity. No clubbing. Neuro: oriented to name for me, hospital for RN.  Patellar reflexes are symmetric  Psych: No agitation, no anxiety, affect is full     Scheduled Meds:   mupirocin   Nasal BID    [Held by provider] aspirin  81 mg Oral Daily    atorvastatin  20 mg Oral Daily    buPROPion  100 mg Oral BID    dilTIAZem  240 mg Oral Daily    digoxin  0.125 mg Oral Daily    insulin glargine  10 Units SubCUTAneous Nightly    levothyroxine  150 mcg Oral Daily    lisinopril  20 mg Oral Daily    metoprolol tartrate  25 mg Oral BID    nortriptyline  10 mg Oral Nightly    sodium chloride flush  5-40 mL IntraVENous 2 times per day    [Held by provider] enoxaparin  30 mg SubCUTAneous Q12H    levofloxacin  500 mg IntraVENous Q24H    methylPREDNISolone  40 mg IntraVENous Q12H    ipratropium-albuterol  1 ampule Inhalation Q4H WA       Data:  CBC:   Recent Labs     04/17/22  0808 04/18/22  0420   WBC 12.1* 14.5*   HGB 12.5* 13.1*   HCT 39.1* 40.8   MCV 90.3 90.1    252     BMP:   Recent Labs     04/17/22  0809 04/18/22  0420   * 136   K 4.2 4.5   CL 97* 98*   CO2 31 28   BUN 22* 23*   CREATININE 0.9 0.9     LIVER PROFILE: No results for input(s): AST, ALT, LIPASE, BILIDIR, BILITOT, ALKPHOS in the last 72 hours. Invalid input(s): AMYLASE,  ALB    Microbiology:  4/16 COVID-19 not detected  4/16 respiratory    Bronchoscopy 4/12/22  Left lower lobe lung mass, endobronchial biopsies:   - Squamous cell carcinoma, moderately differentiated  A. Lymph node, subcarinal, transbronchial ultrasound guided fine needle   aspiration: - Positive for malignant cells, squamous cell carcinoma.      Imaging:  Chest x-ray 4/13/22 imaging   There is improved aeration of the left upper lung compared to the previous day's exam.      ASSESSMENT:  · Acute on chronic hypercapnic and hypoxemic respiratory failure  · COPD with acute exacerbation  · Left lung collapse on chest x-ray 4/13/22 due to left mainstem mass at KYLIE/LLL bifurcation - reexpansion post Bronch 4/13/22      · L effusion- not enough fluid for thoracentesis on U/S 4/13/22  · Postobstructive pneumonia  · Left mainstem endobronchial mass oozing blood on bronchoscopy 4/12/22  · SqCC left lower lobe/lingula - 12 cm mass with positive station 7 lymph node - stage IIIA  · Hypercalcemia - concerning for bone mets - received Zometa last admission with improvement  · 50 pound weight loss  · Chronic diastolic CHF and A. fib - previously on home Eliquis  · > 40-pack-year history of smoking      PLAN:  · BiPAP 16/8 HS and PRN  · Supplemental oxygen to maintain SaO2 >92%; wean as tolerated  · Inhaled bronchodilators  · IV Solumedrol 40 Q12 hrs   · Levaquin IV D#7/10   · MetaNeb   · Holding Eliquis   · NPO for bronchoscopy; has refused   · Possible debulking/laser therapy per CTS, require 5 days off Eliquis, last dose was 4/15/22  · Blood sugar control ISS   · MRSA prophylaxis: Bactroban  · Smoking cessation is recommended   · I called and spoke with daughter on phone re: goals of care -- aggressive prolongation of life/chemo/procedures v palliation. She will discuss with her Dad. He is refusing all interventions currently but does not have capacity.

## 2022-04-19 ENCOUNTER — APPOINTMENT (OUTPATIENT)
Dept: CT IMAGING | Age: 67
DRG: 193 | End: 2022-04-19
Attending: INTERNAL MEDICINE
Payer: MEDICARE

## 2022-04-19 ENCOUNTER — HOSPITAL ENCOUNTER (INPATIENT)
Age: 67
LOS: 14 days | Discharge: HOME HEALTH | DRG: 180 | End: 2022-05-03
Attending: STUDENT IN AN ORGANIZED HEALTH CARE EDUCATION/TRAINING PROGRAM | Admitting: INTERNAL MEDICINE
Payer: MEDICARE

## 2022-04-19 VITALS
DIASTOLIC BLOOD PRESSURE: 94 MMHG | SYSTOLIC BLOOD PRESSURE: 151 MMHG | BODY MASS INDEX: 37.25 KG/M2 | TEMPERATURE: 95.5 F | RESPIRATION RATE: 16 BRPM | WEIGHT: 275 LBS | HEART RATE: 74 BPM | HEIGHT: 72 IN | OXYGEN SATURATION: 92 %

## 2022-04-19 LAB
ANION GAP SERPL CALCULATED.3IONS-SCNC: 8 MMOL/L (ref 3–16)
BASOPHILS ABSOLUTE: 0 K/UL (ref 0–0.2)
BASOPHILS RELATIVE PERCENT: 0.1 %
BUN BLDV-MCNC: 20 MG/DL (ref 7–20)
CALCIUM SERPL-MCNC: 8.8 MG/DL (ref 8.3–10.6)
CHLORIDE BLD-SCNC: 98 MMOL/L (ref 99–110)
CO2: 31 MMOL/L (ref 21–32)
CREAT SERPL-MCNC: 0.7 MG/DL (ref 0.8–1.3)
EOSINOPHILS ABSOLUTE: 0 K/UL (ref 0–0.6)
EOSINOPHILS RELATIVE PERCENT: 0 %
GFR AFRICAN AMERICAN: >60
GFR NON-AFRICAN AMERICAN: >60
GLUCOSE BLD-MCNC: 185 MG/DL (ref 70–99)
GLUCOSE BLD-MCNC: 205 MG/DL (ref 70–99)
GLUCOSE BLD-MCNC: 222 MG/DL (ref 70–99)
GLUCOSE BLD-MCNC: 227 MG/DL (ref 70–99)
HCT VFR BLD CALC: 40.3 % (ref 40.5–52.5)
HEMOGLOBIN: 12.9 G/DL (ref 13.5–17.5)
LYMPHOCYTES ABSOLUTE: 0.2 K/UL (ref 1–5.1)
LYMPHOCYTES RELATIVE PERCENT: 1.7 %
MCH RBC QN AUTO: 29.1 PG (ref 26–34)
MCHC RBC AUTO-ENTMCNC: 32.2 G/DL (ref 31–36)
MCV RBC AUTO: 90.6 FL (ref 80–100)
MONOCYTES ABSOLUTE: 0.4 K/UL (ref 0–1.3)
MONOCYTES RELATIVE PERCENT: 3 %
NEUTROPHILS ABSOLUTE: 11.3 K/UL (ref 1.7–7.7)
NEUTROPHILS RELATIVE PERCENT: 95.2 %
PDW BLD-RTO: 16.9 % (ref 12.4–15.4)
PERFORMED ON: ABNORMAL
PLATELET # BLD: 240 K/UL (ref 135–450)
PMV BLD AUTO: 6.6 FL (ref 5–10.5)
POTASSIUM SERPL-SCNC: 4.8 MMOL/L (ref 3.5–5.1)
RBC # BLD: 4.45 M/UL (ref 4.2–5.9)
SODIUM BLD-SCNC: 137 MMOL/L (ref 136–145)
WBC # BLD: 11.8 K/UL (ref 4–11)

## 2022-04-19 PROCEDURE — 6360000002 HC RX W HCPCS: Performed by: INTERNAL MEDICINE

## 2022-04-19 PROCEDURE — 99233 SBSQ HOSP IP/OBS HIGH 50: CPT | Performed by: INTERNAL MEDICINE

## 2022-04-19 PROCEDURE — 36415 COLL VENOUS BLD VENIPUNCTURE: CPT

## 2022-04-19 PROCEDURE — 1200000000 HC SEMI PRIVATE

## 2022-04-19 PROCEDURE — 94640 AIRWAY INHALATION TREATMENT: CPT

## 2022-04-19 PROCEDURE — 2700000000 HC OXYGEN THERAPY PER DAY

## 2022-04-19 PROCEDURE — 2580000003 HC RX 258: Performed by: INTERNAL MEDICINE

## 2022-04-19 PROCEDURE — 97166 OT EVAL MOD COMPLEX 45 MIN: CPT

## 2022-04-19 PROCEDURE — 97535 SELF CARE MNGMENT TRAINING: CPT

## 2022-04-19 PROCEDURE — 94761 N-INVAS EAR/PLS OXIMETRY MLT: CPT

## 2022-04-19 PROCEDURE — 94660 CPAP INITIATION&MGMT: CPT

## 2022-04-19 PROCEDURE — 97530 THERAPEUTIC ACTIVITIES: CPT

## 2022-04-19 PROCEDURE — 6370000000 HC RX 637 (ALT 250 FOR IP): Performed by: INTERNAL MEDICINE

## 2022-04-19 PROCEDURE — 71250 CT THORAX DX C-: CPT

## 2022-04-19 PROCEDURE — 97162 PT EVAL MOD COMPLEX 30 MIN: CPT

## 2022-04-19 PROCEDURE — 94669 MECHANICAL CHEST WALL OSCILL: CPT

## 2022-04-19 PROCEDURE — 99232 SBSQ HOSP IP/OBS MODERATE 35: CPT | Performed by: INTERNAL MEDICINE

## 2022-04-19 PROCEDURE — 85025 COMPLETE CBC W/AUTO DIFF WBC: CPT

## 2022-04-19 PROCEDURE — 80048 BASIC METABOLIC PNL TOTAL CA: CPT

## 2022-04-19 RX ORDER — DEXTROSE MONOHYDRATE 50 MG/ML
100 INJECTION, SOLUTION INTRAVENOUS PRN
Status: DISCONTINUED | OUTPATIENT
Start: 2022-04-19 | End: 2022-04-22

## 2022-04-19 RX ORDER — DEXTROSE MONOHYDRATE 25 G/50ML
12.5 INJECTION, SOLUTION INTRAVENOUS PRN
Status: DISCONTINUED | OUTPATIENT
Start: 2022-04-19 | End: 2022-04-19 | Stop reason: ALTCHOICE

## 2022-04-19 RX ORDER — LISINOPRIL 20 MG/1
20 TABLET ORAL DAILY
Status: DISCONTINUED | OUTPATIENT
Start: 2022-04-20 | End: 2022-04-28

## 2022-04-19 RX ORDER — LEVOFLOXACIN 5 MG/ML
500 INJECTION, SOLUTION INTRAVENOUS EVERY 24 HOURS
Status: COMPLETED | OUTPATIENT
Start: 2022-04-19 | End: 2022-04-20

## 2022-04-19 RX ORDER — DIGOXIN 125 MCG
0.12 TABLET ORAL DAILY
Status: DISCONTINUED | OUTPATIENT
Start: 2022-04-20 | End: 2022-05-03 | Stop reason: HOSPADM

## 2022-04-19 RX ORDER — SODIUM CHLORIDE 0.9 % (FLUSH) 0.9 %
5-40 SYRINGE (ML) INJECTION PRN
Status: DISCONTINUED | OUTPATIENT
Start: 2022-04-19 | End: 2022-05-03 | Stop reason: HOSPADM

## 2022-04-19 RX ORDER — ACETAMINOPHEN 325 MG/1
650 TABLET ORAL EVERY 6 HOURS PRN
Status: DISCONTINUED | OUTPATIENT
Start: 2022-04-19 | End: 2022-05-03 | Stop reason: HOSPADM

## 2022-04-19 RX ORDER — ONDANSETRON 4 MG/1
4 TABLET, ORALLY DISINTEGRATING ORAL EVERY 8 HOURS PRN
Status: DISCONTINUED | OUTPATIENT
Start: 2022-04-19 | End: 2022-05-03 | Stop reason: HOSPADM

## 2022-04-19 RX ORDER — ATORVASTATIN CALCIUM 10 MG/1
20 TABLET, FILM COATED ORAL DAILY
Status: DISCONTINUED | OUTPATIENT
Start: 2022-04-20 | End: 2022-05-03 | Stop reason: HOSPADM

## 2022-04-19 RX ORDER — DILTIAZEM HYDROCHLORIDE 240 MG/1
240 CAPSULE, COATED, EXTENDED RELEASE ORAL DAILY
Status: DISCONTINUED | OUTPATIENT
Start: 2022-04-20 | End: 2022-05-01

## 2022-04-19 RX ORDER — BUPROPION HYDROCHLORIDE 100 MG/1
100 TABLET, EXTENDED RELEASE ORAL 2 TIMES DAILY
Status: DISCONTINUED | OUTPATIENT
Start: 2022-04-19 | End: 2022-05-03 | Stop reason: HOSPADM

## 2022-04-19 RX ORDER — ONDANSETRON 2 MG/ML
4 INJECTION INTRAMUSCULAR; INTRAVENOUS EVERY 6 HOURS PRN
Status: DISCONTINUED | OUTPATIENT
Start: 2022-04-19 | End: 2022-05-03 | Stop reason: HOSPADM

## 2022-04-19 RX ORDER — POLYETHYLENE GLYCOL 3350 17 G/17G
17 POWDER, FOR SOLUTION ORAL DAILY PRN
Status: DISCONTINUED | OUTPATIENT
Start: 2022-04-19 | End: 2022-05-03 | Stop reason: HOSPADM

## 2022-04-19 RX ORDER — NORTRIPTYLINE HYDROCHLORIDE 10 MG/1
10 CAPSULE ORAL NIGHTLY
Status: DISCONTINUED | OUTPATIENT
Start: 2022-04-19 | End: 2022-05-03 | Stop reason: HOSPADM

## 2022-04-19 RX ORDER — SODIUM CHLORIDE 9 MG/ML
INJECTION, SOLUTION INTRAVENOUS PRN
Status: DISCONTINUED | OUTPATIENT
Start: 2022-04-19 | End: 2022-04-22

## 2022-04-19 RX ORDER — TORSEMIDE 100 MG/1
50 TABLET ORAL DAILY
Status: DISCONTINUED | OUTPATIENT
Start: 2022-04-20 | End: 2022-04-22

## 2022-04-19 RX ORDER — INSULIN GLARGINE 100 [IU]/ML
10 INJECTION, SOLUTION SUBCUTANEOUS NIGHTLY
Status: DISCONTINUED | OUTPATIENT
Start: 2022-04-19 | End: 2022-04-30

## 2022-04-19 RX ORDER — ACETAMINOPHEN 650 MG/1
650 SUPPOSITORY RECTAL EVERY 6 HOURS PRN
Status: DISCONTINUED | OUTPATIENT
Start: 2022-04-19 | End: 2022-05-03 | Stop reason: HOSPADM

## 2022-04-19 RX ORDER — IPRATROPIUM BROMIDE AND ALBUTEROL SULFATE 2.5; .5 MG/3ML; MG/3ML
1 SOLUTION RESPIRATORY (INHALATION) 4 TIMES DAILY
Status: DISCONTINUED | OUTPATIENT
Start: 2022-04-19 | End: 2022-04-19

## 2022-04-19 RX ORDER — TORSEMIDE 20 MG/1
50 TABLET ORAL DAILY
Status: DISCONTINUED | OUTPATIENT
Start: 2022-04-20 | End: 2022-04-19 | Stop reason: HOSPADM

## 2022-04-19 RX ORDER — IPRATROPIUM BROMIDE AND ALBUTEROL SULFATE 2.5; .5 MG/3ML; MG/3ML
1 SOLUTION RESPIRATORY (INHALATION)
Status: DISCONTINUED | OUTPATIENT
Start: 2022-04-20 | End: 2022-04-21

## 2022-04-19 RX ORDER — DEXTROSE MONOHYDRATE 50 MG/ML
100 INJECTION, SOLUTION INTRAVENOUS PRN
Status: DISCONTINUED | OUTPATIENT
Start: 2022-04-19 | End: 2022-05-03 | Stop reason: HOSPADM

## 2022-04-19 RX ORDER — SODIUM CHLORIDE 0.9 % (FLUSH) 0.9 %
5-40 SYRINGE (ML) INJECTION EVERY 12 HOURS SCHEDULED
Status: DISCONTINUED | OUTPATIENT
Start: 2022-04-19 | End: 2022-04-22

## 2022-04-19 RX ORDER — LEVOTHYROXINE SODIUM 0.15 MG/1
150 TABLET ORAL DAILY
Status: DISCONTINUED | OUTPATIENT
Start: 2022-04-20 | End: 2022-04-30

## 2022-04-19 RX ORDER — NICOTINE POLACRILEX 4 MG
15 LOZENGE BUCCAL PRN
Status: DISCONTINUED | OUTPATIENT
Start: 2022-04-19 | End: 2022-05-03 | Stop reason: HOSPADM

## 2022-04-19 RX ADMIN — DILTIAZEM HYDROCHLORIDE 240 MG: 240 CAPSULE, COATED, EXTENDED RELEASE ORAL at 09:03

## 2022-04-19 RX ADMIN — Medication 10 ML: at 09:04

## 2022-04-19 RX ADMIN — LEVOFLOXACIN 500 MG: 5 INJECTION, SOLUTION INTRAVENOUS at 23:08

## 2022-04-19 RX ADMIN — INSULIN LISPRO 1 UNITS: 100 INJECTION, SOLUTION INTRAVENOUS; SUBCUTANEOUS at 22:53

## 2022-04-19 RX ADMIN — ACETAMINOPHEN 650 MG: 325 TABLET ORAL at 23:00

## 2022-04-19 RX ADMIN — IPRATROPIUM BROMIDE AND ALBUTEROL SULFATE 1 AMPULE: 2.5; .5 SOLUTION RESPIRATORY (INHALATION) at 07:48

## 2022-04-19 RX ADMIN — ATORVASTATIN CALCIUM 20 MG: 10 TABLET, FILM COATED ORAL at 09:03

## 2022-04-19 RX ADMIN — METOPROLOL TARTRATE 25 MG: 25 TABLET, FILM COATED ORAL at 09:03

## 2022-04-19 RX ADMIN — LEVOTHYROXINE SODIUM 150 MCG: 150 TABLET ORAL at 05:36

## 2022-04-19 RX ADMIN — IPRATROPIUM BROMIDE AND ALBUTEROL SULFATE 1 AMPULE: 2.5; .5 SOLUTION RESPIRATORY (INHALATION) at 11:46

## 2022-04-19 RX ADMIN — BUPROPION HYDROCHLORIDE 100 MG: 100 TABLET, EXTENDED RELEASE ORAL at 23:01

## 2022-04-19 RX ADMIN — INSULIN GLARGINE 10 UNITS: 100 INJECTION, SOLUTION SUBCUTANEOUS at 22:52

## 2022-04-19 RX ADMIN — NORTRIPTYLINE HYDROCHLORIDE 10 MG: 10 CAPSULE ORAL at 22:39

## 2022-04-19 RX ADMIN — BUPROPION HYDROCHLORIDE 100 MG: 100 TABLET, EXTENDED RELEASE ORAL at 09:03

## 2022-04-19 RX ADMIN — METHYLPREDNISOLONE SODIUM SUCCINATE 40 MG: 40 INJECTION, POWDER, FOR SOLUTION INTRAMUSCULAR; INTRAVENOUS at 05:36

## 2022-04-19 RX ADMIN — IPRATROPIUM BROMIDE AND ALBUTEROL SULFATE 1 AMPULE: .5; 3 SOLUTION RESPIRATORY (INHALATION) at 20:56

## 2022-04-19 RX ADMIN — IPRATROPIUM BROMIDE AND ALBUTEROL SULFATE 1 AMPULE: 2.5; .5 SOLUTION RESPIRATORY (INHALATION) at 15:54

## 2022-04-19 RX ADMIN — DIGOXIN 0.12 MG: 125 TABLET ORAL at 09:03

## 2022-04-19 RX ADMIN — MUPIROCIN: 20 OINTMENT TOPICAL at 23:11

## 2022-04-19 RX ADMIN — Medication 10 ML: at 23:12

## 2022-04-19 RX ADMIN — METOPROLOL TARTRATE 25 MG: 25 TABLET, FILM COATED ORAL at 23:01

## 2022-04-19 ASSESSMENT — PAIN DESCRIPTION - LOCATION: LOCATION: BACK

## 2022-04-19 ASSESSMENT — PAIN DESCRIPTION - PAIN TYPE: TYPE: CHRONIC PAIN

## 2022-04-19 ASSESSMENT — PAIN SCALES - GENERAL
PAINLEVEL_OUTOF10: 7
PAINLEVEL_OUTOF10: 6
PAINLEVEL_OUTOF10: 0
PAINLEVEL_OUTOF10: 0
PAINLEVEL_OUTOF10: 6

## 2022-04-19 ASSESSMENT — PAIN DESCRIPTION - FREQUENCY: FREQUENCY: CONTINUOUS

## 2022-04-19 ASSESSMENT — PAIN DESCRIPTION - ONSET: ONSET: ON-GOING

## 2022-04-19 ASSESSMENT — PAIN DESCRIPTION - ORIENTATION: ORIENTATION: MID

## 2022-04-19 NOTE — PROGRESS NOTES
4 Eyes Skin Assessment     The patient is being assess for  Admission    I agree that 2 RN's have performed a thorough Head to Toe Skin Assessment on the patient. ALL assessment sites listed below have been assessed. Areas assessed by both nurses: [x]   Head, Face, and Ears   [x]   Shoulders, Back, and Chest  [x]   Arms, Elbows, and Hands   [x]   Coccyx, Sacrum, and IschIum  [x]   Legs, Feet, and Heel  SCATTERED BRUISING      Does the Patient have Skin Breakdown?   No         Jhonny Prevention initiated:  Yes   Wound Care Orders initiated:  No      [facility] nurse consulted for Pressure Injury (Stage 3,4, Unstageable, DTI, NWPT, and Complex wounds), New and Established Ostomies:  No      Nurse 1 eSignature: Electronically signed by Kevin Arias RN on 4/19/22 at 7:27 PM EDT    **SHARE this note so that the co-signing nurse is able to place an eSignature**    Nurse 2 eSignature: Electronically signed by Nevaeh Goetz RN on 4/19/22 at 7:28 PM EDT

## 2022-04-19 NOTE — PROGRESS NOTES
Care rounds completed with Dr. Aleah Melton and multidisciplinary team. Reviewed labs, meds, VS, I/O's, assessment, & plan of care. See progress note & new orders for details.

## 2022-04-19 NOTE — PROGRESS NOTES
Admit: 2022    Name:  Kathryn Angulo  Room:  3004/3004-01  MRN:    [id number]    Critical Care Daily Progress Note for 2022     A 66-year-old male with history of recently diagnosed, cell lung cancer with recent admission, discharged on 3 L of oxygen with plans to follow-up with oncology, presented to [facility] emergency room with shortness of breath. Required BiPAP on transfer. ABG with hypoxia and hypercarbia      Interval History:     He now wishes to proceed with an attempt at airway intervention to debulk the  tumor and then proceed with chemo/XRT. Currently on 2 L of oxygen.     Scheduled Meds:   ipratropium-albuterol  1 ampule Inhalation 4x daily    mupirocin   Nasal BID    [Held by provider] aspirin  81 mg Oral Daily    atorvastatin  20 mg Oral Daily    buPROPion  100 mg Oral BID    dilTIAZem  240 mg Oral Daily    digoxin  0.125 mg Oral Daily    insulin glargine  10 Units SubCUTAneous Nightly    levothyroxine  150 mcg Oral Daily    lisinopril  20 mg Oral Daily    metoprolol tartrate  25 mg Oral BID    nortriptyline  10 mg Oral Nightly    sodium chloride flush  5-40 mL IntraVENous 2 times per day    [Held by provider] enoxaparin  30 mg SubCUTAneous Q12H    levofloxacin  500 mg IntraVENous Q24H    methylPREDNISolone  40 mg IntraVENous Q12H       Continuous Infusions:   sodium chloride 250 mL (22)    dextrose         PRN Meds:  sodium chloride flush, sodium chloride, ondansetron **OR** ondansetron, polyethylene glycol, acetaminophen **OR** acetaminophen, glucagon (rDNA), dextrose, dextrose bolus (hypoglycemia) **OR** dextrose bolus (hypoglycemia), glucose                  Objective:     Temp  Av.9 °F (36.6 °C)  Min: 97 °F (36.1 °C)  Max: 98.6 °F (37 °C)  Pulse  Av.9  Min: 53  Max: 83  BP  Min: 75/66  Max: 145/85  SpO2  Av %  Min: 93 %  Max: 100 %  Patient Vitals for the past 4 hrs:   BP Temp Temp src Pulse Resp SpO2   22 0800 (!) 145/85 97 °F (36.1 °C) Temporal 81 22 96 %   04/19/22 0700 129/60 -- -- 73 26 95 %   04/19/22 0600 (!) 99/45 -- -- 63 18 95 %   04/19/22 0500 122/75 -- -- 68 22 95 %         Intake/Output Summary (Last 24 hours) at 4/19/2022 0850  Last data filed at 4/19/2022 0612  Gross per 24 hour   Intake 901.26 ml   Output 850 ml   Net 51.26 ml       Physical Exam:  General:  Awake, alert and oriented. Appears to be in mild respiratory distress  Mucous Membranes:  Pink , anicteric  Neck: No JVD, no carotid bruit, no thyromegaly  Chest:  Clear to auscultation bilaterally, no added sounds, diminished on the left  Cardiovascular:  RRR S1S2 heard, no murmurs or gallops  Abdomen:  Soft, undistended, non tender, no organomegaly, BS present  Extremities: 1+ pedal edema edema. No cyanosis. Distal pulses well felt  Neurological : no focal deficits    Lab Data:  CBC:   Recent Labs     04/17/22  0808 04/18/22  0420 04/19/22 0421   WBC 12.1* 14.5* 11.8*   RBC 4.33 4.53 4.45   HGB 12.5* 13.1* 12.9*   HCT 39.1* 40.8 40.3*   MCV 90.3 90.1 90.6   RDW 17.0* 16.6* 16.9*    252 240     BMP:   Recent Labs     04/17/22  0809 04/18/22  0420 04/19/22  0421   * 136 137   K 4.2 4.5 4.8   CL 97* 98* 98*   CO2 31 28 31   BUN 22* 23* 20   CREATININE 0.9 0.9 0.7*     BNP: No results for input(s): BNP in the last 72 hours. PT/INR: No results for input(s): PROTIME, INR in the last 72 hours. APTT:No results for input(s): APTT in the last 72 hours. CARDIAC ENZYMES: No results for input(s): CKMB, CKMBINDEX, TROPONINI in the last 72 hours. Invalid input(s): CKTOTAL;3  FASTING LIPID PANEL:No results found for: CHOL, HDL, TRIG  LIVER PROFILE: No results for input(s): AST, ALT, ALB, BILIDIR, BILITOT, ALKPHOS in the last 72 hours. XR CHEST PORTABLE   Final Result   1. Worsening left-sided atelectasis/pneumonia and increasing parapneumonic   effusion. 2. Developing right basilar pneumonia.                Assessment & Plan:     Patient Active Problem List    Diagnosis Date Noted    Acute on chronic respiratory failure with hypoxia and hypercapnia (HCC)     Chronic diastolic congestive heart failure (HCC)     Acute hypoxemic respiratory failure (HCC)     Squamous cell carcinoma of left lung (HCC)     Hypoxia 04/16/2022    COPD [facility]chronic obstructive pulmonary disease) ([facility] 04/16/2022    Endobronchial mass     Recurrent left pleural effusion     Metabolic encephalopathy     Atrial fibrillation, permanent (HCC)     Acute respiratory failure with hypoxia and hypercapnia (HCC)     Lung collapse     Postobstructive pneumonia     Mucus plugging of bronchi     Acute on chronic respiratory failure with hypoxia ([facility] 04/12/2022    Mediastinal adenopathy     Lung mass     Multiple tracheobronchial mucus plugs     Mass of left lung     Acute hypercapnic respiratory failure (HCC)     COPD with acute exacerbation (HCC)     Acute encephalopathy     Spinal stenosis of lumbar region with neurogenic claudication 01/28/2019         # Acute on chronic hypoxic Respiratory  Failure   # COPD exacerbation  # Left lung collapse  # post obstructive PNA   -hypoxic 80%, requiring BIPAP. Admitted to ICU . Seen by pulmonology. Off biPap now. sats now stable on O2 3L   -CXR with left lung white out -> bronchoscopy planned for today. Patient refused. - cont IBD, IV steroids--> prednisone, now at 30 mg daily  - IV abx  - IV levaquin day 8/10  As left-sided pleural effusion-not enough for thoracentesis. Squamous cell lung cancer left lung    seen by Oncology last admit  . Plan to F/w Dr. Mercy Gomez next week to plan for cancer treatment   Possible debulking versus laser therapy per cardiothoracic surgery, requires 5 days of Eliquis, last dose 4/15.   Plan transfer to [facility] for CTS evaluation later this week.     # H/O Hypercalcemia   Secondary to squamous cell lung cancer  - Calcium elevated last admit,  likely with bone mets .  required Zometa   Levels stable now    #Atrial fibrillation   - rate controlled , on BB, cardizem, digoxin   - on eliquis->  held for possible procedures       #Chronic CHF - diastolic with preserved EF   # Non obstructive CAD  - f.w [facility]  - cont  statins, BB, lisinopril  -hold  ASA   Restart home dose torsemide 4/19     # DM 2   - on lantus,  SSI      #Chronic anxiety    - on wellbutrin     #Hypothyroid   - on synthroid       ADULT DIET; Easy to Chew; 4 carb choices (60 gm/meal)   Full Code    Transfer to  W. with telemetry    Plan transfer to [facility] for CTS evaluation later this week.     Emerita Estrada MD

## 2022-04-19 NOTE — PROGRESS NOTES
Pt arrived from ICU, VVS, and A/O. Pt updated transfer center contacted RN and will be transferred to [facility] at 5:30 tonight. Pt agreeable with POC.

## 2022-04-19 NOTE — PROGRESS NOTES
Inpatient Physical Therapy Evaluation and Treatment    Unit: ICU  Date:  4/19/2022  Patient Name:    Nima Murphy  Admitting diagnosis:  Hypoxia [R09.02]  COPD (chronic obstructive pulmonary disease) ([facility] [J44.9]  Admit Date:  4/16/2022  Precautions/Restrictions/WB Status/ Lines/ Wounds/ Oxygen: Fall risk, Lines -IV, Telemetry and Continuous pulse oximetry    Treatment Time:  14:15-14:40  Treatment Number:  1   Timed Code Treatment Minutes: 15 minutes  Total Treatment Minutes:  25  minutes    Patient Goals for Therapy: return home. Discharge Recommendations: Home PRN assist  and Home PT  DME needs for discharge: Needs Met       Therapy recommendation for EMS Transport: can transport by wheelchair    Therapy recommendations for staff:   Assist of 1 with use of rolling walker (RW) for all transfers and ambulation to/from [facility]  to/from [facility]    History of Present Illness: Per H&P Dr. Federico Rendon 4/17: \"Patient transferred here from [facility]. 69-year-old male with recent diagnosis of squamous cell lung cancer. Patient was admitted here at [facility] From 4/12 to 4/15. He has a left lung mass which was biopsied, pathology positive for squamous cell lung cancer. Patient has left lung collapse. He was discharged home on home oxygen 3L with plan to follow-up with oncology/radiation oncology this week. Patient presented back next day 4/16 to [facility] with increased shortness of breath. Patient continues to smoke tobacco. He had taken his O2 off. He was hypoxic, with sats in the 80s. . Placed on O2 3 L; required BIPAP on transfer . CXR with left lung white out   Labs with elevated WBC, procalcitonin, lactic acid  . ABG with hypoxia and hypercarbia. \"  Refused Bipap overnight 4/18; on baseline 2L O2 as of 4/19 AM.    Home Health S4 Level Recommendation:  Level 1 Standard  AM-PAC Mobility Score       AM-PAC Inpatient Mobility without Stair Climbing Raw Score : 15    Preadmission Environment Pt. Yvrose Davenport environment:    one story home  Steps to enter first floor: One steps to enter  Steps to second floor: N/A  Bathroom: tub/shower unit, walk in shower, grab bars, standard height commode and  shower seat   Equipment owned: RW, SPC, hospital bed and home O2 (2L) continous     Preadmission Status:  Pt. Able to drive: No  Pt Fully independent with ADLs: No needs  assist shoes and socks aide will assist with bath   Pt. Required assistance from aide for: Cleaning, Cooking and Laundry . Pt can cook something simple   Pt. independent for transfers and gait and walked with Juliet Abeld prior to last admission. RW since then. History of falls Yes   Aide every other day for 3-4 hrs   Nurse 2x a week     Pain   Yes  Location: R and L flank   Rating: mild /10  Pain Medicine Status: No request made    Cognition    A&O x4   Able to follow 2 step commands    Subjective  Patient lying supine in bed with no family present. Pt agreeable to this PT eval & tx. Upper Extremity ROM/Strength  Please see OT evaluation. Lower Extremity ROM / Strength   AROM WFL: Yes    BLE strength WFL, but not formally assessed with MMT.       Lower Extremity Sensation    WFL    Lower Extremity Proprioception:   WFL    Coordination and Tone  WFL    Balance  Sitting:  Good - ; SBA  Comments: at EOB    Standing: Fair +; SBA  Comments: with walker    Bed Mobility   Supine to Sit:    Min A   Sit to Supine:   Not Tested   (up to chair to end session)  Rolling:   Not Tested  Scooting in sitting: Supervision  Scooting in supine:  Not Tested    Transfer Training     Sit to stand:   SBA (from EOB 2x trial and from recliner 1x trial)  Stand to sit:   SBA   Bed to Chair:   SBA with use of rolling walker (RW)    Gait gait completed as indicated below  Distance:      15 ft   Deviations (firm surface/linoleum):  decreased nhi and decreased step length bilaterally  Assistive Device Used:    rolling walker (RW)  Level of Assist: SBA  Comment: Pt self limited distance. Stair Training deferred, pt unsafe/ not appropriate to complete stairs at this time    Activity Tolerance   Pt completed therapy session with No adverse symptoms noted w/activity. Supine at rest /58 Spo2 92 HR 77  After ambulating SpO2 94%, HR 61    Positioning Needs   Pt up in chair, ICU monitoring, positioned in proper neutral alignment and pressure relief provided. Call light provided and all needs within reach    Exercises Initiated  all completed bilaterally unless indicated  Ankle Pumps x 15 reps    Other  None. Patient/Family Education   Pt educated on role of inpatient PT, POC, importance of continued activity, calling for assist with mobility. Assessment  Pt seen for Physical Therapy evaluation in acute care setting. Pt demonstrated decreased Activity tolerance and Strength as well as decreased independence with Ambulation, Bed Mobility  and Transfers. Pt with Fair+ gross strength and balance. Activity tolerance limits him to brief bouts of standing for ADLs and point to point ambulation. Pt will benefit from skilled PT in acute setting to promote activity tolerance and independent functional mobility. Recommending Home PRN assist and with home PT upon discharge as patient functioning below baseline level and would benefit from continued therapy services. Goals : To be met in 3 visits:  1). Independent with LE Ex x 10 reps    To be met in 6 visits:  1). Supine to/from sit: Independent  2). Sit to/from stand: Modified Independent  3). Bed to chair: Modified Independent  4). Gait: Ambulate 150 ft.  with  Modified Independent and use of LRAD (least restrictive assistive device)  5).   Tolerate B LE exercises 3 sets of 10-15 reps    Rehabilitation Potential: Good  Strengths for achieving goals include:   Pt motivated, PLOF and Pt cooperative   Barriers to achieving goals include:    Complexity of condition    Plan    To be seen 3-5 x / week  while in acute care setting for therapeutic exercises, bed mobility, transfers, progressive gait training, balance training, and family/patient education. Signature: Ike Brown, PT, DPT    If patient discharges from this facility prior to next visit, this note will serve as the Discharge Summary.

## 2022-04-19 NOTE — PROGRESS NOTES
AM assessment completed. See flowsheet. A/O x 4. Lungs sounds diminished throughout, KYLIE and LLL more difficult to hear. Afib on bedside monitor. Bowel sounds active. No edema noted. Urinary continence. Labs reviewed. Cont to monitor.

## 2022-04-19 NOTE — PROGRESS NOTES
Pulmonary & Critical Care Medicine ICU Progress Note    CC: Left lung collapse, squamous cell carcinoma    Events of Last 24 hours:   I had d/w patient yesterday afternoon when he was awake and oriented. He indicated he wants to proceed with attempt at airway intervention to debulk airway tumor and then proceed with chemo/XRT. He is very reluctant to have routine bronchoscopy as he doesn't see the point. He is not interested in hospice/palliation and is open to usual medical care  Refused biPAP    Vascular lines: IV: Peripheral    MV: None     / / /FiO2 : 45 %  Recent Labs     22  0855   PHART 7.394   AWM0DMQ 51.1*   PO2ART 66.6*       IV:   sodium chloride 250 mL (22)    dextrose         Vitals:  Blood pressure 129/60, pulse 73, temperature 97.8 °F (36.6 °C), temperature source Axillary, resp. rate 26, height 6' (1.829 m), weight 272 lb 9.6 oz (123.7 kg), SpO2 95 %. on 2 L  Temp  Av.1 °F (36.7 °C)  Min: 97.8 °F (36.6 °C)  Max: 98.6 °F (37 °C)    Intake/Output Summary (Last 24 hours) at 2022 0708  Last data filed at 2022 0612  Gross per 24 hour   Intake 901.26 ml   Output 850 ml   Net 51.26 ml     PE:  General: ill appearing    Eyes: PERRL. No sclera icterus. No conjunctival injection. ENT: No discharge. Pharynx clear. Neck: Trachea midline. Normal thyroid. Resp: No accessory muscle use. No crackles. No wheezing. No rhonchi. No dullness on percussion. Diminished on left   CV: Regular rate. Regular rhythm. No mumur or rub. + edema. Peripheral pulses are 2+. Capillary refill is less than 3 seconds. GI: Non-tender. Non-distended. No masses. No organomegaly. Normal bowel sounds. No hernia. Skin: Warm and dry. No nodule on exposed extremities. No rash on exposed extremities. Lymph: No cervical LAD. No supraclavicular LAD. M/S: No cyanosis. No joint deformity. No clubbing. Neuro: oriented to name for me, hospital for RN.  Patellar reflexes are symmetric  Psych: No agitation, no anxiety, affect is full     Scheduled Meds:   ipratropium-albuterol  1 ampule Inhalation 4x daily    mupirocin   Nasal BID    [Held by provider] aspirin  81 mg Oral Daily    atorvastatin  20 mg Oral Daily    buPROPion  100 mg Oral BID    dilTIAZem  240 mg Oral Daily    digoxin  0.125 mg Oral Daily    insulin glargine  10 Units SubCUTAneous Nightly    levothyroxine  150 mcg Oral Daily    lisinopril  20 mg Oral Daily    metoprolol tartrate  25 mg Oral BID    nortriptyline  10 mg Oral Nightly    sodium chloride flush  5-40 mL IntraVENous 2 times per day    [Held by provider] enoxaparin  30 mg SubCUTAneous Q12H    levofloxacin  500 mg IntraVENous Q24H    methylPREDNISolone  40 mg IntraVENous Q12H       Data:  CBC:   Recent Labs     04/17/22  0808 04/18/22  0420 04/19/22  0421   WBC 12.1* 14.5* 11.8*   HGB 12.5* 13.1* 12.9*   HCT 39.1* 40.8 40.3*   MCV 90.3 90.1 90.6    252 240     BMP:   Recent Labs     04/17/22  0809 04/18/22  0420 04/19/22  0421   * 136 137   K 4.2 4.5 4.8   CL 97* 98* 98*   CO2 31 28 31   BUN 22* 23* 20   CREATININE 0.9 0.9 0.7*     LIVER PROFILE: No results for input(s): AST, ALT, LIPASE, BILIDIR, BILITOT, ALKPHOS in the last 72 hours. Invalid input(s): AMYLASE,  ALB    Microbiology:  4/16 COVID-19 not detected    Bronchoscopy 4/12/22  Left lower lobe lung mass, endobronchial biopsies:   - Squamous cell carcinoma, moderately differentiated  A. Lymph node, subcarinal, transbronchial ultrasound guided fine needle   aspiration: - Positive for malignant cells, squamous cell carcinoma.      Imaging:  Chest x-ray 4/17/22 imaging   Worsening left lung collapse    ASSESSMENT:  · Acute on chronic hypercapnic and hypoxemic respiratory failure  · COPD with acute exacerbation  · Left lung collapse on chest x-ray 4/13/22 due to left mainstem mass at KYLIE/LLL bifurcation - reexpansion post Bronch 4/13/22 & then re-collapsed on f/u       · L effusion- not enough fluid for thoracentesis on U/S 4/13/22  · Postobstructive pneumonia  · Left mainstem endobronchial mass oozing blood on bronchoscopy 4/12/22  · SqCC left lower lobe/lingula - 12 cm mass with positive station 7 lymph node - stage IIIA  · Hypercalcemia - concerning for bone mets - received Zometa last admission with improvement  · 50 pound weight loss  · Chronic diastolic CHF and A. fib - previously on home Eliquis  · > 40-pack-year history of smoking      PLAN:  · BiPAP 16/8 HS and PRN - patient has been refusing   · Supplemental oxygen to maintain SaO2 >92%; wean as tolerated  · Inhaled bronchodilators  · Prednisone 30 daily   · Levaquin D#8/10   · MetaNeb   · Holding Eliquis   · Possible debulking/laser therapy per CTS, require 5 days off Eliquis, last dose was 4/15/22  · Blood sugar control ISS   · MRSA prophylaxis: Bactroban  · Smoking cessation is recommended   · Full code per my discussion with patient; he is interested in airway intervention. I d/w Dr. Thang Chaudhari. Plan transfer to [facility] for CTS evaluation for possible airway therapy.

## 2022-04-19 NOTE — PROGRESS NOTES
O2 increased to 3lpm NC         04/19/22 1555   Oxygen Therapy/Pulse Ox   O2 Device Nasal cannula   O2 Flow Rate (L/min) 3 L/min   Resp 16   SpO2 92 %

## 2022-04-19 NOTE — PROGRESS NOTES
Inpatient Occupational Therapy  Evaluation and Treatment    Unit: ICU  Date:  4/19/2022  Patient Name:    Zoran Smith  Admitting diagnosis:  Hypoxia [R09.02]  COPD (chronic obstructive pulmonary disease) ([facility] [J44.9]  Admit Date:  4/16/2022  Precautions/Restrictions/WB Status/ Lines/ Wounds/ Oxygen: Fall risk, Bed/chair alarm, Lines -IV and Supplemental O2 (2), Telemetry and Continuous pulse oximetry    Treatment Time:  7814-4639  Treatment Number: 1   Timed code treatment minutes 30 minutes   Total Treatment minutes:  40  minutes    Patient Goals for Therapy:  \" go home  \"      Discharge Recommendations: Home PRN assist  and Home OT  DME needs for discharge: Needs Met       Therapy recommendations for staff:   Assist of 1 with use of rolling walker (RW) for all transfers to/from [facility]  to/from chair    History of Present Illness: H&P on 4-16-22   hypoxic, 80% on baseline O2 3 L, requiring BIPAP   -CXR with left lung white out   -elevated WBC, procalcitonin, lactic acid    -ABG with hypoxia and hypercarbia   -report per Dr. Alejandro Miller      Admitted to ICU from [facility] S4 Level Recommendation:  Level 1 Standard  AM-PAC Score: 21    Preadmission Environment    Pt. Lives Alone  Home environment:    one story home  Steps to enter first floor: One steps to enter  Steps to second floor: N/A  Bathroom: tub/shower unit, walk in shower, grab bars, standard height commode and  shower seat   Equipment owned: , [facility], hospital bed and home O2 (2L) continous     Preadmission Status:  Pt. Able to drive: No  Pt Fully independent with ADLs: No needs  assist shoes and socks aide will assist with bath   Pt. Required assistance from aide for: Cleaning, Cooking and Laundry .  Pt can cook something simple   Pt. independent for transfers and gait and walked with Aarti Lamb  History of falls Yes   Aide every other day for 3-4 hrs   Nurse 2x a week     Pain  Yes  Rating:mild  Location:rt and left sides   Pain Medicine Status: No request made      Cognition    A&O Person, Place and Situation , April 2222 and did not know specific date   Able to follow 2 step commands    Subjective  Patient lying supine in bed with no family present. Pt agreeable to this OT eval & tx. Upper Extremity ROM:    WFL    Upper Extremity Strength:    WFL      Upper Extremity Sensation    WFL    Upper Extremity Proprioception:  WFL    Coordination and Tone  WFL    Balance  Functional Sitting Balance:  WFL  Functional Standing Balance:Diminished    Bed mobility:    Supine to sit:   Min A   Sit to supine:   Not Tested  Rolling:    Not Tested  Scooting in sitting:  Supervision  Scooting to head of bed:   Not Tested    Bridging:   Not Tested    Transfers:    Sit to stand:  SBA  Stand to sit:  SBA  Bed to chair:   SBA with RW  Standard toilet: Not Tested  Bed to [facility]:  Not Tested    Dressing:      UE:   Not Tested  LE:    Pt declined donning pants     Bathing:    UE:  Not Tested  LE:  Not Tested    Eating:   Independent    Toileting:  Not Tested    Activity Tolerance   Pt completed therapy session with decreased endurance   Supine at rest /58 Spo2 92 HR 77- on 2 liters   After ambulating SpO2 94%, HR 61    Positioning Needs:   Pt up in chair, no alarm needed, positioned in proper neutral alignment and pressure relief provided. Exercise / Activities Initiated:   N/A    Patient/Family Education:   Role of OT    Assessment of Deficits: Pt seen for Occupational therapy evaluation in acute care setting. Pt demonstrated decreased Activity tolerance, ADLs, Balance , Bed mobility and Transfers. Pt functioning below baseline and will likely benefit from skilled occupational therapy services to maximize safety and independence. Goal(s) : To be met in 3 Visits:  1). Bed to toilet/BSC: Supervision    To be met in 5 Visits:  1). Supine to/from Sit:  Supervision  2). Upper Body Bathing:   Independent  3). Lower Body Bathing:   SBA  4).  Upper Body Dressing: Independent  5). Lower Body Dressing:  CGA  6). Pt to demonstrate UE exs x 15 reps with minimal cues    Rehabilitation Potential:  Fair for goals listed above. Strengths for achieving goals include: Pt cooperative  Barriers to achieving goals include:  Complexity of condition     Plan: To be seen 3-5 x/wk while in acute care setting for therapeutic exercises, bed mobility, transfers, dressing, bathing, family/patient education, ADL/IADL retraining, energy conservation training.      Bel Collins OTR/L [id number]          If patient discharges from this facility prior to next visit, this note will serve as the Discharge Summary

## 2022-04-19 NOTE — PROGRESS NOTES
Report called to [address] RN at [place] [address] and pt going to room 336. Pt belonging gathered per PCA. Awaiting ambulance pick-up.

## 2022-04-19 NOTE — CARE COORDINATION
Pt transferred to [place] [address] from ICU. RN CM on [address] called stating pt is transferring to [facility] as Charge RN got a call from the transfer center regarding transfer.     Juanita Emerson MSW, RAFAEL

## 2022-04-19 NOTE — PROGRESS NOTES
4 Eyes Skin Assessment     The patient is being assess for   discharge    I agree that 2 RN's have performed a thorough Head to Toe Skin Assessment on the patient. ALL assessment sites listed below have been assessed. Areas assessed for pressure by both nurses:   [x]   Head, Face, and Ears   [x]   Shoulders, Back, and Chest, Abdomen  [x]   Arms, Elbows, and Hands   [x]   Coccyx, Sacrum, and Ischium  [x]   Legs, Feet, and Heels  Generalized abrasions and bruising noted      Skin Assessed Under all Medical Devices by both nurses:  O2 device tubing              All Mepilex Borders were peeled back and area peeked at by both nurses:  Yes  Please list where Mepilex Borders are located:  coccyx             **SHARE this note so that the co-signing nurse is able to place an eSignature**    Co-signer eSignature: Electronically signed by Maxi Sharma RN on 4/19/22 at 5:59 PM EDT    Does the Patient have Skin Breakdown related to pressure?   No     (Insert Photo hereNA)         Jhonny Prevention initiated:  No   Wound Care Orders initiated:  No      C nurse consulted for Pressure Injury (Stage 3,4, Unstageable, DTI, NWPT, Complex wounds)and New or Established Ostomies:  No      Primary Nurse eSignature: Electronically signed by Tanisha Ferrell RN on 4/19/22 at 4:48 PM EDT

## 2022-04-19 NOTE — PROGRESS NOTES
8:23 PM  Shift assessment completed, see flow sheet. Medications administered per MAR. SpO2 100% on 3 L, weaned down to 2 L at this time. Bilateral LS diminished. A&O x4. Calm with no complaints at this time. Non-compliant/refuses care at times per report. CHG bath offered and pt refused. PIV x1 in place and functioning appropriately, dressing C/D/I. Carb control diet in place, appetite good. Voids per urinal and BM per bedpan, none of either so far this shift. All lines and monitoring devices remain in place. Call light within reach. Bed locked and in lowest position with alarm on. Will continue to monitor. 12:09 AM  Reassessment completed, see flow sheet. Pt refusing BiPAP tonight, but SpO2 maintaining 96-98% on 2 L NC. Will monitor closely. Bedside drinks/snacks removed, Pt NPO at midnight per hospitalist.    4:33 AM  Reassessment completed, see flow sheet. No changes noted, pt resting quietly in bed.    7:04 AM  EOS bedside report given to Yani Mercer RN. Pt stable at this time, care transferred.

## 2022-04-20 ENCOUNTER — ANESTHESIA EVENT (OUTPATIENT)
Dept: OPERATING ROOM | Age: 67
DRG: 180 | End: 2022-04-20
Payer: MEDICARE

## 2022-04-20 LAB
ABO/RH: NORMAL
ANION GAP SERPL CALCULATED.3IONS-SCNC: 7 MMOL/L (ref 3–16)
ANTIBODY SCREEN: NORMAL
BASOPHILS ABSOLUTE: 0 K/UL (ref 0–0.2)
BASOPHILS RELATIVE PERCENT: 0.2 %
BUN BLDV-MCNC: 21 MG/DL (ref 7–20)
CALCIUM SERPL-MCNC: 8.9 MG/DL (ref 8.3–10.6)
CHLORIDE BLD-SCNC: 98 MMOL/L (ref 99–110)
CO2: 31 MMOL/L (ref 21–32)
CREAT SERPL-MCNC: 0.7 MG/DL (ref 0.8–1.3)
EOSINOPHILS ABSOLUTE: 0 K/UL (ref 0–0.6)
EOSINOPHILS RELATIVE PERCENT: 0 %
GFR AFRICAN AMERICAN: >60
GFR NON-AFRICAN AMERICAN: >60
GLUCOSE BLD-MCNC: 106 MG/DL (ref 70–99)
GLUCOSE BLD-MCNC: 131 MG/DL (ref 70–99)
GLUCOSE BLD-MCNC: 149 MG/DL (ref 70–99)
GLUCOSE BLD-MCNC: 156 MG/DL (ref 70–99)
GLUCOSE BLD-MCNC: 220 MG/DL (ref 70–99)
HCT VFR BLD CALC: 41.7 % (ref 40.5–52.5)
HEMOGLOBIN: 13.3 G/DL (ref 13.5–17.5)
LYMPHOCYTES ABSOLUTE: 0.4 K/UL (ref 1–5.1)
LYMPHOCYTES RELATIVE PERCENT: 2.2 %
MCH RBC QN AUTO: 28.8 PG (ref 26–34)
MCHC RBC AUTO-ENTMCNC: 32 G/DL (ref 31–36)
MCV RBC AUTO: 90.1 FL (ref 80–100)
MONOCYTES ABSOLUTE: 1.2 K/UL (ref 0–1.3)
MONOCYTES RELATIVE PERCENT: 6.6 %
NEUTROPHILS ABSOLUTE: 16.3 K/UL (ref 1.7–7.7)
NEUTROPHILS RELATIVE PERCENT: 91 %
PDW BLD-RTO: 16.4 % (ref 12.4–15.4)
PERFORMED ON: ABNORMAL
PLATELET # BLD: 317 K/UL (ref 135–450)
PMV BLD AUTO: 6.8 FL (ref 5–10.5)
POTASSIUM SERPL-SCNC: 4.4 MMOL/L (ref 3.5–5.1)
RBC # BLD: 4.63 M/UL (ref 4.2–5.9)
SODIUM BLD-SCNC: 136 MMOL/L (ref 136–145)
WBC # BLD: 18 K/UL (ref 4–11)

## 2022-04-20 PROCEDURE — 6370000000 HC RX 637 (ALT 250 FOR IP): Performed by: INTERNAL MEDICINE

## 2022-04-20 PROCEDURE — 6360000002 HC RX W HCPCS: Performed by: INTERNAL MEDICINE

## 2022-04-20 PROCEDURE — 86850 RBC ANTIBODY SCREEN: CPT

## 2022-04-20 PROCEDURE — 86900 BLOOD TYPING SEROLOGIC ABO: CPT

## 2022-04-20 PROCEDURE — 94761 N-INVAS EAR/PLS OXIMETRY MLT: CPT

## 2022-04-20 PROCEDURE — 80048 BASIC METABOLIC PNL TOTAL CA: CPT

## 2022-04-20 PROCEDURE — 1200000000 HC SEMI PRIVATE

## 2022-04-20 PROCEDURE — 94640 AIRWAY INHALATION TREATMENT: CPT

## 2022-04-20 PROCEDURE — 2580000003 HC RX 258: Performed by: INTERNAL MEDICINE

## 2022-04-20 PROCEDURE — 99223 1ST HOSP IP/OBS HIGH 75: CPT | Performed by: NURSE PRACTITIONER

## 2022-04-20 PROCEDURE — 36415 COLL VENOUS BLD VENIPUNCTURE: CPT

## 2022-04-20 PROCEDURE — 86901 BLOOD TYPING SEROLOGIC RH(D): CPT

## 2022-04-20 PROCEDURE — 2700000000 HC OXYGEN THERAPY PER DAY

## 2022-04-20 PROCEDURE — 85025 COMPLETE CBC W/AUTO DIFF WBC: CPT

## 2022-04-20 RX ORDER — INSULIN LISPRO 100 [IU]/ML
0-6 INJECTION, SOLUTION INTRAVENOUS; SUBCUTANEOUS
Status: DISCONTINUED | OUTPATIENT
Start: 2022-04-20 | End: 2022-04-30

## 2022-04-20 RX ORDER — INSULIN LISPRO 100 [IU]/ML
0-3 INJECTION, SOLUTION INTRAVENOUS; SUBCUTANEOUS NIGHTLY
Status: DISCONTINUED | OUTPATIENT
Start: 2022-04-20 | End: 2022-04-30

## 2022-04-20 RX ADMIN — LISINOPRIL 20 MG: 20 TABLET ORAL at 10:01

## 2022-04-20 RX ADMIN — PREDNISONE 30 MG: 20 TABLET ORAL at 10:00

## 2022-04-20 RX ADMIN — BUPROPION HYDROCHLORIDE 100 MG: 100 TABLET, EXTENDED RELEASE ORAL at 20:40

## 2022-04-20 RX ADMIN — METOPROLOL TARTRATE 25 MG: 25 TABLET, FILM COATED ORAL at 10:01

## 2022-04-20 RX ADMIN — INSULIN LISPRO 1 UNITS: 100 INJECTION, SOLUTION INTRAVENOUS; SUBCUTANEOUS at 20:47

## 2022-04-20 RX ADMIN — Medication 10 ML: at 10:01

## 2022-04-20 RX ADMIN — LEVOTHYROXINE SODIUM 150 MCG: 0.15 TABLET ORAL at 05:50

## 2022-04-20 RX ADMIN — TORSEMIDE 50 MG: 100 TABLET ORAL at 10:00

## 2022-04-20 RX ADMIN — NORTRIPTYLINE HYDROCHLORIDE 10 MG: 10 CAPSULE ORAL at 20:40

## 2022-04-20 RX ADMIN — INSULIN LISPRO 2 UNITS: 100 INJECTION, SOLUTION INTRAVENOUS; SUBCUTANEOUS at 17:07

## 2022-04-20 RX ADMIN — MUPIROCIN: 20 OINTMENT TOPICAL at 20:51

## 2022-04-20 RX ADMIN — IPRATROPIUM BROMIDE AND ALBUTEROL SULFATE 1 AMPULE: .5; 3 SOLUTION RESPIRATORY (INHALATION) at 19:35

## 2022-04-20 RX ADMIN — IPRATROPIUM BROMIDE AND ALBUTEROL SULFATE 1 AMPULE: .5; 3 SOLUTION RESPIRATORY (INHALATION) at 08:28

## 2022-04-20 RX ADMIN — IPRATROPIUM BROMIDE AND ALBUTEROL SULFATE 1 AMPULE: .5; 3 SOLUTION RESPIRATORY (INHALATION) at 15:39

## 2022-04-20 RX ADMIN — DILTIAZEM HYDROCHLORIDE 240 MG: 240 CAPSULE, COATED, EXTENDED RELEASE ORAL at 10:00

## 2022-04-20 RX ADMIN — ATORVASTATIN CALCIUM 20 MG: 10 TABLET, FILM COATED ORAL at 10:00

## 2022-04-20 RX ADMIN — BUPROPION HYDROCHLORIDE 100 MG: 100 TABLET, EXTENDED RELEASE ORAL at 10:09

## 2022-04-20 RX ADMIN — IPRATROPIUM BROMIDE AND ALBUTEROL SULFATE 1 AMPULE: .5; 3 SOLUTION RESPIRATORY (INHALATION) at 12:41

## 2022-04-20 RX ADMIN — LEVOFLOXACIN 500 MG: 5 INJECTION, SOLUTION INTRAVENOUS at 20:55

## 2022-04-20 RX ADMIN — DIGOXIN 0.12 MG: 125 TABLET ORAL at 10:01

## 2022-04-20 RX ADMIN — INSULIN GLARGINE 10 UNITS: 100 INJECTION, SOLUTION SUBCUTANEOUS at 20:47

## 2022-04-20 ASSESSMENT — PAIN SCALES - GENERAL
PAINLEVEL_OUTOF10: 0
PAINLEVEL_OUTOF10: 10
PAINLEVEL_OUTOF10: 0

## 2022-04-20 ASSESSMENT — PAIN DESCRIPTION - FREQUENCY: FREQUENCY: CONTINUOUS

## 2022-04-20 ASSESSMENT — PAIN DESCRIPTION - LOCATION: LOCATION: BACK;HEAD

## 2022-04-20 ASSESSMENT — PAIN DESCRIPTION - ONSET: ONSET: ON-GOING

## 2022-04-20 ASSESSMENT — PAIN DESCRIPTION - ORIENTATION: ORIENTATION: MID

## 2022-04-20 ASSESSMENT — PAIN DESCRIPTION - PAIN TYPE: TYPE: CHRONIC PAIN

## 2022-04-20 NOTE — CONSULTS
Consult placed    [address]  Date:4/19/2022,  Time:9:01 PM        Electronically signed by Zak Babin on 4/19/2022 at 9:01 PM

## 2022-04-20 NOTE — PROGRESS NOTES
Pt. Resting in bed. Alert/oriented, though he was mildly disoriented upon wakening. Apparently was awake all night per night RN and he is quite sleepy this morning. Per night RN; he was also aggressive verbally and physically. He is currently somewhat withdrawn. He is accepting care this morning which previously was an issue. Vitals and assessment stable as charted. O2 94-97% on 4L NC at rest currently. Lung sounds diminished. Denies any pain/nausea or any other discomfort at present time. Repositioned for comfort. Call light in reach. Side rails up X2. Bed alarm in place. [employer] monitor on. Will continue to monitor.

## 2022-04-20 NOTE — CONSULTS
Consult completed    Who:  Zeinab Scott PA-C  Date:4/20/2022,  Time:2:34 PM        Electronically signed by Jen Butt on 4/20/2022 at 2:34 PM

## 2022-04-20 NOTE — CONSULTS
Department of Cardiovascular & Thoracic Surgery  History and Physical          DIAGNOSIS: Endobronchial mass     CHIEF COMPLAINT:  Shortness of breath     History Obtained From:  patient, electronic medical record    HISTORY OF PRESENT ILLNESS:      The patient is a 74 y.o. obese male current smoker (1PPD for 40+ years) with significant past medical history of COPD, HTN, CVA, HLD, PAF (on Eliquis chronically), DM, chronic back pain, hypothyroidism, and hypercalcemia (presumed to be from his malignancy) who was recently diagnosed with squamous cell carcinoma (follows with Dr. Charleen Puentes at [facility]) who presents as a transfer from [facility] for evaluation and possible surgical intervention of his endobronchial mass. Past Medical History:        Diagnosis Date    A-fib [facility])     CAD (coronary artery disease)     CHF (congestive heart failure) (HCC)     [facility]ch[facility]     Diabetes mellitus [facility]     Hyperlipidemia     Hypertension     Thyroid disease        Past Surgical History:        Procedure Laterality Date    BRONCHOSCOPY N/A 2022    EBUS WF W/ANES.  (11:00) performed by Graeme Mcgarry MD at [address]  2022    BRONCHOSCOPY ALVEOLAR LAVAGE performed by Graeme Mcgarry MD at [address]  2022    BRONCHOSCOPY BIOPSY BRONCHUS performed by Graeme Mcgarry MD at  [facility]  2022    BRONCHOSCOPY/TRANSBRONCHIAL NEEDLE BIOPSY performed by Graeme Mcgarry MD at  [address] N/A 2022    [address] [facility] performed by Graeme Mcgarry MD at [address] [facility]       Medications Prior to Admission:   Medications Prior to Admission: TECHLITE PEN NEEDLES 31G X 8 MM MISC, USE 1 FIVE TIMES DAILY  levothyroxine (SYNTHROID) 200 MCG tablet,   EUTHYROX 50 MCG tablet,   guaiFENesin (MUCINEX) 600 MG extended release tablet, Take 1 tablet by mouth 2 times daily  [] levoFLOXacin (LEVAQUIN) 500 MG tablet, Take 1 tablet by mouth daily for 4 doses  predniSONE (DELTASONE) 10 MG tablet, 30 mg x 3 days, 20 mg x 3 days, 10 mg x 3 days then stop  apixaban (ELIQUIS) 2.5 MG TABS tablet, Take 1 tablet by mouth 2 times daily  insulin detemir (LEVEMIR) 100 UNIT/ML injection vial, Inject into the skin nightly  insulin aspart (NOVOLOG) 100 UNIT/ML injection vial, Inject into the skin 3 times daily (before meals)   torsemide (DEMADEX) 100 MG tablet, TAKE 1/2 (ONE-HALF) TABLET BY MOUTH ONCE DAILY  potassium chloride (MICRO-K) 10 MEQ extended release capsule, TAKE 1 CAPSULE BY MOUTH TWICE DAILY  HYDROcodone-acetaminophen (NORCO) 5-325 MG per tablet, TAKE 1 TABLET BY MOUTH TWICE DAILY AS NEEDED (Patient not taking: Reported on 4/19/2022)  hydrALAZINE (APRESOLINE) 25 MG tablet, TAKE 1 TABLET BY MOUTH THREE TIMES DAILY  digoxin (LANOXIN) 125 MCG tablet, Take 0.125 mg by mouth daily  tiotropium (SPIRIVA RESPIMAT) 2.5 MCG/ACT AERS inhaler, Inhale 2 puffs into the lungs daily  atorvastatin (LIPITOR) 20 MG tablet, Take 20 mg by mouth daily   buPROPion (WELLBUTRIN SR) 100 MG extended release tablet, Take 100 mg by mouth 2 times daily   CARTIA  MG extended release capsule, Take 240 mg by mouth daily   levothyroxine (SYNTHROID) 150 MCG tablet, Take 150 mcg by mouth Daily   lisinopril (PRINIVIL;ZESTRIL) 20 MG tablet, Take 20 mg by mouth   metFORMIN (GLUCOPHAGE) 850 MG tablet, Take 850 mg by mouth daily (with breakfast)  (Patient not taking: Reported on 4/19/2022)  metoprolol tartrate (LOPRESSOR) 25 MG tablet, Take 25 mg by mouth 2 times daily   nitroGLYCERIN (NITROSTAT) 0.4 MG SL tablet, Place 0.4 mg under the tongue every 5 minutes as needed   nortriptyline (PAMELOR) 10 MG capsule, Take 10 mg by mouth nightly   Omega-3 Fatty Acids (FISH OIL) 1000 MG CAPS, Take 3,000 mg by mouth 3 times daily  aspirin 81 MG tablet, Take 81 mg by mouth daily    Allergies:  Codeine    Social History:    TOBACCO:   reports that he has been smoking. He has a 40.00 pack-year smoking history. He has never used smokeless tobacco.  ETOH:   reports previous alcohol use. CAFFEINE ABUSE:  No  DRUGS:   reports no history of drug use. LIFESTYLE: sedentary     MARITAL STATUS:   OCCUPATION:  Retired tobacco farmer     Family History:    No family history on file. REVIEW OF SYSTEMS:      Constitutional:  No night sweats, headaches, weight loss. Eyes:  No glaucoma, cataracts. ENMT:  No nosebleeds, deviated septum. Cardiac:  No chest pain. +PAF  Vascular:  No claudication, varicosities. GI:  No PUD, heartburn. :  No kidney stones, frequent UTIs  Musculoskeletal:  No arthritis, gout. Respiratory:  No asthma. +SOB, +COPD  Integumentary:  No dermatitis, itching, rash. Neurological:  No stroke, TIAs, seizures. Psychiatric:  No depression, anxiety. Endocrine: +diabetes, +hypothyroidism. Hematologic:  No bleeding, easy bruising. Immunologic: +recently diagnosed SCC, +steroid therapies. PHYSICAL EXAM:    VITALS:  /84   Pulse 71   Temp 97.5 °F (36.4 °C) (Oral)   Resp 24   SpO2 95%     Constitutional:   Well developed and nourished male. No acute distress. +severe obesity. Eyes:  lids and lashes normal, pupils equal, round and reactive to light, extra ocular muscles intact, sclera clear, conjunctiva normal    Head/ENT:  +Edentulous, otherwise normal gums & palate. Moist mucus membranes. No cyanosis or pallor. Neck:  supple, symmetrical, trachea midline, no lymphadenopathy, no jugular venous distension, no carotid bruits and MASSES:  no masses. Lungs:  tachypneic, moderate air exchange, no retractions and clear to auscultation, +expiratory wheezes, +absent left side breath sounds. Cardiovascular:  regular rate and rhythm, S1, S2 normal, no murmur, click, rub or gallop. Apical impulse in 5th intercostal space.     Pulses:  Right dorsalis pedis 1, Left dorsalis pedis 1, Right posterior tibial unable to palpate, Left Posterior tibial unable to palpate, Right radial 1, and Left radial 1. Abdomen:  hypoactive bowel sounds, non-tender, unable to appreciate aorta 2/2 body habitus. No hepatosplenomegaly or masses. Musculoskeletal:  Back is straight and non-tender, full ROM of upper and lower extremities. No kyphosis or scoliosis. +uses a walker and cane at home. Extremities:  Warm, pink, no clubbing, cyanosis, petechiae, ischemia, or deformities. 1+ BLE peripheral edema. Skin: +overall dusky appearance. no rashes, no petechiae, no nodules, no jaundice, +scattered ecchymotic areas as well as scars on BUE's. Neurological/Psychiatric: oriented, normal mood, CN II-XII intact    DATA:  EK22 Atrial fibrillation with premature ventricular or aberrantly conducted complexes RSR' or QR pattern in V1 suggests right ventricular conduction delayT wave abnormality, consider inferior ischemia or digitalis effect Abnormal ECG When compared with ECG of10.8.08 significant changes have occured. Confirmed by Kimberly Stanley MD, [address] () on 2022 1:26:36 PM     CBC with Differential:    Lab Results   Component Value Date    WBC 18.0 2022    RBC 4.63 2022    HGB 13.3 2022    HCT 41.7 2022     2022    MCV 90.1 2022    MCH 28.8 2022    MCHC 32.0 2022    RDW 16.4 2022    LYMPHOPCT 2.2 2022    MONOPCT 6.6 2022    BASOPCT 0.2 2022    MONOSABS 1.2 2022    LYMPHSABS 0.4 2022    EOSABS 0.0 2022    BASOSABS 0.0 2022     CMP:    Lab Results   Component Value Date     2022    K 4.4 2022    K 4.9 2022    CL 98 2022    CO2 31 2022    BUN 21 2022    CREATININE 0.7 2022    GFRAA >60 2022    AGRATIO 1.0 2022    LABGLOM >60 2022    GLUCOSE 156 2022    PROT 6.8 2022    LABALBU 3.4 2022    CALCIUM 8.9 2022    BILITOT 0.3 2022    ALKPHOS 186 2022    AST 19 04/13/2022    ALT 23 04/13/2022     Hepatic Function Panel:    Lab Results   Component Value Date    ALKPHOS 186 04/13/2022    ALT 23 04/13/2022    AST 19 04/13/2022    PROT 6.8 04/13/2022    BILITOT 0.3 04/13/2022    BILIDIR <0.2 04/12/2022    IBILI see below 04/12/2022    LABALBU 3.4 04/13/2022     Calcium:    Lab Results   Component Value Date    CALCIUM 8.9 04/20/2022     PT/INR:    Lab Results   Component Value Date    PROTIME 12.9 04/12/2022    INR 1.14 04/12/2022     Last 3 Troponin:    Lab Results   Component Value Date    TROPONINI <0.01 04/12/2022    TROPONINI 0.01 04/12/2022       CXR: 4/17/22   Impression   1. Worsening left-sided atelectasis/pneumonia and increasing parapneumonic   effusion. 2. Developing right basilar pneumonia. Surgical pathology: 4/12/22   (LLL lung mass, endobronchial biopsies)   FINAL DIAGNOSIS:   Left lower lobe lung mass, endobronchial biopsies:   - Squamous cell carcinoma, moderately differentiated. Cytology: 4/12/22   FINAL DIAGNOSIS:   A. Lymph node, subcarinal, transbronchial ultrasound guided fine needle aspiration:   - Positive for malignant cells, squamous cell carcinoma. B. Lung, left lower lobe, bronchial alveolar lavage:   - Positive for malignant cells, squamous cell carcinoma. - [place] methenamine silver stain is negative for fungal elements and Pneumocystis jiroveci organisms. C. Bronchial washings:   - Positive for malignant cells, squamous cell carcinoma. COMMENT:    For additional details please see the results of the   concurrent biopsy specimen (LWO-).       BUCCA/BUCCA     CLINICAL DIAGNOSIS:    Lung mass     SPECIMEN:   A.  LYMPH NODE, SUBCARINAL TBNA   B.  LUNG, BAL-LLL ENDOBRONCHIAL   C.  LUNG, BRONCHIAL WASHINGS      PET CT: 4/08/22  -unable to view results     CT Chest: 4/19/22   FINDINGS:   Mediastinum: There is no mediastinal adenopathy.  The heart is of normal   size.  Coronary artery calcifications are noted.     Lungs/pleura: There is a large left pleural effusion, associated with   compressive atelectasis of the whole left lung, and complete opacification of   the left hemithorax. Lucetta Record is no evidence for shift of the midline   structures to the right. Lucetta Record is a gap between the 8th and 9th ribs   associated with a nonunited fracture of the posterior aspect of the 9th rib. Through the gap, there is for any a shin of fluid into the chest wall.       There is a calcified granuloma noted in the the left lung, with calcified   left hilar lymph nodes.       There is complete obliteration of the midportion of the left mainstem   bronchus with non visualization of the rest of the left bronchial tree.       There is a small right pleural effusion.  Some mild ground-glass infiltrates   are noted in the the right upper lobe.     Upper Abdomen: Calcified granulomas are noted in the the liver and spleen.       Soft Tissues/Bon unremarkable es:       ASSESSMENT AND PLAN:     Pt is a current smoker (down to 4 or 5 cigarettes per day, was 1 PPD) that went to [facility] with the c/o SOB. He denies any prior use of home oxygen. He is having severe conversational dyspnea, having to pause multiple times while telling his history. Despite his dyspnea he is refusing to wear his BiPap while in the hospital. He is interested in pursuing surgical intervention as well as treatment by Oncology. Will discuss options this afternoon when our team goes back to talk about his treatment plan (were visiting him this morning but he was too sleepy for much interaction).        MELISSA Calles CNP  4/20/2022  10:39 AM

## 2022-04-20 NOTE — CARE COORDINATION
Pt is transfer from [facility]. Assessment completed at [facility] on 4/17. RN JERALD met with the pt at the bedside to confirm. Pt arousable, but not able to stay awake enough to participate in discussion. Pt initially told this RN JERALD that his name was Lebron Sutton, he later confirmed his real name. Pt is on O2 at this time with some labored breathing. Per notes, Pt has been refusing Bipap machine. Will follow pending progress and will reconfirm plan once Pt is more alert and oriented. Addendum 1304pm: case discussed with MD during rounds. Awaiting CT surg. To see pt. And for MD /Pt discussion to determine treatment vs. PC consult.

## 2022-04-20 NOTE — PROGRESS NOTES
Hospitalist Progress Note    Date of Admission: 4/19/2022    Chief Complaint: Endobronchial mass    Hospital Course: 74 y.o. male who presented to [facility] with above complaints  Patient with PMH of SCC left lung, left mainstem endobronchial mass causing left lung collapse, postobstructive pneumonia, acute on chronic respiratory failure, hypercalcemia of malignancy, chronic A. fib, chronic diastolic CHF, COPD initially admitted to [facility] on 4/17/2022 for shortness of breath and respiratory failure. He was started on antibiotics for the postobstructive pneumonia. Patient refused fiberoptic bronchoscopy, refuses BiPAP. CT surgery was consulted for the endobronchial mass for debulking airway tumor and then patient wants to proceed with chemo/XRT. The patient is being transferred to [facility] for CT surgery consult, currently on 3 L oxygen, and in no acute distress. Subjective: Limited historian. Reports increased SOB. No chest pain. Labs:   Recent Labs     04/18/22  0420 04/19/22  0421 04/20/22  0549   WBC 14.5* 11.8* 18.0*   HGB 13.1* 12.9* 13.3*   HCT 40.8 40.3* 41.7    240 317     Recent Labs     04/18/22  0420 04/19/22  0421 04/20/22  0549    137 136   K 4.5 4.8 4.4   CL 98* 98* 98*   CO2 28 31 31   BUN 23* 20 21*   CREATININE 0.9 0.7* 0.7*   CALCIUM 9.1 8.8 8.9     No results for input(s): AST, ALT, BILIDIR, BILITOT, ALKPHOS in the last 72 hours. No results for input(s): INR in the last 72 hours. Physical Exam Performed:    /84   Pulse 53   Temp 97.5 °F (36.4 °C) (Oral)   Resp 20   SpO2 95%       General appearance:  No apparent distress, appears stated age and cooperative. HEENT:  Normal cephalic, atraumatic without obvious deformity. Pupils equal, round, and reactive to light. Extra ocular muscles intact. Conjunctivae/corneas clear. Neck: Supple, with full range of motion. No jugular venous distention. Trachea midline.   Respiratory:  Normal respiratory effort. Diminished breath sounds in the left lung fields, no wheezing or rhonchi. Cardiovascular:  Regular rate and rhythm with normal S1/S2 without murmurs, rubs or gallops. Abdomen: Soft, non-tender, non-distended with normal bowel sounds. Musculoskeletal:  No clubbing, cyanosis bilaterally. Full range of motion without deformity. BLE edema 1+  Skin: Skin color, texture, turgor normal.  No rashes or lesions. Neurologic:  Neurovascularly intact without any focal sensory/motor deficits. Cranial nerves: II-XII intact, grossly non-focal.  Psychiatric:  Drowsy, oriented, but limited insight.    Capillary Refill: Brisk,3 seconds, normal  Peripheral Pulses: +2 palpable, equal bilaterally     Assessment/Plan:    Active Hospital Problems    Diagnosis     Acute on chronic respiratory failure with hypoxia and hypercapnia (HCC) [J96.21, J96.22]     Squamous cell carcinoma of left lung (HCC) [C34.92]     COPD (chronic obstructive pulmonary disease) (HCC) [J44.9]     Endobronchial mass [R91.8]     Postobstructive pneumonia [J18.9]     Lung collapse [J98.19]     Atrial fibrillation, permanent (HCC) [I48.21]     COPD with acute exacerbation (HCC) [J44.1]      SCC left lung with left mainstem endobronchial mass causing left lung collapse/postobstructive pneumonia  SqCC left lower lobe/lingula - 12 cm mass with positive station 7 lymph node - stage IIIA  Plan  -Possible debulking/laser therapy per CTS, require 5 days off of Eliquis, last dose was 4/15/22  -CT surgery consulted  -Plan to F/w Dr. Shawn Betancourt next week to plan for cancer treatment outpatient for subsequent XRT/chemo    Acute on chronic respiratory failure with hypoxia and hypercapnia  COPD with acute exacerbation  Postobstructive pneumonia  Lung collapse  -BiPAP 16/8 HS and PRN - patient has been refusing   -Continue supplemental oxygen, currently on 4 LPM, wean as tolerated  -Continue prednisone p.o.  -Continue IV Levaquin day 9/10  -Continue duo nebs, inhaled bronchodilators    Tobacco abuse-counseled cessation    Atrial fibrillation, permanent   -Eliquis on hold for possible surgery  -Continue Cardizem CD and digoxin    HLD-resume statin    Hypothyroidism-clinically euthyroid, resume Synthroid    HTN-controlled, resume home medication regimen    Chronic diastolic CHF-euvolemic, resume torsemide daily    DM2-stable, resume Lantus, SSI, Accu-Cheks    Chronic anxiety-mood stable, resume Wellbutrin    DVT Prophylaxis: SCD, waiting for Eliquis washout  Diet: ADULT DIET; Easy to Chew; 4 carb choices (60 gm/meal)  ADULT ORAL NUTRITION SUPPLEMENT; Breakfast, Lunch, Dinner;  Low Calorie/High Protein Oral Supplement  Code Status: Full Code  PT/OT Eval Status: Pending     Dispo - Pending CT Surgery input     Alee Knapp MD

## 2022-04-20 NOTE — RT PROTOCOL NOTE
RT Inhaler-Nebulizer Bronchodilator Protocol Note    There is a bronchodilator order in the chart from a provider indicating to follow the RT Bronchodilator Protocol and there is an Initiate RT Inhaler-Nebulizer Bronchodilator Protocol order as well (see protocol at bottom of note). CXR Findings:  No results found. The findings from the last RT Protocol Assessment were as follows:   History Pulmonary Disease: Chronic pulmonary disease  Respiratory Pattern: Mild dyspnea at rest, irregular pattern, or RR 21-25 bpm  Breath Sounds: Slightly diminished and/or crackles  Cough: Strong, spontaneous, non-productive  Indication for Bronchodilator Therapy: On home bronchodilators,Wheezing associated with pulm disorder  Bronchodilator Assessment Score: 8    Aerosolized bronchodilator medication orders have been revised according to the RT Inhaler-Nebulizer Bronchodilator Protocol below. Respiratory Therapist to perform RT Therapy Protocol Assessment initially then follow the protocol. Repeat RT Therapy Protocol Assessment PRN for score 0-3 or on second treatment, BID, and PRN for scores above 3. No Indications - adjust the frequency to every 6 hours PRN wheezing or bronchospasm, if no treatments needed after 48 hours then discontinue using Per Protocol order mode. If indication present, adjust the RT bronchodilator orders based on the Bronchodilator Assessment Score as indicated below. Use Inhaler orders unless patient has one or more of the following: on home nebulizer, not able to hold breath for 10 seconds, is not alert and oriented, cannot activate and use MDI correctly, or respiratory rate 25 breaths per minute or more, then use the equivalent nebulizer order(s) with same Frequency and PRN reasons based on the score. If a patient is on this medication at home then do not decrease Frequency below that used at home.     0-3 - enter or revise RT bronchodilator order(s) to equivalent RT Bronchodilator order with Frequency of every 4 hours PRN for wheezing or increased work of breathing using Per Protocol order mode. 4-6 - enter or revise RT Bronchodilator order(s) to two equivalent RT bronchodilator orders with one order with BID Frequency and one order with Frequency of every 4 hours PRN wheezing or increased work of breathing using Per Protocol order mode. 7-10 - enter or revise RT Bronchodilator order(s) to two equivalent RT bronchodilator orders with one order with TID Frequency and one order with Frequency of every 4 hours PRN wheezing or increased work of breathing using Per Protocol order mode. 11-13 - enter or revise RT Bronchodilator order(s) to one equivalent RT bronchodilator order with QID Frequency and an Albuterol order with Frequency of every 4 hours PRN wheezing or increased work of breathing using Per Protocol order mode. Greater than 13 - enter or revise RT Bronchodilator order(s) to one equivalent RT bronchodilator order with every 4 hours Frequency and an Albuterol order with Frequency of every 2 hours PRN wheezing or increased work of breathing using Per Protocol order mode. RT to enter RT Home Evaluation for COPD & MDI Assessment order using Per Protocol order mode.     Electronically signed by Sapphire Quinonez RCP on 4/19/2022 at 9:21 PM

## 2022-04-20 NOTE — H&P
Hospital Medicine History & Physical      PCP: Aliyah Dumont MD    Date of Admission: 4/19/2022    Date of Service: Pt seen/examined on 4/19/22 and Admitted to Inpatient with expected LOS greater than two midnights due to medical therapy. Chief Complaint: Transfer from [facility] [address] [place] [address] for endobronchial mass      History Of Present Illness:   74 y.o. male who presented to [facility] with above complaints  Patient with PMH of SCC left lung, left mainstem endobronchial mass causing left lung collapse, postobstructive pneumonia, acute on chronic respiratory failure, hypercalcemia of malignancy, chronic A. fib, chronic diastolic CHF, COPD initially admitted to [facility] on 4/17/2022 for shortness of breath and respiratory failure. He was started on antibiotics for the postobstructive pneumonia. Patient refused fiberoptic bronchoscopy, refuses BiPAP. CT surgery was consulted for the endobronchial mass for debulking airway tumor and then patient wants to proceed with chemo/XRT. The patient is being transferred to [facility] for CT surgery consult, currently on 3 L oxygen, and in no acute distress. Past Medical History:          Diagnosis Date    A-fib [facility])     CAD (coronary artery disease)     CHF (congestive heart failure) (HCC)     [facility] disease) [facility]     Diabetes mellitus ([facility]     Hyperlipidemia     Hypertension     Thyroid disease        Past Surgical History:          Procedure Laterality Date    BRONCHOSCOPY N/A 4/12/2022    EBUS WF W/ANES.  (11:00) performed by Lexy Clayton MD at [address]  4/12/2022    BRONCHOSCOPY ALVEOLAR LAVAGE performed by Lexy Clayton MD at [address]  4/12/2022    BRONCHOSCOPY BIOPSY BRONCHUS performed by Lexy Clayton MD at [address]  4/12/2022    BRONCHOSCOPY/TRANSBRONCHIAL NEEDLE BIOPSY performed by Lexy Clayton MD at [id number] [place] [address]  BRONCHOSCOPY N/A 4/13/2022    BRONCH NF performed by Rachel Scott MD at [address] [place] [address]       Medications Prior to Admission:      Prior to Admission medications    Medication Sig Start Date End Date Taking?  Authorizing Provider   TECHLITE PEN NEEDLES 31G X 8 MM MISC USE 1 FIVE TIMES DAILY 4/3/22   Historical Provider, MD   levothyroxine (SYNTHROID) 200 MCG tablet  4/11/22   Historical Provider, MD   EUTHYROX 50 MCG tablet  4/11/22   Historical Provider, MD   guaiFENesin (MUCINEX) 600 MG extended release tablet Take 1 tablet by mouth 2 times daily 4/15/22   aBlbina Miles MD   levoFLOXacin (LEVAQUIN) 500 MG tablet Take 1 tablet by mouth daily for 4 doses 4/15/22 4/19/22  Balbina Miles MD   predniSONE (DELTASONE) 10 MG tablet 30 mg x 3 days, 20 mg x 3 days, 10 mg x 3 days then stop 4/15/22   Balbina Miles MD   apixaban (ELIQUIS) 2.5 MG TABS tablet Take 1 tablet by mouth 2 times daily 4/15/22   Balbina Miles MD   insulin detemir (LEVEMIR) 100 UNIT/ML injection vial Inject into the skin nightly    Historical Provider, MD   insulin aspart (NOVOLOG) 100 UNIT/ML injection vial Inject into the skin 3 times daily (before meals)     Historical Provider, MD   torsemide (DEMADEX) 100 MG tablet TAKE 1/2 (ONE-HALF) TABLET BY MOUTH ONCE DAILY 3/12/22   Historical Provider, MD   potassium chloride (MICRO-K) 10 MEQ extended release capsule TAKE 1 CAPSULE BY MOUTH TWICE DAILY 1/23/22   Historical Provider, MD   HYDROcodone-acetaminophen (Mayte Setters) 5-325 MG per tablet TAKE 1 TABLET BY MOUTH TWICE DAILY AS NEEDED  Patient not taking: Reported on 4/19/2022 3/28/22   Historical Provider, MD   hydrALAZINE (APRESOLINE) 25 MG tablet TAKE 1 TABLET BY MOUTH THREE TIMES DAILY 3/23/22   Historical Provider, MD   digoxin (LANOXIN) 125 MCG tablet Take 0.125 mg by mouth daily 11/8/21   Historical Provider, MD   tiotropium (SPIRIVA RESPIMAT) 2.5 MCG/ACT AERS inhaler Inhale 2 puffs into the lungs daily 4/7/22   [facility] Rachele Pinto MD   atorvastatin (LIPITOR) 20 MG tablet Take 20 mg by mouth daily  1/19/19   Historical Provider, MD   buPROPion [facility] SR) 100 MG extended release tablet Take 100 mg by mouth 2 times daily  11/21/18   Historical Provider, MD   CARTIA  MG extended release capsule Take 240 mg by mouth daily  11/21/18   Historical Provider, MD   levothyroxine (SYNTHROID) 150 MCG tablet Take 150 mcg by mouth Daily  1/19/19   Historical Provider, MD   lisinopril (PRINIVIL;ZESTRIL) 20 MG tablet Take 20 mg by mouth  1/19/19   Historical Provider, MD   metFORMIN (GLUCOPHAGE) 850 MG tablet Take 850 mg by mouth daily (with breakfast)   Patient not taking: Reported on 4/19/2022 1/19/19   Historical Provider, MD   metoprolol tartrate (LOPRESSOR) 25 MG tablet Take 25 mg by mouth 2 times daily  11/10/18   Historical Provider, MD   nitroGLYCERIN (NITROSTAT) 0.4 MG SL tablet Place 0.4 mg under the tongue every 5 minutes as needed  1/4/19   Historical Provider, MD   nortriptyline (PAMELOR) 10 MG capsule Take 10 mg by mouth nightly  1/19/19   Historical Provider, MD   Omega-3 Fatty Acids (FISH OIL) 1000 MG CAPS Take 3,000 mg by mouth 3 times daily    Historical Provider, MD   aspirin 81 MG tablet Take 81 mg by mouth daily    Historical Provider, MD       Allergies:  Codeine    Social History:      The patient currently lives at home    TOBACCO:   reports that he has been smoking. He has a 40.00 pack-year smoking history. He has never used smokeless tobacco.  ETOH:   reports previous alcohol use. E-Cigarettes/Vaping Use     Questions Responses    E-Cigarette/Vaping Use Never User    Start Date     Passive Exposure     Quit Date     Counseling Given     Comments             Family History:   Reviewed in detail and negative for DM, CAD, Cancer, CVA. REVIEW OF SYSTEMS COMPLETED:   Pertinent positives as noted in the HPI. All other systems reviewed and negative.     PHYSICAL EXAM PERFORMED:    BP (!) 153/75   Pulse 79   Temp 97.8 °F (36.6 °C) (Oral)   Resp 18   SpO2 96%     General appearance:  No apparent distress, appears stated age and cooperative. HEENT:  Normal cephalic, atraumatic without obvious deformity. Pupils equal, round, and reactive to light. Extra ocular muscles intact. Conjunctivae/corneas clear. Neck: Supple, with full range of motion. No jugular venous distention. Trachea midline. Respiratory:  Normal respiratory effort. Diminished breath sounds in the left lung fields, no wheezing or rhonchi. Cardiovascular:  Regular rate and rhythm with normal S1/S2 without murmurs, rubs or gallops. Abdomen: Soft, non-tender, non-distended with normal bowel sounds. Musculoskeletal:  No clubbing, cyanosis bilaterally. Full range of motion without deformity. BLE edema 1+  Skin: Skin color, texture, turgor normal.  No rashes or lesions. Neurologic:  Neurovascularly intact without any focal sensory/motor deficits. Cranial nerves: II-XII intact, grossly non-focal.  Psychiatric:  Alert and oriented, thought content appropriate, normal insight  Capillary Refill: Brisk,3 seconds, normal  Peripheral Pulses: +2 palpable, equal bilaterally       Labs:     Recent Labs     04/17/22  0808 04/18/22  0420 04/19/22  0421   WBC 12.1* 14.5* 11.8*   HGB 12.5* 13.1* 12.9*   HCT 39.1* 40.8 40.3*    252 240     Recent Labs     04/17/22  0809 04/18/22  0420 04/19/22  0421   * 136 137   K 4.2 4.5 4.8   CL 97* 98* 98*   CO2 31 28 31   BUN 22* 23* 20   CREATININE 0.9 0.9 0.7*   CALCIUM 9.3 9.1 8.8     No results for input(s): AST, ALT, BILIDIR, BILITOT, ALKPHOS in the last 72 hours. No results for input(s): INR in the last 72 hours. No results for input(s): Osbaldo Snowball in the last 72 hours. Bronchoscopy 4/12/22  Left lower lobe lung mass, endobronchial biopsies:   - Squamous cell carcinoma, moderately differentiated  A.  Lymph node, subcarinal, transbronchial ultrasound guided fine needle   aspiration: - Positive for malignant cells, squamous cell carcinoma.      Imaging:  Chest x-ray 4/17/22 imaging   Worsening left lung collapse    CT chest 4/19/2022  Impression   Complete opacification of the left hemithorax secondary to a large left   pleural effusion with compressive atelectasis of the left lung.  No shift of   the mediastinum to the right. Azul Knuckles is a gap between the 8th and 9th rib   associated with a nonunited fracture of the posterior aspect of the 9th rib. Through this gap, there is herniation of fluid into chest wall.       Small right pleural effusion with mild ground-glass infiltration noted in the   right upper lobe.       Complete obliteration of the midportion of the left mainstem bronchus with   non visualization of rest of the left bronchial tree.       Evidence of prior granulomatous disease.        ASSESSMENT:PLAN:      SCC left lung with left mainstem endobronchial mass causing left lung collapse/postobstructive pneumonia  SqCC left lower lobe/lingula - 12 cm mass with positive station 7 lymph node - stage IIIA  Plan  -Possible debulking/laser therapy per CTS, require 5 days off of Eliquis, last dose was 4/15/22  -CT surgery consulted  -Plan to F/w Dr. Carlos West next week to plan for cancer treatment outpatient for subsequent XRT/chemo    Acute on chronic respiratory failure with hypoxia and hypercapnia  COPD with acute exacerbation  Postobstructive pneumonia  Lung collapse  -BiPAP 16/8 HS and PRN - patient has been refusing   -Continue supplemental oxygen, currently on 4 LPM, wean as tolerated  -Continue prednisone p.o.  -Continue IV Levaquin day 8/10  -Continue duo nebs, inhaled bronchodilators    Tobacco abuse-counseled cessation    Atrial fibrillation, permanent   -Eliquis on hold for possible surgery  -Continue Cardizem CD and digoxin    HLD-resume statin    Hypothyroidism-clinically euthyroid, resume Synthroid    HTN-controlled, resume home medication regimen    Chronic diastolic CHF-euvolemic, resume torsemide daily    DM2-stable, resume Lantus, SSI, Accu-Cheks    Chronic anxiety-mood stable, resume Wellbutrin    DVT Prophylaxis: SCD, waiting for Eliquis washout  Diet: ADULT DIET; Easy to Chew; 4 carb choices (60 gm/meal)  ADULT ORAL NUTRITION SUPPLEMENT; Breakfast, Lunch, Dinner; Low Calorie/High Protein Oral Supplement  Code Status: Full Code    PT/OT Eval Status: Not consulted    Dispo -inpatient stay 3 days stay expected       Cameron Harris MD    Thank you Otoniel Miner MD for the opportunity to be involved in this patient's care. If you have any questions or concerns please feel free to contact me at [phone].

## 2022-04-20 NOTE — PROGRESS NOTES
Patient refusing Bipap. Patient currently on 4 L 96% and tachypneic. Treatment given with mask.  Tolerated well

## 2022-04-21 ENCOUNTER — APPOINTMENT (OUTPATIENT)
Dept: GENERAL RADIOLOGY | Age: 67
DRG: 180 | End: 2022-04-21
Attending: STUDENT IN AN ORGANIZED HEALTH CARE EDUCATION/TRAINING PROGRAM
Payer: MEDICARE

## 2022-04-21 ENCOUNTER — ANESTHESIA (OUTPATIENT)
Dept: OPERATING ROOM | Age: 67
DRG: 180 | End: 2022-04-21
Payer: MEDICARE

## 2022-04-21 VITALS — TEMPERATURE: 98.2 F | SYSTOLIC BLOOD PRESSURE: 131 MMHG | DIASTOLIC BLOOD PRESSURE: 101 MMHG | OXYGEN SATURATION: 99 %

## 2022-04-21 PROBLEM — C34.90 SMALL CELL LUNG CANCER (HCC): Status: ACTIVE | Noted: 2022-04-21

## 2022-04-21 LAB
ANION GAP SERPL CALCULATED.3IONS-SCNC: 13 MMOL/L (ref 3–16)
BASOPHILS ABSOLUTE: 0 K/UL (ref 0–0.2)
BASOPHILS RELATIVE PERCENT: 0.2 %
BUN BLDV-MCNC: 19 MG/DL (ref 7–20)
CALCIUM SERPL-MCNC: 8.7 MG/DL (ref 8.3–10.6)
CHLORIDE BLD-SCNC: 97 MMOL/L (ref 99–110)
CO2: 29 MMOL/L (ref 21–32)
CREAT SERPL-MCNC: 0.7 MG/DL (ref 0.8–1.3)
EOSINOPHILS ABSOLUTE: 0 K/UL (ref 0–0.6)
EOSINOPHILS RELATIVE PERCENT: 0.2 %
GFR AFRICAN AMERICAN: >60
GFR NON-AFRICAN AMERICAN: >60
GLUCOSE BLD-MCNC: 111 MG/DL (ref 70–99)
GLUCOSE BLD-MCNC: 131 MG/DL (ref 70–99)
GLUCOSE BLD-MCNC: 85 MG/DL (ref 70–99)
GLUCOSE BLD-MCNC: 86 MG/DL (ref 70–99)
GLUCOSE BLD-MCNC: 86 MG/DL (ref 70–99)
HCT VFR BLD CALC: 44 % (ref 40.5–52.5)
HEMOGLOBIN: 13.8 G/DL (ref 13.5–17.5)
LYMPHOCYTES ABSOLUTE: 0.6 K/UL (ref 1–5.1)
LYMPHOCYTES RELATIVE PERCENT: 3.8 %
MCH RBC QN AUTO: 28.5 PG (ref 26–34)
MCHC RBC AUTO-ENTMCNC: 31.3 G/DL (ref 31–36)
MCV RBC AUTO: 91.1 FL (ref 80–100)
MONOCYTES ABSOLUTE: 1.5 K/UL (ref 0–1.3)
MONOCYTES RELATIVE PERCENT: 9.3 %
NEUTROPHILS ABSOLUTE: 14.1 K/UL (ref 1.7–7.7)
NEUTROPHILS RELATIVE PERCENT: 86.5 %
PDW BLD-RTO: 16.7 % (ref 12.4–15.4)
PERFORMED ON: ABNORMAL
PERFORMED ON: ABNORMAL
PERFORMED ON: NORMAL
PERFORMED ON: NORMAL
PLATELET # BLD: 265 K/UL (ref 135–450)
PMV BLD AUTO: 6.9 FL (ref 5–10.5)
POTASSIUM SERPL-SCNC: 3.8 MMOL/L (ref 3.5–5.1)
RBC # BLD: 4.83 M/UL (ref 4.2–5.9)
SODIUM BLD-SCNC: 139 MMOL/L (ref 136–145)
WBC # BLD: 16.2 K/UL (ref 4–11)

## 2022-04-21 PROCEDURE — 99999 PR OFFICE/OUTPT VISIT,PROCEDURE ONLY: CPT

## 2022-04-21 PROCEDURE — 6360000002 HC RX W HCPCS: Performed by: NURSE ANESTHETIST, CERTIFIED REGISTERED

## 2022-04-21 PROCEDURE — 36415 COLL VENOUS BLD VENIPUNCTURE: CPT

## 2022-04-21 PROCEDURE — 99232 SBSQ HOSP IP/OBS MODERATE 35: CPT | Performed by: NURSE PRACTITIONER

## 2022-04-21 PROCEDURE — 31641 BRONCHOSCOPY TREAT BLOCKAGE: CPT | Performed by: THORACIC SURGERY (CARDIOTHORACIC VASCULAR SURGERY)

## 2022-04-21 PROCEDURE — 94640 AIRWAY INHALATION TREATMENT: CPT

## 2022-04-21 PROCEDURE — 94761 N-INVAS EAR/PLS OXIMETRY MLT: CPT

## 2022-04-21 PROCEDURE — 3600000008 HC SURGERY OHS BASE: Performed by: THORACIC SURGERY (CARDIOTHORACIC VASCULAR SURGERY)

## 2022-04-21 PROCEDURE — 2700000000 HC OXYGEN THERAPY PER DAY

## 2022-04-21 PROCEDURE — 32551 INSERTION OF CHEST TUBE: CPT | Performed by: THORACIC SURGERY (CARDIOTHORACIC VASCULAR SURGERY)

## 2022-04-21 PROCEDURE — 6370000000 HC RX 637 (ALT 250 FOR IP): Performed by: INTERNAL MEDICINE

## 2022-04-21 PROCEDURE — 2709999900 HC NON-CHARGEABLE SUPPLY: Performed by: THORACIC SURGERY (CARDIOTHORACIC VASCULAR SURGERY)

## 2022-04-21 PROCEDURE — 80048 BASIC METABOLIC PNL TOTAL CA: CPT

## 2022-04-21 PROCEDURE — 85025 COMPLETE CBC W/AUTO DIFF WBC: CPT

## 2022-04-21 PROCEDURE — 2580000003 HC RX 258: Performed by: ANESTHESIOLOGY

## 2022-04-21 PROCEDURE — 71045 X-RAY EXAM CHEST 1 VIEW: CPT

## 2022-04-21 PROCEDURE — 3600000018 HC SURGERY OHS ADDTL 15MIN: Performed by: THORACIC SURGERY (CARDIOTHORACIC VASCULAR SURGERY)

## 2022-04-21 PROCEDURE — 2500000003 HC RX 250 WO HCPCS: Performed by: NURSE ANESTHETIST, CERTIFIED REGISTERED

## 2022-04-21 PROCEDURE — 6370000000 HC RX 637 (ALT 250 FOR IP)

## 2022-04-21 PROCEDURE — 0BJ08ZZ INSPECTION OF TRACHEOBRONCHIAL TREE, VIA NATURAL OR ARTIFICIAL OPENING ENDOSCOPIC: ICD-10-PCS | Performed by: THORACIC SURGERY (CARDIOTHORACIC VASCULAR SURGERY)

## 2022-04-21 PROCEDURE — 94669 MECHANICAL CHEST WALL OSCILL: CPT

## 2022-04-21 PROCEDURE — C1769 GUIDE WIRE: HCPCS | Performed by: THORACIC SURGERY (CARDIOTHORACIC VASCULAR SURGERY)

## 2022-04-21 PROCEDURE — 2580000003 HC RX 258: Performed by: INTERNAL MEDICINE

## 2022-04-21 PROCEDURE — 3700000001 HC ADD 15 MINUTES (ANESTHESIA): Performed by: THORACIC SURGERY (CARDIOTHORACIC VASCULAR SURGERY)

## 2022-04-21 PROCEDURE — 3700000000 HC ANESTHESIA ATTENDED CARE: Performed by: THORACIC SURGERY (CARDIOTHORACIC VASCULAR SURGERY)

## 2022-04-21 PROCEDURE — 2000000000 HC ICU R&B

## 2022-04-21 RX ORDER — IPRATROPIUM BROMIDE AND ALBUTEROL SULFATE 2.5; .5 MG/3ML; MG/3ML
1 SOLUTION RESPIRATORY (INHALATION) 2 TIMES DAILY
Status: DISCONTINUED | OUTPATIENT
Start: 2022-04-21 | End: 2022-04-22

## 2022-04-21 RX ORDER — SODIUM CHLORIDE 9 MG/ML
25 INJECTION, SOLUTION INTRAVENOUS PRN
Status: DISCONTINUED | OUTPATIENT
Start: 2022-04-21 | End: 2022-04-21

## 2022-04-21 RX ORDER — PROPOFOL 10 MG/ML
INJECTION, EMULSION INTRAVENOUS PRN
Status: DISCONTINUED | OUTPATIENT
Start: 2022-04-21 | End: 2022-04-21 | Stop reason: SDUPTHER

## 2022-04-21 RX ORDER — HYDROMORPHONE HCL 110MG/55ML
0.5 PATIENT CONTROLLED ANALGESIA SYRINGE INTRAVENOUS EVERY 5 MIN PRN
Status: DISCONTINUED | OUTPATIENT
Start: 2022-04-21 | End: 2022-04-21

## 2022-04-21 RX ORDER — HYDROMORPHONE HCL 110MG/55ML
0.25 PATIENT CONTROLLED ANALGESIA SYRINGE INTRAVENOUS EVERY 5 MIN PRN
Status: DISCONTINUED | OUTPATIENT
Start: 2022-04-21 | End: 2022-04-21

## 2022-04-21 RX ORDER — ONDANSETRON 2 MG/ML
4 INJECTION INTRAMUSCULAR; INTRAVENOUS
Status: DISCONTINUED | OUTPATIENT
Start: 2022-04-21 | End: 2022-04-21

## 2022-04-21 RX ORDER — DEXMEDETOMIDINE HYDROCHLORIDE 4 UG/ML
.1-1.5 INJECTION, SOLUTION INTRAVENOUS CONTINUOUS
Status: DISCONTINUED | OUTPATIENT
Start: 2022-04-21 | End: 2022-04-28

## 2022-04-21 RX ORDER — SODIUM CHLORIDE 0.9 % (FLUSH) 0.9 %
5-40 SYRINGE (ML) INJECTION PRN
Status: DISCONTINUED | OUTPATIENT
Start: 2022-04-21 | End: 2022-04-22

## 2022-04-21 RX ORDER — SODIUM CHLORIDE 0.9 % (FLUSH) 0.9 %
5-40 SYRINGE (ML) INJECTION EVERY 12 HOURS SCHEDULED
Status: DISCONTINUED | OUTPATIENT
Start: 2022-04-21 | End: 2022-04-21

## 2022-04-21 RX ORDER — SODIUM CHLORIDE 0.9 % (FLUSH) 0.9 %
5-40 SYRINGE (ML) INJECTION PRN
Status: DISCONTINUED | OUTPATIENT
Start: 2022-04-21 | End: 2022-04-21

## 2022-04-21 RX ORDER — LIDOCAINE HYDROCHLORIDE 10 MG/ML
0.3 INJECTION, SOLUTION EPIDURAL; INFILTRATION; INTRACAUDAL; PERINEURAL
Status: ACTIVE | OUTPATIENT
Start: 2022-04-21 | End: 2022-04-21

## 2022-04-21 RX ORDER — SODIUM CHLORIDE 0.9 % (FLUSH) 0.9 %
5-40 SYRINGE (ML) INJECTION EVERY 12 HOURS SCHEDULED
Status: DISCONTINUED | OUTPATIENT
Start: 2022-04-21 | End: 2022-04-27

## 2022-04-21 RX ORDER — ONDANSETRON 2 MG/ML
INJECTION INTRAMUSCULAR; INTRAVENOUS PRN
Status: DISCONTINUED | OUTPATIENT
Start: 2022-04-21 | End: 2022-04-21 | Stop reason: SDUPTHER

## 2022-04-21 RX ORDER — OXYCODONE HYDROCHLORIDE 5 MG/1
10 TABLET ORAL PRN
Status: DISCONTINUED | OUTPATIENT
Start: 2022-04-21 | End: 2022-04-21

## 2022-04-21 RX ORDER — SODIUM CHLORIDE 9 MG/ML
INJECTION, SOLUTION INTRAVENOUS PRN
Status: DISCONTINUED | OUTPATIENT
Start: 2022-04-21 | End: 2022-04-27

## 2022-04-21 RX ORDER — DEXAMETHASONE SODIUM PHOSPHATE 4 MG/ML
INJECTION, SOLUTION INTRA-ARTICULAR; INTRALESIONAL; INTRAMUSCULAR; INTRAVENOUS; SOFT TISSUE PRN
Status: DISCONTINUED | OUTPATIENT
Start: 2022-04-21 | End: 2022-04-21 | Stop reason: SDUPTHER

## 2022-04-21 RX ORDER — SODIUM CHLORIDE, SODIUM LACTATE, POTASSIUM CHLORIDE, CALCIUM CHLORIDE 600; 310; 30; 20 MG/100ML; MG/100ML; MG/100ML; MG/100ML
INJECTION, SOLUTION INTRAVENOUS CONTINUOUS
Status: DISCONTINUED | OUTPATIENT
Start: 2022-04-21 | End: 2022-04-27

## 2022-04-21 RX ORDER — OXYCODONE HYDROCHLORIDE 5 MG/1
5 TABLET ORAL PRN
Status: DISCONTINUED | OUTPATIENT
Start: 2022-04-21 | End: 2022-04-21

## 2022-04-21 RX ORDER — ROCURONIUM BROMIDE 10 MG/ML
INJECTION, SOLUTION INTRAVENOUS PRN
Status: DISCONTINUED | OUTPATIENT
Start: 2022-04-21 | End: 2022-04-21 | Stop reason: SDUPTHER

## 2022-04-21 RX ORDER — MEPERIDINE HYDROCHLORIDE 50 MG/ML
12.5 INJECTION INTRAMUSCULAR; INTRAVENOUS; SUBCUTANEOUS EVERY 5 MIN PRN
Status: DISCONTINUED | OUTPATIENT
Start: 2022-04-21 | End: 2022-04-21

## 2022-04-21 RX ORDER — LABETALOL HYDROCHLORIDE 5 MG/ML
10 INJECTION, SOLUTION INTRAVENOUS
Status: DISCONTINUED | OUTPATIENT
Start: 2022-04-21 | End: 2022-04-21

## 2022-04-21 RX ORDER — DIPHENHYDRAMINE HYDROCHLORIDE 50 MG/ML
12.5 INJECTION INTRAMUSCULAR; INTRAVENOUS
Status: DISCONTINUED | OUTPATIENT
Start: 2022-04-21 | End: 2022-04-21

## 2022-04-21 RX ADMIN — IPRATROPIUM BROMIDE AND ALBUTEROL SULFATE 1 AMPULE: .5; 2.5 SOLUTION RESPIRATORY (INHALATION) at 20:35

## 2022-04-21 RX ADMIN — SODIUM CHLORIDE, POTASSIUM CHLORIDE, SODIUM LACTATE AND CALCIUM CHLORIDE: 600; 310; 30; 20 INJECTION, SOLUTION INTRAVENOUS at 22:53

## 2022-04-21 RX ADMIN — IPRATROPIUM BROMIDE AND ALBUTEROL SULFATE 1 AMPULE: .5; 3 SOLUTION RESPIRATORY (INHALATION) at 08:17

## 2022-04-21 RX ADMIN — ONDANSETRON 4 MG: 2 INJECTION INTRAMUSCULAR; INTRAVENOUS at 13:27

## 2022-04-21 RX ADMIN — ROCURONIUM BROMIDE 50 MG: 10 SOLUTION INTRAVENOUS at 13:16

## 2022-04-21 RX ADMIN — INSULIN GLARGINE 10 UNITS: 100 INJECTION, SOLUTION SUBCUTANEOUS at 21:48

## 2022-04-21 RX ADMIN — SUGAMMADEX 200 MG: 100 INJECTION, SOLUTION INTRAVENOUS at 14:17

## 2022-04-21 RX ADMIN — SUGAMMADEX 200 MG: 100 INJECTION, SOLUTION INTRAVENOUS at 14:22

## 2022-04-21 RX ADMIN — LIDOCAINE HYDROCHLORIDE 80 MG: 10 INJECTION, SOLUTION INFILTRATION; PERINEURAL at 13:16

## 2022-04-21 RX ADMIN — PROPOFOL 50 MG: 10 INJECTION, EMULSION INTRAVENOUS at 13:16

## 2022-04-21 RX ADMIN — DEXAMETHASONE SODIUM PHOSPHATE 8 MG: 4 INJECTION, SOLUTION INTRAMUSCULAR; INTRAVENOUS at 13:27

## 2022-04-21 RX ADMIN — SODIUM CHLORIDE: 9 INJECTION, SOLUTION INTRAVENOUS at 13:25

## 2022-04-21 RX ADMIN — SODIUM CHLORIDE, POTASSIUM CHLORIDE, SODIUM LACTATE AND CALCIUM CHLORIDE: 600; 310; 30; 20 INJECTION, SOLUTION INTRAVENOUS at 15:16

## 2022-04-21 RX ADMIN — ROCURONIUM BROMIDE 50 MG: 10 SOLUTION INTRAVENOUS at 13:45

## 2022-04-21 RX ADMIN — Medication 10 ML: at 08:36

## 2022-04-21 RX ADMIN — INSULIN LISPRO 1 UNITS: 100 INJECTION, SOLUTION INTRAVENOUS; SUBCUTANEOUS at 21:47

## 2022-04-21 ASSESSMENT — PULMONARY FUNCTION TESTS
PIF_VALUE: 31
PIF_VALUE: 39
PIF_VALUE: 7
PIF_VALUE: 38
PIF_VALUE: 41
PIF_VALUE: 42
PIF_VALUE: 39
PIF_VALUE: 43
PIF_VALUE: 19
PIF_VALUE: 0
PIF_VALUE: 22
PIF_VALUE: 0
PIF_VALUE: 21
PIF_VALUE: 23
PIF_VALUE: 39
PIF_VALUE: 37
PIF_VALUE: 31
PIF_VALUE: 2
PIF_VALUE: 39
PIF_VALUE: 34
PIF_VALUE: 39
PIF_VALUE: 42
PIF_VALUE: 0
PIF_VALUE: 31
PIF_VALUE: 18
PIF_VALUE: 38
PIF_VALUE: 23
PIF_VALUE: 1
PIF_VALUE: 23
PIF_VALUE: 15
PIF_VALUE: 40
PIF_VALUE: 37
PIF_VALUE: 30
PIF_VALUE: 31
PIF_VALUE: 36
PIF_VALUE: 42
PIF_VALUE: 39
PIF_VALUE: 39
PIF_VALUE: 20
PIF_VALUE: 41
PIF_VALUE: 39
PIF_VALUE: 3
PIF_VALUE: 31
PIF_VALUE: 1
PIF_VALUE: 39
PIF_VALUE: 25
PIF_VALUE: 1
PIF_VALUE: 20
PIF_VALUE: 44
PIF_VALUE: 38
PIF_VALUE: 39
PIF_VALUE: 39
PIF_VALUE: 41
PIF_VALUE: 23
PIF_VALUE: 45
PIF_VALUE: 41
PIF_VALUE: 40
PIF_VALUE: 30
PIF_VALUE: 39
PIF_VALUE: 29
PIF_VALUE: 37
PIF_VALUE: 22
PIF_VALUE: 36
PIF_VALUE: 41
PIF_VALUE: 1
PIF_VALUE: 36
PIF_VALUE: 22
PIF_VALUE: 39
PIF_VALUE: 39
PIF_VALUE: 40
PIF_VALUE: 23
PIF_VALUE: 37
PIF_VALUE: 34
PIF_VALUE: 1

## 2022-04-21 ASSESSMENT — PAIN SCALES - GENERAL
PAINLEVEL_OUTOF10: 0
PAINLEVEL_OUTOF10: 0

## 2022-04-21 ASSESSMENT — LIFESTYLE VARIABLES: SMOKING_STATUS: 1

## 2022-04-21 ASSESSMENT — ENCOUNTER SYMPTOMS: TACHYPNEA: 1

## 2022-04-21 NOTE — PLAN OF CARE
Problem: Falls - Risk of:  Goal: Will remain free from falls  Description: Will remain free from falls  Outcome: Progressing  Goal: Absence of physical injury  Description: Absence of physical injury  Outcome: Progressing

## 2022-04-21 NOTE — PROGRESS NOTES
Pt alert and oriented, VSS, O2 sat 97% on 4L. Shift assessment completed and documented. Pt NPO for surgery today. Pt denies any needs at this time. Call light within reach. [employer] camera on for safety.

## 2022-04-21 NOTE — ANESTHESIA PRE PROCEDURE
Department of Anesthesiology  Preprocedure Note       Name:  Louis Lynch   Age:  74 y.o.  :  1955                                          MRN:  [id number]         Date:  2022      Surgeon: Jessica Llamas):  Noe Jenkins MD    Procedure: Procedure(s):  BRONCHOSCOPY WITH ENDOBRONCHIAL YAG LASER WITH POSSIBLE STENT PLACEMENT    Medications prior to admission:   Prior to Admission medications    Medication Sig Start Date End Date Taking?  Authorizing Provider   TECHLITE PEN NEEDLES 31G X 8 MM MISC USE 1 FIVE TIMES DAILY 4/3/22   Historical Provider, MD   levothyroxine (SYNTHROID) 200 MCG tablet  22   Historical Provider, MD   EUTHYROX 50 MCG tablet  22   Historical Provider, MD   guaiFENesin (MUCINEX) 600 MG extended release tablet Take 1 tablet by mouth 2 times daily 4/15/22   Riki Giles MD   predniSONE (DELTASONE) 10 MG tablet 30 mg x 3 days, 20 mg x 3 days, 10 mg x 3 days then stop 4/15/22   Riki Giles MD   apixaban (ELIQUIS) 2.5 MG TABS tablet Take 1 tablet by mouth 2 times daily 4/15/22   Riki Giles MD   insulin detemir (LEVEMIR) 100 UNIT/ML injection vial Inject into the skin nightly    Historical Provider, MD   insulin aspart (NOVOLOG) 100 UNIT/ML injection vial Inject into the skin 3 times daily (before meals)     Historical Provider, MD   torsemide (DEMADEX) 100 MG tablet TAKE 1/2 (ONE-HALF) TABLET BY MOUTH ONCE DAILY 3/12/22   Historical Provider, MD   potassium chloride (MICRO-K) 10 MEQ extended release capsule TAKE 1 CAPSULE BY MOUTH TWICE DAILY 22   Historical Provider, MD   HYDROcodone-acetaminophen ([facility]) 5-325 MG per tablet TAKE 1 TABLET BY MOUTH TWICE DAILY AS NEEDED  Patient not taking: Reported on 2022 3/28/22   Historical Provider, MD   hydrALAZINE (APRESOLINE) 25 MG tablet TAKE 1 TABLET BY MOUTH THREE TIMES DAILY 3/23/22   Historical Provider, MD   digoxin (LANOXIN) 125 MCG tablet Take 0.125 mg by mouth daily 21   Historical Provider, MD   tiotropium (SPIRIVA RESPIMAT) 2.5 MCG/ACT AERS inhaler Inhale 2 puffs into the lungs daily 4/7/22   Alexis Lambert MD   atorvastatin (LIPITOR) 20 MG tablet Take 20 mg by mouth daily  1/19/19   Historical Provider, MD   buPROPion [facility] SR) 100 MG extended release tablet Take 100 mg by mouth 2 times daily  11/21/18   Historical Provider, MD   CARTIA  MG extended release capsule Take 240 mg by mouth daily  11/21/18   Historical Provider, MD   levothyroxine (SYNTHROID) 150 MCG tablet Take 150 mcg by mouth Daily  1/19/19   Historical Provider, MD   lisinopril (PRINIVIL;ZESTRIL) 20 MG tablet Take 20 mg by mouth  1/19/19   Historical Provider, MD   metFORMIN (GLUCOPHAGE) 850 MG tablet Take 850 mg by mouth daily (with breakfast)   Patient not taking: Reported on 4/19/2022 1/19/19   Historical Provider, MD   metoprolol tartrate (LOPRESSOR) 25 MG tablet Take 25 mg by mouth 2 times daily  11/10/18   Historical Provider, MD   nitroGLYCERIN (NITROSTAT) 0.4 MG SL tablet Place 0.4 mg under the tongue every 5 minutes as needed  1/4/19   Historical Provider, MD   nortriptyline (PAMELOR) 10 MG capsule Take 10 mg by mouth nightly  1/19/19   Historical Provider, MD   Omega-3 Fatty Acids (FISH OIL) 1000 MG CAPS Take 3,000 mg by mouth 3 times daily    Historical Provider, MD   aspirin 81 MG tablet Take 81 mg by mouth daily    Historical Provider, MD       Current medications:    Current Facility-Administered Medications   Medication Dose Route Frequency Provider Last Rate Last Admin    ipratropium-albuterol (DUONEB) nebulizer solution 1 ampule  1 ampule Inhalation BID Dc Pacheco MD        insulin lispro (HUMALOG) injection vial 0-3 Units  0-3 Units SubCUTAneous Nightly Dc Pacheco MD   1 Units at 04/20/22 2047    insulin lispro (HUMALOG) injection vial 0-6 Units  0-6 Units SubCUTAneous TID WC Dc Pacheco MD   2 Units at 04/20/22 1707    atorvastatin (LIPITOR) tablet 20 mg  20 mg Oral Daily Leopold Crandall, MD   20 mg at 04/20/22 1000    buPROPion [facility] SR) extended release tablet 100 mg  100 mg Oral BID Leopold Crandall, MD   100 mg at 04/20/22 2040    digoxin (LANOXIN) tablet 0.125 mg  0.125 mg Oral Daily Leopold Crandall, MD   0.125 mg at 04/20/22 1001    dilTIAZem (CARDIZEM CD) extended release capsule 240 mg  240 mg Oral Daily Leopold Crandall, MD   240 mg at 04/20/22 1000    insulin glargine (LANTUS) injection vial 10 Units  10 Units SubCUTAneous Nightly Leopold Crandall, MD   10 Units at 04/20/22 2047    levothyroxine (SYNTHROID) tablet 150 mcg  150 mcg Oral Daily Leopold Crandall, MD   150 mcg at 04/20/22 0550    lisinopril (PRINIVIL;ZESTRIL) tablet 20 mg  20 mg Oral Daily Leopold Crandall, MD   20 mg at 04/20/22 1001    metoprolol tartrate (LOPRESSOR) tablet 25 mg  25 mg Oral BID Leopold Crandall, MD   25 mg at 04/20/22 1001    mupirocin (BACTROBAN) 2 % ointment   Nasal BID Leopold Crandall, MD   Given at 04/20/22 2051    nortriptyline (PAMELOR) capsule 10 mg  10 mg Oral Nightly Leopold Crandall, MD   10 mg at 04/20/22 2040    predniSONE (DELTASONE) tablet 30 mg  30 mg Oral Daily Leopold Crandall, MD   30 mg at 04/20/22 1000    sodium chloride flush 0.9 % injection 5-40 mL  5-40 mL IntraVENous 2 times per day Leopold Crandall, MD   10 mL at 04/21/22 0836    torsemide (DEMADEX) tablet 50 mg  50 mg Oral Daily Leopold Crandall, MD   50 mg at 04/20/22 1000    0.9 % sodium chloride infusion   IntraVENous PRN Leopold Crandall, MD        acetaminophen (TYLENOL) tablet 650 mg  650 mg Oral Q6H PRN Leopold Crandall, MD   650 mg at 04/19/22 2300    Or    acetaminophen (TYLENOL) suppository 650 mg  650 mg Rectal Q6H PRN Leopold Crandall, MD        dextrose 5 % solution  100 mL/hr IntraVENous PRN Leopold Crandall, MD        dextrose bolus (hypoglycemia) 10% 125 mL  125 mL IntraVENous PRN Leopold Crandall, MD        Or   [address] [place] [address] dextrose bolus (hypoglycemia) 10% 250 mL  250 mL IntraVENous PRN Diallo Serrano MD        glucagon (rDNA) injection 1 mg  1 mg IntraMUSCular GISSELL Serrano MD        glucose chewable tablet 4 each  4 each Oral PRN Diallo Serrano MD        ondansetron (ZOFRAN-ODT) disintegrating tablet 4 mg  4 mg Oral Q8H PRN Diallo Serrano MD        Or    ondansetron (ZOFRAN) injection 4 mg  4 mg IntraVENous Q6H PRN Diallo Serrano MD        polyethylene glycol (GLYCOLAX) packet 17 g  17 g Oral Daily PRN Diallo Serrano MD        sodium chloride flush 0.9 % injection 5-40 mL  5-40 mL IntraVENous PRJEFFERY Serrano MD        glucose (GLUTOSE) 40 % oral gel 15 g  15 g Oral PRN Diallo Serrano MD        glucagon (rDNA) injection 1 mg  1 mg IntraMUSCular PRJEFFERY Serrano MD        dextrose 5 % solution  100 mL/hr IntraVENous PRN Diallo Serrano MD        dextrose bolus (hypoglycemia) 10% 125 mL  125 mL IntraVENous PRJEFFERY Serrano MD        Or    dextrose bolus (hypoglycemia) 10% 250 mL  250 mL IntraVENous PRN Diallo Serrano MD           Allergies:     Allergies   Allergen Reactions    Codeine        Problem List:    Patient Active Problem List   Diagnosis Code    Spinal stenosis of lumbar region with neurogenic claudication M48.062    Mediastinal adenopathy R59.0    Lung mass R91.8    Multiple tracheobronchial mucus plugs T17.800A    Mass of left lung R91.8    Acute on chronic respiratory failure with hypoxia ([facility]) J96.21    Acute hypercapnic respiratory failure ([facility]) J96.02    COPD with acute exacerbation ([facility]) J44.1    Acute encephalopathy G93.40    Recurrent left pleural effusion N39    Metabolic encephalopathy Y43.10    Atrial fibrillation, permanent ([facility]) I48.21    Acute respiratory failure with hypoxia and hypercapnia ([facility]) J96.01, J96.02    Lung collapse J98.19    Postobstructive pneumonia J18.9    Mucus plugging of bronchi T17.500A    Endobronchial mass R91.8    Hypoxia R09.02    COPD (chronic obstructive pulmonary disease) (HCC) J44.9    Acute hypoxemic respiratory failure (HCC) J96.01    Squamous cell carcinoma of left lung (HCC) C34.92    Acute on chronic respiratory failure with hypoxia and hypercapnia (HCC) J96.21, J96.22    Chronic diastolic congestive heart failure (HCC) I50.32       Past Medical History:        Diagnosis Date    [facility]fib [facility]     CAD (coronary artery disease)     CHF (congestive heart failure) (HCC)     COPD (chronic obstructive pulmonary disease) (HCC)     CVA (cerebral vascular accident) ([facility]     Diabetes mellitus ([facility]     Hyperlipidemia     Hypertension     Thyroid disease        Past Surgical History:        Procedure Laterality Date    BRONCHOSCOPY N/A 4/12/2022    EBUS WF W/ANES.  (11:00) performed by Ashley Haas MD at [address]  4/12/2022    BRONCHOSCOPY ALVEOLAR LAVAGE performed by Ashley Haas MD at [address]  4/12/2022    BRONCHOSCOPY BIOPSY BRONCHUS performed by Ashley Haas MD at [address]  4/12/2022    BRONCHOSCOPY/TRANSBRONCHIAL NEEDLE BIOPSY performed by Ashley Haas MD at [address] N/A 4/13/2022    [address] [place] [address] performed by Ashley Haas MD at [facility] History:    Social History     Tobacco Use    Smoking status: Current Every Day Smoker     Packs/day: 1.00     Years: 40.00     Pack years: 40.00    Smokeless tobacco: Never Used   Substance Use Topics    Alcohol use: Not Currently                                Ready to quit: Not Answered  Counseling given: Not Answered      Vital Signs (Current):   Vitals:    04/21/22 0400 04/21/22 0819 04/21/22 0846 04/21/22 1101   BP: 134/62  116/61 106/63   Pulse: (!) 36  67 71   Resp: 17  18 18   Temp: 98.6 °F (37 °C)  97.6 °F (36.4 °C) 98 °F (36.7 °C)   TempSrc: Oral  Oral Oral   SpO2: 99% 96% 97% 98% BP Readings from Last 3 Encounters:   04/21/22 106/63   04/19/22 (!) 151/94   04/15/22 104/80       NPO Status:                                                                                 BMI:   Wt Readings from Last 3 Encounters:   04/19/22 275 lb (124.7 kg)   04/15/22 268 lb 15.4 oz (122 kg)   04/12/22 272 lb (123.4 kg)     There is no height or weight on file to calculate BMI.    CBC:   Lab Results   Component Value Date    WBC 16.2 04/21/2022    RBC 4.83 04/21/2022    HGB 13.8 04/21/2022    HCT 44.0 04/21/2022    MCV 91.1 04/21/2022    RDW 16.7 04/21/2022     04/21/2022       CMP:   Lab Results   Component Value Date     04/21/2022    K 3.8 04/21/2022    K 4.9 04/13/2022    CL 97 04/21/2022    CO2 29 04/21/2022    BUN 19 04/21/2022    CREATININE 0.7 04/21/2022    GFRAA >60 04/21/2022    AGRATIO 1.0 04/13/2022    LABGLOM >60 04/21/2022    GLUCOSE 86 04/21/2022    PROT 6.8 04/13/2022    CALCIUM 8.7 04/21/2022    BILITOT 0.3 04/13/2022    ALKPHOS 186 04/13/2022    AST 19 04/13/2022    ALT 23 04/13/2022       POC Tests:   Recent Labs     04/21/22  1109   POCGLU 86       Coags:   Lab Results   Component Value Date    PROTIME 12.9 04/12/2022    INR 1.14 04/12/2022       HCG (If Applicable): No results found for: PREGTESTUR, PREGSERUM, HCG, HCGQUANT     ABGs:   Lab Results   Component Value Date    PHART 7.394 04/17/2022    PO2ART 66.6 04/17/2022    JTY8WVI 51.1 04/17/2022    NJC7VCU 30.5 04/17/2022    BEART 4.5 04/17/2022    S8REQJVE 92.9 04/17/2022        Type & Screen (If Applicable):  No results found for: LABABO, LABRH    Drug/Infectious Status (If Applicable):  No results found for: HIV, HEPCAB    COVID-19 Screening (If Applicable):   Lab Results   Component Value Date    COVID19 Not Detected 04/16/2022           Anesthesia Evaluation   no history of anesthetic complications:   Airway: Mallampati: III  TM distance: <3 FB   Neck ROM: limited  Mouth opening: > = 3 FB Dental:    (+) upper dentures and lower dentures      Pulmonary:   (+) pneumonia (post obstructive):  COPD:  current smoker                          ROS comment: tracheo bronchial plugging and lung mass, hypoxia   Cardiovascular:    (+) hypertension:, CAD:, dysrhythmias: atrial fibrillation, CHF: diastolic,                   Neuro/Psych:   (+) CVA:, neuromuscular disease:,             GI/Hepatic/Renal: Neg GI/Hepatic/Renal ROS            Endo/Other:    (+) DiabetesType II DM, using insulin, hypothyroidism::., .                 Abdominal:             Vascular: Other Findings:             Anesthesia Plan      general     ASA 4     (Pt agrees to risks, benefits and alternatives of GETA. I discussed his increased risk of remaining intubated and requiring post operative ventilation  Questions answered. Willing to proceed with plan.)  Induction: intravenous. Anesthetic plan and risks discussed with patient.                       Ayse Durham MD   4/21/2022

## 2022-04-21 NOTE — ANESTHESIA POSTPROCEDURE EVALUATION
Department of Anesthesiology  Postprocedure Note    Patient: Padmaja Tolentino  MRN: [id number]  YOB: 1955  Date of evaluation: 4/21/2022  Time:  5:36 PM     Procedure Summary     Date: 04/21/22 Room / Location: [address]    Anesthesia Start: 1310 Anesthesia Stop: 9162    Procedure: BRONCHOSCOPY WITH ENDOBRONCHIAL YAG LASER AND CHEST TUBE PLACEMENT (N/A Bronchus) Diagnosis: (-)    Surgeons: Iman Khalil MD Responsible Provider: Magno Eaton MD    Anesthesia Type: general ASA Status: 4          Anesthesia Type: general    Jose David Phase I:      Jose David Phase II:      Last vitals: Reviewed and per EMR flowsheets. Anesthesia Post Evaluation    Patient location during evaluation: ICU  Patient participation: complete - patient participated  Level of consciousness: awake and alert  Airway patency: patent  Nausea & Vomiting: no nausea and no vomiting  Complications: no  Cardiovascular status: blood pressure returned to baseline  Respiratory status: acceptable  Hydration status: euvolemic  Comments: VSS on transfer to phase 2 recovery. No anesthetic complications.

## 2022-04-21 NOTE — CARE COORDINATION
Chart reviewed day 2 plan for OR this afternoon. CM will follow PO course for needs.   Lian Linares RN

## 2022-04-21 NOTE — PLAN OF CARE
Problem: Falls - Risk of:  Goal: Will remain free from falls  Description: Will remain free from falls  4/21/2022 1059 by Cynthia Gotti RN  Outcome: Progressing  4/21/2022 0154 by Moody Blas RN  Outcome: Progressing  Goal: Absence of physical injury  Description: Absence of physical injury  4/21/2022 1059 by Cynthia Gotti RN  Outcome: Progressing  4/21/2022 0154 by Moody Blas RN  Outcome: Progressing

## 2022-04-21 NOTE — RT PROTOCOL NOTE
RT Inhaler-Nebulizer Bronchodilator Protocol Note    There is a bronchodilator order in the chart from a provider indicating to follow the RT Bronchodilator Protocol and there is an Initiate RT Inhaler-Nebulizer Bronchodilator Protocol order as well (see protocol at bottom of note). CXR Findings:  No results found. The findings from the last RT Protocol Assessment were as follows:   History Pulmonary Disease: Chronic pulmonary disease  Respiratory Pattern: Dyspnea on exertion or RR 21-25 bpm  Breath Sounds: Slightly diminished and/or crackles  Cough: Strong, spontaneous, non-productive  Indication for Bronchodilator Therapy: Decreased or absent breath sounds  Bronchodilator Assessment Score: 6    Aerosolized bronchodilator medication orders have been revised according to the RT Inhaler-Nebulizer Bronchodilator Protocol below. Respiratory Therapist to perform RT Therapy Protocol Assessment initially then follow the protocol. Repeat RT Therapy Protocol Assessment PRN for score 0-3 or on second treatment, BID, and PRN for scores above 3. No Indications - adjust the frequency to every 6 hours PRN wheezing or bronchospasm, if no treatments needed after 48 hours then discontinue using Per Protocol order mode. If indication present, adjust the RT bronchodilator orders based on the Bronchodilator Assessment Score as indicated below. Use Inhaler orders unless patient has one or more of the following: on home nebulizer, not able to hold breath for 10 seconds, is not alert and oriented, cannot activate and use MDI correctly, or respiratory rate 25 breaths per minute or more, then use the equivalent nebulizer order(s) with same Frequency and PRN reasons based on the score. If a patient is on this medication at home then do not decrease Frequency below that used at home.     0-3 - enter or revise RT bronchodilator order(s) to equivalent RT Bronchodilator order with Frequency of every 4 hours PRN for wheezing or increased work of breathing using Per Protocol order mode. 4-6 - enter or revise RT Bronchodilator order(s) to two equivalent RT bronchodilator orders with one order with BID Frequency and one order with Frequency of every 4 hours PRN wheezing or increased work of breathing using Per Protocol order mode. 7-10 - enter or revise RT Bronchodilator order(s) to two equivalent RT bronchodilator orders with one order with TID Frequency and one order with Frequency of every 4 hours PRN wheezing or increased work of breathing using Per Protocol order mode. 11-13 - enter or revise RT Bronchodilator order(s) to one equivalent RT bronchodilator order with QID Frequency and an Albuterol order with Frequency of every 4 hours PRN wheezing or increased work of breathing using Per Protocol order mode. Greater than 13 - enter or revise RT Bronchodilator order(s) to one equivalent RT bronchodilator order with every 4 hours Frequency and an Albuterol order with Frequency of every 2 hours PRN wheezing or increased work of breathing using Per Protocol order mode. RT to enter RT Home Evaluation for COPD & MDI Assessment order using Per Protocol order mode.     Electronically signed by Magui Osborne RCP on 4/21/2022 at 9:54 AM

## 2022-04-21 NOTE — PROGRESS NOTES
CVTS Thoracic Progress Note:          CC:  Post op follow up     Subj: awake, alert, sitting up in chair, appears comfortable. Conversational dyspnea still present. Obj:    Blood pressure 106/63, pulse 71, temperature 98 °F (36.7 °C), temperature source Oral, resp. rate 18, SpO2 98 %. Lungs clear on right, absent on left   Remains on 4L per NC, sat 98%   Abdomen rounded, soft, non-tender   -850-500= 2175 ml in 24 hrs; Cr 0.7     Diagnostics:   Recent Labs     04/19/22  0421 04/20/22  0549 04/21/22  0539   WBC 11.8* 18.0* 16.2*   HGB 12.9* 13.3* 13.8   HCT 40.3* 41.7 44.0    317 265                                                                  Recent Labs     04/19/22  0421 04/20/22  0549 04/21/22  0539    136 139   K 4.8 4.4 3.8   CL 98* 98* 97*   CO2 31 31 29   BUN 20 21* 19   CREATININE 0.7* 0.7* 0.7*   GLUCOSE 205* 156* 86     CXR: 4/17/22   Impression   1. Worsening left-sided atelectasis/pneumonia and increasing parapneumonic   effusion. 2. Developing right basilar pneumonia. CT Chest: 4/19/22   FINDINGS:   Mediastinum: There is no mediastinal adenopathy.  The heart is of normal   size.  Coronary artery calcifications are noted.       Lungs/pleura: There is a large left pleural effusion, associated with   compressive atelectasis of the whole left lung, and complete opacification of   the left hemithorax. Lucetta Record is no evidence for shift of the midline   structures to the right. Lucetta Record is a gap between the 8th and 9th ribs   associated with a nonunited fracture of the posterior aspect of the 9th rib.    Through the gap, there is for any a shin of fluid into the chest wall.       There is a calcified granuloma noted in the the left lung, with calcified   left hilar lymph nodes.       There is complete obliteration of the midportion of the left mainstem   bronchus with non visualization of the rest of the left bronchial tree.       There is a small right pleural effusion.  Some mild ground-glass infiltrates   are noted in the the right upper lobe.     Upper Abdomen: Calcified granulomas are noted in the the liver and spleen.       Soft Tissues/Bon unremarkable es:        Assess/Plan:   Plan for the OR this afternoon   Discussed plan with pt's daughter Erik Jones) per his request.   She is working and will not be able to be present for surgery - discussed with OR staff and they will give phone updates. Pt denied any further questions this morning.    Keep NPO.   ______________________________________________________    MELISSA Black - CNP  4/21/2022  11:36 AM

## 2022-04-21 NOTE — PROGRESS NOTES
04/21/22 0954   RT Protocol   History Pulmonary Disease 2   Respiratory pattern 2   Breath sounds 2   Cough 0   Indications for Bronchodilator Therapy Decreased or absent breath sounds   Bronchodilator Assessment Score 6

## 2022-04-22 LAB
ANION GAP SERPL CALCULATED.3IONS-SCNC: 5 MMOL/L (ref 3–16)
BASOPHILS ABSOLUTE: 0 K/UL (ref 0–0.2)
BASOPHILS RELATIVE PERCENT: 0.4 %
BUN BLDV-MCNC: 15 MG/DL (ref 7–20)
CALCIUM SERPL-MCNC: 7.7 MG/DL (ref 8.3–10.6)
CHLORIDE BLD-SCNC: 100 MMOL/L (ref 99–110)
CO2: 36 MMOL/L (ref 21–32)
CREAT SERPL-MCNC: 0.6 MG/DL (ref 0.8–1.3)
EOSINOPHILS ABSOLUTE: 0 K/UL (ref 0–0.6)
EOSINOPHILS RELATIVE PERCENT: 0.3 %
GFR AFRICAN AMERICAN: >60
GFR NON-AFRICAN AMERICAN: >60
GLUCOSE BLD-MCNC: 163 MG/DL (ref 70–99)
GLUCOSE BLD-MCNC: 228 MG/DL (ref 70–99)
GLUCOSE BLD-MCNC: 307 MG/DL (ref 70–99)
HCT VFR BLD CALC: 44.9 % (ref 40.5–52.5)
HEMOGLOBIN: 13.9 G/DL (ref 13.5–17.5)
LYMPHOCYTES ABSOLUTE: 0.2 K/UL (ref 1–5.1)
LYMPHOCYTES RELATIVE PERCENT: 1.7 %
MCH RBC QN AUTO: 29 PG (ref 26–34)
MCHC RBC AUTO-ENTMCNC: 30.9 G/DL (ref 31–36)
MCV RBC AUTO: 93.7 FL (ref 80–100)
MONOCYTES ABSOLUTE: 0.5 K/UL (ref 0–1.3)
MONOCYTES RELATIVE PERCENT: 4.1 %
NEUTROPHILS ABSOLUTE: 11.3 K/UL (ref 1.7–7.7)
NEUTROPHILS RELATIVE PERCENT: 93.5 %
PDW BLD-RTO: 17.1 % (ref 12.4–15.4)
PERFORMED ON: ABNORMAL
PERFORMED ON: ABNORMAL
PLATELET # BLD: 214 K/UL (ref 135–450)
PMV BLD AUTO: 7.5 FL (ref 5–10.5)
POTASSIUM SERPL-SCNC: 4.3 MMOL/L (ref 3.5–5.1)
RBC # BLD: 4.79 M/UL (ref 4.2–5.9)
REASON FOR REJECTION: NORMAL
REJECTED TEST: NORMAL
SODIUM BLD-SCNC: 141 MMOL/L (ref 136–145)
WBC # BLD: 12.1 K/UL (ref 4–11)

## 2022-04-22 PROCEDURE — 99024 POSTOP FOLLOW-UP VISIT: CPT | Performed by: NURSE PRACTITIONER

## 2022-04-22 PROCEDURE — 80048 BASIC METABOLIC PNL TOTAL CA: CPT

## 2022-04-22 PROCEDURE — 94761 N-INVAS EAR/PLS OXIMETRY MLT: CPT

## 2022-04-22 PROCEDURE — 6360000002 HC RX W HCPCS: Performed by: NURSE PRACTITIONER

## 2022-04-22 PROCEDURE — 94669 MECHANICAL CHEST WALL OSCILL: CPT

## 2022-04-22 PROCEDURE — 2580000003 HC RX 258: Performed by: ANESTHESIOLOGY

## 2022-04-22 PROCEDURE — 85025 COMPLETE CBC W/AUTO DIFF WBC: CPT

## 2022-04-22 PROCEDURE — 2700000000 HC OXYGEN THERAPY PER DAY

## 2022-04-22 PROCEDURE — 6370000000 HC RX 637 (ALT 250 FOR IP): Performed by: INTERNAL MEDICINE

## 2022-04-22 PROCEDURE — 6370000000 HC RX 637 (ALT 250 FOR IP)

## 2022-04-22 PROCEDURE — 94640 AIRWAY INHALATION TREATMENT: CPT

## 2022-04-22 PROCEDURE — 36415 COLL VENOUS BLD VENIPUNCTURE: CPT

## 2022-04-22 PROCEDURE — 2000000000 HC ICU R&B

## 2022-04-22 RX ORDER — METOPROLOL SUCCINATE 25 MG/1
25 TABLET, EXTENDED RELEASE ORAL DAILY
Status: ON HOLD | COMMUNITY
End: 2022-05-02 | Stop reason: HOSPADM

## 2022-04-22 RX ORDER — TORSEMIDE 100 MG/1
50 TABLET ORAL DAILY
Status: DISCONTINUED | OUTPATIENT
Start: 2022-04-23 | End: 2022-05-03 | Stop reason: HOSPADM

## 2022-04-22 RX ORDER — IPRATROPIUM BROMIDE AND ALBUTEROL SULFATE 2.5; .5 MG/3ML; MG/3ML
1 SOLUTION RESPIRATORY (INHALATION)
Status: DISCONTINUED | OUTPATIENT
Start: 2022-04-22 | End: 2022-04-24

## 2022-04-22 RX ORDER — FUROSEMIDE 10 MG/ML
40 INJECTION INTRAMUSCULAR; INTRAVENOUS EVERY 8 HOURS
Status: DISPENSED | OUTPATIENT
Start: 2022-04-22 | End: 2022-04-23

## 2022-04-22 RX ADMIN — INSULIN LISPRO 2 UNITS: 100 INJECTION, SOLUTION INTRAVENOUS; SUBCUTANEOUS at 16:24

## 2022-04-22 RX ADMIN — METOPROLOL TARTRATE 25 MG: 25 TABLET, FILM COATED ORAL at 20:20

## 2022-04-22 RX ADMIN — BUPROPION HYDROCHLORIDE 100 MG: 100 TABLET, EXTENDED RELEASE ORAL at 09:02

## 2022-04-22 RX ADMIN — MUPIROCIN: 20 OINTMENT TOPICAL at 09:18

## 2022-04-22 RX ADMIN — DILTIAZEM HYDROCHLORIDE 240 MG: 240 CAPSULE, COATED, EXTENDED RELEASE ORAL at 09:14

## 2022-04-22 RX ADMIN — INSULIN GLARGINE 10 UNITS: 100 INJECTION, SOLUTION SUBCUTANEOUS at 20:17

## 2022-04-22 RX ADMIN — NORTRIPTYLINE HYDROCHLORIDE 10 MG: 10 CAPSULE ORAL at 20:20

## 2022-04-22 RX ADMIN — MUPIROCIN: 20 OINTMENT TOPICAL at 20:20

## 2022-04-22 RX ADMIN — DIGOXIN 0.12 MG: 125 TABLET ORAL at 09:03

## 2022-04-22 RX ADMIN — SODIUM CHLORIDE, PRESERVATIVE FREE 10 ML: 5 INJECTION INTRAVENOUS at 20:20

## 2022-04-22 RX ADMIN — PREDNISONE 30 MG: 20 TABLET ORAL at 09:14

## 2022-04-22 RX ADMIN — IPRATROPIUM BROMIDE AND ALBUTEROL SULFATE 1 AMPULE: .5; 2.5 SOLUTION RESPIRATORY (INHALATION) at 16:14

## 2022-04-22 RX ADMIN — FUROSEMIDE 40 MG: 10 INJECTION, SOLUTION INTRAMUSCULAR; INTRAVENOUS at 16:26

## 2022-04-22 RX ADMIN — BUPROPION HYDROCHLORIDE 100 MG: 100 TABLET, EXTENDED RELEASE ORAL at 20:20

## 2022-04-22 RX ADMIN — IPRATROPIUM BROMIDE AND ALBUTEROL SULFATE 1 AMPULE: .5; 2.5 SOLUTION RESPIRATORY (INHALATION) at 12:09

## 2022-04-22 RX ADMIN — FUROSEMIDE 40 MG: 10 INJECTION, SOLUTION INTRAMUSCULAR; INTRAVENOUS at 09:14

## 2022-04-22 RX ADMIN — ATORVASTATIN CALCIUM 20 MG: 10 TABLET, FILM COATED ORAL at 09:02

## 2022-04-22 RX ADMIN — LISINOPRIL 20 MG: 20 TABLET ORAL at 09:03

## 2022-04-22 RX ADMIN — IPRATROPIUM BROMIDE AND ALBUTEROL SULFATE 1 AMPULE: .5; 2.5 SOLUTION RESPIRATORY (INHALATION) at 19:50

## 2022-04-22 RX ADMIN — METOPROLOL TARTRATE 25 MG: 25 TABLET, FILM COATED ORAL at 09:02

## 2022-04-22 RX ADMIN — INSULIN LISPRO 2 UNITS: 100 INJECTION, SOLUTION INTRAVENOUS; SUBCUTANEOUS at 20:18

## 2022-04-22 RX ADMIN — IPRATROPIUM BROMIDE AND ALBUTEROL SULFATE 1 AMPULE: .5; 2.5 SOLUTION RESPIRATORY (INHALATION) at 07:55

## 2022-04-22 ASSESSMENT — PAIN SCALES - GENERAL
PAINLEVEL_OUTOF10: 0

## 2022-04-22 ASSESSMENT — PAIN DESCRIPTION - LOCATION: LOCATION: BACK

## 2022-04-22 ASSESSMENT — PAIN DESCRIPTION - ONSET: ONSET: ON-GOING

## 2022-04-22 ASSESSMENT — PAIN DESCRIPTION - FREQUENCY: FREQUENCY: CONTINUOUS

## 2022-04-22 ASSESSMENT — PAIN DESCRIPTION - PAIN TYPE: TYPE: CHRONIC PAIN

## 2022-04-22 NOTE — PLAN OF CARE
Pt alert and oriented x4. Turns self. Bathed, full linen change and externa catheter applied. Avasys remains in place. Lasix given. Insulin coverage for hyperglycemia. Fluids d/c'd d/t pre op order and being a thoracic patient. Denies pain. Problem: Falls - Risk of:  Goal: Will remain free from falls  Description: Will remain free from falls  4/22/2022 1712 by Ady White RN  Outcome: Progressing  4/22/2022 0815 by Tim Martínez RN  Outcome: Progressing  4/22/2022 0532 by Zak Nice RN  Outcome: Progressing  Goal: Absence of physical injury  Description: Absence of physical injury  4/22/2022 1712 by Ady White RN  Outcome: Progressing  4/22/2022 0532 by Zak Nice RN  Outcome: Progressing     Problem: Chronic Conditions and Co-morbidities  Goal: Patient's chronic conditions and co-morbidity symptoms are monitored and maintained or improved  4/22/2022 1712 by Ady White RN  Outcome: Progressing  4/22/2022 0532 by Zak Nice RN  Outcome: Progressing     Problem: Pain  Goal: Verbalizes/displays adequate comfort level or baseline comfort level  4/22/2022 1712 by Ady White RN  Outcome: Progressing  4/22/2022 0532 by Zak Nice RN  Outcome: Progressing     Problem: ABCDS Injury Assessment  Goal: Absence of physical injury  4/22/2022 1712 by Ady White RN  Outcome: Progressing  4/22/2022 0532 by Zak Nice RN  Outcome: Progressing     Problem: Discharge Planning  Goal: Discharge to home or other facility with appropriate resources  4/22/2022 1712 by Ady White RN  Outcome: Progressing  4/22/2022 0532 by Zak Nice RN  Outcome: Progressing     Problem: Skin/Tissue Integrity  Goal: Absence of new skin breakdown  Description: 1. Monitor for areas of redness and/or skin breakdown  2. Assess vascular access sites hourly  3. Every 4-6 hours minimum:  Change oxygen saturation probe site  4.   Every 4-6 hours:  If on nasal continuous positive airway pressure, respiratory therapy assess nares and determine need for appliance change or resting period.   4/22/2022 1712 by Elsy Bardales RN  Outcome: Progressing  4/22/2022 0532 by Arlington Curling, RN  Outcome: Progressing

## 2022-04-22 NOTE — PROGRESS NOTES
Hospitalist Progress Note    Date of Admission: 4/19/2022    Chief Complaint: Endobronchial mass    Hospital Course: 74 y.o. male who presented to [facility] with above complaints  Patient with PMH of SCC left lung, left mainstem endobronchial mass causing left lung collapse, postobstructive pneumonia, acute on chronic respiratory failure, hypercalcemia of malignancy, chronic A. fib, chronic diastolic CHF, COPD initially admitted to [facility] on 4/17/2022 for shortness of breath and respiratory failure. He was started on antibiotics for the postobstructive pneumonia. Patient refused fiberoptic bronchoscopy, refuses BiPAP. CT surgery was consulted for the endobronchial mass for debulking airway tumor and then patient wants to proceed with chemo/XRT. The patient is being transferred to [facility] for CT surgery consult, currently on 3 L oxygen, and in no acute distress. Subjective: Improved mentation. SOB has improved significantly since OR. Chest tube in place. Labs:   Recent Labs     04/20/22  0549 04/21/22  0539 04/22/22  0424   WBC 18.0* 16.2* 12.1*   HGB 13.3* 13.8 13.9   HCT 41.7 44.0 44.9    265 214     Recent Labs     04/20/22  0549 04/21/22  0539 04/22/22  0707    139 141   K 4.4 3.8 4.3   CL 98* 97* 100   CO2 31 29 36*   BUN 21* 19 15   CREATININE 0.7* 0.7* 0.6*   CALCIUM 8.9 8.7 7.7*     No results for input(s): AST, ALT, BILIDIR, BILITOT, ALKPHOS in the last 72 hours. No results for input(s): INR in the last 72 hours. Physical Exam Performed:    /64   Pulse 80   Temp 98.2 °F (36.8 °C) (Axillary)   Resp 20   Wt 230 lb 9.6 oz (104.6 kg)   SpO2 100%   BMI 31.28 kg/m²       General appearance:  No apparent distress, appears stated age and cooperative. HEENT:  Normal cephalic, atraumatic without obvious deformity. Pupils equal, round, and reactive to light. Extra ocular muscles intact. Conjunctivae/corneas clear.   Neck: Supple, with full range of motion. No jugular venous distention. Trachea midline. Respiratory:  Normal respiratory effort. Diminished breath sounds in the left lung fields, no wheezing or rhonchi. Cardiovascular:  Regular rate and rhythm with normal S1/S2 without murmurs, rubs or gallops. Abdomen: Soft, non-tender, non-distended with normal bowel sounds. Musculoskeletal:  No clubbing, cyanosis bilaterally. Full range of motion without deformity. BLE edema 1+  Skin: Skin color, texture, turgor normal.  No rashes or lesions. Neurologic:  Neurovascularly intact without any focal sensory/motor deficits. Cranial nerves: II-XII intact, grossly non-focal.  Psychiatric:  Alert and better oriented.    Capillary Refill: Brisk, 3 seconds, normal  Peripheral Pulses: +2 palpable, equal bilaterally     Assessment/Plan:    Active Hospital Problems    Diagnosis     Small cell lung cancer ([facility] [address] [C34.90]      Priority: Medium    Acute on chronic respiratory failure with hypoxia and hypercapnia (HCC) [J96.21, J96.22]     Squamous cell carcinoma of left lung (HCC) [C34.92]     COPD (chronic obstructive pulmonary disease) (HCC) [J44.9]     Endobronchial mass [R91.8]     Postobstructive pneumonia [J18.9]     Lung collapse [J98.19]     Atrial fibrillation, permanent (HCC) [I48.21]     COPD with acute exacerbation (HCC) [J44.1]      SCC left lung with left mainstem endobronchial mass causing left lung collapse/postobstructive pneumonia  SqCC left lower lobe/lingula - 12 cm mass with positive station 7 lymph node - stage IIIA  Plan  -Possible debulking/laser therapy per CTS, require 5 days off of Eliquis, last dose was 4/15/22  -CT surgery consulted; OR 4/20 for laser treatment and chest tube placement  -Plan to F/w Dr. Janina Bartholomew next week to plan for cancer treatment outpatient for subsequent XRT/chemo    Acute on chronic respiratory failure with hypoxia and hypercapnia  COPD with acute exacerbation  Postobstructive pneumonia  Lung collapse  -BiPAP 16/8 HS and PRN - patient has been refusing   -Continue supplemental oxygen, currently on 4 LPM, wean as tolerated  -Continue prednisone p.o.  -Completed IV Levaquin course  -Continue duo nebs, inhaled bronchodilators  -Chest tube as above    Tobacco abuse-counseled cessation    Atrial fibrillation, permanent   -Eliquis held for surgery; resume when ok with CT Surgery  -Continue Cardizem CD and digoxin    HLD-resume statin    Hypothyroidism-clinically euthyroid, resume Synthroid    HTN-controlled, resume home medication regimen    Chronic diastolic CHF-euvolemic. Post op diuresis per CT Surgery.      DM2-stable, resume Lantus, SSI, Accu-Cheks    Chronic anxiety-mood stable, resume Wellbutrin    DVT Prophylaxis: Resume Eliquis when ok with CT surgery post op  Diet: Diet NPO  Code Status: Full Code  PT/OT Eval Status: Pending     Dispo - ICU    Navid Wong MD

## 2022-04-22 NOTE — PROGRESS NOTES
Hospitalist Progress Note    Date of Admission: 4/19/2022    Chief Complaint: Endobronchial mass    Hospital Course: 74 y.o. male who presented to [facility] with above complaints  Patient with PMH of SCC left lung, left mainstem endobronchial mass causing left lung collapse, postobstructive pneumonia, acute on chronic respiratory failure, hypercalcemia of malignancy, chronic A. fib, chronic diastolic CHF, COPD initially admitted to [facility] on 4/17/2022 for shortness of breath and respiratory failure. He was started on antibiotics for the postobstructive pneumonia. Patient refused fiberoptic bronchoscopy, refuses BiPAP. CT surgery was consulted for the endobronchial mass for debulking airway tumor and then patient wants to proceed with chemo/XRT. The patient is being transferred to [facility] for CT surgery consult, currently on 3 L oxygen, and in no acute distress. Subjective: Limited historian. SOB stable. Plan for OR. Labs:   Recent Labs     04/19/22  0421 04/20/22  0549 04/21/22  0539   WBC 11.8* 18.0* 16.2*   HGB 12.9* 13.3* 13.8   HCT 40.3* 41.7 44.0    317 265     Recent Labs     04/19/22  0421 04/20/22  0549 04/21/22  0539    136 139   K 4.8 4.4 3.8   CL 98* 98* 97*   CO2 31 31 29   BUN 20 21* 19   CREATININE 0.7* 0.7* 0.7*   CALCIUM 8.8 8.9 8.7     No results for input(s): AST, ALT, BILIDIR, BILITOT, ALKPHOS in the last 72 hours. No results for input(s): INR in the last 72 hours. Physical Exam Performed:    BP (!) 92/48   Pulse 83   Temp 97.9 °F (36.6 °C) (Axillary)   Resp 24   SpO2 95%       General appearance:  No apparent distress, appears stated age and cooperative. HEENT:  Normal cephalic, atraumatic without obvious deformity. Pupils equal, round, and reactive to light. Extra ocular muscles intact. Conjunctivae/corneas clear. Neck: Supple, with full range of motion. No jugular venous distention. Trachea midline.   Respiratory:  Normal respiratory effort. Diminished breath sounds in the left lung fields, no wheezing or rhonchi. Cardiovascular:  Regular rate and rhythm with normal S1/S2 without murmurs, rubs or gallops. Abdomen: Soft, non-tender, non-distended with normal bowel sounds. Musculoskeletal:  No clubbing, cyanosis bilaterally. Full range of motion without deformity. BLE edema 1+  Skin: Skin color, texture, turgor normal.  No rashes or lesions. Neurologic:  Neurovascularly intact without any focal sensory/motor deficits. Cranial nerves: II-XII intact, grossly non-focal.  Psychiatric:  Drowsy, oriented, but limited insight.    Capillary Refill: Brisk,3 seconds, normal  Peripheral Pulses: +2 palpable, equal bilaterally     Assessment/Plan:    Active Hospital Problems    Diagnosis     Small cell lung cancer ([facility] [address] [C34.90]      Priority: Medium    Acute on chronic respiratory failure with hypoxia and hypercapnia (HCC) [J96.21, J96.22]     Squamous cell carcinoma of left lung (HCC) [C34.92]     COPD (chronic obstructive pulmonary disease) (HCC) [J44.9]     Endobronchial mass [R91.8]     Postobstructive pneumonia [J18.9]     Lung collapse [J98.19]     Atrial fibrillation, permanent (HCC) [I48.21]     COPD with acute exacerbation (HCC) [J44.1]      SCC left lung with left mainstem endobronchial mass causing left lung collapse/postobstructive pneumonia  SqCC left lower lobe/lingula - 12 cm mass with positive station 7 lymph node - stage IIIA  Plan  -Possible debulking/laser therapy per CTS, require 5 days off of Eliquis, last dose was 4/15/22  -CT surgery consulted; plan for OR today  -Plan to F/w Dr. Charles García next week to plan for cancer treatment outpatient for subsequent XRT/chemo    Acute on chronic respiratory failure with hypoxia and hypercapnia  COPD with acute exacerbation  Postobstructive pneumonia  Lung collapse  -BiPAP 16/8 HS and PRN - patient has been refusing   -Continue supplemental oxygen, currently on 4 LPM, wean as tolerated  -Continue prednisone p.o.  -Continue IV Levaquin day 10/10  -Continue duo nebs, inhaled bronchodilators    Tobacco abuse-counseled cessation    Atrial fibrillation, permanent   -Eliquis held for possible surgery  -Continue Cardizem CD and digoxin    HLD-resume statin    Hypothyroidism-clinically euthyroid, resume Synthroid    HTN-controlled, resume home medication regimen    Chronic diastolic CHF-euvolemic, resume torsemide daily    DM2-stable, resume Lantus, SSI, Accu-Cheks    Chronic anxiety-mood stable, resume Wellbutrin    DVT Prophylaxis: SCD, waiting for Eliquis washout  Diet: Diet NPO  Code Status: Full Code  PT/OT Eval Status: Pending     Dispo - ICU    Quang Valladares MD

## 2022-04-22 NOTE — PLAN OF CARE
Problem: Falls - Risk of:  Goal: Will remain free from falls  Description: Will remain free from falls  4/22/2022 0532 by Hradik Crocker RN  Outcome: Progressing  4/21/2022 1649 by Tameka Guevara RN  Outcome: Not Progressing  Goal: Absence of physical injury  Description: Absence of physical injury  Outcome: Progressing     Problem: Chronic Conditions and Co-morbidities  Goal: Patient's chronic conditions and co-morbidity symptoms are monitored and maintained or improved  Outcome: Progressing     Problem: Pain  Goal: Verbalizes/displays adequate comfort level or baseline comfort level  Outcome: Progressing     Problem: ABCDS Injury Assessment  Goal: Absence of physical injury  Outcome: Progressing     Problem: Discharge Planning  Goal: Discharge to home or other facility with appropriate resources  Outcome: Progressing     Problem: Skin/Tissue Integrity  Goal: Absence of new skin breakdown  Description: 1. Monitor for areas of redness and/or skin breakdown  2. Assess vascular access sites hourly  3. Every 4-6 hours minimum:  Change oxygen saturation probe site  4. Every 4-6 hours:  If on nasal continuous positive airway pressure, respiratory therapy assess nares and determine need for appliance change or resting period.   Outcome: Progressing

## 2022-04-22 NOTE — PROGRESS NOTES
CVTS Thoracic Progress Note:          CC:  Post op follow up    Subj: Reports significant improvement in breathing today. Obj:    Blood pressure 107/72, pulse 93, temperature 97.9 °F (36.6 °C), temperature source Oral, resp. rate 24, weight 230 lb 9.6 oz (104.6 kg), SpO2 97 %. Alert, oriented    Lungs diminished on the left    Abdomen soft, non-tender. Normoactive bowel sounds     ml over 24 hours, Cr 0.6   Left pleural tube with [id number]=3830 ml serous drainage over 24 hours     Diagnostics:   Recent Labs     04/20/22  0549 04/21/22  0539 04/22/22  0424   WBC 18.0* 16.2* 12.1*   HGB 13.3* 13.8 13.9   HCT 41.7 44.0 44.9    265 214                                                                  Recent Labs     04/20/22  0549 04/21/22  0539 04/22/22  0707    139 141   K 4.4 3.8 4.3   CL 98* 97* 100   CO2 31 29 36*   BUN 21* 19 15   CREATININE 0.7* 0.7* 0.6*   GLUCOSE 156* 86 163*     CXR: 4/21/22  FINDINGS:   Interval placement of left-sided pigtail chest tube in the axillary region. There is minimal re-expansion of the extreme left lung apex.  No pneumothorax   is seen.  Increased right perihilar opacification, edema versus infiltrate. No significant right pleural effusion or pneumothorax.  Cardiac silhouette is   obscured           Impression   Partial re-expansion of the left upper lobe following chest tube.  Increased   right perihilar edema or infiltrate.  No pneumothorax. Assess/Plan:   AM labs and imaging reviewed as above. IM notes reviewed.      Endobronchial lesion s/p bronchoscopy with yag laser and left chest tube placement   -CXR 4/21 improved with some re-expansion of KYLIE   -Pt reports significant improvement in SOB today, oxygen requirements are decreasing   -Expansion measures: IS, oobtc, ambulation   -Left pleural tube to remain until output lessens   -CXR tomorrow morning     SCC lung   -Follows with  Dr. Salazar Erm   -Planned f/u next week for further planning regarding XRT/chemo    Atrial fibrillation   -Cardizem, digoxin   -Holding Eliquis     CHF  -IV Lasix x 3 doses ordered     ________________________________________________________________    MELISSA Dove - CNP  4/22/2022  1:50 PM

## 2022-04-23 ENCOUNTER — APPOINTMENT (OUTPATIENT)
Dept: GENERAL RADIOLOGY | Age: 67
DRG: 180 | End: 2022-04-23
Attending: STUDENT IN AN ORGANIZED HEALTH CARE EDUCATION/TRAINING PROGRAM
Payer: MEDICARE

## 2022-04-23 LAB
ANION GAP SERPL CALCULATED.3IONS-SCNC: 6 MMOL/L (ref 3–16)
BASOPHILS ABSOLUTE: 0 K/UL (ref 0–0.2)
BASOPHILS RELATIVE PERCENT: 0.2 %
BUN BLDV-MCNC: 23 MG/DL (ref 7–20)
CALCIUM SERPL-MCNC: 7.6 MG/DL (ref 8.3–10.6)
CHLORIDE BLD-SCNC: 93 MMOL/L (ref 99–110)
CO2: 39 MMOL/L (ref 21–32)
CREAT SERPL-MCNC: 1 MG/DL (ref 0.8–1.3)
EOSINOPHILS ABSOLUTE: 0 K/UL (ref 0–0.6)
EOSINOPHILS RELATIVE PERCENT: 0 %
GFR AFRICAN AMERICAN: >60
GFR NON-AFRICAN AMERICAN: >60
GLUCOSE BLD-MCNC: 139 MG/DL (ref 70–99)
GLUCOSE BLD-MCNC: 166 MG/DL (ref 70–99)
GLUCOSE BLD-MCNC: 182 MG/DL (ref 70–99)
GLUCOSE BLD-MCNC: 187 MG/DL (ref 70–99)
GLUCOSE BLD-MCNC: 215 MG/DL (ref 70–99)
HCT VFR BLD CALC: 41 % (ref 40.5–52.5)
HEMOGLOBIN: 12.9 G/DL (ref 13.5–17.5)
LYMPHOCYTES ABSOLUTE: 0.6 K/UL (ref 1–5.1)
LYMPHOCYTES RELATIVE PERCENT: 3.5 %
MCH RBC QN AUTO: 28.5 PG (ref 26–34)
MCHC RBC AUTO-ENTMCNC: 31.4 G/DL (ref 31–36)
MCV RBC AUTO: 90.7 FL (ref 80–100)
MONOCYTES ABSOLUTE: 1.1 K/UL (ref 0–1.3)
MONOCYTES RELATIVE PERCENT: 6.3 %
NEUTROPHILS ABSOLUTE: 15.9 K/UL (ref 1.7–7.7)
NEUTROPHILS RELATIVE PERCENT: 90 %
PDW BLD-RTO: 16.6 % (ref 12.4–15.4)
PERFORMED ON: ABNORMAL
PLATELET # BLD: 249 K/UL (ref 135–450)
PMV BLD AUTO: 6.6 FL (ref 5–10.5)
POTASSIUM SERPL-SCNC: 3.7 MMOL/L (ref 3.5–5.1)
PTH RELATED PEPTIDE: 139 PMOL/L (ref 0–2.3)
RBC # BLD: 4.52 M/UL (ref 4.2–5.9)
SODIUM BLD-SCNC: 138 MMOL/L (ref 136–145)
WBC # BLD: 17.7 K/UL (ref 4–11)

## 2022-04-23 PROCEDURE — 6370000000 HC RX 637 (ALT 250 FOR IP): Performed by: INTERNAL MEDICINE

## 2022-04-23 PROCEDURE — 6370000000 HC RX 637 (ALT 250 FOR IP)

## 2022-04-23 PROCEDURE — 80048 BASIC METABOLIC PNL TOTAL CA: CPT

## 2022-04-23 PROCEDURE — 94669 MECHANICAL CHEST WALL OSCILL: CPT

## 2022-04-23 PROCEDURE — 85025 COMPLETE CBC W/AUTO DIFF WBC: CPT

## 2022-04-23 PROCEDURE — 2000000000 HC ICU R&B

## 2022-04-23 PROCEDURE — 2580000003 HC RX 258: Performed by: ANESTHESIOLOGY

## 2022-04-23 PROCEDURE — 36415 COLL VENOUS BLD VENIPUNCTURE: CPT

## 2022-04-23 PROCEDURE — 97530 THERAPEUTIC ACTIVITIES: CPT

## 2022-04-23 PROCEDURE — 2700000000 HC OXYGEN THERAPY PER DAY

## 2022-04-23 PROCEDURE — 94761 N-INVAS EAR/PLS OXIMETRY MLT: CPT

## 2022-04-23 PROCEDURE — 94640 AIRWAY INHALATION TREATMENT: CPT

## 2022-04-23 PROCEDURE — 97162 PT EVAL MOD COMPLEX 30 MIN: CPT

## 2022-04-23 PROCEDURE — 71045 X-RAY EXAM CHEST 1 VIEW: CPT

## 2022-04-23 RX ADMIN — IPRATROPIUM BROMIDE AND ALBUTEROL SULFATE 1 AMPULE: .5; 2.5 SOLUTION RESPIRATORY (INHALATION) at 07:56

## 2022-04-23 RX ADMIN — METOPROLOL TARTRATE 12.5 MG: 25 TABLET, FILM COATED ORAL at 20:48

## 2022-04-23 RX ADMIN — INSULIN LISPRO 2 UNITS: 100 INJECTION, SOLUTION INTRAVENOUS; SUBCUTANEOUS at 11:43

## 2022-04-23 RX ADMIN — ACETAMINOPHEN 650 MG: 325 TABLET ORAL at 09:34

## 2022-04-23 RX ADMIN — INSULIN LISPRO 1 UNITS: 100 INJECTION, SOLUTION INTRAVENOUS; SUBCUTANEOUS at 09:29

## 2022-04-23 RX ADMIN — PREDNISONE 30 MG: 20 TABLET ORAL at 11:30

## 2022-04-23 RX ADMIN — DIGOXIN 0.12 MG: 125 TABLET ORAL at 11:30

## 2022-04-23 RX ADMIN — IPRATROPIUM BROMIDE AND ALBUTEROL SULFATE 1 AMPULE: .5; 2.5 SOLUTION RESPIRATORY (INHALATION) at 15:48

## 2022-04-23 RX ADMIN — IPRATROPIUM BROMIDE AND ALBUTEROL SULFATE 1 AMPULE: .5; 2.5 SOLUTION RESPIRATORY (INHALATION) at 11:36

## 2022-04-23 RX ADMIN — SODIUM CHLORIDE, PRESERVATIVE FREE 10 ML: 5 INJECTION INTRAVENOUS at 09:29

## 2022-04-23 RX ADMIN — MUPIROCIN: 20 OINTMENT TOPICAL at 11:39

## 2022-04-23 RX ADMIN — LEVOTHYROXINE SODIUM 150 MCG: 0.15 TABLET ORAL at 09:28

## 2022-04-23 RX ADMIN — SODIUM CHLORIDE, PRESERVATIVE FREE 10 ML: 5 INJECTION INTRAVENOUS at 20:48

## 2022-04-23 RX ADMIN — MUPIROCIN: 20 OINTMENT TOPICAL at 20:49

## 2022-04-23 RX ADMIN — INSULIN LISPRO 1 UNITS: 100 INJECTION, SOLUTION INTRAVENOUS; SUBCUTANEOUS at 20:51

## 2022-04-23 RX ADMIN — POLYETHYLENE GLYCOL 3350 17 G: 17 POWDER, FOR SOLUTION ORAL at 17:23

## 2022-04-23 RX ADMIN — BUPROPION HYDROCHLORIDE 100 MG: 100 TABLET, EXTENDED RELEASE ORAL at 20:48

## 2022-04-23 RX ADMIN — INSULIN GLARGINE 10 UNITS: 100 INJECTION, SOLUTION SUBCUTANEOUS at 20:54

## 2022-04-23 RX ADMIN — INSULIN LISPRO 1 UNITS: 100 INJECTION, SOLUTION INTRAVENOUS; SUBCUTANEOUS at 17:18

## 2022-04-23 RX ADMIN — METOPROLOL TARTRATE 25 MG: 25 TABLET, FILM COATED ORAL at 11:30

## 2022-04-23 RX ADMIN — NORTRIPTYLINE HYDROCHLORIDE 10 MG: 10 CAPSULE ORAL at 20:48

## 2022-04-23 RX ADMIN — IPRATROPIUM BROMIDE AND ALBUTEROL SULFATE 1 AMPULE: .5; 2.5 SOLUTION RESPIRATORY (INHALATION) at 20:16

## 2022-04-23 ASSESSMENT — PAIN SCALES - GENERAL
PAINLEVEL_OUTOF10: 0
PAINLEVEL_OUTOF10: 8

## 2022-04-23 NOTE — PROGRESS NOTES
Hospitalist Progress Note      PCP: Phoenix Maza MD    Date of Admission: 4/19/2022    Chief Complaint: Endobronchial mass    Hospital Course:     74 y.o. male who presented to [facility] with above complaints  Patient with PMH of SCC left lung, left mainstem endobronchial mass causing left lung collapse, postobstructive pneumonia, acute on chronic respiratory failure, hypercalcemia of malignancy, chronic A. fib, chronic diastolic CHF, COPD initially admitted to [facility] on 4/17/2022 for shortness of breath and respiratory failure. He was started on antibiotics for the postobstructive pneumonia. Patient refused fiberoptic bronchoscopy, refuses BiPAP. CT surgery was consulted for the endobronchial mass for debulking airway tumor and then patient wants to proceed with chemo/XRT. The patient was  transferred to [facility] for CT surgery consult, currently on 3 L oxygen, and in no acute distress. He is currently in the ICU, he is being followed by CT surgery consultation. Subjective: He is sitting up in bed, in no acute distress at this time. Denies chest pain or shortness of breath. Appetite seems to be improving mentation also seems to be slowly improving. Chest tube is in place.       Medications:  Reviewed    Infusion Medications    lactated ringers Stopped (04/22/22 8768)    sodium chloride      dexmedetomidine      dextrose       Scheduled Medications    ipratropium-albuterol  1 ampule Inhalation Q4H WA    torsemide  50 mg Oral Daily    sodium chloride flush  5-40 mL IntraVENous 2 times per day    insulin lispro  0-3 Units SubCUTAneous Nightly    insulin lispro  0-6 Units SubCUTAneous TID WC    atorvastatin  20 mg Oral Daily    buPROPion  100 mg Oral BID    digoxin  0.125 mg Oral Daily    dilTIAZem  240 mg Oral Daily    insulin glargine  10 Units SubCUTAneous Nightly    levothyroxine  150 mcg Oral Daily    lisinopril  20 mg Oral Daily    metoprolol tartrate  25 mg Oral BID    mupirocin   Nasal BID    nortriptyline  10 mg Oral Nightly    predniSONE  30 mg Oral Daily     PRN Meds: sodium chloride, acetaminophen **OR** acetaminophen, dextrose, glucagon (rDNA), glucose, ondansetron **OR** ondansetron, polyethylene glycol, sodium chloride flush, glucose, dextrose bolus (hypoglycemia) **OR** dextrose bolus (hypoglycemia)      Intake/Output Summary (Last 24 hours) at 4/23/2022 1324  Last data filed at 4/23/2022 1137  Gross per 24 hour   Intake 1592.23 ml   Output 1965 ml   Net -372.77 ml       Physical Exam Performed:    /63   Pulse 85   Temp 97.9 °F (36.6 °C) (Axillary)   Resp 22   Ht 6' (1.829 m)   Wt 254 lb 13.6 oz (115.6 kg)   SpO2 93%   BMI 34.56 kg/m²     General appearance: He is sitting up in bed, in NAD, he is fairly alert pleasant and cooperative   HEENT: Pupils equal, round, and reactive to light. Conjunctivae/corneas clear. Neck: Supple, with full range of motion. No jugular venous distention. Trachea midline. Respiratory: Bilateral breath sounds, decreased at both bases, there is also notably diminished breath sounds in the left lung field, no use of accessory muscles at this time. Chest tube in place. Cardiovascular: Regular rate and rhythm with normal S1/S2   Abdomen: Soft, non-tender, non-distended with normal bowel sounds. Musculoskeletal: No clubbing, cyanosis or edema bilaterally. Full range of motion without deformity. Neurologic:  Neurovascularly intact without any focal sensory/motor deficits. Cranial nerves: II-XII intact, grossly non-focal.  Psychiatric: He is awake, he is able to tell me the month and the year but not the name of the hospital.  Short-term recall was fair recalled 2/3 objects after 1 minute.     Peripheral Pulses: +2 palpable, equal bilaterally       Labs:   Recent Labs     04/21/22  0539 04/22/22  0424 04/23/22  0418   WBC 16.2* 12.1* 17.7*   HGB 13.8 13.9 12.9*   HCT 44.0 44.9 41.0    214 249     Recent Labs 04/21/22  0539 04/22/22  0707 04/23/22  0418    141 138   K 3.8 4.3 3.7   CL 97* 100 93*   CO2 29 36* 39*   BUN 19 15 23*   CREATININE 0.7* 0.6* 1.0   CALCIUM 8.7 7.7* 7.6*     No results for input(s): AST, ALT, BILIDIR, BILITOT, ALKPHOS in the last 72 hours. No results for input(s): INR in the last 72 hours. No results for input(s): Cook Lager in the last 72 hours. Urinalysis:    No results found for: Georgianne Lute, BACTERIA, RBCUA, BLOODU, SPECGRAV, Judson [place] [address]    Radiology:  XR CHEST PORTABLE   Final Result   Left chest tube is in stable position. Left hemithorax remains mostly opaque with aeration of the apex. Decreased right perihilar vascular congestion. XR CHEST PORTABLE   Final Result   Partial re-expansion of the left upper lobe following chest tube. Increased   right perihilar edema or infiltrate. No pneumothorax. XR CHEST PORTABLE    (Results Pending)           Assessment/Plan:    Active Hospital Problems    Diagnosis     Small cell lung cancer ([facility] [address] [C34.90]      Priority: Medium    Acute on chronic respiratory failure with hypoxia and hypercapnia (HCC) [J96.21, J96.22]     Squamous cell carcinoma of left lung (HCC) [C34.92]     COPD (chronic obstructive pulmonary disease) (HCC) [J44.9]     Endobronchial mass [R91.8]     Postobstructive pneumonia [J18.9]     Lung collapse [J98.19]     Atrial fibrillation, permanent (HCC) [I48.21]     COPD with acute exacerbation (HCC) [J44.1]      SCC left lung with left mainstem endobronchial mass causing left lung collapse/postobstructive pneumonia  SqCC left lower lobe/lingula - 12 cm mass with positive station 7 lymph node - stage IIIA  Plan  -Possible debulking/laser therapy per CTS, require 5 days off of Eliquis, last dose was 4/15/22  -CT surgery consulted;  He went to the OR 4/20 for laser treatment and chest tube placement  -Plan to F/w Dr. Dayana Kaur next week to plan for cancer treatment outpatient for subsequent XRT/chemo     Acute on chronic respiratory failure with hypoxia and hypercapnia  COPD with acute exacerbation  Postobstructive pneumonia  Lung collapse  -BiPAP 16/8 HS and PRN - patient has been refusing   -Continue supplemental oxygen, currently on 4 LPM, wean as tolerated  -Continue prednisone p.o.  -Completed IV Levaquin course  -Continue duo nebs, inhaled bronchodilators  -Chest tube as above     Tobacco abuse-counseled cessation     Atrial fibrillation, permanent   -Eliquis held for surgery; resume when ok with CT Surgery  -Continue Cardizem CD and digoxin     HLD-resume statin     Hypothyroidism-clinically euthyroid, resume Synthroid     HTN-controlled, resume home medication regimen     Chronic diastolic CHF-euvolemic. Post op diuresis per CT Surgery.      DM2-stable, resume Lantus, SSI, Accu-Cheks     Chronic anxiety-mood stable, resume Wellbutrin    DVT Prophylaxis: Resume Eliquis when ok with CT surgery post op    Diet: ADULT DIET; Regular  ADULT ORAL NUTRITION SUPPLEMENT; Breakfast, Lunch, Dinner; Low Calorie/High Protein Oral Supplement  Code Status: Full Code    PT/OT Eval Status: pending    Dispo -uncertain patient still in ICU.     Ming Coburn MD

## 2022-04-23 NOTE — PROGRESS NOTES
Physical Therapy  Facility/Department: [facility] C2 CARD TELEMETRY  Physical Therapy Initial Assessment    Name: Nakul Small  : 1955  MRN: [id number]  Date of Service: 2022    Discharge Recommendations:  Subacute/Skilled Nursing Facility   PT Equipment Recommendations  Equipment Needed: No      Patient Diagnosis(es): There were no encounter diagnoses. Past Medical History:  has a past medical history of A-fib ([facility] [address] CAD (coronary artery disease), CHF (congestive heart failure) ([facility] [address] COPD (chronic obstructive pulmonary disease) ([facility] [address] CVA (cerebral vascular accident) ([facility] [address] Diabetes mellitus ([facility] [address] Hyperlipidemia, Hypertension, and Thyroid disease. Past Surgical History:  has a past surgical history that includes bronchoscopy (N/A, 2022); bronchoscopy (2022); bronchoscopy (2022); bronchoscopy (2022); bronchoscopy (N/A, 2022); and bronchoscopy (N/A, 2022). Assessment   Body Structures, Functions, Activity Limitations Requiring Skilled Therapeutic Intervention: Decreased functional mobility ; Decreased strength;Decreased safe awareness;Decreased endurance;Decreased balance;Decreased posture  Assessment: Pt referred for PT evaluation during current hospital stay with dx of endobronchial lesion, s/p placement L chest tube on 22. Pt currently functioning below his baseline, demonstrating generalized weakness and requiring min-modA x 1 for bed mobility and bed>chair transfer. Pt appears weak and unsteady when standing. Recommend SNF at D/C in light of current deficits.   Treatment Diagnosis: Impaired functional mobility, decreased endurance  Specific Instructions for Next Treatment: Progress ther ex and mobility as tolerated  Therapy Prognosis: Good  Decision Making: Medium Complexity  Requires PT Follow-Up: Yes  Disease-specific education: Pt educated on role of PT, benefits of mobility, safety in transfers - pt verbalizes understanding, will need reinforcement 2* decreased safety. Activity Tolerance: Patient tolerated evaluation without incident;Patient tolerated treatment well;Patient limited by endurance  Activity Tolerance Comments: Vitals in chair: BP = 88/47, HR = 76 bpm, O2 sat = 96% on 2L O2.  BP's ran in the 80s/40s-50s during entire session although pt asymptomatic. ICU RN aware and states BP's have been running low. Plan   Plan: 5-7 times per week  Specific Instructions for Next Treatment: Progress ther ex and mobility as tolerated  Current Treatment Recommendations: Strengthening,ROM,Balance training,Functional mobility training,Transfer training,Endurance training,Gait training,Safety education & training,Patient/Caregiver education & training,Equipment evaluation, education, & procurement  Safety Devices: All fall risk precautions in place,Call light within reach,Gait belt,Left in chair,Nurse notified,Telesitter in use,Chair alarm in place,Patient at risk for falls     Restrictions  Restrictions/Precautions  Restrictions/Precautions: Fall Risk,General Precautions  Position Activity Restriction  Other position/activity restrictions: Julieta-sys monitor in room, telemetry with ICU monitoring, L chest tube, Me[place]     Subjective   General  Chart Reviewed: Yes  Patient assessed for rehabilitation services?: Yes  Family / Caregiver Present: No  Referring Practitioner: Mj Acevedo MD  Referral Date : 04/23/22  Diagnosis: Endobronchial lesion, s/p placement of L chest tube with bronchoscopy on 4/21/22  Follows Commands: Within Functional Limits  General Comment  Comments: Pt resting in bed upon entry of PT  Subjective  Subjective: Pt agreeable to work with PT this afternoon and get up to chair for lunch. Pain: C/o 6/10 pain in L side of chest near chest tube site.     Social/Functional History  Social/Functional History  Lives With: Alone  Type of Home: House  Home Layout: One level  Home Access: Stairs to enter with rails  Entrance Stairs - Number of (04/23/22 1329)  AM-PAC Inpatient T-Scale Score : 33.86 (04/23/22 1329)  Mobility Inpatient CMS 0-100% Score: 72.57 (04/23/22 1329)  Mobility Inpatient CMS G-Code Modifier : CL (04/23/22 1329)    Goals  Short Term Goals  Time Frame for Short term goals: 1 week, 4/30/22 (unless otherwise specified)  Short term goal 1: Pt will transfer supine <-> sit with SBA  Short term goal 2: Pt will transfer sit <-> stand and bed>chair using appropriate AD as needed with CGA x 1  Short term goal 3: Pt will ambulate x 60 feet using appropriate AD as needed with CGA x 1  Short term goal 4: By 4/26/22: Pt will tolerate 12-15 reps BLE exercise for strengthening, balance, and endurance  Patient Goals   Patient goals : \"To get stronger\"       Therapy Time   Individual Concurrent Group Co-treatment   Time In 7269         Time Out 1341         Minutes 36         Timed Code Treatment Minutes: [address], [place] #[id number]    If pt is unable to be seen after this session, please let this note serve as discharge summary. Please see case management note for discharge disposition. Thank you.

## 2022-04-23 NOTE — PLAN OF CARE
Problem: Falls - Risk of:  Goal: Will remain free from falls  Description: Will remain free from falls  4/22/2022 2010 by Todd Urean RN  Outcome: Progressing  4/22/2022 1712 by Brady Fuentes RN  Outcome: Progressing  4/22/2022 0815 by Manfred Malin RN  Outcome: Progressing     Problem: Falls - Risk of:  Goal: Absence of physical injury  Description: Absence of physical injury  4/22/2022 2010 by Todd Urena RN  Outcome: Progressing  4/22/2022 1712 by Brady Fuentes RN  Outcome: Progressing     Problem: Chronic Conditions and Co-morbidities  Goal: Patient's chronic conditions and co-morbidity symptoms are monitored and maintained or improved  4/22/2022 2010 by Todd Urena RN  Outcome: Progressing  4/22/2022 1712 by Brady Fuentes RN  Outcome: Progressing

## 2022-04-23 NOTE — FLOWSHEET NOTE
Pt had become more confused within the last hour, Pt's daughter spoke to the Pt as well as this RN. Paged Dr. Romayne Schilling for a nictone patch because he is asking for a smoke.

## 2022-04-23 NOTE — PROGRESS NOTES
Patient having more frequent PVCs and pauses (longest up to 2.5 seconds). Patient also more hypotensive. Sofia Ricks MD notified. RN continuing to monitor. Patient is asymptomatic and denies any lightheaded or dizziness. Patient same mental status as before admin. Patient did receive night time dose of Metoprolol 25 mg per order. RN to pass on reevaluation of Metoprolol dose.

## 2022-04-23 NOTE — FLOWSHEET NOTE
1030 Bedside rounds with Dr. Tete Napier. 1040 IS and Acapella at bedside. When this RN tried to teach the Pt how to use the IS and accapella, the Pt would not look at the items. Pt refused to use either one.

## 2022-04-23 NOTE — PROGRESS NOTES
CC: Endobronchial lesion s/p bronchoscopy with yag laser and left chest tube placement on 4/21    Some low BP last night but he is on severel meds for BP     of serous fluid overnight  No airleak  CXR noted, some aeration of left uuper lobe      Plan:  Leave CT in place on suction one more day  Nees IS/Acapella/EZ pap, aggressive pulmonary expansion maneuvers  PT/OT to ambulate/treat  Repeat CXR in am

## 2022-04-23 NOTE — PLAN OF CARE
Problem: Falls - Risk of:  Goal: Will remain free from falls  Description: Will remain free from falls  4/23/2022 0915 by Gm Russell RN  Outcome: Progressing  4/22/2022 2010 by Nell Toscano RN  Outcome: Progressing     Problem: Falls - Risk of:  Goal: Absence of physical injury  Description: Absence of physical injury  4/23/2022 0915 by Gm Russell RN  Outcome: Progressing  4/22/2022 2010 by Nell Toscano RN  Outcome: Progressing     Problem: Chronic Conditions and Co-morbidities  Goal: Patient's chronic conditions and co-morbidity symptoms are monitored and maintained or improved  4/23/2022 0915 by Gm Russell RN  Outcome: Progressing  4/22/2022 2010 by Nell Toscano RN  Outcome: Progressing     Problem: Pain  Goal: Verbalizes/displays adequate comfort level or baseline comfort level  Outcome: Progressing     Problem: Skin/Tissue Integrity  Goal: Absence of new skin breakdown  Description: 1. Monitor for areas of redness and/or skin breakdown  2. Assess vascular access sites hourly  3. Every 4-6 hours minimum:  Change oxygen saturation probe site  4. Every 4-6 hours:  If on nasal continuous positive airway pressure, respiratory therapy assess nares and determine need for appliance change or resting period.   Outcome: Progressing

## 2022-04-24 ENCOUNTER — APPOINTMENT (OUTPATIENT)
Dept: GENERAL RADIOLOGY | Age: 67
DRG: 180 | End: 2022-04-24
Attending: STUDENT IN AN ORGANIZED HEALTH CARE EDUCATION/TRAINING PROGRAM
Payer: MEDICARE

## 2022-04-24 LAB
ANION GAP SERPL CALCULATED.3IONS-SCNC: 11 MMOL/L (ref 3–16)
BASOPHILS ABSOLUTE: 0 K/UL (ref 0–0.2)
BASOPHILS RELATIVE PERCENT: 0.1 %
BUN BLDV-MCNC: 25 MG/DL (ref 7–20)
CALCIUM SERPL-MCNC: 8.8 MG/DL (ref 8.3–10.6)
CHLORIDE BLD-SCNC: 92 MMOL/L (ref 99–110)
CO2: 33 MMOL/L (ref 21–32)
CREAT SERPL-MCNC: 0.9 MG/DL (ref 0.8–1.3)
EOSINOPHILS ABSOLUTE: 0 K/UL (ref 0–0.6)
EOSINOPHILS RELATIVE PERCENT: 0.1 %
GFR AFRICAN AMERICAN: >60
GFR NON-AFRICAN AMERICAN: >60
GLUCOSE BLD-MCNC: 141 MG/DL (ref 70–99)
GLUCOSE BLD-MCNC: 167 MG/DL (ref 70–99)
GLUCOSE BLD-MCNC: 175 MG/DL (ref 70–99)
GLUCOSE BLD-MCNC: 251 MG/DL (ref 70–99)
GLUCOSE BLD-MCNC: 387 MG/DL (ref 70–99)
HCT VFR BLD CALC: 44.3 % (ref 40.5–52.5)
HEMOGLOBIN: 13.8 G/DL (ref 13.5–17.5)
LYMPHOCYTES ABSOLUTE: 0.8 K/UL (ref 1–5.1)
LYMPHOCYTES RELATIVE PERCENT: 6 %
MCH RBC QN AUTO: 28.6 PG (ref 26–34)
MCHC RBC AUTO-ENTMCNC: 31.3 G/DL (ref 31–36)
MCV RBC AUTO: 91.4 FL (ref 80–100)
MONOCYTES ABSOLUTE: 1.2 K/UL (ref 0–1.3)
MONOCYTES RELATIVE PERCENT: 8.4 %
NEUTROPHILS ABSOLUTE: 11.7 K/UL (ref 1.7–7.7)
NEUTROPHILS RELATIVE PERCENT: 85.4 %
PDW BLD-RTO: 17.2 % (ref 12.4–15.4)
PERFORMED ON: ABNORMAL
PLATELET # BLD: 179 K/UL (ref 135–450)
PMV BLD AUTO: 6.7 FL (ref 5–10.5)
POTASSIUM SERPL-SCNC: 3.8 MMOL/L (ref 3.5–5.1)
RBC # BLD: 4.84 M/UL (ref 4.2–5.9)
SODIUM BLD-SCNC: 136 MMOL/L (ref 136–145)
WBC # BLD: 13.7 K/UL (ref 4–11)

## 2022-04-24 PROCEDURE — 94761 N-INVAS EAR/PLS OXIMETRY MLT: CPT

## 2022-04-24 PROCEDURE — 71045 X-RAY EXAM CHEST 1 VIEW: CPT

## 2022-04-24 PROCEDURE — 2700000000 HC OXYGEN THERAPY PER DAY

## 2022-04-24 PROCEDURE — 97110 THERAPEUTIC EXERCISES: CPT

## 2022-04-24 PROCEDURE — 36415 COLL VENOUS BLD VENIPUNCTURE: CPT

## 2022-04-24 PROCEDURE — 80048 BASIC METABOLIC PNL TOTAL CA: CPT

## 2022-04-24 PROCEDURE — 85025 COMPLETE CBC W/AUTO DIFF WBC: CPT

## 2022-04-24 PROCEDURE — 6370000000 HC RX 637 (ALT 250 FOR IP): Performed by: THORACIC SURGERY (CARDIOTHORACIC VASCULAR SURGERY)

## 2022-04-24 PROCEDURE — 97166 OT EVAL MOD COMPLEX 45 MIN: CPT

## 2022-04-24 PROCEDURE — 6370000000 HC RX 637 (ALT 250 FOR IP): Performed by: INTERNAL MEDICINE

## 2022-04-24 PROCEDURE — 6370000000 HC RX 637 (ALT 250 FOR IP)

## 2022-04-24 PROCEDURE — 97535 SELF CARE MNGMENT TRAINING: CPT

## 2022-04-24 PROCEDURE — 97530 THERAPEUTIC ACTIVITIES: CPT

## 2022-04-24 PROCEDURE — 2580000003 HC RX 258: Performed by: ANESTHESIOLOGY

## 2022-04-24 PROCEDURE — 97116 GAIT TRAINING THERAPY: CPT

## 2022-04-24 PROCEDURE — 2000000000 HC ICU R&B

## 2022-04-24 RX ORDER — IPRATROPIUM BROMIDE AND ALBUTEROL SULFATE 2.5; .5 MG/3ML; MG/3ML
1 SOLUTION RESPIRATORY (INHALATION) EVERY 4 HOURS PRN
Status: DISCONTINUED | OUTPATIENT
Start: 2022-04-24 | End: 2022-05-03 | Stop reason: HOSPADM

## 2022-04-24 RX ORDER — OXYCODONE HYDROCHLORIDE AND ACETAMINOPHEN 5; 325 MG/1; MG/1
1 TABLET ORAL EVERY 4 HOURS PRN
Status: DISCONTINUED | OUTPATIENT
Start: 2022-04-24 | End: 2022-05-03 | Stop reason: HOSPADM

## 2022-04-24 RX ADMIN — POLYETHYLENE GLYCOL 3350 17 G: 17 POWDER, FOR SOLUTION ORAL at 21:19

## 2022-04-24 RX ADMIN — INSULIN LISPRO 1 UNITS: 100 INJECTION, SOLUTION INTRAVENOUS; SUBCUTANEOUS at 09:26

## 2022-04-24 RX ADMIN — LEVOTHYROXINE SODIUM 150 MCG: 0.15 TABLET ORAL at 09:38

## 2022-04-24 RX ADMIN — OXYCODONE AND ACETAMINOPHEN 1 TABLET: 5; 325 TABLET ORAL at 23:01

## 2022-04-24 RX ADMIN — ATORVASTATIN CALCIUM 20 MG: 10 TABLET, FILM COATED ORAL at 09:33

## 2022-04-24 RX ADMIN — INSULIN GLARGINE 10 UNITS: 100 INJECTION, SOLUTION SUBCUTANEOUS at 21:08

## 2022-04-24 RX ADMIN — PREDNISONE 30 MG: 20 TABLET ORAL at 09:39

## 2022-04-24 RX ADMIN — MUPIROCIN: 20 OINTMENT TOPICAL at 09:34

## 2022-04-24 RX ADMIN — BUPROPION HYDROCHLORIDE 100 MG: 100 TABLET, EXTENDED RELEASE ORAL at 20:49

## 2022-04-24 RX ADMIN — METOPROLOL TARTRATE 12.5 MG: 25 TABLET, FILM COATED ORAL at 09:33

## 2022-04-24 RX ADMIN — METOPROLOL TARTRATE 12.5 MG: 25 TABLET, FILM COATED ORAL at 20:49

## 2022-04-24 RX ADMIN — INSULIN LISPRO 5 UNITS: 100 INJECTION, SOLUTION INTRAVENOUS; SUBCUTANEOUS at 17:32

## 2022-04-24 RX ADMIN — INSULIN LISPRO 2 UNITS: 100 INJECTION, SOLUTION INTRAVENOUS; SUBCUTANEOUS at 21:07

## 2022-04-24 RX ADMIN — ACETAMINOPHEN 650 MG: 325 TABLET ORAL at 21:07

## 2022-04-24 RX ADMIN — NORTRIPTYLINE HYDROCHLORIDE 10 MG: 10 CAPSULE ORAL at 20:49

## 2022-04-24 RX ADMIN — DIGOXIN 0.12 MG: 125 TABLET ORAL at 09:33

## 2022-04-24 RX ADMIN — BUPROPION HYDROCHLORIDE 100 MG: 100 TABLET, EXTENDED RELEASE ORAL at 09:33

## 2022-04-24 RX ADMIN — SODIUM CHLORIDE, PRESERVATIVE FREE 10 ML: 5 INJECTION INTRAVENOUS at 20:49

## 2022-04-24 RX ADMIN — TORSEMIDE 50 MG: 100 TABLET ORAL at 09:33

## 2022-04-24 RX ADMIN — SODIUM CHLORIDE, PRESERVATIVE FREE 10 ML: 5 INJECTION INTRAVENOUS at 09:34

## 2022-04-24 RX ADMIN — LISINOPRIL 20 MG: 20 TABLET ORAL at 09:33

## 2022-04-24 RX ADMIN — DILTIAZEM HYDROCHLORIDE 240 MG: 240 CAPSULE, COATED, EXTENDED RELEASE ORAL at 09:38

## 2022-04-24 ASSESSMENT — PAIN DESCRIPTION - FREQUENCY
FREQUENCY: CONTINUOUS
FREQUENCY: CONTINUOUS

## 2022-04-24 ASSESSMENT — PAIN DESCRIPTION - LOCATION
LOCATION: CHEST;RIB CAGE
LOCATION: CHEST;RIB CAGE

## 2022-04-24 ASSESSMENT — PAIN DESCRIPTION - ONSET: ONSET: ON-GOING

## 2022-04-24 ASSESSMENT — PAIN SCALES - GENERAL
PAINLEVEL_OUTOF10: 8
PAINLEVEL_OUTOF10: 0
PAINLEVEL_OUTOF10: 0
PAINLEVEL_OUTOF10: 8
PAINLEVEL_OUTOF10: 0

## 2022-04-24 ASSESSMENT — PAIN DESCRIPTION - ORIENTATION
ORIENTATION: LEFT
ORIENTATION: LEFT

## 2022-04-24 ASSESSMENT — PAIN DESCRIPTION - DESCRIPTORS
DESCRIPTORS: ACHING;SORE
DESCRIPTORS: ACHING;SORE

## 2022-04-24 ASSESSMENT — PAIN DESCRIPTION - PAIN TYPE: TYPE: ACUTE PAIN;SURGICAL PAIN

## 2022-04-24 NOTE — PROGRESS NOTES
Occupational Therapy  Facility/Department: [facility] C2 CARD TELEMETRY  Occupational Therapy Initial Assessment    Name: Padmaja Tolentino  : 1955  MRN: [id number]  Date of Service: 2022    Discharge Recommendations:  [address]          Patient Diagnosis(es): There were no encounter diagnoses. Past Medical History:  has a past medical history of A-fib ([facility] [address] CAD (coronary artery disease), CHF (congestive heart failure) ([facility] [address] COPD (chronic obstructive pulmonary disease) ([facility] [address] CVA (cerebral vascular accident) ([facility] [address] Diabetes mellitus ([facility] [address] Hyperlipidemia, Hypertension, and Thyroid disease. Past Surgical History:  has a past surgical history that includes bronchoscopy (N/A, 2022); bronchoscopy (2022); bronchoscopy (2022); bronchoscopy (2022); bronchoscopy (N/A, 2022); and bronchoscopy (N/A, 2022). Treatment Diagnosis: Impaired mobility and decreased ADL status      Assessment   Performance deficits / Impairments: Decreased functional mobility ; Decreased ADL status; Decreased ROM; Decreased strength;Decreased safe awareness;Decreased cognition;Decreased endurance;Decreased balance;Decreased fine motor control;Decreased coordination    Assessment: Pt is a 73 yo male presenting with a dx of endobronchial lesion and is s/p L chest tube on . PTA, was retired and previously worked as a . PTA pt was living alone in a one story home and independent with ADLs and had assistance from daughter with IADLs. After evaluation and treatment, pt found to be presenting with the above mentioned deficits. Pt is currently functioning below baseline and is min A for UB/LB ADLs, Min to mod Ax1 for functional transfers and mobility with use of RW. Pt would benefit from continued skilled occupational therapy to address these deficits, increasing safety and independence with ADL and functional mobility. Recommend SNF.     Co-tx collaboration this date to safely meet goals and will have better occupational performance outcomes with in a co-treatment than 1:1 session. Treatment Diagnosis: Impaired mobility and decreased ADL status  Prognosis: Fair  Decision Making: Medium Complexity  REQUIRES OT FOLLOW-UP: Yes  Activity Tolerance  Activity Tolerance: Treatment limited secondary to medical complications (free text); Patient limited by fatigue  Activity Tolerance: Pt reporting onset of dizziness while toileting. BP dropped to 86/23 resulting in patient being returned to bed at end of session.  RN notified        Plan   Plan  Times per Week: 4-6x/week  Current Treatment Recommendations: Strengthening,Functional mobility training,Endurance training,Gait training,Balance training,Cognitive reorientation,Safety education & training,Patient/Caregiver education & training,Pain management,Self-Care / ADL,Positioning,Home management training     Restrictions  Restrictions/Precautions  Restrictions/Precautions: Fall Risk,General Precautions  Position Activity Restriction  Other position/activity restrictions: Julieta-sys monitor in room, telemetry with ICU monitoring, L chest tube, Meier    Subjective   General  Chart Reviewed: Yes,Orders,Progress Notes,History and Physical,Previous Admission,Operative Notes,Labs  Patient assessed for rehabilitation services?: Yes  Response to previous treatment: Patient with no complaints from previous session  Family / Caregiver Present: No  Referring Practitioner: Gigi Michelle MD  Diagnosis: Endobrochial mass  Subjective  Subjective: Pt presented supine in bed, pleasant, and agreeable to OT evaluation  General Comment  Comments: RN cleared pt for therapy  Pain Assessment  Pain Assessment: 0-10  Pain Level: 0  Vital Signs  Pulse: 78  Resp: 20  BP: 95/64  MAP (Calculated): 74.33  Oxygen Therapy  SpO2: 97 %  O2 Device: Nasal cannula  O2 Flow Rate (L/min): 1 L/min  Social/Functional History  Social/Functional History  Lives With: Alone  Type of Home: House  Home Layout: One level  Home Access: Stairs to enter with rails  Entrance Stairs - Number of Steps: 1-2 GISSELL with 1 handrail  Bathroom Shower/Tub: Tub/Shower unit  Bathroom Toilet: Handicap height  Bathroom Equipment: Grab bars in shower,Hand-held shower  Home Equipment: Grab bars,Cane,Walker, rolling,Wheelchair-manual,Oxygen (home O2 as needed (2 LPM))  Has the patient had two or more falls in the past year or any fall with injury in the past year?: No (pt denies any falls)  Receives Help From: [employer] (comment)  ADL Assistance: Independent  Homemaking Assistance: Needs assistance (outside assist with housekeeping 2x/week, daughter does grocery shopping)  Homemaking Responsibilities: Yes  Ambulation Assistance: Independent (using cane most of the time (occasionally uses RW if needed))  Transfer Assistance: Independent  Active : No  Patient's  Info: Daughter  Mode of Transportation: Family  Occupation: Retired  Type of Occupation:   Leisure & Hobbies: Playing with 9 grandchildren (range in age from 3 y/o to 24 y/o)       Objective   Vision Exceptions: Wears glasses for reading  Hearing: Within functional limits       Observation/Palpation  Posture: Fair  Edema: BUE edema  Safety Devices  Type of Devices: All fall risk precautions in place;Call light within reach;Gait belt;Nurse notified;Telesitter in use;Patient at risk for falls; Bed alarm in place; Left in bed    Balance  Sitting Balance: Contact guard assistance (CGA/SBA)  Standing Balance: Minimal assistance (mod x1)  Standing Balance  Time: ~5 mins  Activity: transfer training  Comment: Pt reporting onset of dizziness resulting in return to bed    Functional Mobility  Functional - Mobility Device: Other (HHA)  Activity:  (chair<>bed, chair<>BSC)  Assist Level: Moderate assistance    Toilet Transfers  Toilet Transfer:  Moderate assistance (HHA)  AROM: Generally decreased, functional  PROM: Generally decreased, functional  Strength: Generally decreased, functional  Coordination: Within functional limits  Tone: Normal  ADL  Feeding: Independent  UE Dressing: Minimal assistance (assist required for line management)  LE Dressing: Minimal assistance (depends, pants, socks. Min A provided for pulling pants/depends over waist)  Toileting: Minimal assistance (BSC; min A for pulling pants over waist)  Toileting Skilled Clinical Factors: Pt unaware of perineal placement during urination resulting in assist for problem solving and safety awareness. Additional Comments: Pt reporting onset of dizziness during toileting on BSC. Activity Tolerance  Activity Tolerance: Patient limited by fatigue;Patient limited by endurance; Patient limited by pain  Activity Tolerance Comments: Vitals: 103/74 96 BPM 95% on 1L  seated in chair: 86/23. patient felt dizzy and BP was low so patient was assisted back to bed. RN Gemma Dale made aware. back in bed 122/77 76 BPM 91% on 1L. activity tolerance also limited by patient becoming incontinent of urine. Transfers  Stand Pivot Transfers: Moderate assistance  Sit to stand: Moderate assistance  Stand to sit: Moderate assistance        Perception  Overall Perceptual Status: WFL            Dynamic Standing Balance Exercises: x5 STS  Education Given To: Patient  Education Provided: Role of Therapy;Plan of Care;Transfer Training;Precautions;IADL Safety; Energy Conservation; ADL Adaptive Strategies  Education Provided Comments: Pt educated on purpose of OT, OT POC, energy conservation technique, and importance of OOB activity.   Education Method: Demonstration  Barriers to Learning: Cognition  Education Outcome: Verbalized understanding;Continued education needed  LUE AROM (degrees)  LUE AROM : WFL  Left Hand AROM (degrees)  Left Hand AROM: WFL  RUE AROM (degrees)  RUE AROM : WFL  Right Hand AROM (degrees)  Right Hand AROM: WFL        AM-PAC Score  AM-PAC Inpatient Daily Activity Raw Score: 16 (04/24/22 1340)  AM-PAC Inpatient ADL T-Scale Score : 35.96 (04/24/22 1340)  ADL Inpatient CMS 0-100% Score: 53.32 (04/24/22 1340)  ADL Inpatient CMS G-Code Modifier : CK (04/24/22 1340)    Goals  Short Term Goals  Time Frame for Short term goals: 1 week (5/1)  Short Term Goal 1: Pt will perform toilet transfer with SBA and use of LRAD  Short Term Goal 2: Pt will perform toileting with SBA and use of LRA  Short Term Goal 3: Pt will perform LB dressing with SBA  Short Term Goal 4: Pt will perform 3 grooming tasks while standing at sink with LRAD and rest breaks  Short Term Goal 5: Pt will perform x15 BUE exercises to improve UB strength/endurance to increase IND with reposition and ADLs  Patient Goals   Patient goals : To get stronger       Therapy Time   Individual Concurrent Group Co-treatment   Time In 0750         Time Out 0835         Minutes 45         Timed Code Treatment Minutes: 35 Minutes      If pt is unable to be seen after this session, please let this note serve as discharge summary. Please see case management note for discharge disposition. Thank you.        Yo Woodard, OT

## 2022-04-24 NOTE — PROGRESS NOTES
CC: Endobronchial lesion s/p bronchoscopy with yag laser and left chest tube placement on 4/21    Some low BP last night but he is on severel meds for BP     of serous fluid overnight  No airleak  CXR noted, some aeration of left uuper lobe      Plan:  Leave CT in place,patient might need a PLeureX before d/c if fluid output doesn't slow down  Nees IS/Acapella/EZ pap, aggressive pulmonary expansion maneuvers  PT/OT to ambulate/treat  Repeat CXR in am

## 2022-04-24 NOTE — PROGRESS NOTES
Physical Therapy  Facility/Department: [facility] C2 CARD TELEMETRY  Physical Therapy Treatment    Name: Jyoti Moralez  : 1955  MRN: [id number]  Date of Service: 2022    Discharge Recommendations:  [address]          Patient Diagnosis(es): There were no encounter diagnoses. Past Medical History:  has a past medical history of A-fib ([facility] [address] CAD (coronary artery disease), CHF (congestive heart failure) ([facility] [address] COPD (chronic obstructive pulmonary disease) ([facility] [address] CVA (cerebral vascular accident) ([facility] [address] Diabetes mellitus ([facility] [address] Hyperlipidemia, Hypertension, and Thyroid disease. Past Surgical History:  has a past surgical history that includes bronchoscopy (N/A, 2022); bronchoscopy (2022); bronchoscopy (2022); bronchoscopy (2022); bronchoscopy (N/A, 2022); and bronchoscopy (N/A, 2022). If pt is unable to be seen after this session, please let this note serve as discharge summary. Please see case management note for discharge disposition. Thank you. Assessment   Body Structures, Functions, Activity Limitations Requiring Skilled Therapeutic Intervention: Decreased functional mobility ; Decreased strength;Decreased safe awareness;Decreased endurance;Decreased balance;Decreased posture  Assessment: Patient is progressing slowly with his therapy goals. Today the patient needed mod assist for bed mobility, mod assist for transfers and mod assist to ambulate up to 3 feet with a rolling walker. Patient had low blood pressure, dizziness when sitting in chair so patient was assisted back to bed. RN made aware. Patient completed his seated and supine exercises with guidance. Patient continues to be below his prior level of function and would benefit from skilled PT Plan of care.  PT again recommends that this patient receive skilled PT in the SNF setting, when medically stable, in order to address his therapy deficits and to help him maximize his safety and independence with functional mobility. PT to continue to follow. Treatment Diagnosis: Impaired functional mobility, decreased endurance  Specific Instructions for Next Treatment: Progress ther ex and mobility as tolerated  Therapy Prognosis: Good  Decision Making: Medium Complexity  Barriers to Learning: impaired cognition  Requires PT Follow-Up: Yes  Activity Tolerance  Activity Tolerance: Patient limited by fatigue;Patient limited by endurance; Patient limited by pain  Activity Tolerance Comments: Vitals: 103/74 96 BPM 95% on 1L  seated in chair: 86/23. patient felt dizzy and BP was low so patient was assisted back to bed. PHILLIP Landeros made aware. back in bed 122/77 76 BPM 91% on 1L. activity tolerance also limited by patient becoming incontinent of urine. Plan   Plan  Plan: 5-7 times per week  Specific Instructions for Next Treatment: Progress ther ex and mobility as tolerated  Current Treatment Recommendations: Strengthening,ROM,Balance training,Functional mobility training,Transfer training,Endurance training,Gait training,Safety education & training,Patient/Caregiver education & training,Equipment evaluation, education, & procurement  Safety Devices  Type of Devices:  All fall risk precautions in place,Call light within reach,Gait belt,Nurse notified,Telesitter in use,Patient at risk for falls,Bed alarm in place,Left in bed     Restrictions  Restrictions/Precautions  Restrictions/Precautions: Fall Risk,General Precautions  Position Activity Restriction  Other position/activity restrictions: Julieta-sys monitor in room, telemetry with ICU monitoring, L chest tube, Meier     Subjective   General  Chart Reviewed: Yes  Patient assessed for rehabilitation services?: Yes  Family / Caregiver Present: No  Referring Practitioner: Antoine Graves MD  Referral Date : 04/23/22  Diagnosis: Endobronchial lesion, s/p placement of L chest tube with bronchoscopy on 4/21/22  General Comment  Comments: Pt resting in bed upon entry of PT  Subjective  Subjective: Pt agreeable to work with PT this morning and get up to chair for his meal.  C/o 6/10 pain in L side of chest near chest tube site. Objective                              Bed mobility  Supine to Sit: Moderate assistance  Sit to Supine: Moderate assistance  Scooting: Minimal assistance (to scoot to edge of bed)  Comment: head of bed elevated  Transfers  Sit to Stand: Moderate Assistance  Stand to sit: Moderate Assistance  Bed to Chair: Moderate assistance  Comment: cueing for safety. poor eccentric control of hip extensors. Ambulation  Surface: level tile  Device: Rolling Walker  Other Apparatus: O2 (1L)  Assistance: Moderate assistance  Quality of Gait: min-mod assist for RW placement. mod assist to correct loss of balance. forward flexed posture, cueing to correct. Gait Deviations: Slow Latosha;Decreased step length;Decreased step height  Distance: bed to chair ~3 feet. bed to commode ~3 feet. commode to chair ~ 3 feet. chair to bed ~3 feet. Comments: No complaints of SOB, chest pain or dizziness. 1 loss of balance, mod assist to correct. More Ambulation?: No     Balance  Posture: Fair  Sitting - Static: Good  Sitting - Dynamic: Fair  Standing - Static: Fair  Standing - Dynamic: Poor  Comments: with rolling walker  Exercise Treatment: 1 x 10 bilateral straight leg raise, ankle pumps, long arc quads.         AM-PAC Score     AM-PAC Inpatient Mobility without Stair Climbing Raw Score : 15 (04/24/22 0935)  AM-PAC Inpatient without Stair Climbing T-Scale Score : 43.03 (04/24/22 0935)  Mobility Inpatient CMS 0-100% Score: 47.43 (04/24/22 0935)  Mobility Inpatient without Stair CMS G-Code Modifier : CK (04/24/22 0935)       Goals  Short Term Goals  Time Frame for Short term goals: 1 week, 4/30/22 (unless otherwise specified)  Short term goal 1: Pt will transfer supine <-> sit with SBA 4/24 mod assist  Short term goal 2: Pt will transfer sit <-> stand and bed>chair using appropriate AD as needed with CGA x 1 4/24 mod assist  Short term goal 3: Pt will ambulate x 60 feet using appropriate AD as needed with CGA x 1 4/24 3 feet with mod assist  Short term goal 4: By 4/26/22: Pt will tolerate 12-15 reps BLE exercise for strengthening, balance, and endurance 4/24 continue goal  Patient Goals   Patient goals :  \"To get stronger\"       Therapy Time   Individual Concurrent Group Co-treatment   Time In 3082         Time Out 0830         Minutes 41         Timed Code Treatment Minutes: Jacquelyn Tarango 34, PT

## 2022-04-24 NOTE — PROGRESS NOTES
Hospitalist Progress Note      PCP: Gabe Vicente MD    Date of Admission: 4/19/2022    Chief Complaint: Endobronchial mass    Hospital Course:      74 y.o. male who presented to [facility] with above complaints  Patient with PMH of SCC left lung, left mainstem endobronchial mass causing left lung collapse, postobstructive pneumonia, acute on chronic respiratory failure, hypercalcemia of malignancy, chronic A. fib, chronic diastolic CHF, COPD initially admitted to [facility] on 4/17/2022 for shortness of breath and respiratory failure.  He was started on antibiotics for the postobstructive pneumonia.  Patient refused fiberoptic bronchoscopy, refuses BiPAP.  CT surgery was consulted for the endobronchial mass for debulking airway tumor and then patient wants to proceed with chemo/XRT.  The patient was  transferred to [facility] for CT surgery consult, currently on 3 L oxygen, and in no acute distress. He is currently in the ICU, he is being followed by CT surgery in consultation. Subjective: sitting up in bed, in no acute distress at this time. Denies chest pain or shortness of breath. Appetite seems to be improving mentation also seems to be slowly improving.   Chest tube is in place.         Medications:  Reviewed    Infusion Medications    lactated ringers Stopped (04/22/22 7614)    sodium chloride      dexmedetomidine      dextrose       Scheduled Medications    metoprolol tartrate  12.5 mg Oral BID    torsemide  50 mg Oral Daily    sodium chloride flush  5-40 mL IntraVENous 2 times per day    insulin lispro  0-3 Units SubCUTAneous Nightly    insulin lispro  0-6 Units SubCUTAneous TID WC    atorvastatin  20 mg Oral Daily    buPROPion  100 mg Oral BID    digoxin  0.125 mg Oral Daily    dilTIAZem  240 mg Oral Daily    insulin glargine  10 Units SubCUTAneous Nightly    levothyroxine  150 mcg Oral Daily    lisinopril  20 mg Oral Daily    mupirocin   Nasal BID    nortriptyline  10 mg Oral Nightly    predniSONE  30 mg Oral Daily     PRN Meds: ipratropium-albuterol, sodium chloride, acetaminophen **OR** acetaminophen, dextrose, glucagon (rDNA), glucose, ondansetron **OR** ondansetron, polyethylene glycol, sodium chloride flush, glucose, dextrose bolus (hypoglycemia) **OR** dextrose bolus (hypoglycemia)      Intake/Output Summary (Last 24 hours) at 4/24/2022 1416  Last data filed at 4/24/2022 1300  Gross per 24 hour   Intake 240 ml   Output 1720 ml   Net -1480 ml       Physical Exam Performed:    BP 95/64   Pulse 78   Temp 97.8 °F (36.6 °C) (Axillary)   Resp 20   Ht 6' (1.829 m)   Wt 254 lb 13.6 oz (115.6 kg)   SpO2 97%   BMI 34.56 kg/m²      General appearance: He is sitting up in bed, in NAD, he is fairly alert pleasant and cooperative   HEENT: Pupils equal, round, and reactive to light. Conjunctivae/corneas clear. Neck: Supple, with full range of motion. No jugular venous distention. Trachea midline. Respiratory: Bilateral breath sounds, decreased at both bases, there is also notably diminished breath sounds in the left lung field, no use of accessory muscles at this time. Chest tube in place. Cardiovascular: Regular rate and rhythm with normal S1/S2   Abdomen: Soft, non-tender, non-distended with normal bowel sounds. Musculoskeletal: No clubbing, cyanosis or edema bilaterally. Full range of motion without deformity. Neurologic:  Neurovascularly intact without any focal sensory/motor deficits. Cranial nerves: II-XII intact, grossly non-focal.  Psychiatric: He is awake, he is able to tell me the month and the year but not the name of the hospital.  Short-term recall was fair recalled 2/3 objects after 1 minute.     Peripheral Pulses: +2 palpable, equal bilaterally     Labs:   Recent Labs     04/22/22  0424 04/23/22  0418 04/24/22  0423   WBC 12.1* 17.7* 13.7*   HGB 13.9 12.9* 13.8   HCT 44.9 41.0 44.3    249 179     Recent Labs     04/22/22  0707 04/23/22  0418 04/24/22  0423    138 136   K 4.3 3.7 3.8    93* 92*   CO2 36* 39* 33*   BUN 15 23* 25*   CREATININE 0.6* 1.0 0.9   CALCIUM 7.7* 7.6* 8.8     No results for input(s): AST, ALT, BILIDIR, BILITOT, ALKPHOS in the last 72 hours. No results for input(s): INR in the last 72 hours. No results for input(s): Verlena Gopi in the last 72 hours. Urinalysis:    No results found for: Kanu Bourdon, BACTERIA, RBCUA, BLOODU, SPECGRAV, Judson [place] [address]    Radiology:  XR CHEST PORTABLE   Final Result   Stable left chest tube. No pneumothorax. Persisting extensive opacification of the left hemithorax- atelectasis or   consolidation versus pleural effusion. Developing right basilar airspace disease, likely pulmonary edema. XR CHEST PORTABLE   Final Result   Left chest tube is in stable position. Left hemithorax remains mostly opaque with aeration of the apex. Decreased right perihilar vascular congestion. XR CHEST PORTABLE   Final Result   Partial re-expansion of the left upper lobe following chest tube. Increased   right perihilar edema or infiltrate. No pneumothorax.                  Assessment/Plan:    Active Hospital Problems    Diagnosis     Small cell lung cancer ([facility] [address] [C34.90]      Priority: Medium    Acute on chronic respiratory failure with hypoxia and hypercapnia (HCC) [J96.21, J96.22]     Squamous cell carcinoma of left lung (HCC) [C34.92]     COPD (chronic obstructive pulmonary disease) (HCC) [J44.9]     Endobronchial mass [R91.8]     Postobstructive pneumonia [J18.9]     Lung collapse [J98.19]     Atrial fibrillation, permanent (HCC) [I48.21]     COPD with acute exacerbation (HCC) [J44.1]      SCC left lung with left mainstem endobronchial mass causing left lung collapse/postobstructive pneumonia  SqCC left lower lobe/lingula - 12 cm mass with positive station 7 lymph node - stage IIIA  Plan  -Possible debulking/laser therapy per CTS, require 5 days off of Eliquis, last dose was 4/15/22  -CT surgery consulted; He went to the OR 4/20 for laser treatment and chest tube placement  -Plan to F/w Dr. Catrachita Ordaz next week to plan for cancer treatment outpatient for subsequent XRT/chemo     Acute on chronic respiratory failure with hypoxia and hypercapnia  COPD with acute exacerbation  Postobstructive pneumonia  Lung collapse  -BiPAP 16/8 HS and PRN - patient has been refusing   -Continue supplemental oxygen, currently on 4 LPM, wean as tolerated  -Continue prednisone p.o.  -Completed IV Levaquin course  -Continue duo nebs, inhaled bronchodilators  -Chest tube as above     Tobacco abuse-counseled cessation     Atrial fibrillation, permanent   -Eliquis held for surgery; resume when ok with CT Surgery  -Continue Cardizem CD and digoxin     HLD-resume statin     Hypothyroidism-clinically euthyroid, resume Synthroid     HTN-follow BP, blood pressures have been running a little low and will continue to follow.     Chronic diastolic CHF-euvolemic. Post op diuresis per CT Surgery.      DM2-stable, resume Lantus, SSI, Accu-Cheks     Chronic anxiety-mood stable, resume Wellbutrin    DVT Prophylaxis: Resume Eliquis when ok with CT surgery post op  Diet: ADULT DIET; Regular  ADULT ORAL NUTRITION SUPPLEMENT; Breakfast, Lunch, Dinner;  Low Calorie/High Protein Oral Supplement  Code Status: Full Code    PT/OT Eval Status: Consulted and recommend SNF    Dispo -per CT surgery    Jeremy Saleem MD

## 2022-04-25 ENCOUNTER — APPOINTMENT (OUTPATIENT)
Dept: GENERAL RADIOLOGY | Age: 67
DRG: 180 | End: 2022-04-25
Attending: STUDENT IN AN ORGANIZED HEALTH CARE EDUCATION/TRAINING PROGRAM
Payer: MEDICARE

## 2022-04-25 LAB
ANION GAP SERPL CALCULATED.3IONS-SCNC: 10 MMOL/L (ref 3–16)
BASOPHILS ABSOLUTE: 0 K/UL (ref 0–0.2)
BASOPHILS RELATIVE PERCENT: 0.1 %
BUN BLDV-MCNC: 18 MG/DL (ref 7–20)
CALCIUM SERPL-MCNC: 8.1 MG/DL (ref 8.3–10.6)
CHLORIDE BLD-SCNC: 93 MMOL/L (ref 99–110)
CO2: 33 MMOL/L (ref 21–32)
CREAT SERPL-MCNC: 0.8 MG/DL (ref 0.8–1.3)
EOSINOPHILS ABSOLUTE: 0 K/UL (ref 0–0.6)
EOSINOPHILS RELATIVE PERCENT: 0.3 %
GFR AFRICAN AMERICAN: >60
GFR NON-AFRICAN AMERICAN: >60
GLUCOSE BLD-MCNC: 114 MG/DL (ref 70–99)
GLUCOSE BLD-MCNC: 192 MG/DL (ref 70–99)
GLUCOSE BLD-MCNC: 223 MG/DL (ref 70–99)
GLUCOSE BLD-MCNC: 268 MG/DL (ref 70–99)
GLUCOSE BLD-MCNC: 98 MG/DL (ref 70–99)
HCT VFR BLD CALC: 41.3 % (ref 40.5–52.5)
HEMOGLOBIN: 13.3 G/DL (ref 13.5–17.5)
LYMPHOCYTES ABSOLUTE: 0.8 K/UL (ref 1–5.1)
LYMPHOCYTES RELATIVE PERCENT: 6.4 %
MCH RBC QN AUTO: 29.1 PG (ref 26–34)
MCHC RBC AUTO-ENTMCNC: 32.3 G/DL (ref 31–36)
MCV RBC AUTO: 90.1 FL (ref 80–100)
MONOCYTES ABSOLUTE: 0.9 K/UL (ref 0–1.3)
MONOCYTES RELATIVE PERCENT: 7.1 %
NEUTROPHILS ABSOLUTE: 10.5 K/UL (ref 1.7–7.7)
NEUTROPHILS RELATIVE PERCENT: 86.1 %
PDW BLD-RTO: 16.9 % (ref 12.4–15.4)
PERFORMED ON: ABNORMAL
PERFORMED ON: NORMAL
PLATELET # BLD: 193 K/UL (ref 135–450)
PMV BLD AUTO: 6.8 FL (ref 5–10.5)
POTASSIUM SERPL-SCNC: 3.3 MMOL/L (ref 3.5–5.1)
RBC # BLD: 4.59 M/UL (ref 4.2–5.9)
SODIUM BLD-SCNC: 136 MMOL/L (ref 136–145)
WBC # BLD: 12.3 K/UL (ref 4–11)

## 2022-04-25 PROCEDURE — 99024 POSTOP FOLLOW-UP VISIT: CPT | Performed by: NURSE PRACTITIONER

## 2022-04-25 PROCEDURE — 71045 X-RAY EXAM CHEST 1 VIEW: CPT

## 2022-04-25 PROCEDURE — 97116 GAIT TRAINING THERAPY: CPT

## 2022-04-25 PROCEDURE — 6370000000 HC RX 637 (ALT 250 FOR IP): Performed by: INTERNAL MEDICINE

## 2022-04-25 PROCEDURE — 2700000000 HC OXYGEN THERAPY PER DAY

## 2022-04-25 PROCEDURE — 6370000000 HC RX 637 (ALT 250 FOR IP): Performed by: THORACIC SURGERY (CARDIOTHORACIC VASCULAR SURGERY)

## 2022-04-25 PROCEDURE — 2580000003 HC RX 258: Performed by: ANESTHESIOLOGY

## 2022-04-25 PROCEDURE — 94761 N-INVAS EAR/PLS OXIMETRY MLT: CPT

## 2022-04-25 PROCEDURE — 6370000000 HC RX 637 (ALT 250 FOR IP)

## 2022-04-25 PROCEDURE — 97110 THERAPEUTIC EXERCISES: CPT

## 2022-04-25 PROCEDURE — 80048 BASIC METABOLIC PNL TOTAL CA: CPT

## 2022-04-25 PROCEDURE — 85025 COMPLETE CBC W/AUTO DIFF WBC: CPT

## 2022-04-25 PROCEDURE — 97530 THERAPEUTIC ACTIVITIES: CPT

## 2022-04-25 PROCEDURE — 36415 COLL VENOUS BLD VENIPUNCTURE: CPT

## 2022-04-25 PROCEDURE — 2000000000 HC ICU R&B

## 2022-04-25 RX ORDER — POTASSIUM CHLORIDE 20 MEQ/1
40 TABLET, EXTENDED RELEASE ORAL ONCE
Status: COMPLETED | OUTPATIENT
Start: 2022-04-25 | End: 2022-04-25

## 2022-04-25 RX ADMIN — DILTIAZEM HYDROCHLORIDE 240 MG: 240 CAPSULE, COATED, EXTENDED RELEASE ORAL at 08:52

## 2022-04-25 RX ADMIN — SODIUM CHLORIDE, PRESERVATIVE FREE 10 ML: 5 INJECTION INTRAVENOUS at 20:50

## 2022-04-25 RX ADMIN — OXYCODONE AND ACETAMINOPHEN 1 TABLET: 5; 325 TABLET ORAL at 08:52

## 2022-04-25 RX ADMIN — SODIUM CHLORIDE, PRESERVATIVE FREE 10 ML: 5 INJECTION INTRAVENOUS at 08:54

## 2022-04-25 RX ADMIN — METOPROLOL TARTRATE 12.5 MG: 25 TABLET, FILM COATED ORAL at 20:40

## 2022-04-25 RX ADMIN — INSULIN LISPRO 2 UNITS: 100 INJECTION, SOLUTION INTRAVENOUS; SUBCUTANEOUS at 12:58

## 2022-04-25 RX ADMIN — NORTRIPTYLINE HYDROCHLORIDE 10 MG: 10 CAPSULE ORAL at 20:40

## 2022-04-25 RX ADMIN — TORSEMIDE 50 MG: 100 TABLET ORAL at 08:53

## 2022-04-25 RX ADMIN — INSULIN GLARGINE 10 UNITS: 100 INJECTION, SOLUTION SUBCUTANEOUS at 20:40

## 2022-04-25 RX ADMIN — BUPROPION HYDROCHLORIDE 100 MG: 100 TABLET, EXTENDED RELEASE ORAL at 08:56

## 2022-04-25 RX ADMIN — METOPROLOL TARTRATE 12.5 MG: 25 TABLET, FILM COATED ORAL at 08:53

## 2022-04-25 RX ADMIN — ATORVASTATIN CALCIUM 20 MG: 10 TABLET, FILM COATED ORAL at 08:53

## 2022-04-25 RX ADMIN — INSULIN LISPRO 3 UNITS: 100 INJECTION, SOLUTION INTRAVENOUS; SUBCUTANEOUS at 20:41

## 2022-04-25 RX ADMIN — LISINOPRIL 20 MG: 20 TABLET ORAL at 08:53

## 2022-04-25 RX ADMIN — PREDNISONE 30 MG: 20 TABLET ORAL at 08:53

## 2022-04-25 RX ADMIN — DIGOXIN 0.12 MG: 125 TABLET ORAL at 08:53

## 2022-04-25 RX ADMIN — POTASSIUM CHLORIDE 40 MEQ: 20 TABLET, EXTENDED RELEASE ORAL at 18:09

## 2022-04-25 RX ADMIN — LEVOTHYROXINE SODIUM 150 MCG: 0.15 TABLET ORAL at 05:56

## 2022-04-25 RX ADMIN — BUPROPION HYDROCHLORIDE 100 MG: 100 TABLET, EXTENDED RELEASE ORAL at 20:40

## 2022-04-25 ASSESSMENT — PAIN SCALES - GENERAL: PAINLEVEL_OUTOF10: 7

## 2022-04-25 NOTE — PROGRESS NOTES
No significant events to note overnight. A&O x4, though irritable and withdrawn at times. Afebrile. AFIB per monitor with freq multifocal PVC's and occasional pauses. Normotensive. Sao2 maintained on home O2 dose 2 lpm. Respirations shallow with markedly diminished lung sounds. Achieving <250ml volume on IS. L pleural chest tube site unremarkable, draining moderate amt of serous fluid with sediment, 310ml this shift. Atrium changed this morning. MD notified of hypokalemia per AM labs, no orders at present. PRN glycolax given for constipation. C/O pain to chest tube site overnight unrelieved by PRN tylenol, MD notified and order obtained for PRN percocet, given with optimal relief. POC discussed, denies any further needs. Will cont to monitor.

## 2022-04-25 NOTE — PROGRESS NOTES
Hospitalist Progress Note      PCP: Erik Foote MD    Date of Admission: 4/19/2022    Chief Complaint: Endobronchial mass      Subjective: sitting up in bed, in no acute distress at this time. Denies chest pain or shortness of breath. Appetite seems to be improving mentation also seems to be slowly improving. Chest tube is in place.         Medications:  Reviewed    Infusion Medications    lactated ringers Stopped (04/22/22 1790)    sodium chloride      dexmedetomidine      dextrose       Scheduled Medications    potassium chloride  40 mEq Oral Once    metoprolol tartrate  12.5 mg Oral BID    torsemide  50 mg Oral Daily    sodium chloride flush  5-40 mL IntraVENous 2 times per day    insulin lispro  0-3 Units SubCUTAneous Nightly    insulin lispro  0-6 Units SubCUTAneous TID WC    atorvastatin  20 mg Oral Daily    buPROPion  100 mg Oral BID    digoxin  0.125 mg Oral Daily    dilTIAZem  240 mg Oral Daily    insulin glargine  10 Units SubCUTAneous Nightly    levothyroxine  150 mcg Oral Daily    lisinopril  20 mg Oral Daily    nortriptyline  10 mg Oral Nightly    predniSONE  30 mg Oral Daily     PRN Meds: ipratropium-albuterol, oxyCODONE-acetaminophen, sodium chloride, acetaminophen **OR** acetaminophen, dextrose, glucagon (rDNA), glucose, ondansetron **OR** ondansetron, polyethylene glycol, sodium chloride flush, glucose, dextrose bolus (hypoglycemia) **OR** dextrose bolus (hypoglycemia)      Intake/Output Summary (Last 24 hours) at 4/25/2022 1556  Last data filed at 4/25/2022 1200  Gross per 24 hour   Intake 360 ml   Output 1465 ml   Net -1105 ml       Physical Exam Performed:    /76   Pulse 66   Temp 98 °F (36.7 °C) (Oral)   Resp 18   Ht 6' (1.829 m)   Wt 254 lb 3.1 oz (115.3 kg)   SpO2 94%   BMI 34.47 kg/m²      General appearance: He is sitting up in bed, in NAD, he is fairly alert pleasant and cooperative   HEENT: Pupils equal, round, and reactive to light. Conjunctivae/corneas clear. Neck: Supple, with full range of motion. No jugular venous distention. Trachea midline. Respiratory: Bilateral breath sounds, decreased at both bases, there is also notably diminished breath sounds in the left lung field, no use of accessory muscles at this time. Chest tube in place. Cardiovascular: Regular rate and rhythm with normal S1/S2   Abdomen: Soft, non-tender, non-distended with normal bowel sounds. Musculoskeletal: No clubbing, cyanosis or edema bilaterally. Full range of motion without deformity. Neurologic:  Neurovascularly intact without any focal sensory/motor deficits. Cranial nerves: II-XII intact, grossly non-focal.  Psychiatric: He is awake, he is able to tell me the month and the year but not the name of the hospital.  Short-term recall was fair recalled 2/3 objects after 1 minute. Peripheral Pulses: +2 palpable, equal bilaterally     Labs:   Recent Labs     04/23/22  0418 04/24/22  0423 04/25/22  0413   WBC 17.7* 13.7* 12.3*   HGB 12.9* 13.8 13.3*   HCT 41.0 44.3 41.3    179 193     Recent Labs     04/23/22  0418 04/24/22  0423 04/25/22  0413    136 136   K 3.7 3.8 3.3*   CL 93* 92* 93*   CO2 39* 33* 33*   BUN 23* 25* 18   CREATININE 1.0 0.9 0.8   CALCIUM 7.6* 8.8 8.1*     No results for input(s): AST, ALT, BILIDIR, BILITOT, ALKPHOS in the last 72 hours. No results for input(s): INR in the last 72 hours. No results for input(s): [place] Dec in the last 72 hours. Urinalysis:    No results found for: [place], BACTERIA, RBCUA, BLOODU, [address], [place] [place] [address]    Radiology:  XR CHEST PORTABLE   Final Result   Decreased left-sided pleural effusion. There is a developing pleural gas   collection seen in the left francisco javier thorax superiorly secondary to decreased   pleural fluid and incomplete re-expansion of the left lung.   Dense   consolidation the left lung persists along with large remaining left-sided   pleural effusion      Improved aeration of the right lung         XR CHEST PORTABLE   Final Result   Stable left chest tube. No pneumothorax. Persisting extensive opacification of the left hemithorax- atelectasis or   consolidation versus pleural effusion. Developing right basilar airspace disease, likely pulmonary edema. XR CHEST PORTABLE   Final Result   Left chest tube is in stable position. Left hemithorax remains mostly opaque with aeration of the apex. Decreased right perihilar vascular congestion. XR CHEST PORTABLE   Final Result   Partial re-expansion of the left upper lobe following chest tube. Increased   right perihilar edema or infiltrate. No pneumothorax. Assessment/Plan:    Active Hospital Problems    Diagnosis     Small cell lung cancer ([facility] [address] [C34.90]      Priority: Medium    Acute on chronic respiratory failure with hypoxia and hypercapnia (HCC) [J96.21, J96.22]     Squamous cell carcinoma of left lung (HCC) [C34.92]     COPD (chronic obstructive pulmonary disease) (HCC) [J44.9]     Endobronchial mass [R91.8]     Postobstructive pneumonia [J18.9]     Lung collapse [J98.19]     Atrial fibrillation, permanent (HCC) [I48.21]     COPD with acute exacerbation (HCC) [J44.1]        \"Patient with PMH of SCC left lung, left mainstem endobronchial mass causing left lung collapse, postobstructive pneumonia, acute on chronic respiratory failure, hypercalcemia of malignancy, chronic A. fib, chronic diastolic CHF, COPD initially admitted to [facility] on 4/17/2022 for shortness of breath and respiratory failure.  He was started on antibiotics for the postobstructive pneumonia.  Patient refused fiberoptic bronchoscopy, refuses BiPAP.  CT surgery was consulted for the endobronchial mass for debulking airway tumor and then patient wants to proceed with chemo/XRT. The patient was transferred to [facility] for CT surgery consult. \"      SCC left lung with left mainstem endobronchial mass causing left lung collapse/postobstructive pneumonia  SqCC left lower lobe/lingula - 12 cm mass with positive station 7 lymph node - stage IIIA  -s/p debulking/laser therapy per CTS on 4/20. CT placed.  -Plan to F/w Dr. Dorinda Pereira next week to plan for cancer treatment outpatient for subsequent XRT/chemo     Acute on chronic respiratory failure with hypoxia and hypercapnia  COPD with acute exacerbation  Postobstructive pneumonia  Lung collapse  -refused BiPAP. He normally wears O2 PRN at home.  -Continue prednisone   -Completed IV Levaquin course  -inhaled bronchodilators  -Chest tube placed, CT surgery considering PleurX catheter.     Tobacco abuse-counseled cessation     Atrial fibrillation, permanent   -Eliquis held for surgery; resume when ok with CT Surgery  -Continue Cardizem CD and digoxin     HLD-resume statin     Hypothyroidism-clinically euthyroid, resumed Synthroid      DM2- insulin regimen. Steroid complicated treatment.      Chronic anxiety-mood stable, resumed Wellbutrin      DVT Prophylaxis: Resume Eliquis when ok with CT surgery post op. SCDs meanwhile. Diet: ADULT DIET; Regular  ADULT ORAL NUTRITION SUPPLEMENT; Breakfast, Lunch, Dinner; Low Calorie/High Protein Oral Supplement  Code Status: Full Code    PT/OT Eval Status: rec'd SNF    Dispo - recommend SNF when ok with CT surgery. Perhaps 4/27? (it depends upon chest tube management). Patient was hoping to go home but he would be better off with SNF.       Jose Manuel Chapa MD

## 2022-04-25 NOTE — PROGRESS NOTES
CVTS Thoracic Progress Note:          CC:  Post op follow up 4/21/22 Endobronchial lesion s/p bronchoscopy with yag laser and left chest tube placement      Subj: awake, sitting up in bed, talking without conversational dyspnea. Reports feeling better. Saturating 99% on 2L per NC. Obj:    Blood pressure 119/75, pulse 65, temperature 97.8 °F (36.6 °C), temperature source Oral, resp. rate 16, height 6' (1.829 m), weight 254 lb 3.1 oz (115.3 kg), SpO2 99 %. Lungs diminished to left base   Abdomen rounded, soft, non-tender    Incision with occlusive dressing, CDI   Chest tube with 400-170-140= 710 ml serous drainage in last 24 hours/No airleak    Diagnostics:   Recent Labs     04/23/22  0418 04/24/22  0423 04/25/22  0413   WBC 17.7* 13.7* 12.3*   HGB 12.9* 13.8 13.3*   HCT 41.0 44.3 41.3    179 193                                                                  Recent Labs     04/23/22  0418 04/24/22  0423 04/25/22  0413    136 136   K 3.7 3.8 3.3*   CL 93* 92* 93*   CO2 39* 33* 33*   BUN 23* 25* 18   CREATININE 1.0 0.9 0.8   GLUCOSE 139* 175* 114*       CXR: 4/25/22   Impression   Decreased left-sided pleural effusion. Lexy Seller is a developing pleural gas   collection seen in the left francisco javier thorax superiorly secondary to decreased   pleural fluid and incomplete re-expansion of the left lung.  Dense   consolidation the left lung persists along with large remaining left-sided   pleural effusion       Improved aeration of the right lung     Assess/Plan:   Care per primary team     SCC with left endobronchial lesion: s/p YAG laser debulking   -placing chest tube to water seal today  -chest tube with 710 ml output in past 24 hrs (malignant)  -He will likely need a PleurX catheter as the amount of output from his chest tube is high.  Will discuss timing of placement with team.   -continue expansion: IS/Acapella, EZPAP, and PT/OT  _____________________________________________________    MELISSA Jaffe CNP  4/25/2022  12:20 PM

## 2022-04-25 NOTE — CARE COORDINATION
This writer noted SNF recommendation from therapy. Spoke with patient at bedside who is in agreement. First choice is [facility]. Contacted liaison [facility]) and faxed referral. Does not need pre cert. Will continue to follow.       Jluy Up RN

## 2022-04-25 NOTE — PROGRESS NOTES
Physical Therapy  Facility/Department: [facility] C2 CARD TELEMETRY  Daily Treatment Note  NAME: Rollo Bamberger  : 1955  MRN: [id number]    Date of Service: 2022    Discharge Recommendations:  Subacute/Skilled Nursing Facility   PT Equipment Recommendations  Equipment Needed: No    Patient Diagnosis(es): There were no encounter diagnoses. Assessment   Assessment: Pt participated well with PT this session, although continues to demonstrate c/o dizziness and drop in BP values when first getting OOB. Pt able to transfer into chair and amb short distance around hospital room with support of RW and min-Carmen x 1. Amb distance and overall standing tolerance limited by hypotension and generalized weakness. Recommend SNF at D/C in light of current deficits. Activity Tolerance: Patient limited by fatigue;Patient limited by endurance  Equipment Needed: No     Plan    Plan: 5-7 times per week  Current Treatment Recommendations: Strengthening;ROM;Balance training;Functional mobility training;Transfer training; Endurance training;Gait training; Safety education & training;Patient/Caregiver education & training;Equipment evaluation, education, & procurement     Subjective    Subjective: Pt agreeable to work with PT this afternoon, pleasant and cooperative. Agreeable to try and get up to the chair. Pain: pt sitting BP 81/62 with mild reports of dizziness, upon standing and returning to stitting 110/70.   Overall Orientation Status: Within Functional Limits     Objective   Vitals  Pulse: 66  Heart Rate Source: Monitor  BP: 115/76  BP Location: Left lower arm  BP Method: Automatic  Patient Position: Semi fowlers  MAP (Calculated): 89  SpO2: 94 %  O2 Device: Nasal cannula (2L O2)     Bed Mobility Training  Overall Level of Assistance: Minimum assistance  Supine to Sit: Minimum assistance (moving toward L side, HOB elevated ~30 degrees)  Sit to Supine: Minimum assistance  Scooting: Minimum assistance    Balance  Sitting: Intact  Standing: With support    Transfer Training  Overall Level of Assistance: Stand-by assistance  Sit to Stand: Stand-by assistance  Stand to Sit: Stand-by assistance  Bed to Chair: Stand-by assistance    Gait  Overall Level of Assistance: Minimum assistance; Moderate assistance  Interventions: Tactile cues; Verbal cues; Safety awareness training  Speed/Latosha: Pace decreased (< 100 feet/min); Shuffled; Slow  Step Length: Left shortened;Right shortened  Gait Abnormalities: Decreased step clearance;Shuffling gait; Step to gait  Distance (ft): 10 Feet  Assistive Device: Walker, rolling     PT Exercises  A/AROM Exercises: 2 x 15 ankle pumps BLE (pt too fatigued/dizzy after mobility assessment to perform additional exercises today)        Patient Education  Education Given To: Patient  Education Provided: Role of Therapy;Plan of Care;Home Exercise Program;Transfer Training; Fall Prevention Strategies;Precautions; Equipment; Energy Conservation (monitoring of BP with position changes)  Education Method: Demonstration;Verbal  Barriers to Learning: Cognition  Education Outcome: Verbalized understanding;Demonstrated understanding;Continued education needed     [facility] 6 Clicks Inpatient Mobility:  AM-PAC Mobility Inpatient   How much difficulty turning over in bed?: A Little  How much difficulty sitting down on / standing up from a chair with arms?: A Lot  How much difficulty moving from lying on back to sitting on side of bed?: A Little  How much help from another person moving to and from a bed to a chair?: A Lot  How much help from another person needed to walk in hospital room?: A Lot  How much help from another person for climbing 3-5 steps with a railing?: Total  AM-PAC Inpatient Mobility Raw Score : 13  AM-PAC Inpatient T-Scale Score : 36.74  Mobility Inpatient CMS 0-100% Score: 64.91  Mobility Inpatient CMS G-Code Modifier : CL    Goals  Short Term Goals  Time Frame for Short term goals: 1 week, 4/30/22 (unless otherwise specified)  Short term goal 1: Pt will transfer supine <-> sit with SBA  Short term goal 2: Pt will transfer sit <-> stand and bed>chair using appropriate AD as needed with CGA x 1  Short term goal 3: Pt will ambulate x 60 feet using appropriate AD as needed with CGA x 1  Short term goal 4: By 4/26/22: Pt will tolerate 12-15 reps BLE exercise for strengthening, balance, and endurance - MET for ankle pumps on 4/25/22, goal ongoing  Patient Goals   Patient goals : \"To get stronger\"      Therapy Time   Individual Concurrent Group Co-treatment   Time In 1400         Time Out 1453         Minutes 53         Timed Code Treatment Minutes: [address] [place], [place] #[id number]    If pt is unable to be seen after this session, please let this note serve as discharge summary. Please see case management note for discharge disposition. Thank you.

## 2022-04-25 NOTE — PROGRESS NOTES
Occupational Therapy  Facility/Department: [facility] C2 CARD TELEMETRY  Daily Treatment Note  NAME: Louis Lynch  : 1955  MRN: [id number]    Date of Service: 2022    Discharge Recommendations:  Subacute/Skilled Nursing Facility  OT Equipment Recommendations  Equipment Needed:  (defer)      Patient Diagnosis(es): There were no encounter diagnoses. Assessment    Assessment: Pt not motivated to move from chair; RN and OT educated on importance of OOB activity, pt O2 desat 88% on standing 2L, pt sit<>stand x 3 with stand pivot to chair, pt reporting fatigue and lightheadedness. sitting BP 81/62, improving to 110/70 after standing. Pt completed all transfers adn mobilty with SBA includign bed mobility. pt continues to benefit from skilled OT fucnctioning below his baseline. would benefit from SNF upon d/c. Pt educated on need for rehab as he reports he doesn't feel safe returning home at this point in time. Activity Tolerance: Patient limited by fatigue;Patient limited by endurance  Discharge Recommendations: Subacute/Skilled Nursing Facility  Equipment Needed:  (defer)      Plan   Plan  Times per Week: 4-6x/week  Current Treatment Recommendations: Strengthening; Functional mobility training; Endurance training;Gait training;Balance training;Cognitive reorientation; Safety education & training;Patient/Caregiver education & training;Pain management;Self-Care / ADL;Positioning;Home management training     Subjective   Subjective  Subjective: pt not motivated to stand, wanted to stay in chair  Pain: 8/10 in surgical site           Objective    Vitals  Vitals  Pulse: 66  BP: 110/70  BP Location: Left lower arm  BP Method: Automatic  MAP (Calculated): 83.33  SpO2: 90 %  O2 Device: Nasal cannula (2L)  Bed Mobility Training  Bed Mobility Training: Yes  Overall Level of Assistance: Minimum assistance  Supine to Sit: Minimum assistance (moving toward L side, HOB elevated ~30 degrees)  Sit to Supine: Minimum assistance  Scooting: Minimum assistance  Balance  Sitting: Intact  Standing: With support  Transfer Training  Transfer Training: Yes  Overall Level of Assistance: Stand-by assistance  Sit to Stand: Stand-by assistance  Stand to Sit: Stand-by assistance  Bed to Chair: Stand-by assistance  Gait  Overall Level of Assistance: Minimum assistance; Moderate assistance  Interventions: Tactile cues; Verbal cues; Safety awareness training  Speed/Latosha: Pace decreased (< 100 feet/min); Shuffled; Slow  Step Length: Left shortened;Right shortened  Gait Abnormalities: Decreased step clearance;Shuffling gait; Step to gait  Distance (ft): 10 Feet  Assistive Device: Walker, rolling                 Patient Education  Education Given To: Patient  Education Provided: Role of Therapy;Plan of Care;Transfer Training;Precautions;IADL Safety; Energy Conservation; ADL Adaptive Strategies  Education Provided Comments: Pt educated on purpose of OT, OT POC, energy conservation technique, and importance of OOB activity. Education Method: Verbal  Education Outcome: Verbalized understanding;Continued education needed    Goals  Short Term Goals  Time Frame for Short term goals: 1 week (5/1)  Short Term Goal 1: Pt will perform toilet transfer with SBA and use of LRAD  Short Term Goal 2: Pt will perform toileting with SBA and use of LRA  Short Term Goal 3: Pt will perform LB dressing with SBA  Short Term Goal 4: Pt will perform 3 grooming tasks while standing at sink with LRAD and rest breaks  Short Term Goal 5: Pt will perform x15 BUE exercises to improve UB strength/endurance to increase IND with reposition and ADLs  Patient Goals   Patient goals : To get stronger       Therapy Time   Individual Concurrent Group Co-treatment   Time In 1545         Time Out 1615         Minutes 30         Timed Code Treatment Minutes: [address], OT   If pt is unable to be seen after this session, please let this note serve as discharge summary. Please see case management note for discharge disposition. Thank you.

## 2022-04-26 ENCOUNTER — ANESTHESIA EVENT (OUTPATIENT)
Dept: OPERATING ROOM | Age: 67
DRG: 180 | End: 2022-04-26
Payer: MEDICARE

## 2022-04-26 LAB
ABO/RH: NORMAL
ANTIBODY SCREEN: NORMAL
GLUCOSE BLD-MCNC: 126 MG/DL (ref 70–99)
GLUCOSE BLD-MCNC: 192 MG/DL (ref 70–99)
GLUCOSE BLD-MCNC: 265 MG/DL (ref 70–99)
GLUCOSE BLD-MCNC: 322 MG/DL (ref 70–99)
PERFORMED ON: ABNORMAL

## 2022-04-26 PROCEDURE — 86901 BLOOD TYPING SEROLOGIC RH(D): CPT

## 2022-04-26 PROCEDURE — 2000000000 HC ICU R&B

## 2022-04-26 PROCEDURE — 6370000000 HC RX 637 (ALT 250 FOR IP): Performed by: INTERNAL MEDICINE

## 2022-04-26 PROCEDURE — 6370000000 HC RX 637 (ALT 250 FOR IP)

## 2022-04-26 PROCEDURE — 86900 BLOOD TYPING SEROLOGIC ABO: CPT

## 2022-04-26 PROCEDURE — 86850 RBC ANTIBODY SCREEN: CPT

## 2022-04-26 PROCEDURE — 6370000000 HC RX 637 (ALT 250 FOR IP): Performed by: THORACIC SURGERY (CARDIOTHORACIC VASCULAR SURGERY)

## 2022-04-26 PROCEDURE — 99024 POSTOP FOLLOW-UP VISIT: CPT | Performed by: NURSE PRACTITIONER

## 2022-04-26 PROCEDURE — 2580000003 HC RX 258: Performed by: ANESTHESIOLOGY

## 2022-04-26 RX ADMIN — METOPROLOL TARTRATE 12.5 MG: 25 TABLET, FILM COATED ORAL at 09:49

## 2022-04-26 RX ADMIN — DIGOXIN 0.12 MG: 125 TABLET ORAL at 09:47

## 2022-04-26 RX ADMIN — LISINOPRIL 20 MG: 20 TABLET ORAL at 09:48

## 2022-04-26 RX ADMIN — MUPIROCIN: 20 OINTMENT TOPICAL at 20:46

## 2022-04-26 RX ADMIN — LEVOTHYROXINE SODIUM 150 MCG: 0.15 TABLET ORAL at 05:34

## 2022-04-26 RX ADMIN — SODIUM CHLORIDE, PRESERVATIVE FREE 10 ML: 5 INJECTION INTRAVENOUS at 09:49

## 2022-04-26 RX ADMIN — INSULIN LISPRO 1 UNITS: 100 INJECTION, SOLUTION INTRAVENOUS; SUBCUTANEOUS at 12:20

## 2022-04-26 RX ADMIN — MUPIROCIN: 20 OINTMENT TOPICAL at 09:49

## 2022-04-26 RX ADMIN — BUPROPION HYDROCHLORIDE 100 MG: 100 TABLET, EXTENDED RELEASE ORAL at 21:37

## 2022-04-26 RX ADMIN — INSULIN LISPRO 2 UNITS: 100 INJECTION, SOLUTION INTRAVENOUS; SUBCUTANEOUS at 21:00

## 2022-04-26 RX ADMIN — BUPROPION HYDROCHLORIDE 100 MG: 100 TABLET, EXTENDED RELEASE ORAL at 12:16

## 2022-04-26 RX ADMIN — INSULIN GLARGINE 10 UNITS: 100 INJECTION, SOLUTION SUBCUTANEOUS at 21:02

## 2022-04-26 RX ADMIN — ATORVASTATIN CALCIUM 20 MG: 10 TABLET, FILM COATED ORAL at 09:47

## 2022-04-26 RX ADMIN — PREDNISONE 30 MG: 20 TABLET ORAL at 09:48

## 2022-04-26 RX ADMIN — SODIUM CHLORIDE, PRESERVATIVE FREE 10 ML: 5 INJECTION INTRAVENOUS at 21:04

## 2022-04-26 RX ADMIN — DILTIAZEM HYDROCHLORIDE 240 MG: 240 CAPSULE, COATED, EXTENDED RELEASE ORAL at 09:48

## 2022-04-26 RX ADMIN — INSULIN LISPRO 3 UNITS: 100 INJECTION, SOLUTION INTRAVENOUS; SUBCUTANEOUS at 16:30

## 2022-04-26 RX ADMIN — NORTRIPTYLINE HYDROCHLORIDE 10 MG: 10 CAPSULE ORAL at 21:37

## 2022-04-26 RX ADMIN — TORSEMIDE 50 MG: 100 TABLET ORAL at 09:48

## 2022-04-26 ASSESSMENT — PAIN DESCRIPTION - LOCATION: LOCATION: CHEST

## 2022-04-26 ASSESSMENT — PAIN SCALES - GENERAL: PAINLEVEL_OUTOF10: 4

## 2022-04-26 NOTE — PROGRESS NOTES
Pt SBP running soft in the lower to mid 80s these last few cycles. Pt HR has also been dropping into the 30s quite frequently while sleeping this last 30mins. HR recovers  back into the mid 40s-50s. On call hospitalist Dr. Praetek Greene notified.

## 2022-04-26 NOTE — PROGRESS NOTES
CVTS Thoracic Progress Note:          CC:  Post op follow up 4/21/22 Endobronchial lesion s/p bronchoscopy with yag laser and left chest tube placement      Subj: awake, sitting up in bed, talking without conversational dyspnea. Reports feeling pretty well today. Obj:    Blood pressure 128/79, pulse 73, temperature 97.7 °F (36.5 °C), temperature source Oral, resp. rate 21, height 6' (1.829 m), weight 252 lb 6.8 oz (114.5 kg), SpO2 97 %. Lungs diminished to left base   Abdomen rounded, soft, non-tender    Incision with occlusive dressing, CDI   Chest tube with 125 ml serous drainage in last 24 hours/No airleak (malignant)    Diagnostics:   Recent Labs     04/24/22  0423 04/25/22  0413   WBC 13.7* 12.3*   HGB 13.8 13.3*   HCT 44.3 41.3    193                                                                  Recent Labs     04/24/22  0423 04/25/22  0413    136   K 3.8 3.3*   CL 92* 93*   CO2 33* 33*   BUN 25* 18   CREATININE 0.9 0.8   GLUCOSE 175* 114*       CXR: 4/25/22   Impression   Decreased left-sided pleural effusion. Rodney Robeline is a developing pleural gas   collection seen in the left francisco javier thorax superiorly secondary to decreased   pleural fluid and incomplete re-expansion of the left lung.  Dense   consolidation the left lung persists along with large remaining left-sided   pleural effusion       Improved aeration of the right lung     Assess/Plan:   Care per primary team     SCC with left endobronchial lesion: s/p YAG laser debulking   -clamping chest tube. -RN ok to unclamp and return to suction if any respiratory distress. -If this occurs, needs to notify team.  -Plan to place Pleur-X catheter tomorrow.   -NPO at midnight.   _____________________________________________________    Andrez Hou, APRN - CNP  4/26/2022  11:48 AM

## 2022-04-26 NOTE — PROGRESS NOTES
Hospitalist Progress Note      PCP: Wily Simpson MD    Date of Admission: 4/19/2022    Chief Complaint: Endobronchial mass      Subjective:  He feels about the same. Tired, worn out. Minimal dyspnea. Pain controlled. Medications:  Reviewed    Infusion Medications    lactated ringers Stopped (04/22/22 7959)    sodium chloride      dexmedetomidine      dextrose       Scheduled Medications    mupirocin   Nasal BID    metoprolol tartrate  12.5 mg Oral BID    torsemide  50 mg Oral Daily    sodium chloride flush  5-40 mL IntraVENous 2 times per day    insulin lispro  0-3 Units SubCUTAneous Nightly    insulin lispro  0-6 Units SubCUTAneous TID WC    atorvastatin  20 mg Oral Daily    buPROPion  100 mg Oral BID    digoxin  0.125 mg Oral Daily    dilTIAZem  240 mg Oral Daily    insulin glargine  10 Units SubCUTAneous Nightly    levothyroxine  150 mcg Oral Daily    lisinopril  20 mg Oral Daily    nortriptyline  10 mg Oral Nightly    predniSONE  30 mg Oral Daily     PRN Meds: ipratropium-albuterol, oxyCODONE-acetaminophen, sodium chloride, acetaminophen **OR** acetaminophen, dextrose, glucagon (rDNA), glucose, ondansetron **OR** ondansetron, polyethylene glycol, sodium chloride flush, glucose, dextrose bolus (hypoglycemia) **OR** dextrose bolus (hypoglycemia)      Intake/Output Summary (Last 24 hours) at 4/26/2022 1159  Last data filed at 4/26/2022 0923  Gross per 24 hour   Intake --   Output 810 ml   Net -810 ml       Physical Exam Performed:    /79   Pulse 73   Temp 97.7 °F (36.5 °C) (Oral)   Resp 21   Ht 6' (1.829 m)   Wt 252 lb 6.8 oz (114.5 kg)   SpO2 97%   BMI 34.24 kg/m²      General appearance: He is sitting up in bed, in NAD, he is alert pleasant and cooperative   HEENT: Pupils equal, round, and reactive to light. Conjunctivae/corneas clear. Neck: Supple, with full range of motion. No jugular venous distention. Trachea midline.   Respiratory: Bilateral breath sounds, decreased at both bases, there is also notably diminished breath sounds in the left lung field, no use of accessory muscles at this time. Chest tube in place. Cardiovascular: Regular rate and rhythm with normal S1/S2   Abdomen: Soft, non-tender, non-distended with normal bowel sounds. Musculoskeletal: No clubbing, cyanosis or edema bilaterally. Full range of motion without deformity. Neurologic:  Neurovascularly intact without any focal sensory/motor deficits. Cranial nerves: II-XII intact, grossly non-focal.  Psychiatric: He is awake, oriented, has insight  Peripheral Pulses: +2 palpable, equal bilaterally     Labs:   Recent Labs     04/24/22 0423 04/25/22 0413   WBC 13.7* 12.3*   HGB 13.8 13.3*   HCT 44.3 41.3    193     Recent Labs     04/24/22 0423 04/25/22 0413    136   K 3.8 3.3*   CL 92* 93*   CO2 33* 33*   BUN 25* 18   CREATININE 0.9 0.8   CALCIUM 8.8 8.1*     No results for input(s): AST, ALT, BILIDIR, BILITOT, ALKPHOS in the last 72 hours. No results for input(s): INR in the last 72 hours. No results for input(s): Les Angelita in the last 72 hours. Urinalysis:    No results found for: [place], BACTERIA, RBCUA, BLOODU, [address], [address] [place] [address]    Radiology:  XR CHEST PORTABLE   Final Result   Decreased left-sided pleural effusion. There is a developing pleural gas   collection seen in the left francisco javier thorax superiorly secondary to decreased   pleural fluid and incomplete re-expansion of the left lung. Dense   consolidation the left lung persists along with large remaining left-sided   pleural effusion      Improved aeration of the right lung         XR CHEST PORTABLE   Final Result   Stable left chest tube. No pneumothorax. Persisting extensive opacification of the left hemithorax- atelectasis or   consolidation versus pleural effusion. Developing right basilar airspace disease, likely pulmonary edema.          XR CHEST PORTABLE   Final Result   Left chest tube is in stable position. Left hemithorax remains mostly opaque with aeration of the apex. Decreased right perihilar vascular congestion. XR CHEST PORTABLE   Final Result   Partial re-expansion of the left upper lobe following chest tube. Increased   right perihilar edema or infiltrate. No pneumothorax. Assessment/Plan:    Active Hospital Problems    Diagnosis     Small cell lung cancer ([facility] [address] [C34.90]      Priority: Medium    Acute on chronic respiratory failure with hypoxia and hypercapnia (HCC) [J96.21, J96.22]     Squamous cell carcinoma of left lung (HCC) [C34.92]     COPD (chronic obstructive pulmonary disease) (HCC) [J44.9]     Endobronchial mass [R91.8]     Postobstructive pneumonia [J18.9]     Lung collapse [J98.19]     Atrial fibrillation, permanent (HCC) [I48.21]     COPD with acute exacerbation (HCC) [J44.1]        \"Patient with PMH of SCC left lung, left mainstem endobronchial mass causing left lung collapse, postobstructive pneumonia, acute on chronic respiratory failure, hypercalcemia of malignancy, chronic A. fib, chronic diastolic CHF, COPD initially admitted to [facility] on 4/17/2022 for shortness of breath and respiratory failure.  He was started on antibiotics for the postobstructive pneumonia.  Patient refused fiberoptic bronchoscopy, refuses BiPAP.  CT surgery was consulted for the endobronchial mass for debulking airway tumor and then patient wants to proceed with chemo/XRT. The patient was transferred to [address] [place] [address] for CT surgery consult. \"      SCC left lung with left mainstem endobronchial mass causing left lung collapse/postobstructive pneumonia  SqCC left lower lobe/lingula - 12 cm mass with positive station 7 lymph node - stage IIIA  -s/p debulking/laser therapy per CTS on 4/20.   CT placed.  -Plan to F/w Dr. Paulette Carreon next week to plan for cancer treatment outpatient for subsequent XRT/chemo     Acute on chronic respiratory failure with hypoxia and hypercapnia  COPD with acute exacerbation  Postobstructive pneumonia  Lung collapse  -refused BiPAP. He normally wears O2 PRN at home.  -Continue prednisone   -Completed IV Levaquin course  -inhaled bronchodilators  -Chest tube placed, CT surgery considering PleurX catheter.     Tobacco abuse-counseled cessation     Atrial fibrillation, permanent   -Eliquis held for surgery; resume when ok with CT Surgery  -Continue Cardizem CD and digoxin     HLD-resume statin     Hypothyroidism-clinically euthyroid, resumed Synthroid      DM2- insulin regimen. Steroid complicated treatment.      Chronic anxiety-mood stable, resumed Wellbutrin      DVT Prophylaxis: Resume Eliquis when ok with CT surgery post op. SCDs meanwhile. Diet: ADULT DIET; Regular  ADULT ORAL NUTRITION SUPPLEMENT; Breakfast, Lunch, Dinner; Low Calorie/High Protein Oral Supplement  Code Status: Full Code    PT/OT Eval Status: rec'd SNF    Dispo - recommend SNF when ok with CT surgery. Perhaps 4/27 - 4/29? (it depends upon chest tube management, might need PleurX).         Emeli Florian MD

## 2022-04-26 NOTE — PROGRESS NOTES
Comprehensive Nutrition Assessment    Type and Reason for Visit:  Initial,RD Nutrition Re-Screen/LOS    Nutrition Recommendations/Plan:   1. Continue regular diet and encourage PO intake   2. Modify ONS to magic cups BID   3. RD to modify room service to selective   4. Monitor nutrition adequacy, pertinent labs, bowel habits, wt changes, and clinical progress     Malnutrition Assessment:  Malnutrition Status: At risk for malnutrition (Comment) (04/26/22 1326)    Context:  Acute Illness     Findings of the 6 clinical characteristics of malnutrition:  Energy Intake:  Mild decrease in energy intake (Comment)    Nutrition Assessment:    LOS assessment: Pt admitted w/ SCC with left endobronchial lesion: s/p YAG laser debulking on 4/20. Pt NPO at midnight, plan to place Pleur-X catheter tomorrow. On regular diet w/ varying PO intakes between 1-100% of meals. Pt reports poor appetite 3 months PTA. Fair intake since admission, pt would like to choose his meals to increase PO intake, RD to modify room service to selective. Reports significant wt loss, reports UBW= 380 lb 3 months ago. Unable to verify wt loss w/ EMR d/t lack of wt hx. Pt does not like ensure HP but willing to trial magic cups instead, RD to modify. Continue to encourage PO intake, will continue to monitor. Nutrition Related Findings:    Generalized non-pitting edema. No updated labs to view today. -268 x 24 hours. No BM recorded. Chest tube. Wound Type: None       Current Nutrition Intake & Therapies:    Average Meal Intake: 1-25%,26-50%,%  Average Supplements Intake: 0%,1-25%  ADULT DIET; Regular  ADULT ORAL NUTRITION SUPPLEMENT; Breakfast, Lunch, Dinner; Low Calorie/High Protein Oral Supplement  Diet NPO Exceptions are: Sips of Water with Meds    Anthropometric Measures:  Height: 6' (182.9 cm)  Ideal Body Weight (IBW): 178 lbs (81 kg)       Current Body Weight: 252 lb (114.3 kg), 141.6 % IBW.  Weight Source: Bed Scale  Current BMI (kg/m2): 34.2                    BMI Categories: Obese Class 1 (BMI 30.0-34. 9)    Estimated Daily Nutrient Needs:  Energy Requirements Based On: Kcal/kg (25-30 kcals/kg)  Weight Used for Energy Requirements: Ideal (81 kg)  Energy (kcal/day): 4023-7698  Weight Used for Protein Requirements: Ideal (81 kg IBW, 1.2-1.4 g/kg)  Protein (g/day):  g  Method Used for Fluid Requirements: 1 ml/kcal    Nutrition Diagnosis:   · Inadequate oral intake related to catabolic illness,increase demand for energy/nutrients,inadequate protein-energy intake as evidenced by poor intake prior to admission,intake 26-50%      Nutrition Interventions:   Food and/or Nutrient Delivery: Continue Current Diet,Modify Oral Nutrition Supplement  Nutrition Education/Counseling: Education not indicated          Goals:  Previous Goal Met: No Progress toward Goal(s)  Goals: PO intake 50% or greater,prior to discharge       Nutrition Monitoring and Evaluation:   Behavioral-Environmental Outcomes: None Identified  Food/Nutrient Intake Outcomes: Food and Nutrient Intake,Supplement Intake  Physical Signs/Symptoms Outcomes: Biochemical Data,Nutrition Focused Physical Findings,Weight,Constipation    Discharge Planning:    Continue current diet,Continue Oral Nutrition Supplement     [address], [address] [place] [address],   Contact: Office: [phone]; [address]: [phone]

## 2022-04-26 NOTE — PLAN OF CARE
Problem: Falls - Risk of:  Goal: Will remain free from falls  Description: Will remain free from falls  Outcome: Progressing  Goal: Absence of physical injury  Description: Absence of physical injury  Outcome: Progressing     Problem: Chronic Conditions and Co-morbidities  Goal: Patient's chronic conditions and co-morbidity symptoms are monitored and maintained or improved  Outcome: Progressing     Problem: Pain  Goal: Verbalizes/displays adequate comfort level or baseline comfort level  Outcome: Progressing     Problem: ABCDS Injury Assessment  Goal: Absence of physical injury  Outcome: Progressing     Problem: Discharge Planning  Goal: Discharge to home or other facility with appropriate resources  Outcome: Progressing     Problem: Skin/Tissue Integrity  Goal: Absence of new skin breakdown  Description: 1. Monitor for areas of redness and/or skin breakdown  2. Assess vascular access sites hourly  3. Every 4-6 hours minimum:  Change oxygen saturation probe site  4. Every 4-6 hours:  If on nasal continuous positive airway pressure, respiratory therapy assess nares and determine need for appliance change or resting period.   Outcome: Progressing

## 2022-04-26 NOTE — ANESTHESIA PRE PROCEDURE
Department of Anesthesiology  Preprocedure Note       Name:  Allie Mcnulty   Age:  74 y.o.  :  1955                                          MRN:  [id number]         Date:  2022      Surgeon: Blessing Diaz):  Alix Lorenzo MD    Procedure: Procedure(s):  LEFT PLEUR-X CATHETER PLACEMENT    Medications prior to admission:   Prior to Admission medications    Medication Sig Start Date End Date Taking?  Authorizing Provider   metoprolol succinate (TOPROL XL) 25 MG extended release tablet Take 25 mg by mouth daily   Yes Historical Provider, MD   TECHLITE PEN NEEDLES 31G X 8 MM MISC USE 1 FIVE TIMES DAILY 4/3/22   Historical Provider, MD   levothyroxine (SYNTHROID) 200 MCG tablet Take 200 mcg by mouth Daily  22   Historical Provider, MD   EUTHYROX 50 MCG tablet Take 50 mcg by mouth Daily  22   Historical Provider, MD   guaiFENesin (MUCINEX) 600 MG extended release tablet Take 1 tablet by mouth 2 times daily 4/15/22   Balbina Miles MD   predniSONE (DELTASONE) 10 MG tablet 30 mg x 3 days, 20 mg x 3 days, 10 mg x 3 days then stop 4/15/22   Balbina Miles MD   apixaban (ELIQUIS) 2.5 MG TABS tablet Take 1 tablet by mouth 2 times daily 4/15/22   Balbina Miles MD   insulin detemir (LEVEMIR) 100 UNIT/ML injection vial Inject 90 Units into the skin 2 times daily     Historical Provider, MD   insulin aspart (NOVOLOG) 100 UNIT/ML injection vial Inject 20 Units into the skin 3 times daily (before meals)     Historical Provider, MD   torsemide (DEMADEX) 100 MG tablet TAKE 1/2 (ONE-HALF) TABLET BY MOUTH ONCE DAILY 3/12/22   Historical Provider, MD   potassium chloride (MICRO-K) 10 MEQ extended release capsule TAKE 1 CAPSULE BY MOUTH TWICE DAILY 22   Historical Provider, MD   hydrALAZINE (APRESOLINE) 25 MG tablet TAKE 1 TABLET BY MOUTH THREE TIMES DAILY 3/23/22   Historical Provider, MD   digoxin (LANOXIN) 125 MCG tablet Take 0.125 mg by mouth daily 21   Historical Provider, MD tiotropium (SPIRIVA RESPIMAT) 2.5 MCG/ACT AERS inhaler Inhale 2 puffs into the lungs daily 4/7/22   Palmira Michael MD   atorvastatin (LIPITOR) 20 MG tablet Take 20 mg by mouth daily  1/19/19   Historical Provider, MD   buPROPion [facility] SR) 100 MG extended release tablet Take 100 mg by mouth 2 times daily  11/21/18   Historical Provider, MD   CARTIA  MG extended release capsule Take 240 mg by mouth daily  11/21/18   Historical Provider, MD   lisinopril (PRINIVIL;ZESTRIL) 20 MG tablet Take 20 mg by mouth daily  1/19/19   Historical Provider, MD   metFORMIN (GLUCOPHAGE) 850 MG tablet Take 850 mg by mouth daily (with breakfast)   Patient not taking: Reported on 4/19/2022 1/19/19   Historical Provider, MD   nitroGLYCERIN (NITROSTAT) 0.4 MG SL tablet Place 0.4 mg under the tongue every 5 minutes as needed  1/4/19   Historical Provider, MD   nortriptyline (PAMELOR) 10 MG capsule Take 10 mg by mouth nightly  1/19/19   Historical Provider, MD   Omega-3 Fatty Acids (FISH OIL) 1000 MG CAPS Take 3,000 mg by mouth 3 times daily    Historical Provider, MD   aspirin 81 MG tablet Take 81 mg by mouth daily    Historical Provider, MD       Current medications:    Current Facility-Administered Medications   Medication Dose Route Frequency Provider Last Rate Last Admin    mupirocin (BACTROBAN) 2 % ointment   Nasal BID Erik Sprague MD   Given at 04/26/22 0949    ipratropium-albuterol (DUONEB) nebulizer solution 1 ampule  1 ampule Inhalation Q4H PRN Snehal Durbin MD        oxyCODONE-acetaminophen (PERCOCET) 5-325 MG per tablet 1 tablet  1 tablet Oral Q4H PRN Dolly Lynch MD   1 tablet at 04/25/22 0852    metoprolol tartrate (LOPRESSOR) tablet 12.5 mg  12.5 mg Oral BID Snehal Durbin MD   12.5 mg at 04/26/22 0949    torsemide (DEMADEX) tablet 50 mg  50 mg Oral Daily Murali Granados MD   50 mg at 04/26/22 0948    lactated ringers infusion   IntraVENous Continuous Celia Guillermo MD   Stopped at 04/22/22 4539    sodium chloride flush 0.9 % injection 5-40 mL  5-40 mL IntraVENous 2 times per day Ryann Mayorga MD   10 mL at 04/26/22 0949    0.9 % sodium chloride infusion   IntraVENous PRN Ryann Mayorga MD        dexmedetomidine [facility]) 400 mcg in sodium chloride 0.9 % 100 mL infusion  0.1-1.5 mcg/kg/hr IntraVENous Continuous Keren Rodriguez MD        insulin lispro (HUMALOG) injection vial 0-3 Units  0-3 Units SubCUTAneous Nightly Ana Lilia Corina PA   3 Units at 04/25/22 2041    insulin lispro (HUMALOG) injection vial 0-6 Units  0-6 Units SubCUTAneous TID WC Ana Lilia Corina PA   3 Units at 04/26/22 1630    atorvastatin (LIPITOR) tablet 20 mg  20 mg Oral Daily Ana Lilia Corina, PA   20 mg at 04/26/22 0947    buPROPion [facility]) extended release tablet 100 mg  100 mg Oral BID Ana Lilia Corina PA   100 mg at 04/26/22 1216    digoxin (LANOXIN) tablet 0.125 mg  0.125 mg Oral Daily Ana Lilia Kulupilloi, PA   0.125 mg at 04/26/22 0947    dilTIAZem (CARDIZEM CD) extended release capsule 240 mg  240 mg Oral Daily Ana Lilia Kulupilloi, PA   240 mg at 04/26/22 0948    insulin glargine (LANTUS) injection vial 10 Units  10 Units SubCUTAneous Nightly Ana Lilia Corina, PA   10 Units at 04/25/22 2040    levothyroxine (SYNTHROID) tablet 150 mcg  150 mcg Oral Daily Ana Lilia Kulupilloi, PA   150 mcg at 04/26/22 0534    lisinopril (PRINIVIL;ZESTRIL) tablet 20 mg  20 mg Oral Daily Ana Lilia Kulupilloi, PA   20 mg at 04/26/22 0948    nortriptyline (PAMELOR) capsule 10 mg  10 mg Oral Nightly Ana Lilia Kulupilloi, PA   10 mg at 04/25/22 2040    predniSONE (DELTASONE) tablet 30 mg  30 mg Oral Daily Ana Lilia Reynoldi PA   30 mg at 04/26/22 0948    acetaminophen (TYLENOL) tablet 650 mg  650 mg Oral Q6H PRN LINA Banda   650 mg at 04/24/22 2107    Or    acetaminophen (TYLENOL) suppository 650 mg  650 mg Rectal Q6H PRN LINA Banda        dextrose 5 % solution  100 mL/hr IntraVENous PRN LINA Banda        glucagon (rDNA) injection 1 mg  1 mg IntraMUSCular PRN LINA Banda        glucose chewable tablet 4 each  4 each Oral PRN LINA Mcgrath        ondansetron (ZOFRAN-ODT) disintegrating tablet 4 mg  4 mg Oral Q8H PRN LINA Mcgrath        Or    ondansetron (ZOFRAN) injection 4 mg  4 mg IntraVENous Q6H PRN LINA Orlando        polyethylene glycol (GLYCOLAX) packet 17 g  17 g Oral Daily PRN LINA Mcgrath   17 g at 04/24/22 2119    sodium chloride flush 0.9 % injection 5-40 mL  5-40 mL IntraVENous PRN LINA Orlando        glucose (GLUTOSE) 40 % oral gel 15 g  15 g Oral PRN LINA Mcgrath        dextrose bolus (hypoglycemia) 10% 125 mL  125 mL IntraVENous PRN LINA Orlando        Or    dextrose bolus (hypoglycemia) 10% 250 mL  250 mL IntraVENous PRN LINA Mcgrath           Allergies:     Allergies   Allergen Reactions    Codeine        Problem List:    Patient Active Problem List   Diagnosis Code    Spinal stenosis of lumbar region with neurogenic claudication M48.062    Mediastinal adenopathy R59.0    Lung mass R91.8    Multiple tracheobronchial mucus plugs T17.800A    Mass of left lung R91.8    Acute on chronic respiratory failure with hypoxia ([facility]) J96.21    Acute hypercapnic respiratory failure ([facility]) J96.02    COPD with acute exacerbation ([facility]) J44.1    Acute encephalopathy G93.40    Recurrent left pleural effusion Y78    Metabolic encephalopathy C80.23    Atrial fibrillation, permanent ([facility]) I48.21    Acute respiratory failure with hypoxia and hypercapnia ([facility]) J96.01, J96.02    Lung collapse J98.19    Postobstructive pneumonia J18.9    Mucus plugging of bronchi T17.500A    Endobronchial mass R91.8    Hypoxia R09.02    COPD (chronic obstructive pulmonary disease) ([facility]) J44.9    Acute hypoxemic respiratory failure ([facility]) J96.01    Squamous cell carcinoma of left lung ([facility]) C34.92    Acute on chronic respiratory failure with hypoxia and hypercapnia ([facility]) J96.21, J96.22    Chronic diastolic congestive heart failure ([facility]) I50.32    Small cell lung cancer ([facility] C34.90       Past Medical History:        Diagnosis Date    A-fib [facility])     CAD (coronary artery disease)     CHF (congestive heart failure) (HCC)     COPD (chronic obstructive pulmonary disease) (HCC)     CVA (cerebral vascular accident) ([facility]     Diabetes mellitus ([facility]     Hyperlipidemia     Hypertension     Thyroid disease        Past Surgical History:        Procedure Laterality Date    BRONCHOSCOPY N/A 4/12/2022    EBUS WF W/ANES.  (11:00) performed by Alexis Lambert MD at [address]  4/12/2022    BRONCHOSCOPY ALVEOLAR LAVAGE performed by Alexis Lambert MD at [address]  4/12/2022    BRONCHOSCOPY BIOPSY BRONCHUS performed by Alexis Lambert MD at [address]  4/12/2022    BRONCHOSCOPY/TRANSBRONCHIAL NEEDLE BIOPSY performed by Alexis Lambert MD at [address] N/[place] 4/13/2022    [address] [place] [address] performed by Alexis Lambert MD at [address] N/[place] 4/21/2022    BRONCHOSCOPY WITH ENDOBRONCHIAL YAG LASER AND CHEST TUBE PLACEMENT performed by Lamine Chiu MD at [place] [address] History:    Social History     Tobacco Use    Smoking status: Current Every Day Smoker     Packs/day: 1.00     Years: 40.00     Pack years: 40.00    Smokeless tobacco: Never Used   Substance Use Topics    Alcohol use: Not Currently                                Ready to quit: Not Answered  Counseling given: Not Answered      Vital Signs (Current):   Vitals:    04/26/22 1400 04/26/22 1500 04/26/22 1600 04/26/22 1642   BP: 94/63 107/86 110/65 93/76   Pulse: 60 62 62 74   Resp: 21 25 20 28   Temp:       TempSrc:       SpO2: 98% 98% 97% 98%   Weight:       Height:                                                  BP Readings from Last 3 Encounters:   04/26/22 93/76   04/21/22 (!) 131/101   04/19/22 (!) 151/94       NPO Status:                                                                                 BMI:   Wt Readings from Last 3 Encounters:   04/26/22 252 lb 6.8 oz (114.5 kg)   04/19/22 275 lb (124.7 kg)   04/15/22 268 lb 15.4 oz (122 kg)     Body mass index is 34.24 kg/m².     CBC:   Lab Results   Component Value Date    WBC 12.3 04/25/2022    RBC 4.59 04/25/2022    HGB 13.3 04/25/2022    HCT 41.3 04/25/2022    MCV 90.1 04/25/2022    RDW 16.9 04/25/2022     04/25/2022       CMP:   Lab Results   Component Value Date     04/25/2022    K 3.3 04/25/2022    K 4.9 04/13/2022    CL 93 04/25/2022    CO2 33 04/25/2022    BUN 18 04/25/2022    CREATININE 0.8 04/25/2022    GFRAA >60 04/25/2022    AGRATIO 1.0 04/13/2022    LABGLOM >60 04/25/2022    GLUCOSE 114 04/25/2022    PROT 6.8 04/13/2022    CALCIUM 8.1 04/25/2022    BILITOT 0.3 04/13/2022    ALKPHOS 186 04/13/2022    AST 19 04/13/2022    ALT 23 04/13/2022       POC Tests:   Recent Labs     04/26/22  1628   POCGLU 265*       Coags:   Lab Results   Component Value Date    PROTIME 12.9 04/12/2022    INR 1.14 04/12/2022       HCG (If Applicable): No results found for: PREGTESTUR, PREGSERUM, HCG, HCGQUANT     ABGs:   Lab Results   Component Value Date    PHART 7.394 04/17/2022    PO2ART 66.6 04/17/2022    BGN7VKG 51.1 04/17/2022    SRE7DGJ 30.5 04/17/2022    BEART 4.5 04/17/2022    A2WUEELX 92.9 04/17/2022        Type & Screen (If Applicable):  No results found for: LABABO, LABRH    Drug/Infectious Status (If Applicable):  No results found for: HIV, HEPCAB    COVID-19 Screening (If Applicable):   Lab Results   Component Value Date    COVID19 Not Detected 04/16/2022           Anesthesia Evaluation  Patient summary reviewed and Nursing notes reviewed no history of anesthetic complications:   Airway: Mallampati: III     Neck ROM: full   Dental:    (+) edentulous      Pulmonary:Negative Pulmonary ROS and normal exam    (+) pneumonia:  COPD:                             Cardiovascular:Negative CV ROS    (+) hypertension:, CAD:, dysrhythmias: atrial fibrillation, CHF:, Neuro/Psych:   Negative Neuro/Psych ROS  (+) CVA:,             GI/Hepatic/Renal: Neg GI/Hepatic/Renal ROS       (-) hiatal hernia and GERD       Endo/Other: Negative Endo/Other ROS   (+) Diabetes, . Abdominal:             Vascular: Other Findings:           Anesthesia Plan      general     ASA 4     (I discussed with the patient the risks and benefits of PIV, general anesthesia, IV Narcotics, PACU. All questions were answered the patient agrees with the plan and wishes to proceed.  )  Induction: intravenous. Pre-Operative Diagnosis: Endobronchial mass [R91.8]; [facility] [C34.90]    74 y.o.   BMI:  Body mass index is 34.24 kg/m².      Vitals:    04/27/22 0400 04/27/22 0500 04/27/22 0600 04/27/22 0835   BP: 122/68 105/71 121/65 120/77   Pulse: 56 53 62    Resp: 22 20 16    Temp:       TempSrc:       SpO2: 97% 100% 95%    Weight:       Height:           Allergies   Allergen Reactions    Codeine        Social History     Tobacco Use    Smoking status: Current Every Day Smoker     Packs/day: 1.00     Years: 40.00     Pack years: 40.00    Smokeless tobacco: Never Used   Substance Use Topics    Alcohol use: Not Currently       LABS:    CBC  Lab Results   Component Value Date/Time    WBC 14.1 (H) 04/27/2022 04:21 AM    HGB 13.6 04/27/2022 04:21 AM    HCT 44.1 04/27/2022 04:21 AM     04/27/2022 04:21 AM     RENAL  Lab Results   Component Value Date/Time     (L) 04/27/2022 04:21 AM    K 3.6 04/27/2022 04:21 AM    CL 91 (L) 04/27/2022 04:21 AM    CO2 28 04/27/2022 04:21 AM    BUN 16 04/27/2022 04:21 AM    CREATININE 0.8 04/27/2022 04:21 AM    GLUCOSE 196 (H) 04/27/2022 04:21 AM     COAGS  Lab Results   Component Value Date/Time    PROTIME 12.9 (H) 04/12/2022 10:05 AM    INR 1.14 (H) 04/12/2022 10:05 AM         Jennie Franklin MD   4/26/2022

## 2022-04-27 ENCOUNTER — ANESTHESIA (OUTPATIENT)
Dept: OPERATING ROOM | Age: 67
DRG: 180 | End: 2022-04-27
Payer: MEDICARE

## 2022-04-27 ENCOUNTER — APPOINTMENT (OUTPATIENT)
Dept: GENERAL RADIOLOGY | Age: 67
DRG: 180 | End: 2022-04-27
Attending: STUDENT IN AN ORGANIZED HEALTH CARE EDUCATION/TRAINING PROGRAM
Payer: MEDICARE

## 2022-04-27 VITALS — OXYGEN SATURATION: 91 % | DIASTOLIC BLOOD PRESSURE: 60 MMHG | SYSTOLIC BLOOD PRESSURE: 90 MMHG

## 2022-04-27 LAB
ANION GAP SERPL CALCULATED.3IONS-SCNC: 14 MMOL/L (ref 3–16)
BUN BLDV-MCNC: 16 MG/DL (ref 7–20)
CALCIUM SERPL-MCNC: 8.1 MG/DL (ref 8.3–10.6)
CHLORIDE BLD-SCNC: 91 MMOL/L (ref 99–110)
CO2: 28 MMOL/L (ref 21–32)
CREAT SERPL-MCNC: 0.8 MG/DL (ref 0.8–1.3)
GFR AFRICAN AMERICAN: >60
GFR NON-AFRICAN AMERICAN: >60
GLUCOSE BLD-MCNC: 134 MG/DL (ref 70–99)
GLUCOSE BLD-MCNC: 136 MG/DL (ref 70–99)
GLUCOSE BLD-MCNC: 196 MG/DL (ref 70–99)
GLUCOSE BLD-MCNC: 304 MG/DL (ref 70–99)
GLUCOSE BLD-MCNC: 357 MG/DL (ref 70–99)
HCT VFR BLD CALC: 44.1 % (ref 40.5–52.5)
HEMOGLOBIN: 13.6 G/DL (ref 13.5–17.5)
MCH RBC QN AUTO: 29 PG (ref 26–34)
MCHC RBC AUTO-ENTMCNC: 30.8 G/DL (ref 31–36)
MCV RBC AUTO: 94.1 FL (ref 80–100)
PDW BLD-RTO: 17.6 % (ref 12.4–15.4)
PERFORMED ON: ABNORMAL
PLATELET # BLD: 192 K/UL (ref 135–450)
PMV BLD AUTO: 8 FL (ref 5–10.5)
POTASSIUM REFLEX MAGNESIUM: 3.6 MMOL/L (ref 3.5–5.1)
RBC # BLD: 4.68 M/UL (ref 4.2–5.9)
SODIUM BLD-SCNC: 133 MMOL/L (ref 136–145)
WBC # BLD: 14.1 K/UL (ref 4–11)

## 2022-04-27 PROCEDURE — 3600000008 HC SURGERY OHS BASE: Performed by: STUDENT IN AN ORGANIZED HEALTH CARE EDUCATION/TRAINING PROGRAM

## 2022-04-27 PROCEDURE — 97530 THERAPEUTIC ACTIVITIES: CPT

## 2022-04-27 PROCEDURE — 2580000003 HC RX 258: Performed by: STUDENT IN AN ORGANIZED HEALTH CARE EDUCATION/TRAINING PROGRAM

## 2022-04-27 PROCEDURE — 2000000000 HC ICU R&B

## 2022-04-27 PROCEDURE — 3700000000 HC ANESTHESIA ATTENDED CARE: Performed by: STUDENT IN AN ORGANIZED HEALTH CARE EDUCATION/TRAINING PROGRAM

## 2022-04-27 PROCEDURE — 32550 INSERT PLEURAL CATH: CPT | Performed by: STUDENT IN AN ORGANIZED HEALTH CARE EDUCATION/TRAINING PROGRAM

## 2022-04-27 PROCEDURE — 97116 GAIT TRAINING THERAPY: CPT

## 2022-04-27 PROCEDURE — 36415 COLL VENOUS BLD VENIPUNCTURE: CPT

## 2022-04-27 PROCEDURE — 85027 COMPLETE CBC AUTOMATED: CPT

## 2022-04-27 PROCEDURE — 2580000003 HC RX 258: Performed by: ANESTHESIOLOGY

## 2022-04-27 PROCEDURE — 6360000002 HC RX W HCPCS: Performed by: NURSE ANESTHETIST, CERTIFIED REGISTERED

## 2022-04-27 PROCEDURE — 2500000003 HC RX 250 WO HCPCS: Performed by: STUDENT IN AN ORGANIZED HEALTH CARE EDUCATION/TRAINING PROGRAM

## 2022-04-27 PROCEDURE — 2500000003 HC RX 250 WO HCPCS: Performed by: NURSE ANESTHETIST, CERTIFIED REGISTERED

## 2022-04-27 PROCEDURE — 0W9B30Z DRAINAGE OF LEFT PLEURAL CAVITY WITH DRAINAGE DEVICE, PERCUTANEOUS APPROACH: ICD-10-PCS | Performed by: STUDENT IN AN ORGANIZED HEALTH CARE EDUCATION/TRAINING PROGRAM

## 2022-04-27 PROCEDURE — 6370000000 HC RX 637 (ALT 250 FOR IP): Performed by: THORACIC SURGERY (CARDIOTHORACIC VASCULAR SURGERY)

## 2022-04-27 PROCEDURE — 6370000000 HC RX 637 (ALT 250 FOR IP): Performed by: STUDENT IN AN ORGANIZED HEALTH CARE EDUCATION/TRAINING PROGRAM

## 2022-04-27 PROCEDURE — C1729 CATH, DRAINAGE: HCPCS | Performed by: STUDENT IN AN ORGANIZED HEALTH CARE EDUCATION/TRAINING PROGRAM

## 2022-04-27 PROCEDURE — 6370000000 HC RX 637 (ALT 250 FOR IP)

## 2022-04-27 PROCEDURE — 2709999900 HC NON-CHARGEABLE SUPPLY: Performed by: STUDENT IN AN ORGANIZED HEALTH CARE EDUCATION/TRAINING PROGRAM

## 2022-04-27 PROCEDURE — 3700000001 HC ADD 15 MINUTES (ANESTHESIA): Performed by: STUDENT IN AN ORGANIZED HEALTH CARE EDUCATION/TRAINING PROGRAM

## 2022-04-27 PROCEDURE — 71045 X-RAY EXAM CHEST 1 VIEW: CPT

## 2022-04-27 PROCEDURE — 97535 SELF CARE MNGMENT TRAINING: CPT

## 2022-04-27 PROCEDURE — 3600000018 HC SURGERY OHS ADDTL 15MIN: Performed by: STUDENT IN AN ORGANIZED HEALTH CARE EDUCATION/TRAINING PROGRAM

## 2022-04-27 PROCEDURE — 80048 BASIC METABOLIC PNL TOTAL CA: CPT

## 2022-04-27 RX ORDER — SODIUM CHLORIDE 0.9 % (FLUSH) 0.9 %
5-40 SYRINGE (ML) INJECTION PRN
Status: DISCONTINUED | OUTPATIENT
Start: 2022-04-27 | End: 2022-04-27

## 2022-04-27 RX ORDER — MEPERIDINE HYDROCHLORIDE 50 MG/ML
12.5 INJECTION INTRAMUSCULAR; INTRAVENOUS; SUBCUTANEOUS EVERY 5 MIN PRN
Status: DISCONTINUED | OUTPATIENT
Start: 2022-04-27 | End: 2022-04-27

## 2022-04-27 RX ORDER — EPHEDRINE SULFATE 50 MG/ML
INJECTION INTRAVENOUS PRN
Status: DISCONTINUED | OUTPATIENT
Start: 2022-04-27 | End: 2022-04-27 | Stop reason: SDUPTHER

## 2022-04-27 RX ORDER — LABETALOL HYDROCHLORIDE 5 MG/ML
5 INJECTION, SOLUTION INTRAVENOUS EVERY 10 MIN PRN
Status: DISCONTINUED | OUTPATIENT
Start: 2022-04-27 | End: 2022-04-27

## 2022-04-27 RX ORDER — HYDROMORPHONE HCL 110MG/55ML
0.5 PATIENT CONTROLLED ANALGESIA SYRINGE INTRAVENOUS EVERY 5 MIN PRN
Status: DISCONTINUED | OUTPATIENT
Start: 2022-04-27 | End: 2022-04-27

## 2022-04-27 RX ORDER — HYDROMORPHONE HCL 110MG/55ML
0.25 PATIENT CONTROLLED ANALGESIA SYRINGE INTRAVENOUS EVERY 5 MIN PRN
Status: DISCONTINUED | OUTPATIENT
Start: 2022-04-27 | End: 2022-04-27

## 2022-04-27 RX ORDER — DIPHENHYDRAMINE HYDROCHLORIDE 50 MG/ML
12.5 INJECTION INTRAMUSCULAR; INTRAVENOUS
Status: DISCONTINUED | OUTPATIENT
Start: 2022-04-27 | End: 2022-04-27

## 2022-04-27 RX ORDER — ONDANSETRON 2 MG/ML
4 INJECTION INTRAMUSCULAR; INTRAVENOUS
Status: DISCONTINUED | OUTPATIENT
Start: 2022-04-27 | End: 2022-04-27

## 2022-04-27 RX ORDER — LIDOCAINE HYDROCHLORIDE 10 MG/ML
INJECTION, SOLUTION EPIDURAL; INFILTRATION; INTRACAUDAL; PERINEURAL PRN
Status: DISCONTINUED | OUTPATIENT
Start: 2022-04-27 | End: 2022-04-27 | Stop reason: ALTCHOICE

## 2022-04-27 RX ORDER — MIDAZOLAM HYDROCHLORIDE 1 MG/ML
INJECTION INTRAMUSCULAR; INTRAVENOUS PRN
Status: DISCONTINUED | OUTPATIENT
Start: 2022-04-27 | End: 2022-04-27 | Stop reason: SDUPTHER

## 2022-04-27 RX ORDER — PROPOFOL 10 MG/ML
INJECTION, EMULSION INTRAVENOUS PRN
Status: DISCONTINUED | OUTPATIENT
Start: 2022-04-27 | End: 2022-04-27 | Stop reason: SDUPTHER

## 2022-04-27 RX ORDER — SODIUM CHLORIDE 0.9 % (FLUSH) 0.9 %
5-40 SYRINGE (ML) INJECTION EVERY 12 HOURS SCHEDULED
Status: DISCONTINUED | OUTPATIENT
Start: 2022-04-27 | End: 2022-04-27

## 2022-04-27 RX ORDER — SODIUM CHLORIDE 9 MG/ML
INJECTION, SOLUTION INTRAVENOUS PRN
Status: DISCONTINUED | OUTPATIENT
Start: 2022-04-27 | End: 2022-04-27

## 2022-04-27 RX ADMIN — INSULIN LISPRO 4 UNITS: 100 INJECTION, SOLUTION INTRAVENOUS; SUBCUTANEOUS at 15:22

## 2022-04-27 RX ADMIN — PROPOFOL 20 MG: 10 INJECTION, EMULSION INTRAVENOUS at 09:31

## 2022-04-27 RX ADMIN — SODIUM CHLORIDE, PRESERVATIVE FREE 10 ML: 5 INJECTION INTRAVENOUS at 08:55

## 2022-04-27 RX ADMIN — TORSEMIDE 50 MG: 100 TABLET ORAL at 08:37

## 2022-04-27 RX ADMIN — BUPROPION HYDROCHLORIDE 100 MG: 100 TABLET, EXTENDED RELEASE ORAL at 08:39

## 2022-04-27 RX ADMIN — DILTIAZEM HYDROCHLORIDE 240 MG: 240 CAPSULE, COATED, EXTENDED RELEASE ORAL at 08:38

## 2022-04-27 RX ADMIN — INSULIN LISPRO 3 UNITS: 100 INJECTION, SOLUTION INTRAVENOUS; SUBCUTANEOUS at 20:16

## 2022-04-27 RX ADMIN — EPHEDRINE SULFATE 20 MG: 50 INJECTION INTRAVENOUS at 09:54

## 2022-04-27 RX ADMIN — BUPROPION HYDROCHLORIDE 100 MG: 100 TABLET, EXTENDED RELEASE ORAL at 20:17

## 2022-04-27 RX ADMIN — PROPOFOL 20 MG: 10 INJECTION, EMULSION INTRAVENOUS at 09:22

## 2022-04-27 RX ADMIN — INSULIN GLARGINE 10 UNITS: 100 INJECTION, SOLUTION SUBCUTANEOUS at 20:15

## 2022-04-27 RX ADMIN — LISINOPRIL 20 MG: 20 TABLET ORAL at 08:35

## 2022-04-27 RX ADMIN — MIDAZOLAM HYDROCHLORIDE 2 MG: 2 INJECTION, SOLUTION INTRAMUSCULAR; INTRAVENOUS at 09:09

## 2022-04-27 RX ADMIN — SODIUM CHLORIDE: 9 INJECTION, SOLUTION INTRAVENOUS at 09:09

## 2022-04-27 RX ADMIN — NORTRIPTYLINE HYDROCHLORIDE 10 MG: 10 CAPSULE ORAL at 20:18

## 2022-04-27 RX ADMIN — MUPIROCIN: 20 OINTMENT TOPICAL at 08:39

## 2022-04-27 RX ADMIN — PROPOFOL 20 MG: 10 INJECTION, EMULSION INTRAVENOUS at 09:27

## 2022-04-27 RX ADMIN — SODIUM CHLORIDE, PRESERVATIVE FREE 10 ML: 5 INJECTION INTRAVENOUS at 20:16

## 2022-04-27 RX ADMIN — ATORVASTATIN CALCIUM 20 MG: 10 TABLET, FILM COATED ORAL at 08:37

## 2022-04-27 RX ADMIN — PREDNISONE 30 MG: 20 TABLET ORAL at 08:37

## 2022-04-27 RX ADMIN — DIGOXIN 0.12 MG: 125 TABLET ORAL at 08:37

## 2022-04-27 RX ADMIN — MUPIROCIN: 20 OINTMENT TOPICAL at 20:16

## 2022-04-27 ASSESSMENT — PULMONARY FUNCTION TESTS
PIF_VALUE: 2
PIF_VALUE: 1
PIF_VALUE: 2
PIF_VALUE: 1
PIF_VALUE: 2
PIF_VALUE: 2
PIF_VALUE: 16
PIF_VALUE: 2
PIF_VALUE: 2
PIF_VALUE: 1
PIF_VALUE: 2
PIF_VALUE: 0
PIF_VALUE: 1
PIF_VALUE: 1
PIF_VALUE: 2
PIF_VALUE: 2
PIF_VALUE: 1
PIF_VALUE: 2
PIF_VALUE: 1
PIF_VALUE: 1
PIF_VALUE: 2
PIF_VALUE: 1
PIF_VALUE: 1
PIF_VALUE: 2
PIF_VALUE: 2
PIF_VALUE: 1
PIF_VALUE: 2
PIF_VALUE: 1
PIF_VALUE: 2
PIF_VALUE: 1
PIF_VALUE: 2
PIF_VALUE: 0
PIF_VALUE: 2
PIF_VALUE: 1

## 2022-04-27 NOTE — OP NOTE
Operative Note      Patient: Pollo Bean  YOB: 1955  MRN: [id number]    Date of Procedure: 4/27/2022    Pre-Op Diagnosis: - squamous lung cancer, left lung with effusion    Post-Op Diagnosis: Same       Procedure(s):  LEFT PLEUR-X CATHETER PLACEMENT    Surgeon(s):  Gwendolyn Dos Santos MD    Assistant:   Surgical Assistant: Amira Cruz    Anesthesia: Monitor Anesthesia Care    Estimated Blood Loss (mL): Minimal    Complications: None    Specimens:   * No specimens in log *    Implants:  * No implants in log *      Drains:   Chest Tube Left Pleural (Active)       [REMOVED] Chest Tube Left Pleural 1 (Removed)   Chest Tube Airleak No 04/26/22 0833   Status Gravity 04/26/22 0833   Suction To water seal 04/26/22 0790   Y Connector Used No 04/26/22 0833   Drainage Description Serous; Yellow 04/26/22 0833   Dressing Status Clean, dry & intact 04/26/22 0833   Chest Tube Dressing Split Gauze 04/26/22 0833   Site Assessment Not assessed 04/26/22 0833   Surrounding Skin Unable to view 04/26/22 0833   Output (ml) 65 ml 04/26/22 1200       [REMOVED] External Urinary Catheter (Removed)   Placement Replaced 04/13/22 0600   Suction 40 mmgHg continuous 04/13/22 0600   Urine Color Yellow 04/13/22 0600   Urine Appearance Hazy 04/13/22 0600   Output (mL) 900 mL 04/13/22 1614       [REMOVED] External Urinary Catheter (Removed)   Site Assessment Clean,dry & intact 04/23/22 0925   Placement Repositioned 04/23/22 0925   Securement Method Leg strap 04/23/22 0925   Perineal Care Yes 04/22/22 2000   Suction N/A 04/23/22 0430   Urine Color Yellow 04/23/22 0925   Urine Appearance Clear 04/23/22 0925   Output (mL) 35 mL 04/23/22 8382       Findings: pleurx placement, difficult entry due to habitus    Detailed Description of Procedure:   He was brought to the operating room placed in supine position. Monitored anesthesia care was administered.   A timeout was performed confirming correct correct patient procedure and laterality. Initially, the previous left chest tube was removed. Bedside ultrasound demonstrated a large fluid pocket of the left base at the proposed incision site. He was then prepped and draped in sterile fashion. 2 sites 5 cm apart on the skin were anesthetized with 1% lidocaine. 3 mm incisions were made at both sites. Initially, the needle supplied with a kit was inserted but was unable to reach his ribs. A second kit was ordered with a longer needle and once this was done we were able to aspirate through the chest wall which revealed a blood-tinged pleural fluid. The delivery sheath was barely long enough to reach the chest wall but adequate. The Pleurx catheter was then tunneled through the anterior incision site using the trocar. The fabric cuff was placed midway into the subcutaneous tissues. Once this was done, the breakaway sheath was torn and the Plex catheter was inserted into the left pleural space. It was hooked to suction and approximately 300 mL of blood-tinged pleural fluid was obtained. We felt this to be adequate so the catheter was secured with a 2-0 silk suture and skin closed over the posterior access site with a Vicryl suture. Dressings were placed and he was returned to the ICU in stable condition. X-rays pending at the time of dictation.     Electronically signed by Rebecca Butler MD on 4/27/2022 at 9:45 AM

## 2022-04-27 NOTE — PLAN OF CARE
Problem: Falls - Risk of:  Goal: Will remain free from falls  Description: Will remain free from falls  4/27/2022 0015 by Radha Saavedra RN  Outcome: Progressing  Goal: Absence of physical injury  Description: Absence of physical injury  4/27/2022 0015 by Radha Saavedra RN  Outcome: Progressing     Problem: Pain  Goal: Verbalizes/displays adequate comfort level or baseline comfort level  4/27/2022 1200 by Keeley Juan RN  Outcome: Progressing  4/27/2022 0015 by Radha Saavedra RN  Outcome: Adequate for Discharge

## 2022-04-27 NOTE — PROGRESS NOTES
Pt sitting up in chair, pt stated he had to use the bathroom. Pt unstable while trying to ambulate pt to bathroom. Rn returned pt into bed.

## 2022-04-27 NOTE — ANESTHESIA POSTPROCEDURE EVALUATION
Department of Anesthesiology  Postprocedure Note    Patient: Rebecca Navarrete  MRN: [id number]  YOB: 1955  Date of evaluation: 4/27/2022  Time:  3:55 PM     Procedure Summary     Date: 04/27/22 Room / Location: [facility]  / [facility]    Anesthesia Start: 2375 Anesthesia Stop: 1009    Procedure: LEFT PLEUR-X CATHETER PLACEMENT (Left ) Diagnosis: (-)    Surgeons: Christopher Saravia MD Responsible Provider: Giovanna Rodriguez MD    Anesthesia Type: general ASA Status: 4          Anesthesia Type: general    Jose David Phase I:      Jose David Phase II:      Last vitals: Reviewed and per EMR flowsheets.        Anesthesia Post Evaluation    Comments: Postoperative Anesthesia Note    Name:    Rebecca Navarrete  MRN:      [id number]    Patient Vitals in the past 12 hrs:  04/27/22 1500, BP:114/69, Temp:97.5 °F (36.4 °C), Temp src:Axillary, Pulse:59, Resp:18, SpO2:100 %  04/27/22 1432, BP:89/63, Pulse:66, Resp:21  04/27/22 1427, BP:89/63, Pulse:56, SpO2:100 %  04/27/22 1400, Pulse:83, Resp:26, SpO2:96 %  04/27/22 1330, BP:(!) 125/99, Pulse:59, Resp:19, SpO2:94 %  04/27/22 1300, BP:107/67, Pulse:(!) 46, Resp:21, SpO2:99 %  04/27/22 1230, BP:83/63, Pulse:(!) 49, Resp:19, SpO2:100 %  04/27/22 1200, BP:(!) 87/53, Pulse:(!) 49, Resp:19, SpO2:100 %  04/27/22 1130, BP:82/60, Pulse:56, Resp:19, SpO2:100 %  04/27/22 1100, BP:94/64, Pulse:(!) 48, Resp:22, SpO2:100 %  04/27/22 1030, BP:104/61, Pulse:(!) 49, Resp:21, SpO2:100 %  04/27/22 1000, BP:(!) 93/57, Temp:98 °F (36.7 °C), Temp src:Axillary, Pulse:50, Resp:18  04/27/22 0900, BP:109/84, Pulse:69, Resp:20, SpO2:96 %  04/27/22 0835, BP:120/77  04/27/22 0600, BP:121/65, Pulse:62, Resp:16, SpO2:95 %  04/27/22 0500, BP:105/71, Pulse:53, Resp:20, SpO2:100 %  04/27/22 0400, BP:122/68, Pulse:56, Resp:22, SpO2:97 %     LABS:    CBC  Lab Results       Component                Value               Date/Time                  WBC                      14.1 (H)            04/27/2022 04:21 AM        HGB                      13.6                04/27/2022 04:21 AM        HCT                      44.1                04/27/2022 04:21 AM        PLT                      192                 04/27/2022 04:21 AM   RENAL  Lab Results       Component                Value               Date/Time                  NA                       133 (L)             04/27/2022 04:21 AM        K                        3.6                 04/27/2022 04:21 AM        CL                       91 (L)              04/27/2022 04:21 AM        CO2                      28                  04/27/2022 04:21 AM        BUN                      16                  04/27/2022 04:21 AM        CREATININE               0.8                 04/27/2022 04:21 AM        GLUCOSE                  196 (H)             04/27/2022 04:21 AM   COAGS  Lab Results       Component                Value               Date/Time                  PROTIME                  12.9 (H)            04/12/2022 10:05 AM        INR                      1.14 (H)            04/12/2022 10:05 AM     Intake & Output:  @[id number]@    Nausea & Vomiting:  No    Level of Consciousness:  Awake    Pain Assessment:  Adequate analgesia    Anesthesia Complications:  No apparent anesthetic complications    SUMMARY      Vital signs stable  OK to discharge from Stage I post anesthesia care.   Care transferred from Anesthesiology department on discharge from perioperative area

## 2022-04-27 NOTE — PLAN OF CARE
Problem: Falls - Risk of:  Goal: Will remain free from falls  Description: Will remain free from falls  Outcome: Progressing  Goal: Absence of physical injury  Description: Absence of physical injury  Outcome: Progressing     Problem: Chronic Conditions and Co-morbidities  Goal: Patient's chronic conditions and co-morbidity symptoms are monitored and maintained or improved  Outcome: Progressing     Problem: ABCDS Injury Assessment  Goal: Absence of physical injury  Outcome: Progressing     Problem: Skin/Tissue Integrity  Goal: Absence of new skin breakdown  Description: 1. Monitor for areas of redness and/or skin breakdown  2. Assess vascular access sites hourly  3. Every 4-6 hours minimum:  Change oxygen saturation probe site  4. Every 4-6 hours:  If on nasal continuous positive airway pressure, respiratory therapy assess nares and determine need for appliance change or resting period.   Outcome: Progressing     Problem: Nutrition Deficit:  Goal: Optimize nutritional status  4/27/2022 0015 by Adwoa Amaya RN  Outcome: Progressing  4/26/2022 1329 by Norleen Dubin, MS, RD, LD  Outcome: Not Progressing

## 2022-04-27 NOTE — PROGRESS NOTES
Occupational Therapy  Facility/Department: [facility] C2 CARD TELEMETRY  Daily Treatment Note  NAME: Lisa Rankin  : 1955  MRN: [id number]    Date of Service: 2022    Discharge Recommendations:  Subacute/Skilled Nursing Facility      AM-PAC Daily Activity Inpatient   How much help for putting on and taking off regular lower body clothing?: A Lot  How much help for Bathing?: A Lot  How much help for Toileting?: A Little  How much help for putting on and taking off regular upper body clothing?: A Little  How much help for taking care of personal grooming?: A Little  How much help for eating meals?: None  AM-PAC Inpatient Daily Activity Raw Score: 17  AM-PAC Inpatient ADL T-Scale Score : 37.26  ADL Inpatient CMS 0-100% Score: 50.11  ADL Inpatient CMS G-Code Modifier : CK        Patient Diagnosis(es): There were no encounter diagnoses. Assessment    Activity Tolerance: Patient limited by fatigue;Patient limited by endurance      Plan   Plan  Times per Week: 4-6x/week     Subjective   Subjective  Subjective: Pt agreeable to OT session states agreeable to dressing.   Pain: 4/10 L low back and chest  Cognition  Overall Cognitive Status: WFL        Objective    Vitals  Pulse: 56  Heart Rate Source: Monitor  BP: 89/63  BP Location: Right upper arm  BP Method: Automatic  Patient Position: Semi fowlers  MAP (Calculated): 71.67  SpO2: 100 %  O2 Device: Nasal cannula (2L O2)      Bed Mobility Training  Supine to Sit: Moderate assistance;Assist X2 (to L)  Sit to Supine: Other (comment) (up to chair at end of session)  Scooting: Minimum assistance (to scoot forward to EOB)    Balance  Sitting: Intact  Standing: Impaired  Standing - Static: Fair;Occasional  Standing - Dynamic: Fair;Constant support    Transfer Training  Transfer Training: Yes  Sit to Stand: Stand-by assistance  Stand to Sit: Stand-by assistance  Bed to Chair: Minimum assistance;Contact-guard assistance (using RW, moving from bed>toilet>chair)  Toilet Transfer: Contact-guard assistance;Minimum assistance  Gait  Overall Level of Assistance: Minimum assistance (with RW fot bathroom)       ADL  UE Dressing: Minimal assistance  LE Dressing: Minimal assistance donning briefs  Toileting hygiene: SBA         Goals  Short Term Goals  Time Frame for Short term goals: 1 week (5/1)  Short Term Goal 1: Pt will perform toilet transfer with SBA and use of LRAD  Short Term Goal 2: Pt will perform toileting with SBA and use of LRA  Short Term Goal 3: Pt will perform LB dressing with SBA  Short Term Goal 4: Pt will perform 3 grooming tasks while standing at sink with LRAD and rest breaks  Short Term Goal 5: Pt will perform x15 BUE exercises to improve UB strength/endurance to increase IND with reposition and ADLs  Patient Goals   Patient goals : To get stronger       Therapy Time   Individual Concurrent Group Co-treatment   Time In 1430         Time Out 1510         Minutes 40         Timed Code Treatment Minutes: 40 Minutes       HAIR Howard/L  If pt is unable to be seen after this session, please let this note serve as discharge summary. Please see case management note for discharge disposition. Thank you.

## 2022-04-27 NOTE — RT PROTOCOL NOTE
RT Inhaler-Nebulizer Bronchodilator Protocol Note    There is a bronchodilator order in the chart from a provider indicating to follow the RT Bronchodilator Protocol and there is an Initiate RT Inhaler-Nebulizer Bronchodilator Protocol order as well (see protocol at bottom of note). CXR Findings:  No results found. The findings from the last RT Protocol Assessment were as follows:   History Pulmonary Disease: Smoker 15 pack years or more  Respiratory Pattern: Regular pattern and RR 12-20 bpm  Breath Sounds: Clear breath sounds  Cough: Strong, spontaneous, non-productive  Indication for Bronchodilator Therapy: None  Bronchodilator Assessment Score: 1    Aerosolized bronchodilator medication orders have been revised according to the RT Inhaler-Nebulizer Bronchodilator Protocol below. Respiratory Therapist to perform RT Therapy Protocol Assessment initially then follow the protocol. Repeat RT Therapy Protocol Assessment PRN for score 0-3 or on second treatment, BID, and PRN for scores above 3. No Indications - adjust the frequency to every 6 hours PRN wheezing or bronchospasm, if no treatments needed after 48 hours then discontinue using Per Protocol order mode. If indication present, adjust the RT bronchodilator orders based on the Bronchodilator Assessment Score as indicated below. Use Inhaler orders unless patient has one or more of the following: on home nebulizer, not able to hold breath for 10 seconds, is not alert and oriented, cannot activate and use MDI correctly, or respiratory rate 25 breaths per minute or more, then use the equivalent nebulizer order(s) with same Frequency and PRN reasons based on the score. If a patient is on this medication at home then do not decrease Frequency below that used at home.     0-3 - enter or revise RT bronchodilator order(s) to equivalent RT Bronchodilator order with Frequency of every 4 hours PRN for wheezing or increased work of breathing using Per

## 2022-04-27 NOTE — PROGRESS NOTES
Physical Therapy  Facility/Department: [facility] C2 CARD TELEMETRY  Daily Treatment Note  NAME: Shai Akbar  : 1955  MRN: [id number]    Date of Service: 2022    Discharge Recommendations:  Subacute/Skilled Nursing Facility   PT Equipment Recommendations  Equipment Needed: No    Patient Diagnosis(es): There were no encounter diagnoses. Assessment   Assessment: Pt participated well with PT this session, although continues to demonstrate c/o dizziness and low BP values when first getting OOB. Pt seen in conjunction with OT for co-treat session given hx low BP readings and increased assist requirements today for mobility. Pt able to transfer OOB with modA x 2 and amb short distances around hospital room with support of RW and min/CGA x 1. Amb distance and overall standing tolerance limited by c/o generalized weakness and continued dizziness while first upright. Recommend SNF at D/C in light of current deficits. Activity Tolerance: Patient limited by fatigue;Patient limited by endurance  Equipment Needed: No     Plan    Plan: 5-7 times per week  Current Treatment Recommendations: Strengthening;ROM;Balance training;Functional mobility training;Transfer training; Endurance training;Gait training; Safety education & training;Patient/Caregiver education & training;Equipment evaluation, education, & procurement     Subjective    Subjective: Pt agreeable to work with PT this afternoon, pleasant and cooperative. Agreeable to try and get up to the chair. Pt with occasional c/o dizziness with position changes although to a lesser degree today vs. last PT session. BP = 83/48 post-amb, increasing to 114/69 after sitting in chair x 5 minutes with LE's elevated. Pain: Pt c/o \"4/10\" acute pain in L side of chest/rib cage and lower back, near chest tube site.   Overall Orientation Status: Within Functional Limits     Objective   Vitals  Pulse: 56  Heart Rate Source: Monitor  BP: 89/63  BP Location: Right upper arm  BP Method: Automatic  Patient Position: Semi fowlers  MAP (Calculated): 71.67  SpO2: 100 %  O2 Device: Nasal cannula (2L O2)     Bed Mobility Training  Supine to Sit: Moderate assistance;Assist X2  Scooting: Minimum assistance (to scoot forward to EOB)    Balance  Sitting: Intact  Standing: Impaired  Standing - Static: Fair;Occasional  Standing - Dynamic: Fair;Constant support    Transfer Training  Sit to Stand: Stand-by assistance  Stand to Sit: Stand-by assistance  Bed to Chair: Minimum assistance;Contact-guard assistance (using RW, moving from bed>toilet>chair)    Gait  Overall Level of Assistance: Minimum assistance  Interventions: Tactile cues; Verbal cues; Safety awareness training  Speed/Latosha: Pace decreased (< 100 feet/min); Slow  Step Length: Left shortened;Right shortened  Gait Abnormalities: Decreased step clearance; Step to gait  Distance (ft): 15 Feet (2 x 15 feet)  Assistive Device: Walker, rolling (on 2L O2)    Other Specialty Interventions  Other Treatments/Modalities: Pt incontinent of urine in bed. Gown changed with assist.  Pt assisted into/out of bathroom using RW with Zechariah x 1 (see OT note for details). Patient Education  Education Given To: Patient  Education Provided: Role of Therapy;Plan of Care;Home Exercise Program;Transfer Training; Fall Prevention Strategies;Precautions; Equipment; Energy Conservation  Education Provided Comments: Disease-specific education: Pt educated on role of PT, benefits of mobility, safety in transfers/amb with RW - pt verbalizes understanding, will need reinforcement 2* decreased safety.   Education Method: Demonstration;Verbal  Barriers to Learning: Cognition  Education Outcome: Verbalized understanding;Demonstrated understanding;Continued education needed     [facility] 6 Clicks Inpatient Mobility:  AM-PAC Mobility Inpatient   How much difficulty turning over in bed?: A Little  How much difficulty sitting down on / standing up from a chair with arms?: A Lot  How much difficulty moving from lying on back to sitting on side of bed?: A Little  How much help from another person moving to and from a bed to a chair?: A Lot  How much help from another person needed to walk in hospital room?: A Lot  How much help from another person for climbing 3-5 steps with a railing?: Total  AM-PAC Inpatient Mobility Raw Score : 13  AM-PAC Inpatient T-Scale Score : 36.74  Mobility Inpatient CMS 0-100% Score: 64.91  Mobility Inpatient CMS G-Code Modifier : CL    Goals  Short Term Goals  Time Frame for Short term goals: 1 week, 4/30/22 (unless otherwise specified)  Short term goal 1: Pt will transfer supine <-> sit with SBA  Short term goal 2: Pt will transfer sit <-> stand and bed>chair using appropriate AD as needed with CGA x 1  Short term goal 3: Pt will ambulate x 60 feet using appropriate AD as needed with CGA x 1  Short term goal 4: By 4/26/22: Pt will tolerate 12-15 reps BLE exercise for strengthening, balance, and endurance - MET for ankle pumps on 4/25/22, goal ongoing  Patient Goals   Patient goals : \"To get stronger\"      Therapy Time   Individual Concurrent Group Co-treatment   Time In 3653         Time Out 1500         Minutes 40         Timed Code Treatment Minutes: Kristine [place], [place] #[id number]    If pt is unable to be seen after this session, please let this note serve as discharge summary. Please see case management note for discharge disposition. Thank you.

## 2022-04-27 NOTE — PROGRESS NOTES
Hospitalist Progress Note      PCP: King Colón MD    Date of Admission: 4/19/2022    Chief Complaint: Endobronchial mass      Subjective:  Bradycardic per nursing. No complaints. Plurex placed. Medications:  Reviewed    Infusion Medications    dexmedetomidine      dextrose       Scheduled Medications    mupirocin   Nasal BID    metoprolol tartrate  12.5 mg Oral BID    torsemide  50 mg Oral Daily    insulin lispro  0-3 Units SubCUTAneous Nightly    insulin lispro  0-6 Units SubCUTAneous TID WC    atorvastatin  20 mg Oral Daily    buPROPion  100 mg Oral BID    digoxin  0.125 mg Oral Daily    dilTIAZem  240 mg Oral Daily    insulin glargine  10 Units SubCUTAneous Nightly    levothyroxine  150 mcg Oral Daily    lisinopril  20 mg Oral Daily    nortriptyline  10 mg Oral Nightly    predniSONE  30 mg Oral Daily     PRN Meds: ipratropium-albuterol, oxyCODONE-acetaminophen, acetaminophen **OR** acetaminophen, dextrose, glucagon (rDNA), glucose, ondansetron **OR** ondansetron, polyethylene glycol, sodium chloride flush, glucose, dextrose bolus (hypoglycemia) **OR** dextrose bolus (hypoglycemia)      Intake/Output Summary (Last 24 hours) at 4/27/2022 1331  Last data filed at 4/27/2022 1310  Gross per 24 hour   Intake --   Output 387 ml   Net -387 ml       Physical Exam Performed:    /67   Pulse (!) 46   Temp 98 °F (36.7 °C) (Axillary)   Resp 21   Ht 6' (1.829 m)   Wt 252 lb 6.8 oz (114.5 kg)   SpO2 99%   BMI 34.24 kg/m²      General appearance: NAD, sitting in bed. HEENT: Pupils equal, round, and reactive to light. Conjunctivae/corneas clear. Neck: Supple, with full range of motion. No jugular venous distention. Trachea midline. Respiratory: Bilateral breath sounds, decreased at both bases, there is also notably diminished breath sounds in the left lung field, no use of accessory muscles at this time. Chest tube in place.   Cardiovascular: Regular rate and rhythm with normal S1/S2   Abdomen: Soft, non-tender, non-distended with normal bowel sounds. Musculoskeletal: No clubbing, cyanosis or edema bilaterally. Full range of motion without deformity. Neurologic:  Neurovascularly intact without any focal sensory/motor deficits. Cranial nerves: II-XII intact, grossly non-focal.  Psychiatric: He is awake, oriented, has insight  Peripheral Pulses: +2 palpable, equal bilaterally     Labs:   Recent Labs     04/25/22 0413 04/27/22  0421   WBC 12.3* 14.1*   HGB 13.3* 13.6   HCT 41.3 44.1    192     Recent Labs     04/25/22 0413 04/27/22  0421    133*   K 3.3* 3.6   CL 93* 91*   CO2 33* 28   BUN 18 16   CREATININE 0.8 0.8   CALCIUM 8.1* 8.1*     No results for input(s): AST, ALT, BILIDIR, BILITOT, ALKPHOS in the last 72 hours. No results for input(s): INR in the last 72 hours. No results for input(s): Dhiraj Favre in the last 72 hours. Urinalysis:    No results found for: [place], BACTERIA, RBCUA, BLOODU, SPECGRAV, [address] [place] [address]    Radiology:  XR CHEST 1 VIEW   Final Result   1. Interval placement of a left PleurX catheter, with no significant change   in size or appearance of a large left pleural effusion. 2. Persistent near complete consolidation of the left lung. 3. No evidence of a pneumothorax. XR CHEST PORTABLE   Final Result   Decreased left-sided pleural effusion. There is a developing pleural gas   collection seen in the left francisco javier thorax superiorly secondary to decreased   pleural fluid and incomplete re-expansion of the left lung. Dense   consolidation the left lung persists along with large remaining left-sided   pleural effusion      Improved aeration of the right lung         XR CHEST PORTABLE   Final Result   Stable left chest tube. No pneumothorax. Persisting extensive opacification of the left hemithorax- atelectasis or   consolidation versus pleural effusion. Developing right basilar airspace disease, likely pulmonary edema. XR CHEST PORTABLE   Final Result   Left chest tube is in stable position. Left hemithorax remains mostly opaque with aeration of the apex. Decreased right perihilar vascular congestion. XR CHEST PORTABLE   Final Result   Partial re-expansion of the left upper lobe following chest tube. Increased   right perihilar edema or infiltrate. No pneumothorax. 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 fib, chronic diastolic CHF, COPD initially admitted to [facility] on 4/17/2022 for shortness of breath and respiratory failure.  He was started on antibiotics for the postobstructive pneumonia.  Patient refused fiberoptic bronchoscopy, refuses BiPAP.  CT surgery was consulted for the endobronchial mass for debulking airway tumor and then patient wants to proceed with chemo/XRT. The patient was transferred to [facility] for CT surgery consult. \"      SCC left lung with left mainstem endobronchial mass causing left lung collapse/postobstructive pneumonia  SqCC left lower lobe/lingula - 12 cm mass with positive station 7 lymph node - stage IIIA  -s/p debulking/laser therapy per CTS on 4/20.   CT placed.  -Plan to F/w Dr. Brasher Givens next week to plan for cancer treatment outpatient for subsequent XRT/chemo     Acute on chronic respiratory failure with hypoxia and hypercapnia  COPD with acute exacerbation  Postobstructive pneumonia  Lung collapse  -refused BiPAP. He normally wears O2 PRN at home.  -Continue prednisone   -Completed IV Levaquin course  -inhaled bronchodilators  -Chest tube placed, CT surgery placed PleurX catheter 4/27/22.     Tobacco abuse-counseled cessation     Atrial fibrillation, permanent   -Eliquis held for surgery; resume when ok with CT Surgery  -Continue Cardizem CD and digoxin  -hold for bradycardia     HLD-resume statin     Hypothyroidism-clinically euthyroid, resumed Synthroid      DM2- insulin regimen. Steroid complicated treatment.      Chronic anxiety-mood stable, resumed Wellbutrin      DVT Prophylaxis: Resume Eliquis when ok with CT surgery post op. SCDs meanwhile. Diet: ADULT DIET; Regular  Code Status: Full Code    PT/OT Eval Status: rec'd SNF    Dispo - recommend SNF when ok with CT surgery. 1-2 more days.     Valentino Porras MD

## 2022-04-27 NOTE — PROGRESS NOTES
Assumed care of this pt. Marked chest tube drainage @ 65 ml. Pt denies pain & says his breathing is fair.

## 2022-04-28 LAB
ANION GAP SERPL CALCULATED.3IONS-SCNC: 12 MMOL/L (ref 3–16)
BASOPHILS ABSOLUTE: 0 K/UL (ref 0–0.2)
BASOPHILS RELATIVE PERCENT: 0.1 %
BUN BLDV-MCNC: 25 MG/DL (ref 7–20)
CALCIUM SERPL-MCNC: 8.7 MG/DL (ref 8.3–10.6)
CHLORIDE BLD-SCNC: 88 MMOL/L (ref 99–110)
CO2: 29 MMOL/L (ref 21–32)
CREAT SERPL-MCNC: 1.2 MG/DL (ref 0.8–1.3)
EOSINOPHILS ABSOLUTE: 0 K/UL (ref 0–0.6)
EOSINOPHILS RELATIVE PERCENT: 0 %
GFR AFRICAN AMERICAN: >60
GFR NON-AFRICAN AMERICAN: >60
GLUCOSE BLD-MCNC: 156 MG/DL (ref 70–99)
GLUCOSE BLD-MCNC: 167 MG/DL (ref 70–99)
GLUCOSE BLD-MCNC: 312 MG/DL (ref 70–99)
GLUCOSE BLD-MCNC: 351 MG/DL (ref 70–99)
GLUCOSE BLD-MCNC: 383 MG/DL (ref 70–99)
GLUCOSE BLD-MCNC: 399 MG/DL (ref 70–99)
HCT VFR BLD CALC: 41.5 % (ref 40.5–52.5)
HEMOGLOBIN: 13.2 G/DL (ref 13.5–17.5)
LYMPHOCYTES ABSOLUTE: 0.3 K/UL (ref 1–5.1)
LYMPHOCYTES RELATIVE PERCENT: 1.8 %
MCH RBC QN AUTO: 28.6 PG (ref 26–34)
MCHC RBC AUTO-ENTMCNC: 31.7 G/DL (ref 31–36)
MCV RBC AUTO: 90.2 FL (ref 80–100)
MONOCYTES ABSOLUTE: 0.4 K/UL (ref 0–1.3)
MONOCYTES RELATIVE PERCENT: 2.6 %
NEUTROPHILS ABSOLUTE: 15.7 K/UL (ref 1.7–7.7)
NEUTROPHILS RELATIVE PERCENT: 95.5 %
PDW BLD-RTO: 17 % (ref 12.4–15.4)
PERFORMED ON: ABNORMAL
PLATELET # BLD: 214 K/UL (ref 135–450)
PMV BLD AUTO: 6.8 FL (ref 5–10.5)
POTASSIUM SERPL-SCNC: 3.9 MMOL/L (ref 3.5–5.1)
RBC # BLD: 4.6 M/UL (ref 4.2–5.9)
SODIUM BLD-SCNC: 129 MMOL/L (ref 136–145)
WBC # BLD: 16.5 K/UL (ref 4–11)

## 2022-04-28 PROCEDURE — 97116 GAIT TRAINING THERAPY: CPT

## 2022-04-28 PROCEDURE — 6370000000 HC RX 637 (ALT 250 FOR IP): Performed by: STUDENT IN AN ORGANIZED HEALTH CARE EDUCATION/TRAINING PROGRAM

## 2022-04-28 PROCEDURE — 97110 THERAPEUTIC EXERCISES: CPT

## 2022-04-28 PROCEDURE — 36415 COLL VENOUS BLD VENIPUNCTURE: CPT

## 2022-04-28 PROCEDURE — 97530 THERAPEUTIC ACTIVITIES: CPT

## 2022-04-28 PROCEDURE — 80048 BASIC METABOLIC PNL TOTAL CA: CPT

## 2022-04-28 PROCEDURE — 2580000003 HC RX 258: Performed by: STUDENT IN AN ORGANIZED HEALTH CARE EDUCATION/TRAINING PROGRAM

## 2022-04-28 PROCEDURE — 99024 POSTOP FOLLOW-UP VISIT: CPT | Performed by: NURSE PRACTITIONER

## 2022-04-28 PROCEDURE — 2580000003 HC RX 258: Performed by: FAMILY MEDICINE

## 2022-04-28 PROCEDURE — 85025 COMPLETE CBC W/AUTO DIFF WBC: CPT

## 2022-04-28 PROCEDURE — 1200000000 HC SEMI PRIVATE

## 2022-04-28 RX ORDER — LISINOPRIL 10 MG/1
10 TABLET ORAL DAILY
Status: DISCONTINUED | OUTPATIENT
Start: 2022-04-29 | End: 2022-05-03 | Stop reason: HOSPADM

## 2022-04-28 RX ORDER — SODIUM CHLORIDE 9 MG/ML
INJECTION, SOLUTION INTRAVENOUS CONTINUOUS
Status: ACTIVE | OUTPATIENT
Start: 2022-04-28 | End: 2022-04-28

## 2022-04-28 RX ADMIN — INSULIN LISPRO 3 UNITS: 100 INJECTION, SOLUTION INTRAVENOUS; SUBCUTANEOUS at 21:02

## 2022-04-28 RX ADMIN — BUPROPION HYDROCHLORIDE 100 MG: 100 TABLET, EXTENDED RELEASE ORAL at 21:01

## 2022-04-28 RX ADMIN — METOPROLOL TARTRATE 12.5 MG: 25 TABLET, FILM COATED ORAL at 09:21

## 2022-04-28 RX ADMIN — LISINOPRIL 20 MG: 20 TABLET ORAL at 09:22

## 2022-04-28 RX ADMIN — TORSEMIDE 50 MG: 100 TABLET ORAL at 09:22

## 2022-04-28 RX ADMIN — SODIUM CHLORIDE: 9 INJECTION, SOLUTION INTRAVENOUS at 16:35

## 2022-04-28 RX ADMIN — OXYCODONE AND ACETAMINOPHEN 1 TABLET: 5; 325 TABLET ORAL at 21:01

## 2022-04-28 RX ADMIN — INSULIN LISPRO 4 UNITS: 100 INJECTION, SOLUTION INTRAVENOUS; SUBCUTANEOUS at 12:25

## 2022-04-28 RX ADMIN — INSULIN GLARGINE 10 UNITS: 100 INJECTION, SOLUTION SUBCUTANEOUS at 21:02

## 2022-04-28 RX ADMIN — DILTIAZEM HYDROCHLORIDE 240 MG: 240 CAPSULE, COATED, EXTENDED RELEASE ORAL at 09:21

## 2022-04-28 RX ADMIN — INSULIN LISPRO 5 UNITS: 100 INJECTION, SOLUTION INTRAVENOUS; SUBCUTANEOUS at 16:32

## 2022-04-28 RX ADMIN — NORTRIPTYLINE HYDROCHLORIDE 10 MG: 10 CAPSULE ORAL at 21:01

## 2022-04-28 RX ADMIN — SODIUM CHLORIDE, PRESERVATIVE FREE 10 ML: 5 INJECTION INTRAVENOUS at 21:01

## 2022-04-28 RX ADMIN — BUPROPION HYDROCHLORIDE 100 MG: 100 TABLET, EXTENDED RELEASE ORAL at 09:22

## 2022-04-28 RX ADMIN — INSULIN LISPRO 1 UNITS: 100 INJECTION, SOLUTION INTRAVENOUS; SUBCUTANEOUS at 09:29

## 2022-04-28 RX ADMIN — LEVOTHYROXINE SODIUM 150 MCG: 0.15 TABLET ORAL at 05:24

## 2022-04-28 RX ADMIN — ATORVASTATIN CALCIUM 20 MG: 10 TABLET, FILM COATED ORAL at 09:22

## 2022-04-28 RX ADMIN — DIGOXIN 0.12 MG: 125 TABLET ORAL at 09:21

## 2022-04-28 RX ADMIN — PREDNISONE 30 MG: 20 TABLET ORAL at 09:21

## 2022-04-28 ASSESSMENT — PAIN DESCRIPTION - ORIENTATION: ORIENTATION: LEFT

## 2022-04-28 ASSESSMENT — PAIN DESCRIPTION - DESCRIPTORS: DESCRIPTORS: ACHING

## 2022-04-28 ASSESSMENT — PAIN SCALES - GENERAL
PAINLEVEL_OUTOF10: 4
PAINLEVEL_OUTOF10: 3

## 2022-04-28 ASSESSMENT — PAIN DESCRIPTION - LOCATION: LOCATION: CHEST

## 2022-04-28 NOTE — DISCHARGE INSTR - COC
Continuity of Care Form    Patient Name: Madelin Bolanos   :  1955  MRN:  [id number]    Admit date:  2022  Discharge date:  5/3/2022    Code Status Order: Full Code   Advance Directives:      Admitting Physician:  John Ramos MD  PCP: Wily Simpson MD    [facility]72  Discharging Unit Phone Number: [phone]    Emergency Contact:   Extended Emergency Contact Information  Primary Emergency Contact: [facility]  Mobile Phone: [phone]  Relation: Child   needed? No    Past Surgical History:  Past Surgical History:   Procedure Laterality Date    BRONCHOSCOPY N/A 2022    EBUS WF W/ANES. (11:00) performed by Alexis Lambert MD at [facility]  2022    BRONCHOSCOPY ALVEOLAR LAVAGE performed by Alexis Lambert MD at [facility] 149  2022    BRONCHOSCOPY BIOPSY BRONCHUS performed by Alexis Lambert MD at [facility] 149  2022    BRONCHOSCOPY/TRANSBRONCHIAL NEEDLE BIOPSY performed by Alexis Lambert MD at [facility] 149 N/A 2022    Charlett Stapler NF performed by Alexis Lambert MD at [facility] 149 N/A 2022    BRONCHOSCOPY WITH ENDOBRONCHIAL YAG LASER AND CHEST TUBE PLACEMENT performed by Lamine Chiu MD at [facility] 2022    LEFT PLEUR-X CATHETER PLACEMENT performed by Ed Eagle MD at [address]       Immunization History: There is no immunization history on file for this patient.     Active Problems:  Patient Active Problem List   Diagnosis Code    Spinal stenosis of lumbar region with neurogenic claudication M48.062    Mediastinal adenopathy R59.0    Lung mass R91.8    Multiple tracheobronchial mucus plugs T17.800A    Mass of left lung R91.8    Acute on chronic respiratory failure with hypoxia (HCC) J96.21    Acute hypercapnic respiratory failure (HCC) J96.02    COPD with acute exacerbation (HCC) J44.1    Acute encephalopathy G93.40    Recurrent left pleural effusion F21    Metabolic encephalopathy X87.92    Atrial fibrillation, permanent ([facility]) I48.21    Acute respiratory failure with hypoxia and hypercapnia (HCC) J96.01, J96.02    Lung collapse J98.19    Postobstructive pneumonia J18.9    Mucus plugging of bronchi T17.500A    Endobronchial mass R91.8    Hypoxia R09.02    COPD (chronic obstructive pulmonary disease) (HCC) J44.9    Acute hypoxemic respiratory failure (HCC) J96.01    Squamous cell carcinoma of left lung ([facility]) C34.92    Acute on chronic respiratory failure with hypoxia and hypercapnia (HCC) J96.21, J96.22    Chronic diastolic congestive heart failure ([facility]) I50.32    Small cell lung cancer ([facility]) C34.90       Isolation/Infection:   Isolation            No Isolation          Patient Infection Status       None to display            Nurse Assessment:  Last Vital Signs: /67   Pulse 68   Temp 97.7 °F (36.5 °C) (Oral)   Resp 18   Ht 6' (1.829 m)   Wt 252 lb 6.8 oz (114.5 kg)   SpO2 96%   BMI 34.24 kg/m²     Last documented pain score (0-10 scale): Pain Level: 4  Last Weight:   Wt Readings from Last 1 Encounters:   04/26/22 252 lb 6.8 oz (114.5 kg)     Mental Status:  oriented, alert, coherent, logical, thought processes intact, and able to concentrate and follow conversation    IV Access:  - None    Nursing Mobility/ADLs:  Walking   Assisted  Transfer  Assisted  Bathing  Assisted  Dressing  Assisted  Toileting  Assisted  Feeding  Independent  Med Admin  Independent  Med Delivery   whole    Wound Care Documentation and Therapy:  Wound 04/28/22 Scrotum Anterior (Active)   Wound Etiology Skin Tear 04/28/22 0908   Wound Assessment Dry 04/28/22 0908   Drainage Amount None 04/28/22 0908   Margins Defined edges 04/28/22 0908   Number of days: 0        Elimination:  Continence:    Bowel: Yes  Bladder: Yes  Urinary Catheter: None   Colostomy/Ileostomy/Ileal Conduit: No       Date of Last BM: 4/27/22    Intake/Output Summary (Last 24 hours) at 4/28/2022 1315  Last data filed at 4/28/2022 0931  Gross per 24 hour   Intake 440 ml   Output 613 ml   Net -173 ml     I/O last 3 completed shifts: In: 200 [P.O.:200]  Out: 550 [Urine:100; Blood:10; Chest Tube:440]    Safety Concerns: At Risk for Falls    Impairments/Disabilities:      None    Nutrition Therapy:  Current Nutrition Therapy:   - Oral Diet:  General    Routes of Feeding: Oral  Liquids: Thin Liquids  Daily Fluid Restriction: no  Last Modified Barium Swallow with Video (Video Swallowing Test): not done    Treatments at the Time of Hospital Discharge:   Respiratory Treatments: duoneb  Oxygen Therapy:  is on oxygen at 2 L/min per nasal cannula. Ventilator:    - No ventilator support    Rehab Therapies: Physical Therapy and Occupational Therapy  Weight Bearing Status/Restrictions: No weight bearing restrictions  Other Medical Equipment (for information only, NOT a DME order):  walker  Other Treatments: Pleur-x catheter- Can drain daily or every other day until drainage is less than 150 ml, then drain every 2-3 days. Once you are draining less than 150 ml every 3 days you may drain every 4th day. Once you are draining less than 150 ml every 4th day, please call Dr. Neftali Babin office for follow up ([phone]).      Patient's personal belongings (please select all that are sent with patient):  3 Pleur-X kits and the home information packet     RN SIGNATURE:  Electronically signed by Zane Simeon RN on 4/28/22 at 3:09 PM EDT    CASE MANAGEMENT/SOCIAL WORK SECTION    Inpatient Status Date: 4/28/22    Readmission Risk Assessment Score:  Readmission Risk              Risk of Unplanned Readmission:  23           Discharging to Facility/ [address] [facility],[address]   [address] [place] [address],[address]   [place] [address] [address] [facility] [address] [facility]     / signature: Electronically signed by Nakia Quick RN on

## 2022-04-28 NOTE — PROGRESS NOTES
PS Dr. Bryant Gold, \"Pt BP is coming up slowly but he still feels bad. Still a/o and talking to me but weak and states he just feels bad. Any new orders or monitor? \"

## 2022-04-28 NOTE — PROGRESS NOTES
Shift assessment completed. Pt A&Ox4, VSS. Chest tube in place to gravity, no s&s of crepitus. Chest tube capped. Pt. Was instructed on care of site when d/c. Denies any needs at this time. Bed locked and in lowest position. Call light within reach. Will continue to monitor.

## 2022-04-28 NOTE — PROGRESS NOTES
CVTS Thoracic Progress Note:            CC:  Post op follow up     Subj: No complaints. Overall feeling fairly well. Obj:    Blood pressure 109/67, pulse 68, temperature 97.7 °F (36.5 °C), temperature source Oral, resp. rate 18, height 6' (1.829 m), weight 252 lb 6.8 oz (114.5 kg), SpO2 96 %. Alert, oriented    Lungs diminished to left base   Abdomen rounded, soft, non-tender    Pleur-x intact to left chest wall. Surrounding area cdi without erythema or drainage    Pleur-x with 440 ml serosanguinous drainage over 24 hours     Diagnostics:   Recent Labs     04/27/22  0421   WBC 14.1*   HGB 13.6   HCT 44.1                                                                     Recent Labs     04/27/22  0421   *   K 3.6   CL 91*   CO2 28   BUN 16   CREATININE 0.8   GLUCOSE 196*     CXR: 4/27/22  FINDINGS:   There has been interval placement of a left PleurX catheter with tip of the   catheter overlying the mid left chest.  There has been no significant change   in appearance of a large left pleural effusion.  There is partial aeration of   the left lung apex, with complete consolidation of the remainder of the left   lung, unchanged.  There is no evidence of a pneumothorax.  There is stable   mild right basilar atelectasis.  The heart size and mediastinal contours are   stable.  Cervical fusion hardware is noted.           Impression   1. Interval placement of a left PleurX catheter, with no significant change   in size or appearance of a large left pleural effusion. 2. Persistent near complete consolidation of the left lung. 3. No evidence of a pneumothorax. Assess/Plan:   Labs and imaging reviewed as above.      SCC with left endobronchial lesion: s/p YAG laser debulking   -symptomatically improved   -Satisfactory oxygen saturations on 2L NC  -Expansion measures: IS, oobtc, ambulation   -follow up planned next week with Dr. Deanne Crowder     Left pleural effusion   -s/p left pleur-x placement 4/27  -pt instructed on use and drainage today     OK with d/c home from CTS standpoint. Pt will likely need assistance with Pleur-x drainage if he is to discharge home instead of SNF. He tells me he already has [facility]. I have discussed with CM- she is looking into this. He will need several Pleur-x kits when he is discharged until supplies can be sent to him. He can follow up with CTS when his Pleur-x is drainage less than 100 ml every 4 days. Will sign off. Please call with questions or concerns.    _____________________________________________________    MELISSA Dove - CNP  4/28/2022  12:44 PM

## 2022-04-28 NOTE — PROGRESS NOTES
PS Dr. Deanne Carrington:    Pt. worked with OT/PT today around 15:00. Pt. complained of symptomatic dizziness and light headed. Pt. BP with PT/OT was 76/52. I rechecked BP 5 minutes later with legs propped up in chair with a result of: 79/53. What are your recommendations?

## 2022-04-28 NOTE — CARE COORDINATION
Chart reviewed day 9, ICU move out. Spoke with CT surgery, nearing d/c. Spoke with patient. Discussed SNF recs, adamantly declined. Stated he has aides services 2x/week and skilled nursing with [facility]. Spoke with [facility] stated will be able to service needs. Patient stated daughter will be able to transport home, will bring O2 tank at d/c paitent reported they have ramp entry into home. Encouraged daughter to come in for educational session with pleurex cath prior to d/c, she lives 1/2 mile away from him. Will continue to follow.  Sinan Saucedo RN

## 2022-04-28 NOTE — PROGRESS NOTES
assistance;Decreased awareness of need for safety  Insights: Decreased awareness of deficits  Initiation: Requires cues for some     Objective   Vitals  Sitting EOB: BP 85/55  After standing: BP 99/56  After Transfer: BP 76/49  Reclined with BLE elevated: BP 76/58 (RN at bedside)     Bed Mobility Training  Bed Mobility Training: Yes  Overall Level of Assistance: Minimum assistance  Supine to Sit: Other (comment) (Unable to assess due to pt sitting EOB upon arrival)  Balance  Sitting: Intact  Standing: Impaired  Standing - Static: Fair;Occasional  Standing - Dynamic: Fair;Constant support  Transfer Training  Transfer Training: Yes  Sit to Stand: Minimum assistance  Stand to Sit: Contact-guard assistance  Bed to Chair: Minimum assistance  Gait Training  Gait Training: Yes  Gait  Overall Level of Assistance: Minimum assistance  Interventions: Tactile cues; Verbal cues; Safety awareness training  Speed/Latosha: Pace decreased (< 100 feet/min); Slow  Step Length: Left shortened;Right shortened  Gait Abnormalities: Decreased step clearance; Step to gait  Distance (ft): 12 Feet  Assistive Device: Walker, rolling     Exercises  Pt performed Ankle pumps 2x10, LAQ, x10, seated marches x10   Comment: encouragement and education provided in order for pt to perform              Patient Education  Education Given To: Patient  Education Provided: Role of Therapy;Plan of Care;Home Exercise Program;Transfer Training; Fall Prevention Strategies;Precautions; Equipment; Energy Conservation  Education Provided Comments: Pt educated on d/c recommendations, especially due to continued BP limitations.  Pt educated on safety with mobility and importance of continued skilled PT  Education Method: Demonstration;Verbal  Barriers to Learning: Cognition  Education Outcome: Verbalized understanding;Demonstrated understanding;Continued education needed    [facility] 6 Clicks Inpatient Mobility:  AM-PAC Mobility Inpatient   How much difficulty turning over in bed?: A Little  How much difficulty sitting down on / standing up from a chair with arms?: A Lot  How much difficulty moving from lying on back to sitting on side of bed?: A Little  How much help from another person moving to and from a bed to a chair?: A Lot  How much help from another person needed to walk in hospital room?: A Lot  How much help from another person for climbing 3-5 steps with a railing?: A Lot  AM-PAC Inpatient Mobility Raw Score : 14  AM-PAC Inpatient T-Scale Score : 38.1  Mobility Inpatient CMS 0-100% Score: 61.29  Mobility Inpatient CMS G-Code Modifier : CL      Goals  Short Term Goals  Time Frame for Short term goals: 1 week, 4/30/22 (unless otherwise specified)  Short term goal 1: Pt will transfer supine <-> sit with SBA--4/28 NT  Short term goal 2: Pt will transfer sit <-> stand and bed>chair using appropriate AD as needed with CGA x 1--4/28 CGA-min(A)  Short term goal 3: Pt will ambulate x 60 feet using appropriate AD as needed with CGA x 1--4/28 15 ft  Short term goal 4: By 4/26/22: Pt will tolerate 12-15 reps BLE exercise for strengthening, balance, and endurance - MET for ankle pumps on 4/25/22, goal ongoing  Patient Goals   Patient goals : \"To get stronger\"      Therapy Time   Individual Concurrent Group Co-treatment   Time In 1434         Time Out 1512         Minutes 38         Timed Code Treatment Minutes: 45 Minutes     If pt is unable to be seen after this session, please let this note serve as discharge summary. Please see case management note for discharge disposition. Thank you.     Jovanni Ibarra, PT

## 2022-04-28 NOTE — PROGRESS NOTES
Report received from [facility]. Patient transferred to room 340 in stable condition from ICU. Patient oriented to room and call light. VSS.

## 2022-04-28 NOTE — PROGRESS NOTES
4 Eyes Skin Assessment     The patient is being assess for  Transfer to New Unit    I agree that 2 RN's have performed a thorough Head to Toe Skin Assessment on the patient. ALL assessment sites listed below have been assessed. Areas assessed by both nurses:   [x]   Head, Face, and Ears   [x]   Shoulders, Back, and Chest  [x]   Arms, Elbows, and Hands   [x]   Coccyx, Sacrum, and Ischum  [x]   Legs, Feet, and Heels        Does the Patient have Skin Breakdown?   Yes a wound was noted on the Admission Assessment and an WOUND LDA was Initiated documentation include the Maria Guadalupe-wound, Wound Assessment, Measurements, Dressing Treatment, Drainage, and Color\",         Jhonny Prevention initiated:  Yes   Wound Care Orders initiated:  No      WOC nurse consulted for Pressure Injury (Stage 3,4, Unstageable, DTI, NWPT, and Complex wounds):  NA      Nurse 1 eSignature: Electronically signed by Dawson Nayak RN on 4/28/22 at 5:26 AM EDT    **SHARE this note so that the co-signing nurse is able to place an eSignature**    Nurse 2 eSignature: {Esignature:[id number]}

## 2022-04-28 NOTE — PROGRESS NOTES
Occupational Therapy  Facility/Department: [facility] C3 TELE/MED SURG/ONC  Daily Treatment Note  NAME: Padmaja Tolentino  : 1955  MRN: [id number]    Date of Service: 2022    Discharge Recommendations:  Theresa0 Jass Francis scored a 16/24 on the AM-PAC ADL Inpatient form. Current research shows that an AM-PAC score of 17 or less is typically not associated with a discharge to the patient's home setting. Based on the patient's AM-PAC score and their current ADL deficits, it is recommended that the patient have 3-5 sessions per week of Occupational Therapy at d/c to increase the patient's independence. Please see assessment section for further patient specific details. If patient discharges prior to next session this note will serve as a discharge summary. Please see below for the latest assessment towards goals. Assessment    Assessment: Pt continues to be limited by low BP during OOB activity. Pt's BP drops to 70s/50s with minimal activity. Pt required up to MIN A for mobility using RW. Pt is not safe to return home alone at this time. Would recommend ongoing OT in order to increase functional status prior to returning home. Activity Tolerance: Patient limited by fatigue;Patient limited by endurance  Discharge Recommendations: [address]  Times per Week: 3-5  Current Treatment Recommendations: Strengthening; Functional mobility training; Endurance training;Gait training;Balance training; Safety education & training;Patient/Caregiver education & training;Pain management;Self-Care / ADL;Positioning;Home management training     Subjective   Subjective  Subjective: Pt sitting side of bed, minimally agreeable to OT. Pt reporting multiple complaints of extended stay in hospital. Pt voiced concerns with discharge plan. Discussed concerns with .   Pain: Pt reporting some discomfort from previous chest tube; did not state place  [x] Call light within reach [x] Chair alarm in place [] Positioning belt [] Gait belt [x] Patient at risk for falls [] Left in bed [x] Left in chair [] Telesitter in use [] Sitter present [] Nurse notified []  None      Goals  Short Term Goals  Time Frame for Short term goals: 1 week (5/1)  Short Term Goal 1: Pt will perform toilet transfer with SBA and use of LRAD  Short Term Goal 2: Pt will perform toileting with SBA and use of LRA  Short Term Goal 3: Pt will perform LB dressing with SBA  Short Term Goal 4: Pt will perform 3 grooming tasks while standing at sink with LRAD and rest breaks  Short Term Goal 5: Pt will perform x15 BUE exercises to improve UB strength/endurance to increase IND with reposition and ADLs  Patient Goals   Patient goals :  To get stronger       Therapy Time   Individual Concurrent Group Co-treatment   Time In 1434         Time Out 1512         Minutes 38         Timed Code Treatment Minutes: [address], OTR/L

## 2022-04-28 NOTE — PROGRESS NOTES
Hospitalist Progress Note      PCP: Bulmaro Mae MD    Date of Admission: 4/19/2022    Chief Complaint: Endobronchial mass      Subjective:  Initially felt \"okay\" in the morning. Low BP when working with PT/OT. Notes some dizziness. Medications:  Reviewed    Infusion Medications    sodium chloride 100 mL/hr at 04/28/22 1635    dextrose       Scheduled Medications    metoprolol tartrate  12.5 mg Oral BID    torsemide  50 mg Oral Daily    insulin lispro  0-3 Units SubCUTAneous Nightly    insulin lispro  0-6 Units SubCUTAneous TID WC    atorvastatin  20 mg Oral Daily    buPROPion  100 mg Oral BID    digoxin  0.125 mg Oral Daily    dilTIAZem  240 mg Oral Daily    insulin glargine  10 Units SubCUTAneous Nightly    levothyroxine  150 mcg Oral Daily    lisinopril  20 mg Oral Daily    nortriptyline  10 mg Oral Nightly    predniSONE  30 mg Oral Daily     PRN Meds: ipratropium-albuterol, oxyCODONE-acetaminophen, acetaminophen **OR** acetaminophen, dextrose, glucagon (rDNA), glucose, ondansetron **OR** ondansetron, polyethylene glycol, sodium chloride flush, glucose, dextrose bolus (hypoglycemia) **OR** dextrose bolus (hypoglycemia)      Intake/Output Summary (Last 24 hours) at 4/28/2022 1810  Last data filed at 4/28/2022 1155  Gross per 24 hour   Intake 240 ml   Output 340 ml   Net -100 ml       Physical Exam Performed:    /67   Pulse 58   Temp 97.7 °F (36.5 °C) (Oral)   Resp 18   Ht 6' (1.829 m)   Wt 252 lb 6.8 oz (114.5 kg)   SpO2 99%   BMI 34.24 kg/m²      General appearance: NAD, sitting in bed. HEENT: Pupils equal, round, and reactive to light. Conjunctivae/corneas clear. Neck: Supple, with full range of motion. No jugular venous distention. Trachea midline. Respiratory: Bilateral breath sounds, decreased at both bases, there is also notably diminished breath sounds in the left lung field, no use of accessory muscles at this time. Chest tube in place.   Cardiovascular: Regular rate and rhythm with normal S1/S2   Abdomen: Soft, non-tender, non-distended with normal bowel sounds. Musculoskeletal: No clubbing, cyanosis or edema bilaterally. Full range of motion without deformity. Neurologic:  Neurovascularly intact without any focal sensory/motor deficits. Cranial nerves: II-XII intact, grossly non-focal.  Psychiatric: He is awake, oriented, has insight  Peripheral Pulses: +2 palpable, equal bilaterally     Labs:   Recent Labs     04/27/22  0421   WBC 14.1*   HGB 13.6   HCT 44.1        Recent Labs     04/27/22  0421   *   K 3.6   CL 91*   CO2 28   BUN 16   CREATININE 0.8   CALCIUM 8.1*     No results for input(s): AST, ALT, BILIDIR, BILITOT, ALKPHOS in the last 72 hours. No results for input(s): INR in the last 72 hours. No results for input(s): [place] in the last 72 hours. Urinalysis:    No results found for: Marquita Ficks, BACTERIA, RBCUA, BLOODU, SPECGRAV, [address] [place] [address]    Radiology:  XR CHEST 1 VIEW   Final Result   1. Interval placement of a left PleurX catheter, with no significant change   in size or appearance of a large left pleural effusion. 2. Persistent near complete consolidation of the left lung. 3. No evidence of a pneumothorax. XR CHEST PORTABLE   Final Result   Decreased left-sided pleural effusion. There is a developing pleural gas   collection seen in the left francisco javier thorax superiorly secondary to decreased   pleural fluid and incomplete re-expansion of the left lung. Dense   consolidation the left lung persists along with large remaining left-sided   pleural effusion      Improved aeration of the right lung         XR CHEST PORTABLE   Final Result   Stable left chest tube. No pneumothorax. Persisting extensive opacification of the left hemithorax- atelectasis or   consolidation versus pleural effusion. Developing right basilar airspace disease, likely pulmonary edema.          XR CHEST PORTABLE   Final Result Left chest tube is in stable position. Left hemithorax remains mostly opaque with aeration of the apex. Decreased right perihilar vascular congestion. XR CHEST PORTABLE   Final Result   Partial re-expansion of the left upper lobe following chest tube. Increased   right perihilar edema or infiltrate. No pneumothorax. Assessment/Plan:    Active Hospital Problems    Diagnosis     Small cell lung cancer ([facility] [address] [C34.90]      Priority: Medium    Acute on chronic respiratory failure with hypoxia and hypercapnia (HCC) [J96.21, J96.22]     Squamous cell carcinoma of left lung (HCC) [C34.92]     COPD (chronic obstructive pulmonary disease) (HCC) [J44.9]     Endobronchial mass [R91.8]     Postobstructive pneumonia [J18.9]     Lung collapse [J98.19]     Atrial fibrillation, permanent (HCC) [I48.21]     COPD with acute exacerbation (HCC) [J44.1]        \"Patient with PMH of SCC left lung, left mainstem endobronchial mass causing left lung collapse, postobstructive pneumonia, acute on chronic respiratory failure, hypercalcemia of malignancy, chronic A. fib, chronic diastolic CHF, COPD initially admitted to [facility] on 4/17/2022 for shortness of breath and respiratory failure.  He was started on antibiotics for the postobstructive pneumonia.  Patient refused fiberoptic bronchoscopy, refuses BiPAP.  CT surgery was consulted for the endobronchial mass for debulking airway tumor and then patient wants to proceed with chemo/XRT. The patient was transferred to [facility] for CT surgery consult. \"      SCC left lung with left mainstem endobronchial mass causing left lung collapse/postobstructive pneumonia  SqCC left lower lobe/lingula - 12 cm mass with positive station 7 lymph node - stage IIIA  -s/p debulking/laser therapy per CTS on 4/20.   CT placed.  -Plan to F/w Dr. Bhargavi Mendez next week to plan for cancer treatment outpatient for subsequent XRT/chemo     Acute on chronic

## 2022-04-28 NOTE — PROGRESS NOTES
Charge RN, another RN and myself got pt back to bed using maximove. Placed pt in trendelenburg and BP came up to 93/57 immediately. Pt states he feels slightly better. Bed check in place and pt resting in bed. All other VSS.

## 2022-04-29 LAB
ANION GAP SERPL CALCULATED.3IONS-SCNC: 10 MMOL/L (ref 3–16)
BASOPHILS ABSOLUTE: 0 K/UL (ref 0–0.2)
BASOPHILS RELATIVE PERCENT: 0.2 %
BUN BLDV-MCNC: 22 MG/DL (ref 7–20)
CALCIUM SERPL-MCNC: 8.2 MG/DL (ref 8.3–10.6)
CHLORIDE BLD-SCNC: 93 MMOL/L (ref 99–110)
CO2: 28 MMOL/L (ref 21–32)
CREAT SERPL-MCNC: 0.9 MG/DL (ref 0.8–1.3)
EOSINOPHILS ABSOLUTE: 0.1 K/UL (ref 0–0.6)
EOSINOPHILS RELATIVE PERCENT: 0.9 %
GFR AFRICAN AMERICAN: >60
GFR NON-AFRICAN AMERICAN: >60
GLUCOSE BLD-MCNC: 172 MG/DL (ref 70–99)
GLUCOSE BLD-MCNC: 208 MG/DL (ref 70–99)
GLUCOSE BLD-MCNC: 221 MG/DL (ref 70–99)
GLUCOSE BLD-MCNC: 229 MG/DL (ref 70–99)
GLUCOSE BLD-MCNC: 344 MG/DL (ref 70–99)
GLUCOSE BLD-MCNC: 371 MG/DL (ref 70–99)
HCT VFR BLD CALC: 43.3 % (ref 40.5–52.5)
HEMOGLOBIN: 13.5 G/DL (ref 13.5–17.5)
LYMPHOCYTES ABSOLUTE: 0.8 K/UL (ref 1–5.1)
LYMPHOCYTES RELATIVE PERCENT: 5.7 %
MCH RBC QN AUTO: 28.5 PG (ref 26–34)
MCHC RBC AUTO-ENTMCNC: 31.2 G/DL (ref 31–36)
MCV RBC AUTO: 91.5 FL (ref 80–100)
MONOCYTES ABSOLUTE: 0.6 K/UL (ref 0–1.3)
MONOCYTES RELATIVE PERCENT: 4.5 %
NEUTROPHILS ABSOLUTE: 12.7 K/UL (ref 1.7–7.7)
NEUTROPHILS RELATIVE PERCENT: 88.7 %
PDW BLD-RTO: 17.2 % (ref 12.4–15.4)
PERFORMED ON: ABNORMAL
PLATELET # BLD: 157 K/UL (ref 135–450)
PMV BLD AUTO: 7.4 FL (ref 5–10.5)
POTASSIUM SERPL-SCNC: 3.5 MMOL/L (ref 3.5–5.1)
RBC # BLD: 4.73 M/UL (ref 4.2–5.9)
SODIUM BLD-SCNC: 131 MMOL/L (ref 136–145)
WBC # BLD: 14.4 K/UL (ref 4–11)

## 2022-04-29 PROCEDURE — 97110 THERAPEUTIC EXERCISES: CPT

## 2022-04-29 PROCEDURE — 2580000003 HC RX 258: Performed by: STUDENT IN AN ORGANIZED HEALTH CARE EDUCATION/TRAINING PROGRAM

## 2022-04-29 PROCEDURE — 6370000000 HC RX 637 (ALT 250 FOR IP): Performed by: STUDENT IN AN ORGANIZED HEALTH CARE EDUCATION/TRAINING PROGRAM

## 2022-04-29 PROCEDURE — 36415 COLL VENOUS BLD VENIPUNCTURE: CPT

## 2022-04-29 PROCEDURE — 2580000003 HC RX 258: Performed by: NURSE PRACTITIONER

## 2022-04-29 PROCEDURE — 2700000000 HC OXYGEN THERAPY PER DAY

## 2022-04-29 PROCEDURE — 1200000000 HC SEMI PRIVATE

## 2022-04-29 PROCEDURE — 2580000003 HC RX 258: Performed by: INTERNAL MEDICINE

## 2022-04-29 PROCEDURE — 85025 COMPLETE CBC W/AUTO DIFF WBC: CPT

## 2022-04-29 PROCEDURE — 6370000000 HC RX 637 (ALT 250 FOR IP): Performed by: INTERNAL MEDICINE

## 2022-04-29 PROCEDURE — 94761 N-INVAS EAR/PLS OXIMETRY MLT: CPT

## 2022-04-29 PROCEDURE — 80048 BASIC METABOLIC PNL TOTAL CA: CPT

## 2022-04-29 RX ORDER — 0.9 % SODIUM CHLORIDE 0.9 %
250 INTRAVENOUS SOLUTION INTRAVENOUS ONCE
Status: COMPLETED | OUTPATIENT
Start: 2022-04-29 | End: 2022-04-29

## 2022-04-29 RX ADMIN — BUPROPION HYDROCHLORIDE 100 MG: 100 TABLET, EXTENDED RELEASE ORAL at 09:20

## 2022-04-29 RX ADMIN — SODIUM CHLORIDE 250 ML: 9 INJECTION, SOLUTION INTRAVENOUS at 09:50

## 2022-04-29 RX ADMIN — APIXABAN 2.5 MG: 2.5 TABLET, FILM COATED ORAL at 16:26

## 2022-04-29 RX ADMIN — SODIUM CHLORIDE, PRESERVATIVE FREE 10 ML: 5 INJECTION INTRAVENOUS at 21:55

## 2022-04-29 RX ADMIN — INSULIN LISPRO 2 UNITS: 100 INJECTION, SOLUTION INTRAVENOUS; SUBCUTANEOUS at 21:56

## 2022-04-29 RX ADMIN — INSULIN LISPRO 2 UNITS: 100 INJECTION, SOLUTION INTRAVENOUS; SUBCUTANEOUS at 12:03

## 2022-04-29 RX ADMIN — SODIUM CHLORIDE 250 ML: 9 INJECTION, SOLUTION INTRAVENOUS at 03:03

## 2022-04-29 RX ADMIN — LEVOTHYROXINE SODIUM 150 MCG: 0.15 TABLET ORAL at 06:48

## 2022-04-29 RX ADMIN — DIGOXIN 0.12 MG: 125 TABLET ORAL at 09:20

## 2022-04-29 RX ADMIN — NORTRIPTYLINE HYDROCHLORIDE 10 MG: 10 CAPSULE ORAL at 21:54

## 2022-04-29 RX ADMIN — SODIUM CHLORIDE 250 ML: 9 INJECTION, SOLUTION INTRAVENOUS at 12:09

## 2022-04-29 RX ADMIN — INSULIN LISPRO 5 UNITS: 100 INJECTION, SOLUTION INTRAVENOUS; SUBCUTANEOUS at 16:28

## 2022-04-29 RX ADMIN — BUPROPION HYDROCHLORIDE 100 MG: 100 TABLET, EXTENDED RELEASE ORAL at 21:55

## 2022-04-29 RX ADMIN — INSULIN GLARGINE 10 UNITS: 100 INJECTION, SOLUTION SUBCUTANEOUS at 21:55

## 2022-04-29 RX ADMIN — PREDNISONE 30 MG: 20 TABLET ORAL at 09:19

## 2022-04-29 RX ADMIN — ATORVASTATIN CALCIUM 20 MG: 10 TABLET, FILM COATED ORAL at 09:20

## 2022-04-29 RX ADMIN — APIXABAN 2.5 MG: 2.5 TABLET, FILM COATED ORAL at 21:55

## 2022-04-29 RX ADMIN — INSULIN LISPRO 1 UNITS: 100 INJECTION, SOLUTION INTRAVENOUS; SUBCUTANEOUS at 09:20

## 2022-04-29 ASSESSMENT — PAIN SCALES - GENERAL
PAINLEVEL_OUTOF10: 0
PAINLEVEL_OUTOF10: 3

## 2022-04-29 NOTE — PLAN OF CARE
Problem: Chronic Conditions and Co-morbidities  Goal: Patient's chronic conditions and co-morbidity symptoms are monitored and maintained or improved  Outcome: Progressing     Problem: Skin/Tissue Integrity  Goal: Absence of new skin breakdown  Description: 1. Monitor for areas of redness and/or skin breakdown  2. Assess vascular access sites hourly  3. Every 4-6 hours minimum:  Change oxygen saturation probe site  4. Every 4-6 hours:  If on nasal continuous positive airway pressure, respiratory therapy assess nares and determine need for appliance change or resting period.   Outcome: Progressing     Problem: Safety - Adult  Goal: Free from fall injury  Outcome: Progressing

## 2022-04-29 NOTE — PROGRESS NOTES
Physical Therapy  Facility/Department: [facility] C3 TELE/MED SURG/ONC  Physical Therapy Daily Treatment Note     Name: Shantal Munoz  : 1955  MRN: [id number]  Date of Service: 2022    Discharge Recommendations:  Subacute/Skilled Nursing Facility   PT Equipment Recommendations  Equipment Needed: No      Patient Diagnosis(es): There were no encounter diagnoses. Past Medical History:  has a past medical history of A-fib ([facility] CAD (coronary artery disease), CHF (congestive heart failure) ([facility] [facility] COPD (chronic obstructive pulmonary disease) ([facility] [facility] CVA (cerebral vascular accident) ([facility] [facility] Diabetes mellitus ([facility] Hyperlipidemia, Hypertension, and Thyroid disease. Past Surgical History:  has a past surgical history that includes bronchoscopy (N/A, 2022); bronchoscopy (2022); bronchoscopy (2022); bronchoscopy (2022); bronchoscopy (N/A, 2022); bronchoscopy (N/A, 2022); and Chest surgery (Left, 2022). Assessment    Pt presents w/ similar level of function compared to previous sessions. Pt able to tolerate BLE Exercises in supine and seated position as well as bed to chair t/f w/ dec assistance req. Pt will benefit from skilled PT services to improve function towards goals. Pt will cont to benefit from SNF upon d/c d/t limited assist home and dec activity tolerance.   Specific Instructions for Next Treatment: Progress ther ex and mobility as tolerated  Activity Tolerance  Activity Tolerance: Patient limited by fatigue;Patient limited by endurance;Treatment limited secondary to medical complications     Plan   Plan  Plan: 3-5 times per week  Specific Instructions for Next Treatment: Progress ther ex and mobility as tolerated  Current Treatment Recommendations: Strengthening,ROM,Balance training,Functional mobility training,Transfer training,Endurance training,Gait training,Safety education & training,Patient/Caregiver education & training,Equipment evaluation, education, & procurement  Safety Devices  Type of Devices:  All fall risk precautions in place,Call light within reach,Gait belt,Nurse notified,Telesitter in use,Patient at risk for falls,Bed alarm in place,Left in bed     Restrictions  Restrictions/Precautions  Restrictions/Precautions: Fall Risk,General Precautions  Position Activity Restriction  Other position/activity restrictions: Julieta-sys monitor in room, telemetry with ICU monitoring, L chest tube, Meier     Subjective   Pain: Pt reports discomfort in chest         Social/Functional History  Social/Functional History  Lives With: Alone  Type of Home: House  Home Layout: One level  Home Access: Stairs to enter with rails  Entrance Stairs - Number of Steps: 1-2 GISSELL with 1 handrail  Bathroom Shower/Tub: Tub/Shower unit  Bathroom Toilet: Handicap height  Bathroom Equipment: Grab bars in shower,Hand-held shower  Home Equipment: Grab bars,Cane,Walker, rolling,Wheelchair-manual,Oxygen (home O2 as needed (2 LPM))  Has the patient had two or more falls in the past year or any fall with injury in the past year?: No (pt denies any falls)  Receives Help From: Cherl Rides (comment)  ADL Assistance: Independent  Homemaking Assistance: Needs assistance (outside assist with housekeeping 2x/week, daughter does grocery shopping)  Homemaking Responsibilities: Yes  Ambulation Assistance: Independent (using cane most of the time (occasionally uses RW if needed))  Transfer Assistance: Independent  Active : No  Patient's  Info: Daughter  Mode of Transportation: Family  Occupation: Retired  Type of Occupation:   Leisure & Hobbies: Playing with 9 grandchildren (range in age from 3 y/o to 26 y/o)    Objective   Pulse: 70  Heart Rate Source: Monitor  BP: 110/64  BP Location: Right upper arm  Patient Position: Sitting  MAP (Calculated): 79.33  Resp: 19  SpO2: 97 %  O2 Device: Nasal cannula                          Bed Mobility Training  Bed Mobility Training: Yes  Overall Level of Assistance: Stand-by assistance  Interventions: Verbal cues  Supine to Sit: Contact-guard assistance  Sit to Supine: Contact-guard assistance  Balance  Sitting: Intact  Standing: Impaired  Standing - Static: Fair;Occasional  Standing - Dynamic: Fair;Constant support  Transfer Training  Transfer Training: Yes  Overall Level of Assistance: Contact-guard assistance  Sit to Stand: Minimum assistance  Stand to Sit: Contact-guard assistance  Bed to Chair: Stand-by assistance  Gait Training  Gait Training: No                 Exercise Treatment: Pt performed supine exercises: 2x10 of heel slides bilat, 2x10 ankle pumps bilat. Pt performed seated exercises: seated marching 2x10 bilat, LAQ 2x10 bilat. Cues for sequencing and technique        AM-PAC Score  AM-PAC Inpatient Mobility Raw Score : 14 (04/29/22 1522)  AM-PAC Inpatient T-Scale Score : 38.1 (04/29/22 1522)  Mobility Inpatient CMS 0-100% Score: 61.29 (04/29/22 1522)  Mobility Inpatient CMS G-Code Modifier : CL (04/29/22 1522)          Goals  Short Term Goals  Time Frame for Short term goals: 1 week, 4/30/22 (unless otherwise specified)  Short term goal 1: Pt will transfer supine <-> sit with SBA--4/28 NT  Short term goal 2: Pt will transfer sit <-> stand and bed>chair using appropriate AD as needed with CGA x 1--4/28 CGA-min(A)  Short term goal 3: Pt will ambulate x 60 feet using appropriate AD as needed with CGA x 1--4/28 15 ft  Short term goal 4: By 4/26/22: Pt will tolerate 12-15 reps BLE exercise for strengthening, balance, and endurance - MET for ankle pumps on 4/25/22, goal ongoing  Patient Goals   Patient goals : \"To get stronger\"       Therapy Time   Individual Concurrent Group Co-treatment   Time In 9747         Time Out 1513         Minutes 31         Timed Code Treatment Minutes: [address] [place]    If pt is unable to be seen after this session, please let this note serve as discharge summary.   Please see case management note for discharge disposition. Thank you.

## 2022-04-29 NOTE — PROGRESS NOTES
Hospitalist Progress Note      PCP: Zenon Liu MD    Date of Admission: 4/19/2022    Chief Complaint: Endobronchial mass      Subjective:  BP is still low   No dizziness or syncope at rest  .      Medications:  Reviewed    Infusion Medications    dextrose       Scheduled Medications    [Held by provider] lisinopril  10 mg Oral Daily    [Held by provider] metoprolol tartrate  12.5 mg Oral BID    [Held by provider] torsemide  50 mg Oral Daily    insulin lispro  0-3 Units SubCUTAneous Nightly    insulin lispro  0-6 Units SubCUTAneous TID WC    atorvastatin  20 mg Oral Daily    buPROPion  100 mg Oral BID    digoxin  0.125 mg Oral Daily    [Held by provider] dilTIAZem  240 mg Oral Daily    insulin glargine  10 Units SubCUTAneous Nightly    levothyroxine  150 mcg Oral Daily    nortriptyline  10 mg Oral Nightly    predniSONE  30 mg Oral Daily     PRN Meds: ipratropium-albuterol, oxyCODONE-acetaminophen, acetaminophen **OR** acetaminophen, dextrose, glucagon (rDNA), glucose, ondansetron **OR** ondansetron, polyethylene glycol, sodium chloride flush, glucose, dextrose bolus (hypoglycemia) **OR** dextrose bolus (hypoglycemia)      Intake/Output Summary (Last 24 hours) at 4/29/2022 1418  Last data filed at 4/28/2022 2000  Gross per 24 hour   Intake --   Output 500 ml   Net -500 ml       Physical Exam Performed:    /67   Pulse 78   Temp 97.5 °F (36.4 °C)   Resp 20   Ht 6' (1.829 m)   Wt 252 lb 6.8 oz (114.5 kg)   SpO2 93%   BMI 34.24 kg/m²      General appearance: NAD,  in bed. Obese   HEENT: . Conjunctivae/corneas clear. Neck: Supple, . No jugular venous distention. Trachea midline. Respiratory: Bilateral breath sounds, decreased at both bases, there is also notably diminished breath sounds in the left lung field, no use of accessory muscles at this time. pleur x in place . 2 l o2   Cardiovascular: Regular rate and rhythm with normal S1/S2   Abdomen: Soft, non-tender, non-distended with normal bowel sounds. Obese   Musculoskeletal: No clubbing, cyanosis or edema bilaterally. Full range of motion without deformity. Neurologic:  Neurovascularly intact without any focal sensory/motor deficits. Psychiatric: He is awake, oriented,   Peripheral Pulses: +2 palpable, equal bilaterally     Labs:   Recent Labs     04/27/22  0421 04/28/22  1831 04/29/22  0600   WBC 14.1* 16.5* 14.4*   HGB 13.6 13.2* 13.5   HCT 44.1 41.5 43.3    214 157     Recent Labs     04/27/22  0421 04/28/22  1831 04/29/22  0600   * 129* 131*   K 3.6 3.9 3.5   CL 91* 88* 93*   CO2 28 29 28   BUN 16 25* 22*   CREATININE 0.8 1.2 0.9   CALCIUM 8.1* 8.7 8.2*     No results for input(s): AST, ALT, BILIDIR, BILITOT, ALKPHOS in the last 72 hours. No results for input(s): INR in the last 72 hours. No results for input(s): Annemarie Basques in the last 72 hours. Urinalysis:    No results found for: Kady Fast, BACTERIA, RBCUA, BLOODU, SPECGRAV, Judson [place] [address]    Radiology:  XR CHEST 1 VIEW   Final Result   1. Interval placement of a left PleurX catheter, with no significant change   in size or appearance of a large left pleural effusion. 2. Persistent near complete consolidation of the left lung. 3. No evidence of a pneumothorax. XR CHEST PORTABLE   Final Result   Decreased left-sided pleural effusion. There is a developing pleural gas   collection seen in the left francisco javier thorax superiorly secondary to decreased   pleural fluid and incomplete re-expansion of the left lung. Dense   consolidation the left lung persists along with large remaining left-sided   pleural effusion      Improved aeration of the right lung         XR CHEST PORTABLE   Final Result   Stable left chest tube. No pneumothorax. Persisting extensive opacification of the left hemithorax- atelectasis or   consolidation versus pleural effusion. Developing right basilar airspace disease, likely pulmonary edema.          XR CHEST PORTABLE   Final Result   Left chest tube is in stable position. Left hemithorax remains mostly opaque with aeration of the apex. Decreased right perihilar vascular congestion. XR CHEST PORTABLE   Final Result   Partial re-expansion of the left upper lobe following chest tube. Increased   right perihilar edema or infiltrate. No pneumothorax. 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 fib, chronic diastolic CHF, COPD initially admitted to [facility] on 4/17/2022 for shortness of breath and respiratory failure.  He was started on antibiotics for the postobstructive pneumonia.  Patient refused fiberoptic bronchoscopy, refuses BiPAP.  CT surgery was consulted for the endobronchial mass for debulking airway tumor and then patient wants to proceed with chemo/XRT. The patient was transferred to [facility] for [facility] surgery consult. \"      SCC left lung with left mainstem endobronchial mass causing left lung collapse/postobstructive pneumonia  SqCC left lower lobe/lingula - 12 cm mass with positive station 7 lymph node - stage IIIA  -s/p debulking/laser therapy per CTS on 4/20.   CT placed.  -Plan to F/w Dr. Ena Rosa next week to plan for cancer treatment outpatient for subsequent XRT/chemo     Acute on chronic respiratory failure with hypoxia and hypercapnia  COPD with acute exacerbation  Postobstructive pneumonia  Lung collapse  -refused BiPAP. He normally wears O2 PRN at home.  -Continue prednisone   -Completed IV Levaquin course  -inhaled bronchodilators  - CT surgery placed PleurX catheter 4/27/22 - will need instructions and supplies prior to DC.     Tobacco abuse-counseled cessation     Atrial fibrillation, permanent   -Eliquis held for surgery; resume when ok with CT Surgery  -Continue   digoxin  -hold for bradycardia  cardizem on hold 2/2 low BP     HLD-resume statin     Hypothyroidism-clinically euthyroid, resumed Synthroid      DM2- insulin regimen. Steroid complicated treatment.      Chronic anxiety-mood stable, resumed Wellbutrin    Hypotension - orthostatic - low BP after working with PT/OT. Gentle IVF. May need home meds adjusted prior to DC. Will decrease lisinopril from 20 mg to 10 mg po daily and hold torsemide. Will re-eval in the am.  Still low this am   All antihypertensives are on hold , IVF    Leukocytosis- stable - no evidence of infection -  possibly steroid induced     Hyponatremia - stable     DVT Prophylaxis: Resume Eliquis when ok with CT surgery post op. SCDs meanwhile. Diet: ADULT DIET; Regular  Code Status: Full Code    PT/OT Eval Status: rec'd SNF    Dispo - recommend SNF   however patient refuses. 1-2 more days. Daughter to assist with taking him home and Pleurex education.   Once BP stabilized     Lorie Woodruff MD

## 2022-04-29 NOTE — PROGRESS NOTES
Physician Progress Note      Adilia Mcfarlane  CSN #:                  [id number]  :                       1955  ADMIT DATE:       2022 4:10 PM  [address] DATE:        2022 6:50 PM  RESPONDING  PROVIDER #:        Lloyd Foley MD          QUERY TEXT:    Patient was admitted with post obstructive pneumonia and was documented  to   have lung malignancy with recent biopsy and bronchoscopy. During admission,   patient was treated with Levaquin for 10 days. Based on clinical indicators   and treatment, can the suspected type of pneumonia be further specified as:     The medical record reflects the following:  Risk Factors: advanced age, copd, lung cancer, chf  Clinical Indicators: post-obstructive pneumonia  Treatment: IV Levaquin, pulmonary consult, bronchoscopy    Thank you for your assistance,  Jessica Saldivar RN,BSN,CCDS,CRCR  Options provided:  -- Gram negative pneumonia  -- Streptococcus pneumonia  -- Other - I will add my own diagnosis  -- Disagree - Not applicable / Not valid  -- Disagree - Clinically unable to determine / Unknown  -- Refer to Clinical Documentation Reviewer    PROVIDER RESPONSE TEXT:    Gram positive pna    Query created by: Betty Wright on 2022 7:56 AM      Electronically signed by:  Lloyd Foley MD 2022 7:08 AM

## 2022-04-29 NOTE — PROGRESS NOTES
Pt is a/o x4. VSS (see below for BP). Assessment as charted. - AM vitals revealed pt was hypotensive w/ BP 88/55 w/ MAP of 60, pt was symptomatic with some dizziness - all AM anithypertensives were held and Dr. Saul Grande was notified - she ordered x2 250ml boluses - thus far after the first bolus, pts BP has increased to 106/68 - will continues to monitor closely. - Pt has some dyspnea at rest & w/ exertion - wheezed lung sounds w/ PleurX catheter in place - dressing was changed and is C/D/I and had a scant amount of serosanguinous drainage. IS was given, educated on, and encouraged - pt did poorly. SpO2 remains >90% on 2L NC (baseline). - Pt is on tele monitoring running A-Fib w/ intermittent bradycardia- has dropped to as low as 47 - MDs aware     Pt is currently resting in his bed that is locked and in its lowest position w/ his call light within reach, non-skid socks, and bed alarm on. Pt denies any other needs at this time.

## 2022-04-29 NOTE — CARE COORDINATION
Chart reviewed day 10 care managed per IM. Patient with b/p issues thru the night. Declining SNF. Plan for home with resumption of [facility]. Notified of potential weekend d/c pending clinical status.  Mike Mccall RN

## 2022-04-30 LAB
GLUCOSE BLD-MCNC: 175 MG/DL (ref 70–99)
GLUCOSE BLD-MCNC: 218 MG/DL (ref 70–99)
GLUCOSE BLD-MCNC: 315 MG/DL (ref 70–99)
GLUCOSE BLD-MCNC: 377 MG/DL (ref 70–99)
GLUCOSE BLD-MCNC: 494 MG/DL (ref 70–99)
GLUCOSE BLD-MCNC: 599 MG/DL (ref 70–99)
PERFORMED ON: ABNORMAL

## 2022-04-30 PROCEDURE — 2580000003 HC RX 258: Performed by: STUDENT IN AN ORGANIZED HEALTH CARE EDUCATION/TRAINING PROGRAM

## 2022-04-30 PROCEDURE — 2700000000 HC OXYGEN THERAPY PER DAY

## 2022-04-30 PROCEDURE — 1200000000 HC SEMI PRIVATE

## 2022-04-30 PROCEDURE — 6370000000 HC RX 637 (ALT 250 FOR IP): Performed by: INTERNAL MEDICINE

## 2022-04-30 PROCEDURE — 6370000000 HC RX 637 (ALT 250 FOR IP): Performed by: STUDENT IN AN ORGANIZED HEALTH CARE EDUCATION/TRAINING PROGRAM

## 2022-04-30 PROCEDURE — 94761 N-INVAS EAR/PLS OXIMETRY MLT: CPT

## 2022-04-30 RX ORDER — INSULIN GLARGINE 100 [IU]/ML
25 INJECTION, SOLUTION SUBCUTANEOUS NIGHTLY
Status: DISCONTINUED | OUTPATIENT
Start: 2022-04-30 | End: 2022-05-01

## 2022-04-30 RX ORDER — INSULIN LISPRO 100 [IU]/ML
0-12 INJECTION, SOLUTION INTRAVENOUS; SUBCUTANEOUS
Status: DISCONTINUED | OUTPATIENT
Start: 2022-04-30 | End: 2022-05-03 | Stop reason: HOSPADM

## 2022-04-30 RX ORDER — PREDNISONE 20 MG/1
20 TABLET ORAL DAILY
Status: DISCONTINUED | OUTPATIENT
Start: 2022-05-01 | End: 2022-05-03 | Stop reason: HOSPADM

## 2022-04-30 RX ORDER — INSULIN LISPRO 100 [IU]/ML
0-6 INJECTION, SOLUTION INTRAVENOUS; SUBCUTANEOUS NIGHTLY
Status: DISCONTINUED | OUTPATIENT
Start: 2022-04-30 | End: 2022-05-03 | Stop reason: HOSPADM

## 2022-04-30 RX ADMIN — INSULIN LISPRO 6 UNITS: 100 INJECTION, SOLUTION INTRAVENOUS; SUBCUTANEOUS at 20:05

## 2022-04-30 RX ADMIN — LEVOTHYROXINE SODIUM 150 MCG: 0.15 TABLET ORAL at 07:27

## 2022-04-30 RX ADMIN — SODIUM CHLORIDE, PRESERVATIVE FREE 10 ML: 5 INJECTION INTRAVENOUS at 08:18

## 2022-04-30 RX ADMIN — APIXABAN 2.5 MG: 2.5 TABLET, FILM COATED ORAL at 08:18

## 2022-04-30 RX ADMIN — SODIUM CHLORIDE, PRESERVATIVE FREE 10 ML: 5 INJECTION INTRAVENOUS at 20:05

## 2022-04-30 RX ADMIN — DIGOXIN 0.12 MG: 125 TABLET ORAL at 08:18

## 2022-04-30 RX ADMIN — INSULIN LISPRO 1 UNITS: 100 INJECTION, SOLUTION INTRAVENOUS; SUBCUTANEOUS at 08:18

## 2022-04-30 RX ADMIN — INSULIN LISPRO 10 UNITS: 100 INJECTION, SOLUTION INTRAVENOUS; SUBCUTANEOUS at 13:22

## 2022-04-30 RX ADMIN — INSULIN GLARGINE 25 UNITS: 100 INJECTION, SOLUTION SUBCUTANEOUS at 20:05

## 2022-04-30 RX ADMIN — INSULIN LISPRO 8 UNITS: 100 INJECTION, SOLUTION INTRAVENOUS; SUBCUTANEOUS at 16:35

## 2022-04-30 RX ADMIN — BUPROPION HYDROCHLORIDE 100 MG: 100 TABLET, EXTENDED RELEASE ORAL at 20:04

## 2022-04-30 RX ADMIN — INSULIN LISPRO 6 UNITS: 100 INJECTION, SOLUTION INTRAVENOUS; SUBCUTANEOUS at 11:53

## 2022-04-30 RX ADMIN — NORTRIPTYLINE HYDROCHLORIDE 10 MG: 10 CAPSULE ORAL at 20:04

## 2022-04-30 RX ADMIN — BUPROPION HYDROCHLORIDE 100 MG: 100 TABLET, EXTENDED RELEASE ORAL at 08:18

## 2022-04-30 RX ADMIN — ATORVASTATIN CALCIUM 20 MG: 10 TABLET, FILM COATED ORAL at 08:18

## 2022-04-30 RX ADMIN — APIXABAN 2.5 MG: 2.5 TABLET, FILM COATED ORAL at 20:04

## 2022-04-30 RX ADMIN — PREDNISONE 30 MG: 20 TABLET ORAL at 08:18

## 2022-04-30 ASSESSMENT — PAIN SCALES - GENERAL: PAINLEVEL_OUTOF10: 0

## 2022-04-30 NOTE — PROGRESS NOTES
Pt a/o. Pt stated no pain, no SOB, no nasuea. No emesis noted. Appetite good. Taking in PO fluids. Writer obtain pt's orthostatics per order and notified Dr. Wily Henriquez in person. Pt up in chair. Call light and bedside table within reach of pt. POC Glucose 494. Medicated with Humalog per MAR. Secure message sent to Dr. Wily Henriquez \"FYI Critical level: POC Glucose 494\" Still awaiting response. Writer sent another secure message to Dr. Wily Henriquez \"Looks like glucose has been running on higher side. Currently on Prednisone and low dose Humalog algorithm. Do you want to put pt on medium or high sliding scale?  thanks\"

## 2022-04-30 NOTE — PROGRESS NOTES
Hospitalist Progress Note      PCP: Chana Geronimo MD    Date of Admission: 4/19/2022    Chief Complaint: Endobronchial mass      Subjective:  BP is better   No dizziness or syncope at rest  Still there is 20 drop in SBP  Able to ambulate with assistance - no dizziness but spo2 dropped with exertion   . Medications:  Reviewed    Infusion Medications    dextrose       Scheduled Medications    apixaban  2.5 mg Oral BID    [Held by provider] lisinopril  10 mg Oral Daily    [Held by provider] metoprolol tartrate  12.5 mg Oral BID    [Held by provider] torsemide  50 mg Oral Daily    insulin lispro  0-3 Units SubCUTAneous Nightly    insulin lispro  0-6 Units SubCUTAneous TID WC    atorvastatin  20 mg Oral Daily    buPROPion  100 mg Oral BID    digoxin  0.125 mg Oral Daily    [Held by provider] dilTIAZem  240 mg Oral Daily    insulin glargine  10 Units SubCUTAneous Nightly    levothyroxine  150 mcg Oral Daily    nortriptyline  10 mg Oral Nightly    predniSONE  30 mg Oral Daily     PRN Meds: ipratropium-albuterol, oxyCODONE-acetaminophen, acetaminophen **OR** acetaminophen, dextrose, glucagon (rDNA), glucose, ondansetron **OR** ondansetron, polyethylene glycol, sodium chloride flush, glucose, dextrose bolus (hypoglycemia) **OR** dextrose bolus (hypoglycemia)      Intake/Output Summary (Last 24 hours) at 4/30/2022 1302  Last data filed at 4/30/2022 1152  Gross per 24 hour   Intake 750 ml   Output 1070 ml   Net -320 ml       Physical Exam Performed:    /64   Pulse 72   Temp 97.2 °F (36.2 °C) (Oral)   Resp 22   Ht 6' (1.829 m)   Wt 252 lb 6.8 oz (114.5 kg)   SpO2 95%   BMI 34.24 kg/m²      General appearance: NAD,  in bed. Obese   HEENT: . Conjunctivae/corneas clear. Neck: Supple, . No jugular venous distention. Trachea midline.   Respiratory: Bilateral breath sounds, decreased at both bases, there is also notably diminished breath sounds in the left lung field, no use of accessory muscles at this time. pleur x in place . 2 l o2   Cardiovascular: Regular rate and rhythm with normal S1/S2   Abdomen: Soft, non-tender, non-distended with normal bowel sounds. Obese   Musculoskeletal: No clubbing, cyanosis or edema bilaterally. Full range of motion without deformity. Neurologic:  Neurovascularly intact without any focal sensory/motor deficits. Psychiatric: He is awake, oriented,   Peripheral Pulses: +2 palpable, equal bilaterally     Labs:   Recent Labs     04/28/22  1831 04/29/22  0600   WBC 16.5* 14.4*   HGB 13.2* 13.5   HCT 41.5 43.3    157     Recent Labs     04/28/22  1831 04/29/22  0600   * 131*   K 3.9 3.5   CL 88* 93*   CO2 29 28   BUN 25* 22*   CREATININE 1.2 0.9   CALCIUM 8.7 8.2*     No results for input(s): AST, ALT, BILIDIR, BILITOT, ALKPHOS in the last 72 hours. No results for input(s): INR in the last 72 hours. No results for input(s): [place] in the last 72 hours. Urinalysis:    No results found for: Renae Harada, BACTERIA, RBCUA, BLOODU, SPECGRAV, [address] [place] [address]    Radiology:  XR CHEST 1 VIEW   Final Result   1. Interval placement of a left PleurX catheter, with no significant change   in size or appearance of a large left pleural effusion. 2. Persistent near complete consolidation of the left lung. 3. No evidence of a pneumothorax. XR CHEST PORTABLE   Final Result   Decreased left-sided pleural effusion. There is a developing pleural gas   collection seen in the left francisco javier thorax superiorly secondary to decreased   pleural fluid and incomplete re-expansion of the left lung. Dense   consolidation the left lung persists along with large remaining left-sided   pleural effusion      Improved aeration of the right lung         XR CHEST PORTABLE   Final Result   Stable left chest tube. No pneumothorax. Persisting extensive opacification of the left hemithorax- atelectasis or   consolidation versus pleural effusion.       Developing right basilar airspace disease, likely pulmonary edema. XR CHEST PORTABLE   Final Result   Left chest tube is in stable position. Left hemithorax remains mostly opaque with aeration of the apex. Decreased right perihilar vascular congestion. XR CHEST PORTABLE   Final Result   Partial re-expansion of the left upper lobe following chest tube. Increased   right perihilar edema or infiltrate. No pneumothorax. Assessment/Plan:    Active Hospital Problems    Diagnosis     Small cell lung cancer ([facility] [address] [C34.90]      Priority: Medium    Acute on chronic respiratory failure with hypoxia and hypercapnia (HCC) [J96.21, J96.22]     Squamous cell carcinoma of left lung (HCC) [C34.92]     COPD (chronic obstructive pulmonary disease) (HCC) [J44.9]     Endobronchial mass [R91.8]     Postobstructive pneumonia [J18.9]     Lung collapse [J98.19]     Atrial fibrillation, permanent (HCC) [I48.21]     COPD with acute exacerbation (HCC) [J44.1]        \"Patient with PMH of SCC left lung, left mainstem endobronchial mass causing left lung collapse, postobstructive pneumonia, acute on chronic respiratory failure, hypercalcemia of malignancy, chronic A. fib, chronic diastolic CHF, COPD initially admitted to [facility] on 4/17/2022 for shortness of breath and respiratory failure.  He was started on antibiotics for the postobstructive pneumonia.  Patient refused fiberoptic bronchoscopy, refuses BiPAP.  CT surgery was consulted for the endobronchial mass for debulking airway tumor and then patient wants to proceed with chemo/XRT. The patient was transferred to [facility] for CT surgery consult. \"      SCC left lung with left mainstem endobronchial mass causing left lung collapse/postobstructive pneumonia  SqCC left lower lobe/lingula - 12 cm mass with positive station 7 lymph node - stage IIIA  -s/p debulking/laser therapy per CTS on 4/20.   CT placed.  -Plan to F/w Dr. Paulette Carreon next week to plan for cancer treatment outpatient for subsequent XRT/chemo     Acute on chronic respiratory failure with hypoxia and hypercapnia  COPD with acute exacerbation  Postobstructive pneumonia  Lung collapse  -refused BiPAP. He normally wears O2 PRN at home.  -Continue prednisone   -Completed IV Levaquin course  -inhaled bronchodilators  - CT surgery placed PleurX catheter 4/27/22 - will need instructions and supplies prior to DC.     Tobacco abuse-counseled cessation     Atrial fibrillation, permanent   -Eliquis held for surgery; resume when ok with CT Surgery  -Continue   digoxin  -hold for bradycardia  cardizem on hold 2/2 low BP     HLD-resume statin     Hypothyroidism-clinically euthyroid, resumed Synthroid      DM2- insulin regimen. Steroid complicated treatment. BS high      Chronic anxiety-mood stable, resumed Wellbutrin    Hypotension - orthostatic - low BP after working with PT/OT. Gentle IVF. May need home meds adjusted prior to DC. Will decrease lisinopril from 20 mg to 10 mg po daily and hold torsemide. Will re-eval in the am.  Still low this am   All antihypertensives are on hold , IVF  BP better -     Last echo 2020 ? [facility] - report - not available   Echo     Leukocytosis- stable - no evidence of infection -  possibly steroid induced     Hyponatremia - stable     DVT Prophylaxis: Resume Eliquis when ok with CT surgery post op. SCDs meanwhile. Diet: ADULT DIET; Regular  Code Status: Full Code    PT/OT Eval Status: rec'd SNF    Dispo - recommend SNF   however patient refuses. 1-2 more days. Daughter to assist with taking him home and Pleurex education.   Once BP stabilized     Lyla Peralta MD

## 2022-04-30 NOTE — PROGRESS NOTES
Writer drained Pleur-x per order and output of 350 mL. Pt tolerated procedure well. Dressing changed and C/D/L. Pt ambulated around room with writer using walker. No dizziness or lightheadedness stated by pt. Just SOB when ambulating. Dr. Jassi Orozco called writer and writer updated Dr. Jassi Orozco on pt status and blood sugar, see new orders placed.

## 2022-05-01 LAB
GLUCOSE BLD-MCNC: 193 MG/DL (ref 70–99)
GLUCOSE BLD-MCNC: 201 MG/DL (ref 70–99)
GLUCOSE BLD-MCNC: 249 MG/DL (ref 70–99)
GLUCOSE BLD-MCNC: 293 MG/DL (ref 70–99)
GLUCOSE BLD-MCNC: 354 MG/DL (ref 70–99)
PERFORMED ON: ABNORMAL

## 2022-05-01 PROCEDURE — 6370000000 HC RX 637 (ALT 250 FOR IP): Performed by: STUDENT IN AN ORGANIZED HEALTH CARE EDUCATION/TRAINING PROGRAM

## 2022-05-01 PROCEDURE — 94761 N-INVAS EAR/PLS OXIMETRY MLT: CPT

## 2022-05-01 PROCEDURE — 1200000000 HC SEMI PRIVATE

## 2022-05-01 PROCEDURE — 6370000000 HC RX 637 (ALT 250 FOR IP): Performed by: INTERNAL MEDICINE

## 2022-05-01 PROCEDURE — 2580000003 HC RX 258: Performed by: STUDENT IN AN ORGANIZED HEALTH CARE EDUCATION/TRAINING PROGRAM

## 2022-05-01 PROCEDURE — 2700000000 HC OXYGEN THERAPY PER DAY

## 2022-05-01 RX ORDER — INSULIN GLARGINE 100 [IU]/ML
40 INJECTION, SOLUTION SUBCUTANEOUS NIGHTLY
Status: DISCONTINUED | OUTPATIENT
Start: 2022-05-01 | End: 2022-05-03 | Stop reason: HOSPADM

## 2022-05-01 RX ORDER — DILTIAZEM HYDROCHLORIDE 120 MG/1
120 CAPSULE, COATED, EXTENDED RELEASE ORAL DAILY
Status: DISCONTINUED | OUTPATIENT
Start: 2022-05-01 | End: 2022-05-03 | Stop reason: HOSPADM

## 2022-05-01 RX ADMIN — BUPROPION HYDROCHLORIDE 100 MG: 100 TABLET, EXTENDED RELEASE ORAL at 20:22

## 2022-05-01 RX ADMIN — INSULIN GLARGINE 40 UNITS: 100 INJECTION, SOLUTION SUBCUTANEOUS at 20:24

## 2022-05-01 RX ADMIN — INSULIN HUMAN 8 UNITS: 100 INJECTION, SOLUTION PARENTERAL at 17:36

## 2022-05-01 RX ADMIN — NORTRIPTYLINE HYDROCHLORIDE 10 MG: 10 CAPSULE ORAL at 20:22

## 2022-05-01 RX ADMIN — APIXABAN 5 MG: 5 TABLET, FILM COATED ORAL at 20:23

## 2022-05-01 RX ADMIN — DIGOXIN 0.12 MG: 125 TABLET ORAL at 10:03

## 2022-05-01 RX ADMIN — INSULIN LISPRO 3 UNITS: 100 INJECTION, SOLUTION INTRAVENOUS; SUBCUTANEOUS at 20:24

## 2022-05-01 RX ADMIN — ATORVASTATIN CALCIUM 20 MG: 10 TABLET, FILM COATED ORAL at 10:03

## 2022-05-01 RX ADMIN — PREDNISONE 20 MG: 20 TABLET ORAL at 10:03

## 2022-05-01 RX ADMIN — BUPROPION HYDROCHLORIDE 100 MG: 100 TABLET, EXTENDED RELEASE ORAL at 10:03

## 2022-05-01 RX ADMIN — DILTIAZEM HYDROCHLORIDE 120 MG: 120 CAPSULE, COATED, EXTENDED RELEASE ORAL at 10:10

## 2022-05-01 RX ADMIN — SODIUM CHLORIDE, PRESERVATIVE FREE 10 ML: 5 INJECTION INTRAVENOUS at 10:02

## 2022-05-01 RX ADMIN — INSULIN LISPRO 8 UNITS: 100 INJECTION, SOLUTION INTRAVENOUS; SUBCUTANEOUS at 16:32

## 2022-05-01 RX ADMIN — INSULIN LISPRO 2 UNITS: 100 INJECTION, SOLUTION INTRAVENOUS; SUBCUTANEOUS at 10:03

## 2022-05-01 RX ADMIN — INSULIN LISPRO 4 UNITS: 100 INJECTION, SOLUTION INTRAVENOUS; SUBCUTANEOUS at 11:52

## 2022-05-01 RX ADMIN — APIXABAN 2.5 MG: 2.5 TABLET, FILM COATED ORAL at 10:03

## 2022-05-01 ASSESSMENT — PAIN SCALES - GENERAL
PAINLEVEL_OUTOF10: 0
PAINLEVEL_OUTOF10: 0

## 2022-05-01 NOTE — PLAN OF CARE
Problem: Skin/Tissue Integrity  Goal: Absence of new skin breakdown  Description: 1. Monitor for areas of redness and/or skin breakdown  2. Assess vascular access sites hourly  3. Every 4-6 hours minimum:  Change oxygen saturation probe site  4. Every 4-6 hours:  If on nasal continuous positive airway pressure, respiratory therapy assess nares and determine need for appliance change or resting period.   Outcome: Progressing     Problem: Safety - Adult  Goal: Free from fall injury  Outcome: Progressing     Problem: Chronic Conditions and Co-morbidities  Goal: Patient's chronic conditions and co-morbidity symptoms are monitored and maintained or improved  Outcome: Progressing

## 2022-05-01 NOTE — PLAN OF CARE
Problem: Skin/Tissue Integrity  Goal: Absence of new skin breakdown  Description: 1. Monitor for areas of redness and/or skin breakdown  2. Assess vascular access sites hourly  3. Every 4-6 hours minimum:  Change oxygen saturation probe site  4. Every 4-6 hours:  If on nasal continuous positive airway pressure, respiratory therapy assess nares and determine need for appliance change or resting period. 5/1/2022 0320 by Oswaldo Marquez RN  Outcome: Progressing     Problem: Safety - Adult  Goal: Free from fall injury  5/1/2022 1330 by Emerald Michelle RN  Outcome: Progressing   Pt remains free from falls during this shift. Pt a/o and calls out appropriately. Bed in lowest position and wheels locked. Call light within reach. Bedside table within reach. Bed check and nonskid footwear in place.

## 2022-05-01 NOTE — PROGRESS NOTES
Pleurex drain drained. sterile procedure followed. 250ml drained. Pt tolerated well, no increased SOB, slight pain when drained, but pt reported tolerable and stopped when finished. New dressing in place. C/D/I. Elena Brighton

## 2022-05-01 NOTE — PROGRESS NOTES
Hospitalist Progress Note      PCP: Bailey Rucker MD    Date of Admission: 4/19/2022    Chief Complaint: Endobronchial mass      Subjective:  BP is better   No dizziness or syncope at rest  Able to ambulate with assistance - no dizziness  BP better   BS high. Medications:  Reviewed    Infusion Medications    dextrose       Scheduled Medications    dilTIAZem  120 mg Oral Daily    predniSONE  20 mg Oral Daily    insulin glargine  25 Units SubCUTAneous Nightly    insulin lispro  0-12 Units SubCUTAneous TID WC    insulin lispro  0-6 Units SubCUTAneous Nightly    apixaban  2.5 mg Oral BID    [Held by provider] lisinopril  10 mg Oral Daily    [Held by provider] metoprolol tartrate  12.5 mg Oral BID    [Held by provider] torsemide  50 mg Oral Daily    atorvastatin  20 mg Oral Daily    buPROPion  100 mg Oral BID    digoxin  0.125 mg Oral Daily    nortriptyline  10 mg Oral Nightly     PRN Meds: perflutren lipid microspheres, ipratropium-albuterol, oxyCODONE-acetaminophen, acetaminophen **OR** acetaminophen, dextrose, glucagon (rDNA), glucose, ondansetron **OR** ondansetron, polyethylene glycol, sodium chloride flush, glucose, dextrose bolus (hypoglycemia) **OR** dextrose bolus (hypoglycemia)      Intake/Output Summary (Last 24 hours) at 5/1/2022 1254  Last data filed at 5/1/2022 1007  Gross per 24 hour   Intake 1315 ml   Output 1330 ml   Net -15 ml       Physical Exam Performed:    /73   Pulse 79   Temp 97.5 °F (36.4 °C) (Oral)   Resp 20   Ht 6' (1.829 m)   Wt 252 lb 6.8 oz (114.5 kg)   SpO2 97%   BMI 34.24 kg/m²      General appearance: NAD,  in bed. Obese   HEENT: . Conjunctivae/corneas clear. Neck: Supple, . No jugular venous distention. Trachea midline. Respiratory: Bilateral breath sounds, decreased at both bases, there is also notably diminished breath sounds in the left lung field, no use of accessory muscles at this time. pleur x in place . 2 l o2   Cardiovascular: Regular rate and rhythm with normal S1/S2   Abdomen: Soft, non-tender, non-distended with normal bowel sounds. Obese   Musculoskeletal: No clubbing, cyanosis or edema bilaterally. Full range of motion without deformity. Neurologic:  Neurovascularly intact without any focal sensory/motor deficits. Psychiatric: He is awake, oriented,   Peripheral Pulses: +2 palpable, equal bilaterally     Labs:   Recent Labs     04/28/22  1831 04/29/22  0600   WBC 16.5* 14.4*   HGB 13.2* 13.5   HCT 41.5 43.3    157     Recent Labs     04/28/22  1831 04/29/22  0600   * 131*   K 3.9 3.5   CL 88* 93*   CO2 29 28   BUN 25* 22*   CREATININE 1.2 0.9   CALCIUM 8.7 8.2*     No results for input(s): AST, ALT, BILIDIR, BILITOT, ALKPHOS in the last 72 hours. No results for input(s): INR in the last 72 hours. No results for input(s): He Erik in the last 72 hours. Urinalysis:    No results found for: [place], BACTERIA, RBCUA, BLOODU, SPECGRAV, [address] [place] [address]    Radiology:  XR CHEST 1 VIEW   Final Result   1. Interval placement of a left PleurX catheter, with no significant change   in size or appearance of a large left pleural effusion. 2. Persistent near complete consolidation of the left lung. 3. No evidence of a pneumothorax. XR CHEST PORTABLE   Final Result   Decreased left-sided pleural effusion. There is a developing pleural gas   collection seen in the left francisco javier thorax superiorly secondary to decreased   pleural fluid and incomplete re-expansion of the left lung. Dense   consolidation the left lung persists along with large remaining left-sided   pleural effusion      Improved aeration of the right lung         XR CHEST PORTABLE   Final Result   Stable left chest tube. No pneumothorax. Persisting extensive opacification of the left hemithorax- atelectasis or   consolidation versus pleural effusion. Developing right basilar airspace disease, likely pulmonary edema.          XR CHEST PORTABLE   Final Result   Left chest tube is in stable position. Left hemithorax remains mostly opaque with aeration of the apex. Decreased right perihilar vascular congestion. XR CHEST PORTABLE   Final Result   Partial re-expansion of the left upper lobe following chest tube. Increased   right perihilar edema or infiltrate. No pneumothorax. 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 fib, chronic diastolic CHF, COPD initially admitted to [facility] on 4/17/2022 for shortness of breath and respiratory failure.  He was started on antibiotics for the postobstructive pneumonia.  Patient refused fiberoptic bronchoscopy, refuses BiPAP.  CT surgery was consulted for the endobronchial mass for debulking airway tumor and then patient wants to proceed with chemo/XRT. The patient was transferred to [facility] for CT surgery consult. \"      SCC left lung with left mainstem endobronchial mass causing left lung collapse/postobstructive pneumonia  SqCC left lower lobe/lingula - 12 cm mass with positive station 7 lymph node - stage IIIA  -s/p debulking/laser therapy per CTS on 4/20.   CT placed.  -Plan to F/w Dr. Sascha Ledesma next week to plan for cancer treatment outpatient for subsequent XRT/chemo     Acute on chronic respiratory failure with hypoxia and hypercapnia  COPD with acute exacerbation  Postobstructive pneumonia  Lung collapse  -refused BiPAP. He normally wears O2 PRN at home.  -Continue prednisone   -Completed IV Levaquin course  -inhaled bronchodilators  - CT surgery placed PleurX catheter 4/27/22 - will need instructions and supplies prior to DC.     Tobacco abuse-counseled cessation     Atrial fibrillation, permanent   -Eliquis held for surgery; resume d  -Continue   digoxin  -hold for bradycardia  cardizem on hold 2/2 low BP  BP better - lower dose of cardizem started      HLD-resume statin     Hypothyroidism-clinically euthyroid, resumed Synthroid      DM2- insulin regimen. Steroid complicated treatment. BS high   Takes levmir - 90 bid at home - ? pharmacy to confirm the dose   Increase lantus 40 nightly      Chronic anxiety-mood stable, resumed Wellbutrin    Hypotension - orthostatic - low BP after working with PT/OT. Gentle IVF. May need home meds adjusted prior to DC. Will decrease lisinopril from 20 mg to 10 mg po daily and hold torsemide. Will re-eval in the am.  Still low this am   All antihypertensives are on hold , IVF  BP better -   cardizem - lower dose started   Last echo 2020 ? [facility] - report - not available   Echo - pending     Leukocytosis- stable - no evidence of infection -  possibly steroid induced     Hyponatremia - stable     DVT Prophylaxis:   Eliquis  Diet: ADULT DIET; Regular  Code Status: Full Code    PT/OT Eval Status: rec'd SNF    Dispo - recommend SNF   however patient refuses. . Daughter to assist with taking him home and Pleurex education.   Once BP stabilized - possibly tomorrow      Houston Raza MD

## 2022-05-01 NOTE — PROGRESS NOTES
Pt assessment completed and charted. VSS. Pt a/ox4. Pt's pleurex drain drained, 250ml out. Pt reported some pain, but tolerable at this time. pt tolerating diet. Pts BS has been elevated. Pt has been up to chair this evening. Pt currently on 1L NC, 94-96%. Pt denies any other needs at this time. Pt calls out appropriately. Pt is a fall risk;  -Bed in lowest position and wheels locked. -Call light within reach.   -Bedside table within reach.   -Non-skid footwear in place.  -bed check in place.

## 2022-05-02 LAB
ANION GAP SERPL CALCULATED.3IONS-SCNC: 8 MMOL/L (ref 3–16)
BASOPHILS ABSOLUTE: 0.1 K/UL (ref 0–0.2)
BASOPHILS RELATIVE PERCENT: 0.7 %
BUN BLDV-MCNC: 11 MG/DL (ref 7–20)
CALCIUM SERPL-MCNC: 9.8 MG/DL (ref 8.3–10.6)
CHLORIDE BLD-SCNC: 96 MMOL/L (ref 99–110)
CO2: 31 MMOL/L (ref 21–32)
CREAT SERPL-MCNC: 0.7 MG/DL (ref 0.8–1.3)
EOSINOPHILS ABSOLUTE: 0.1 K/UL (ref 0–0.6)
EOSINOPHILS RELATIVE PERCENT: 1.5 %
GFR AFRICAN AMERICAN: >60
GFR NON-AFRICAN AMERICAN: >60
GLUCOSE BLD-MCNC: 127 MG/DL (ref 70–99)
GLUCOSE BLD-MCNC: 129 MG/DL (ref 70–99)
GLUCOSE BLD-MCNC: 132 MG/DL (ref 70–99)
GLUCOSE BLD-MCNC: 141 MG/DL (ref 70–99)
GLUCOSE BLD-MCNC: 214 MG/DL (ref 70–99)
GLUCOSE BLD-MCNC: 256 MG/DL (ref 70–99)
HCT VFR BLD CALC: 39.2 % (ref 40.5–52.5)
HEMOGLOBIN: 12.7 G/DL (ref 13.5–17.5)
LV EF: 55 %
LVEF MODALITY: NORMAL
LYMPHOCYTES ABSOLUTE: 0.7 K/UL (ref 1–5.1)
LYMPHOCYTES RELATIVE PERCENT: 8.3 %
MCH RBC QN AUTO: 29.2 PG (ref 26–34)
MCHC RBC AUTO-ENTMCNC: 32.5 G/DL (ref 31–36)
MCV RBC AUTO: 89.7 FL (ref 80–100)
MONOCYTES ABSOLUTE: 0.4 K/UL (ref 0–1.3)
MONOCYTES RELATIVE PERCENT: 5 %
NEUTROPHILS ABSOLUTE: 7.4 K/UL (ref 1.7–7.7)
NEUTROPHILS RELATIVE PERCENT: 84.5 %
PDW BLD-RTO: 16.9 % (ref 12.4–15.4)
PERFORMED ON: ABNORMAL
PLATELET # BLD: 194 K/UL (ref 135–450)
PMV BLD AUTO: 6.5 FL (ref 5–10.5)
POTASSIUM REFLEX MAGNESIUM: 3.7 MMOL/L (ref 3.5–5.1)
RBC # BLD: 4.37 M/UL (ref 4.2–5.9)
SODIUM BLD-SCNC: 135 MMOL/L (ref 136–145)
WBC # BLD: 8.7 K/UL (ref 4–11)

## 2022-05-02 PROCEDURE — 97535 SELF CARE MNGMENT TRAINING: CPT

## 2022-05-02 PROCEDURE — 1200000000 HC SEMI PRIVATE

## 2022-05-02 PROCEDURE — 6370000000 HC RX 637 (ALT 250 FOR IP): Performed by: INTERNAL MEDICINE

## 2022-05-02 PROCEDURE — 6370000000 HC RX 637 (ALT 250 FOR IP): Performed by: STUDENT IN AN ORGANIZED HEALTH CARE EDUCATION/TRAINING PROGRAM

## 2022-05-02 PROCEDURE — 2700000000 HC OXYGEN THERAPY PER DAY

## 2022-05-02 PROCEDURE — 80048 BASIC METABOLIC PNL TOTAL CA: CPT

## 2022-05-02 PROCEDURE — 36415 COLL VENOUS BLD VENIPUNCTURE: CPT

## 2022-05-02 PROCEDURE — 97530 THERAPEUTIC ACTIVITIES: CPT

## 2022-05-02 PROCEDURE — C8929 TTE W OR WO FOL WCON,DOPPLER: HCPCS

## 2022-05-02 PROCEDURE — 85025 COMPLETE CBC W/AUTO DIFF WBC: CPT

## 2022-05-02 PROCEDURE — 94761 N-INVAS EAR/PLS OXIMETRY MLT: CPT

## 2022-05-02 PROCEDURE — 6360000004 HC RX CONTRAST MEDICATION: Performed by: INTERNAL MEDICINE

## 2022-05-02 RX ORDER — INSULIN LISPRO 100 [IU]/ML
0-6 INJECTION, SOLUTION INTRAVENOUS; SUBCUTANEOUS NIGHTLY
Qty: 10 ML | Refills: 0 | Status: ON HOLD | OUTPATIENT
Start: 2022-05-02 | End: 2022-08-10

## 2022-05-02 RX ORDER — DILTIAZEM HYDROCHLORIDE 120 MG/1
120 CAPSULE, COATED, EXTENDED RELEASE ORAL DAILY
Qty: 30 CAPSULE | Refills: 3 | Status: SHIPPED | OUTPATIENT
Start: 2022-05-03

## 2022-05-02 RX ORDER — INSULIN GLARGINE 100 [IU]/ML
40 INJECTION, SOLUTION SUBCUTANEOUS NIGHTLY
Qty: 10 ML | Refills: 3 | Status: ON HOLD | OUTPATIENT
Start: 2022-05-02 | End: 2022-08-10

## 2022-05-02 RX ORDER — INSULIN LISPRO 100 [IU]/ML
0-12 INJECTION, SOLUTION INTRAVENOUS; SUBCUTANEOUS
Qty: 10 ML | Refills: 1 | Status: ON HOLD | OUTPATIENT
Start: 2022-05-02 | End: 2022-08-10

## 2022-05-02 RX ORDER — PREDNISONE 20 MG/1
10 TABLET ORAL DAILY
Qty: 3 TABLET | Refills: 0 | Status: SHIPPED | OUTPATIENT
Start: 2022-05-03 | End: 2022-05-06

## 2022-05-02 RX ADMIN — INSULIN LISPRO 6 UNITS: 100 INJECTION, SOLUTION INTRAVENOUS; SUBCUTANEOUS at 17:12

## 2022-05-02 RX ADMIN — DIGOXIN 0.12 MG: 125 TABLET ORAL at 09:39

## 2022-05-02 RX ADMIN — APIXABAN 5 MG: 5 TABLET, FILM COATED ORAL at 21:05

## 2022-05-02 RX ADMIN — ACETAMINOPHEN 650 MG: 325 TABLET ORAL at 11:38

## 2022-05-02 RX ADMIN — APIXABAN 5 MG: 5 TABLET, FILM COATED ORAL at 09:39

## 2022-05-02 RX ADMIN — PERFLUTREN 1.65 MG: 6.52 INJECTION, SUSPENSION INTRAVENOUS at 15:05

## 2022-05-02 RX ADMIN — DILTIAZEM HYDROCHLORIDE 120 MG: 120 CAPSULE, COATED, EXTENDED RELEASE ORAL at 09:39

## 2022-05-02 RX ADMIN — INSULIN HUMAN 8 UNITS: 100 INJECTION, SOLUTION PARENTERAL at 11:40

## 2022-05-02 RX ADMIN — INSULIN LISPRO 2 UNITS: 100 INJECTION, SOLUTION INTRAVENOUS; SUBCUTANEOUS at 21:07

## 2022-05-02 RX ADMIN — INSULIN HUMAN 8 UNITS: 100 INJECTION, SOLUTION PARENTERAL at 17:14

## 2022-05-02 RX ADMIN — NORTRIPTYLINE HYDROCHLORIDE 10 MG: 10 CAPSULE ORAL at 21:04

## 2022-05-02 RX ADMIN — ATORVASTATIN CALCIUM 20 MG: 10 TABLET, FILM COATED ORAL at 09:39

## 2022-05-02 RX ADMIN — BUPROPION HYDROCHLORIDE 100 MG: 100 TABLET, EXTENDED RELEASE ORAL at 21:05

## 2022-05-02 RX ADMIN — INSULIN GLARGINE 40 UNITS: 100 INJECTION, SOLUTION SUBCUTANEOUS at 21:07

## 2022-05-02 RX ADMIN — BUPROPION HYDROCHLORIDE 100 MG: 100 TABLET, EXTENDED RELEASE ORAL at 09:45

## 2022-05-02 RX ADMIN — INSULIN HUMAN 8 UNITS: 100 INJECTION, SOLUTION PARENTERAL at 08:30

## 2022-05-02 RX ADMIN — PREDNISONE 20 MG: 20 TABLET ORAL at 09:39

## 2022-05-02 ASSESSMENT — PAIN DESCRIPTION - DESCRIPTORS: DESCRIPTORS: ACHING

## 2022-05-02 ASSESSMENT — PAIN SCALES - GENERAL
PAINLEVEL_OUTOF10: 5
PAINLEVEL_OUTOF10: 0
PAINLEVEL_OUTOF10: 6
PAINLEVEL_OUTOF10: 5

## 2022-05-02 ASSESSMENT — PAIN DESCRIPTION - ORIENTATION: ORIENTATION: LEFT

## 2022-05-02 ASSESSMENT — PAIN DESCRIPTION - LOCATION: LOCATION: CHEST

## 2022-05-02 NOTE — DISCHARGE SUMMARY
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 Hospital Course:   SCC left lung with left mainstem endobronchial mass causing left lung collapse/postobstructive pneumonia  SqCC left lower lobe/lingula - 12 cm mass with positive station 7 lymph node - stage IIIA  -s/p debulking/laser therapy per CTS on 4/20. CT placed.  -Plan to F/w Dr. Aris Mayo next week to plan for cancer treatment outpatient for subsequent XRT/chemo     Acute on chronic respiratory failure with hypoxia and hypercapnia  COPD with acute exacerbation  Postobstructive pneumonia  Lung collapse  -refused BiPAP.   He normally wears O2 PRN at home.  -Continue prednisone   -Completed IV Levaquin course  -inhaled bronchodilators  - CT surgery placed PleurX catheter 4/27/22 -      Tobacco abuse-counseled cessation     Atrial fibrillation, permanent   -Eliquis held for surgery; resume d  -Continue   digoxin  -hold for bradycardia  cardizem on hold 2/2 low BP  BP better - lower dose of cardizem started - tolerating      HLD-resume statin     Hypothyroidism-clinically euthyroid, resumed Synthroid      DM2- insulin regimen. Steroid complicated treatment. BS high   Takes levmir - 90 bid at home - ? pharmacy to confirm the dose   Increased lantus 40 nightly      Chronic anxiety-mood stable, resumed Wellbutrin     Hypotension - orthostatic - low BP after working with PT/OT. Gentle IVF. May need home meds adjusted prior to DC. Will decrease lisinopril from 20 mg to 10 mg po daily and hold torsemide. Will re-eval in the am.  Still low this am   All antihypertensives are on hold , IVF  BP better -   cardizem - lower dose started   Last echo 2020 ? [facility] - report - not available   Echo - pending      Leukocytosis- stable - no evidence of infection -  possibly steroid induced      Hyponatremia - stable          Physical Exam Performed:     /64   Pulse 85   Temp 97.2 °F (36.2 °C) (Oral)   Resp 22   Ht 6' (1.829 m)   Wt 252 lb 6.8 oz (114.5 kg)   SpO2 98%   BMI 34.24 kg/m²       General appearance: NAD,  in bed. Obese   HEENT: . Conjunctivae/corneas clear. Neck: Supple, . No jugular venous distention. Trachea midline. Respiratory: Bilateral breath sounds, decreased at both bases, there is also notably diminished breath sounds in the left lung field, no use of accessory muscles at this time. pleur x in place . 2 l o2   Cardiovascular: Regular rate and rhythm with normal S1/S2   Abdomen: Soft, non-tender, non-distended with normal bowel sounds. Obese   Musculoskeletal: No clubbing, cyanosis or edema bilaterally.  Full range of motion without deformity. Neurologic:  Neurovascularly intact without any focal sensory/motor deficits. Psychiatric: He is awake, oriented,   Peripheral Pulses: +2 palpable, equal bilaterally          Labs:  For convenience and continuity at follow-up the following most recent labs are provided:      CBC:    Lab Results   Component Value Date    WBC 8.7 05/02/2022    HGB 12.7 05/02/2022    HCT 39.2 05/02/2022  05/02/2022       Renal:    Lab Results   Component Value Date     05/02/2022    K 3.7 05/02/2022    CL 96 05/02/2022    CO2 31 05/02/2022    BUN 11 05/02/2022    CREATININE 0.7 05/02/2022    CALCIUM 9.8 05/02/2022         Significant Diagnostic Studies    Radiology:   XR CHEST 1 VIEW   Final Result   1. Interval placement of a left PleurX catheter, with no significant change   in size or appearance of a large left pleural effusion. 2. Persistent near complete consolidation of the left lung. 3. No evidence of a pneumothorax. XR CHEST PORTABLE   Final Result   Decreased left-sided pleural effusion. There is a developing pleural gas   collection seen in the left francisco javier thorax superiorly secondary to decreased   pleural fluid and incomplete re-expansion of the left lung. Dense   consolidation the left lung persists along with large remaining left-sided   pleural effusion      Improved aeration of the right lung         XR CHEST PORTABLE   Final Result   Stable left chest tube. No pneumothorax. Persisting extensive opacification of the left hemithorax- atelectasis or   consolidation versus pleural effusion. Developing right basilar airspace disease, likely pulmonary edema. XR CHEST PORTABLE   Final Result   Left chest tube is in stable position. Left hemithorax remains mostly opaque with aeration of the apex. Decreased right perihilar vascular congestion. XR CHEST PORTABLE   Final Result   Partial re-expansion of the left upper lobe following chest tube. Increased   right perihilar edema or infiltrate. No pneumothorax.                 Consults:     IP CONSULT TO SOCIAL WORK  IP CONSULT TO CARDIOTHORACIC SURGERY  IP CONSULT TO HOME CARE NEEDS    Disposition:  [facility]    Condition at Discharge: Stable    Discharge Instructions/Follow-up:  PCP/ oncology    Code Status:  Full Code     Activity: activity as tolerated    Diet: diabetic diet      Discharge Medications:     Current Discharge Medication List           Details   insulin glargine (LANTUS) 100 UNIT/ML injection vial Inject 40 Units into the skin nightly  Qty: 10 mL, Refills: 3      !! insulin lispro (HUMALOG) 100 UNIT/ML SOLN injection vial Inject 0-12 Units into the skin 3 times daily (with meals)  Qty: 10 mL, Refills: 1      !! insulin lispro (HUMALOG) 100 UNIT/ML SOLN injection vial Inject 0-6 Units into the skin nightly  Qty: 10 mL, Refills: 0      insulin regular (HUMULIN R;NOVOLIN R) 100 UNIT/ML injection Inject 8 Units into the skin 3 times daily (before meals)  Qty: 10 mL, Refills: 3       !! - Potential duplicate medications found. Please discuss with provider.            Details   apixaban (ELIQUIS) 5 MG TABS tablet Take 1 tablet by mouth 2 times daily  Qty: 60 tablet, Refills: 0      dilTIAZem (CARDIZEM CD) 120 MG extended release capsule Take 1 capsule by mouth daily  Qty: 30 capsule, Refills: 3      predniSONE (DELTASONE) 20 MG tablet Take 0.5 tablets by mouth daily for 3 days  Qty: 3 tablet, Refills: 0              Details   TECHLITE PEN NEEDLES 31G X 8 MM MISC USE 1 FIVE TIMES DAILY      !! levothyroxine (SYNTHROID) 200 MCG tablet Take 200 mcg by mouth Daily       !! EUTHYROX 50 MCG tablet Take 50 mcg by mouth Daily       guaiFENesin (MUCINEX) 600 MG extended release tablet Take 1 tablet by mouth 2 times daily  Qty: 20 tablet, Refills: 0      digoxin (LANOXIN) 125 MCG tablet Take 0.125 mg by mouth daily      tiotropium (SPIRIVA RESPIMAT) 2.5 MCG/ACT AERS inhaler Inhale 2 puffs into the lungs daily  Qty: 4 g, Refills: 5      atorvastatin (LIPITOR) 20 MG tablet Take 20 mg by mouth daily       buPROPion (WELLBUTRIN SR) 100 MG extended release tablet Take 100 mg by mouth 2 times daily       metFORMIN (GLUCOPHAGE) 850 MG tablet Take 850 mg by mouth daily (with breakfast)       nitroGLYCERIN (NITROSTAT) 0.4 MG SL tablet Place 0.4 mg under the tongue every 5 minutes as needed       nortriptyline (PAMELOR) 10 MG capsule Take 10 mg by mouth nightly       Omega-3 Fatty Acids (FISH OIL) 1000 MG CAPS Take 3,000 mg by mouth 3 times daily      aspirin 81 MG tablet Take 81 mg by mouth daily       ! ! - Potential duplicate medications found. Please discuss with provider. Time Spent on discharge is   45 minutes in the examination, evaluation, counseling and review of medications and discharge plan. Signed:    Mauro Nevarez MD   5/2/2022      Thank you Bailey Rucker MD for the opportunity to be involved in this patient's care. If you have any questions or concerns, please feel free to contact me at [phone].

## 2022-05-02 NOTE — PLAN OF CARE
Problem: Chronic Conditions and Co-morbidities  Goal: Patient's chronic conditions and co-morbidity symptoms are monitored and maintained or improved  5/2/2022 0250 by Soraya Luevano RN  Outcome: Progressing  5/1/2022 1330 by Mavis Longo RN  Outcome: Not Progressing

## 2022-05-02 NOTE — PROGRESS NOTES
Hospitalist Progress Note      PCP: Mariluz Barrow MD    Date of Admission: 4/19/2022    Chief Complaint: Endobronchial mass      Subjective:  BP is better   No dizziness or syncope at rest  Able to ambulate with assistance - no dizziness  BP better   BS  Better controlled . Medications:  Reviewed    Infusion Medications    dextrose       Scheduled Medications    dilTIAZem  120 mg Oral Daily    insulin glargine  40 Units SubCUTAneous Nightly    insulin regular  8 Units SubCUTAneous TID AC    apixaban  5 mg Oral BID    predniSONE  20 mg Oral Daily    insulin lispro  0-12 Units SubCUTAneous TID WC    insulin lispro  0-6 Units SubCUTAneous Nightly    [Held by provider] lisinopril  10 mg Oral Daily    [Held by provider] metoprolol tartrate  12.5 mg Oral BID    [Held by provider] torsemide  50 mg Oral Daily    atorvastatin  20 mg Oral Daily    buPROPion  100 mg Oral BID    digoxin  0.125 mg Oral Daily    nortriptyline  10 mg Oral Nightly     PRN Meds: perflutren lipid microspheres, ipratropium-albuterol, oxyCODONE-acetaminophen, acetaminophen **OR** acetaminophen, dextrose, glucagon (rDNA), glucose, ondansetron **OR** ondansetron, polyethylene glycol, sodium chloride flush, glucose, dextrose bolus (hypoglycemia) **OR** dextrose bolus (hypoglycemia)      Intake/Output Summary (Last 24 hours) at 5/2/2022 1326  Last data filed at 5/2/2022 0901  Gross per 24 hour   Intake 240 ml   Output 1675 ml   Net -1435 ml       Physical Exam Performed:    /64   Pulse 85   Temp 97.2 °F (36.2 °C) (Oral)   Resp 22   Ht 6' (1.829 m)   Wt 252 lb 6.8 oz (114.5 kg)   SpO2 98%   BMI 34.24 kg/m²      General appearance: NAD,  in bed. Obese   HEENT: . Conjunctivae/corneas clear. Neck: Supple, . No jugular venous distention. Trachea midline.   Respiratory: Bilateral breath sounds, decreased at both bases, there is also notably diminished breath sounds in the left lung field, no use of accessory muscles at this time. pleur x in place . 2 l o2   Cardiovascular: Regular rate and rhythm with normal S1/S2   Abdomen: Soft, non-tender, non-distended with normal bowel sounds. Obese   Musculoskeletal: No clubbing, cyanosis or edema bilaterally. Full range of motion without deformity. Neurologic:  Neurovascularly intact without any focal sensory/motor deficits. Psychiatric: He is awake, oriented,   Peripheral Pulses: +2 palpable, equal bilaterally     Labs:   Recent Labs     05/02/22  0612   WBC 8.7   HGB 12.7*   HCT 39.2*        Recent Labs     05/02/22  0612   *   K 3.7   CL 96*   CO2 31   BUN 11   CREATININE 0.7*   CALCIUM 9.8     No results for input(s): AST, ALT, BILIDIR, BILITOT, ALKPHOS in the last 72 hours. No results for input(s): INR in the last 72 hours. No results for input(s): Joyice [place] in the last 72 hours. Urinalysis:    No results found for: Marquita Ficks, BACTERIA, RBCUA, BLOODU, SPECGRAV, Judson [place] [address]    Radiology:  XR CHEST 1 VIEW   Final Result   1. Interval placement of a left PleurX catheter, with no significant change   in size or appearance of a large left pleural effusion. 2. Persistent near complete consolidation of the left lung. 3. No evidence of a pneumothorax. XR CHEST PORTABLE   Final Result   Decreased left-sided pleural effusion. There is a developing pleural gas   collection seen in the left francisco javier thorax superiorly secondary to decreased   pleural fluid and incomplete re-expansion of the left lung. Dense   consolidation the left lung persists along with large remaining left-sided   pleural effusion      Improved aeration of the right lung         XR CHEST PORTABLE   Final Result   Stable left chest tube. No pneumothorax. Persisting extensive opacification of the left hemithorax- atelectasis or   consolidation versus pleural effusion. Developing right basilar airspace disease, likely pulmonary edema.          XR CHEST PORTABLE   Final Result Left chest tube is in stable position. Left hemithorax remains mostly opaque with aeration of the apex. Decreased right perihilar vascular congestion. XR CHEST PORTABLE   Final Result   Partial re-expansion of the left upper lobe following chest tube. Increased   right perihilar edema or infiltrate. No pneumothorax. Assessment/Plan:    Active Hospital Problems    Diagnosis     Small cell lung cancer ([facility] [address] [C34.90]      Priority: Medium    Acute on chronic respiratory failure with hypoxia and hypercapnia (HCC) [J96.21, J96.22]     Squamous cell carcinoma of left lung (HCC) [C34.92]     COPD (chronic obstructive pulmonary disease) (HCC) [J44.9]     Endobronchial mass [R91.8]     Postobstructive pneumonia [J18.9]     Lung collapse [J98.19]     Atrial fibrillation, permanent (HCC) [I48.21]     COPD with acute exacerbation (HCC) [J44.1]        \"Patient with PMH of SCC left lung, left mainstem endobronchial mass causing left lung collapse, postobstructive pneumonia, acute on chronic respiratory failure, hypercalcemia of malignancy, chronic A. fib, chronic diastolic CHF, COPD initially admitted to [facility] on 4/17/2022 for shortness of breath and respiratory failure.  He was started on antibiotics for the postobstructive pneumonia.  Patient refused fiberoptic bronchoscopy, refuses BiPAP.  CT surgery was consulted for the endobronchial mass for debulking airway tumor and then patient wants to proceed with chemo/XRT. The patient was transferred to [facility] for CT surgery consult. \"      SCC left lung with left mainstem endobronchial mass causing left lung collapse/postobstructive pneumonia  SqCC left lower lobe/lingula - 12 cm mass with positive station 7 lymph node - stage IIIA  -s/p debulking/laser therapy per CTS on 4/20.   CT placed.  -Plan to F/w Dr. Kiara Jason next week to plan for cancer treatment outpatient for subsequent XRT/chemo     Acute on chronic respiratory failure with hypoxia and hypercapnia  COPD with acute exacerbation  Postobstructive pneumonia  Lung collapse  -refused BiPAP. He normally wears O2 PRN at home.  -Continue prednisone   -Completed IV Levaquin course  -inhaled bronchodilators  - CT surgery placed PleurX catheter 4/27/22 - will need instructions and supplies prior to DC.     Tobacco abuse-counseled cessation     Atrial fibrillation, permanent   -Eliquis held for surgery; resume d  -Continue   digoxin  -hold for bradycardia  cardizem on hold 2/2 low BP  BP better - lower dose of cardizem started - tolerating      HLD-resume statin     Hypothyroidism-clinically euthyroid, resumed Synthroid      DM2- insulin regimen. Steroid complicated treatment. BS high   Takes levmir - 90 bid at home - ? pharmacy to confirm the dose   Increased lantus 40 nightly      Chronic anxiety-mood stable, resumed Wellbutrin    Hypotension - orthostatic - low BP after working with PT/OT. Gentle IVF. May need home meds adjusted prior to DC. Will decrease lisinopril from 20 mg to 10 mg po daily and hold torsemide. Will re-eval in the am.  Still low this am   All antihypertensives are on hold , IVF  BP better -   cardizem - lower dose started   Last echo 2020 ? [facility] - report - not available   Echo - pending     Leukocytosis- stable - no evidence of infection -  possibly steroid induced     Hyponatremia - stable     DVT Prophylaxis:   Eliquis  Diet: ADULT DIET; Regular  Code Status: Full Code    PT/OT Eval Status: rec'd SNF    Dispo - recommend SNF   however patient refuses. . Daughter to assist with taking him home and Pleurex education.     - possibly today, echo pending     Almaz Dyson MD

## 2022-05-02 NOTE — PROGRESS NOTES
Comprehensive Nutrition Assessment    Type and Reason for Visit:  Reassess    Nutrition Recommendations/Plan:   1. Continue regular diet  2. Encourage PO intakes  3. Monitor nutrition adequacy, pertinent labs, bowel habits, wt changes, and clinical progress       Malnutrition Assessment:  Malnutrition Status: At risk for malnutrition (Comment) (05/02/22 4955)    Context:  Acute Illness       Nutrition Assessment:    Follow up: Pt nutritionally improving AEB reporting good appetite with PO intakes mostly % at recent meals. Pt denies any GI distress, reports regular BMs. No nutritional questions/concerns at this time. Will continue to monitor. Nutrition Related Findings:    Na 135. -354mg/dL. +1 BLE. BM today. Wound Type: None       Current Nutrition Intake & Therapies:    Average Meal Intake: %  Average Supplements Intake: None Ordered  ADULT DIET; Regular    Anthropometric Measures:  Height: 6' (182.9 cm)  Ideal Body Weight (IBW): 178 lbs (81 kg)       Current Body Weight: 252 lb (114.3 kg), 141.6 % IBW. Weight Source: Bed Scale  Current BMI (kg/m2): 34.2        Weight Adjustment For: No Adjustment                 BMI Categories: Obese Class 1 (BMI 30.0-34. 9)    Estimated Daily Nutrient Needs:  Energy Requirements Based On: Kcal/kg (25-30 kcals/kg)  Weight Used for Energy Requirements: Ideal (81 kg)  Energy (kcal/day): 3939-5800  Weight Used for Protein Requirements: Ideal (81 kg IBW, 1.2-1.4 g/kg)  Protein (g/day):  g  Fluid (ml/day): Less than 2000ml per CHF guidelines    Nutrition Diagnosis:   · Inadequate oral intake related to early satiety,increase demand for energy/nutrients as evidenced by poor intake prior to admission,intake 26-50%    Nutrition Interventions:   Food and/or Nutrient Delivery: Continue Current Diet  Nutrition Education/Counseling: Education declined  Coordination of Nutrition Care: Continue to monitor while inpatient  Plan of Care discussed with: Patient    Goals:  Previous Goal Met: Progressing toward Goal(s)  Goals: PO intake 50% or greater,prior to discharge       Nutrition Monitoring and Evaluation:   Behavioral-Environmental Outcomes: None Identified  Food/Nutrient Intake Outcomes: Food and Nutrient Intake  Physical Signs/Symptoms Outcomes: Biochemical Data,Nutrition Focused Physical Findings,Weight,Constipation    Discharge Planning:    Continue current diet     [address], [address]  Contact: [phone]

## 2022-05-02 NOTE — PROGRESS NOTES
Occupational Therapy  Facility/Department: [facility] C3 TELE/MED SURG/ONC  Daily Treatment Note  NAME: Franklin Ro  : 1955  MRN: [id number]    Date of Service: 2022    Discharge Recommendations:  Subacute/Skilled Nursing Facility  OT Equipment Recommendations  Other: defer      Assessment    Assessment: Pt continues to require min A for safe mobility and CGA-min A for safe t/fs with 1 LOB during mobility requiring min A to correct. Pt showing decreased safety awareness, decreased awareness of errors and SOB during mobility. Pt continues to require min-mod A for LB ADLS though is SBA for seated UB ADLS. Pt would continue to benefit from skilled OT services to increase balance, safety, strength, endurance and independence for ADLS and functional mobility. Continue to recommend SNF at d/c d/t deficits. Safety: pt ended session seated in chair with alarm and call light. RN aware. Use gait belt throughout. Activity Tolerance: Patient limited by fatigue;Patient limited by endurance    Discharge Recommendations: Subacute/Skilled Nursing Facility  Other: defer      Plan   Plan  Times per Week: 3-5x/week  Current Treatment Recommendations: Strengthening; Functional mobility training; Endurance training;Gait training;Balance training; Safety education & training;Patient/Caregiver education & training;Pain management;Self-Care / ADL;Positioning;Home management training     Subjective   Subjective  Subjective: Pt seated up in chair and minimally agreeable to OT. Pain: Pt reporting some discomfort from previous chest tube; /10  Cognition  Overall Cognitive Status: Exceptions  Arousal/Alertness: Appropriate responses to stimuli  Following Commands:  Follows one step commands with repetition  Attention Span: Appears intact  Safety Judgement: Decreased awareness of need for assistance;Decreased awareness of need for safety  Problem Solving: Decreased awareness of errors  Insights: Decreased awareness of deficits  Initiation: Requires cues for some  Sequencing: Requires cues for some        Objective    Vitals  Vitals  Pulse: 85  Heart Rate Source: Monitor  BP: 109/64  BP Location: Left upper arm  Patient Position: Sitting  MAP (Calculated): 79  SpO2: 98 %  O2 Device: Nasal cannula (1L)     Bed Mobility Training  Bed Mobility Training: No    Balance  Sitting: Intact  Standing: Impaired  Standing - Static: Fair;Occasional  Standing - Dynamic: Constant support; Fair (SW used, 1 LOB requiring min A to correct, vc's for safety, pt impulsive during mobility. CGA-min A throuhgout. Activity: to/from restroom, LB clothing management. Time: ~[id number], ~2minutes. Pt SOB throughout mobility.)    Transfer Training  Transfer Training: Yes  Overall Level of Assistance: Contact-guard assistance  Sit to Stand: Minimum assistance  Stand to Sit: Contact-guard assistance  Toilet Transfer: Contact-guard assistance;Minimum assistance (to/from [facility] in restroom.)     ADL  UE Dressing: Stand by assistance; Increased time to complete;Verbal cueing (min vc's and SBA to don/doff gown.)  LE Dressing: Moderate assistance;Dependent/Total;Increased time to complete (total A to don/doff socks, mod A to don pants.)  Toileting: Minimal assistance; Increased time to complete; Adaptive equipment (min A for safe t/fs. Pt with decreased awareness of deficits and requiring cue for positioning to urinate in [facility] in seated instead of on the floor.)           Patient Education  Education Given To: Patient  Education Provided: Role of Therapy;Plan of Care;Transfer Training;Precautions; Energy Conservation; ADL Adaptive Strategies; Equipment  Education Provided Comments: Pt educated on purpose of OT, OT POC, energy conservation technique, safe t/fs, safe mobility, positioning during toileting, equipment for LB dressing and importance of OOB activity. Goals  Short Term Goals  Time Frame for Short term goals: 1 week (5/1);  Extend to 5/7 d/t LOS  Short Term Goal 1: Pt will perform toilet transfer with SBA and use of LRAD-Ongoing (5/2) min A with SW  Short Term Goal 2: Pt will perform toileting with SBA and use of LRAD-Ongoing (5/2) min A with SW  Short Term Goal 3: Pt will perform LB dressing with SBA-Ongoing (5/2) mod A with SW  Short Term Goal 4: Pt will perform 3 grooming tasks while standing at sink with LRAD and rest breaks  Short Term Goal 5: Pt will perform x15 BUE exercises to improve UB strength/endurance to increase IND with reposition and ADLs  Patient Goals   Patient goals : To get stronger     AM PAC ADL: 18  Therapy Time   Individual Concurrent Group Co-treatment   Time In 1055         Time Out 1135         Minutes 40         Timed Code Treatment Minutes: HAIR [place]/L  If pt discharges prior to next session, this note will serve as discharge summary. See case management note for discharge disposition.

## 2022-05-03 VITALS
RESPIRATION RATE: 16 BRPM | BODY MASS INDEX: 34.19 KG/M2 | SYSTOLIC BLOOD PRESSURE: 122 MMHG | WEIGHT: 252.43 LBS | HEART RATE: 92 BPM | DIASTOLIC BLOOD PRESSURE: 66 MMHG | OXYGEN SATURATION: 95 % | HEIGHT: 72 IN | TEMPERATURE: 97.3 F

## 2022-05-03 LAB
GLUCOSE BLD-MCNC: 112 MG/DL (ref 70–99)
GLUCOSE BLD-MCNC: 125 MG/DL (ref 70–99)
GLUCOSE BLD-MCNC: 185 MG/DL (ref 70–99)
GLUCOSE BLD-MCNC: 247 MG/DL (ref 70–99)
PERFORMED ON: ABNORMAL

## 2022-05-03 PROCEDURE — 97110 THERAPEUTIC EXERCISES: CPT

## 2022-05-03 PROCEDURE — 6370000000 HC RX 637 (ALT 250 FOR IP): Performed by: INTERNAL MEDICINE

## 2022-05-03 PROCEDURE — 6370000000 HC RX 637 (ALT 250 FOR IP): Performed by: STUDENT IN AN ORGANIZED HEALTH CARE EDUCATION/TRAINING PROGRAM

## 2022-05-03 PROCEDURE — 94761 N-INVAS EAR/PLS OXIMETRY MLT: CPT

## 2022-05-03 PROCEDURE — 2700000000 HC OXYGEN THERAPY PER DAY

## 2022-05-03 PROCEDURE — 97116 GAIT TRAINING THERAPY: CPT

## 2022-05-03 PROCEDURE — 97530 THERAPEUTIC ACTIVITIES: CPT

## 2022-05-03 RX ADMIN — ATORVASTATIN CALCIUM 20 MG: 10 TABLET, FILM COATED ORAL at 09:22

## 2022-05-03 RX ADMIN — DIGOXIN 0.12 MG: 125 TABLET ORAL at 09:22

## 2022-05-03 RX ADMIN — APIXABAN 5 MG: 5 TABLET, FILM COATED ORAL at 09:22

## 2022-05-03 RX ADMIN — PREDNISONE 20 MG: 20 TABLET ORAL at 09:22

## 2022-05-03 RX ADMIN — DILTIAZEM HYDROCHLORIDE 120 MG: 120 CAPSULE, COATED, EXTENDED RELEASE ORAL at 09:22

## 2022-05-03 RX ADMIN — INSULIN LISPRO 6 UNITS: 100 INJECTION, SOLUTION INTRAVENOUS; SUBCUTANEOUS at 11:56

## 2022-05-03 RX ADMIN — BUPROPION HYDROCHLORIDE 100 MG: 100 TABLET, EXTENDED RELEASE ORAL at 09:22

## 2022-05-03 RX ADMIN — INSULIN HUMAN 8 UNITS: 100 INJECTION, SOLUTION PARENTERAL at 11:55

## 2022-05-03 RX ADMIN — INSULIN HUMAN 8 UNITS: 100 INJECTION, SOLUTION PARENTERAL at 08:59

## 2022-05-03 ASSESSMENT — PAIN SCALES - GENERAL
PAINLEVEL_OUTOF10: 0

## 2022-05-03 NOTE — DISCHARGE SUMMARY
tolerating      HLD-resume statin     Hypothyroidism-clinically euthyroid, resumed Synthroid      DM2- insulin regimen. Steroid complicated treatment. BS high   Takes levmir - 90 bid at home - ? pharmacy to confirm the dose   Increased lantus 40 nightly      Chronic anxiety-mood stable, resumed Wellbutrin     Hypotension - orthostatic - low BP after working with PT/OT. Gentle IVF. May need home meds adjusted prior to DC. Will decrease lisinopril from 20 mg to 10 mg po daily and hold torsemide. Will re-eval in the am.  Still low this am   All antihypertensives are on hold , IVF  BP better -   cardizem - lower dose started   Last echo 2020 ? [facility] - report - not available   Echo - pending      Leukocytosis- stable - no evidence of infection -  possibly steroid induced      Hyponatremia - stable          Physical Exam Performed:     /66   Pulse 92   Temp 97.3 °F (36.3 °C) (Oral)   Resp 16   Ht 6' (1.829 m)   Wt 252 lb 6.8 oz (114.5 kg)   SpO2 95%   BMI 34.24 kg/m²       General appearance: NAD,  in bed. Obese   HEENT: . Conjunctivae/corneas clear. Neck: Supple, . No jugular venous distention. Trachea midline. Respiratory: Bilateral breath sounds, decreased at both bases, there is also notably diminished breath sounds in the left lung field, no use of accessory muscles at this time. pleur x in place . 2 l o2   Cardiovascular: Regular rate and rhythm with normal S1/S2   Abdomen: Soft, non-tender, non-distended with normal bowel sounds. Obese   Musculoskeletal: No clubbing, cyanosis or edema bilaterally.  Full range of motion without deformity. Neurologic:  Neurovascularly intact without any focal sensory/motor deficits. Psychiatric: He is awake, oriented,   Peripheral Pulses: +2 palpable, equal bilaterally          Labs:  For convenience and continuity at follow-up the following most recent labs are provided:      CBC:    Lab Results   Component Value Date    WBC 8.7 05/02/2022    HGB 12.7 05/02/2022 HCT 39.2 05/02/2022     05/02/2022       Renal:    Lab Results   Component Value Date     05/02/2022    K 3.7 05/02/2022    CL 96 05/02/2022    CO2 31 05/02/2022    BUN 11 05/02/2022    CREATININE 0.7 05/02/2022    CALCIUM 9.8 05/02/2022         Significant Diagnostic Studies    Radiology:   XR CHEST 1 VIEW   Final Result   1. Interval placement of a left PleurX catheter, with no significant change   in size or appearance of a large left pleural effusion. 2. Persistent near complete consolidation of the left lung. 3. No evidence of a pneumothorax. XR CHEST PORTABLE   Final Result   Decreased left-sided pleural effusion. There is a developing pleural gas   collection seen in the left francisco javier thorax superiorly secondary to decreased   pleural fluid and incomplete re-expansion of the left lung. Dense   consolidation the left lung persists along with large remaining left-sided   pleural effusion      Improved aeration of the right lung         XR CHEST PORTABLE   Final Result   Stable left chest tube. No pneumothorax. Persisting extensive opacification of the left hemithorax- atelectasis or   consolidation versus pleural effusion. Developing right basilar airspace disease, likely pulmonary edema. XR CHEST PORTABLE   Final Result   Left chest tube is in stable position. Left hemithorax remains mostly opaque with aeration of the apex. Decreased right perihilar vascular congestion. XR CHEST PORTABLE   Final Result   Partial re-expansion of the left upper lobe following chest tube. Increased   right perihilar edema or infiltrate. No pneumothorax.                 Consults:     IP CONSULT TO SOCIAL WORK  IP CONSULT TO CARDIOTHORACIC SURGERY  IP CONSULT TO HOME CARE NEEDS    Disposition:  [facility]    Condition at Discharge: Stable    Discharge Instructions/Follow-up:  PCP/ oncology    Code Status:  Prior     Activity: activity as tolerated    Diet: diabetic diet      Discharge Medications:     Discharge Medication List as of 5/3/2022 12:27 PM           Details   insulin glargine (LANTUS) 100 UNIT/ML injection vial Inject 40 Units into the skin nightly, Disp-10 mL, R-3Normal      !! insulin lispro (HUMALOG) 100 UNIT/ML SOLN injection vial Inject 0-12 Units into the skin 3 times daily (with meals), Disp-10 mL, R-1Normal      !! insulin lispro (HUMALOG) 100 UNIT/ML SOLN injection vial Inject 0-6 Units into the skin nightly, Disp-10 mL, R-0Normal      insulin regular (HUMULIN R;NOVOLIN R) 100 UNIT/ML injection Inject 8 Units into the skin 3 times daily (before meals), Disp-10 mL, R-3Normal       !! - Potential duplicate medications found. Please discuss with provider.            Details   apixaban (ELIQUIS) 5 MG TABS tablet Take 1 tablet by mouth 2 times daily, Disp-60 tablet, R-0Normal      dilTIAZem (CARDIZEM CD) 120 MG extended release capsule Take 1 capsule by mouth daily, Disp-30 capsule, R-3Normal      predniSONE (DELTASONE) 20 MG tablet Take 0.5 tablets by mouth daily for 3 days, Disp-3 tablet, R-0Normal              Details   TECHLITE PEN NEEDLES 31G X 8 MM MISC USE 1 FIVE TIMES DAILY, DAWHistorical Med      !! levothyroxine (SYNTHROID) 200 MCG tablet Take 200 mcg by mouth Daily Historical Med      !! EUTHYROX 50 MCG tablet Take 50 mcg by mouth Daily , DAWHistorical Med      guaiFENesin (MUCINEX) 600 MG extended release tablet Take 1 tablet by mouth 2 times daily, Disp-20 tablet, R-0Normal      digoxin (LANOXIN) 125 MCG tablet Take 0.125 mg by mouth dailyHistorical Med      tiotropium (SPIRIVA RESPIMAT) 2.5 MCG/ACT AERS inhaler Inhale 2 puffs into the lungs daily, Disp-4 g, R-5Normal      atorvastatin (LIPITOR) 20 MG tablet Take 20 mg by mouth daily Historical Med      buPROPion (WELLBUTRIN SR) 100 MG extended release tablet Take 100 mg by mouth 2 times daily Historical Med      metFORMIN (GLUCOPHAGE) 850 MG tablet Take 850 mg by mouth daily (with breakfast) Historical Med      nitroGLYCERIN (NITROSTAT) 0.4 MG SL tablet Place 0.4 mg under the tongue every 5 minutes as needed Historical Med      nortriptyline (PAMELOR) 10 MG capsule Take 10 mg by mouth nightly Historical Med      Omega-3 Fatty Acids (FISH OIL) 1000 MG CAPS Take 3,000 mg by mouth 3 times dailyHistorical Med      aspirin 81 MG tablet Take 81 mg by mouth dailyHistorical Med       !! - Potential duplicate medications found. Please discuss with provider. Time Spent on discharge is   45 minutes in the examination, evaluation, counseling and review of medications and discharge plan. Signed:    Farida Melendez MD   5/3/2022      Thank you Brittany Simon MD for the opportunity to be involved in this patient's care. If you have any questions or concerns, please feel free to contact me at [phone].

## 2022-05-03 NOTE — PROGRESS NOTES
Physical Therapy  Facility/Department: [facility] C3 TELE/MED SURG/ONC  Daily Treatment Note  NAME: Katia Lugo  : 1955  MRN: [id number]    Date of Service: 5/3/2022    Discharge Recommendations:  Subacute/Skilled Nursing Facility   PT Equipment Recommendations  Equipment Needed: No       AM-PAC Mobility Inpatient   How much difficulty turning over in bed?: A Little  How much difficulty sitting down on / standing up from a chair with arms?: A Little  How much difficulty moving from lying on back to sitting on side of bed?: A Little  How much help from another person moving to and from a bed to a chair?: A Little  How much help from another person needed to walk in hospital room?: A Little  How much help from another person for climbing 3-5 steps with a railing?: A Lot  AM-PAC Inpatient Mobility Raw Score : 17  AM-PAC Inpatient T-Scale Score : 42.13  Mobility Inpatient CMS 0-100% Score: 50.57  Mobility Inpatient CMS G-Code Modifier : CK    Patient Diagnosis(es): There were no encounter diagnoses. Assessment   Assessment: Pt cont to need SBA for bed moiblity and CGA for transfers and gait short distances up to 15' with RW. He requires encouragment to participate and sounds like he relies on others for alot of his housesold chores and general care. Pt is recommended for SNF at D/C for con't skilled PT. Activity Tolerance: Patient limited by fatigue;Patient limited by endurance  Equipment Needed: No     Plan    Plan  Plan: 3-5 times per week  Specific Instructions for Next Treatment: Progress ther ex and mobility as tolerated  Current Treatment Recommendations: Strengthening;ROM;Balance training;Functional mobility training;Transfer training; Endurance training;Gait training; Safety education & training;Patient/Caregiver education & training;Equipment evaluation, education, & procurement     Restrictions  Restrictions/Precautions  Restrictions/Precautions: Fall Risk,General Precautions  Position Activity Restriction  Other position/activity restrictions: Julieta-sys monitor in room, telemetry with ICU monitoring, L chest tube, Meier     Subjective    Subjective  Subjective: Pt supine in bed upon arrival. Pt agreeable to PT session w/ cues for motivation. Pain: no c/o pain made during session  Orientation  Overall Orientation Status: Within Functional Limits  Cognition  Overall Cognitive Status: Exceptions  Arousal/Alertness: Appropriate responses to stimuli  Following Commands: Follows one step commands with repetition; Follows one step commands with increased time  Attention Span: Attends with cues to redirect  Safety Judgement: Decreased awareness of need for assistance;Decreased awareness of need for safety  Problem Solving: Decreased awareness of errors  Insights: Decreased awareness of deficits  Initiation: Requires cues for some  Sequencing: Requires cues for some  Cognition Comment: Pt makes little eye contact and does not enjoy therapy but does not refuse the service. Pt looks off when requests are made for mobility and ex but when asked if he hears the request he states he is in no hurry to do anything. Objective   Vitals  Pulse: 92  BP: 122/66  BP Location: Right upper arm  MAP (Calculated): 84.67  SpO2: 95 %  O2 Device: Nasal cannula (1L)  Bed Mobility Training  Rolling: Stand-by assistance;Supervision  Supine to Sit: Stand-by assistance (reaches for therapist hand to pull him up \"give me a hand\" but when given cues pt can get up OOB with requested technique and SBA to his R)  Sit to Supine: Other (comment) (remained up)  Balance  Sitting: Intact  Standing: Impaired  Standing - Static: Fair;Occasional  Standing - Dynamic: Constant support; Fair  Transfer Training  Transfer Training: Yes  Sit to Stand: Contact-guard assistance  Stand to Sit: Contact-guard assistance  Gait Training  Gait Training: Yes  Gait  Overall Level of Assistance: Contact-guard assistance  Interventions: Safety awareness training  Speed/Latosha: Pace decreased (< 100 feet/min); Slow  Step Length: Left shortened;Right shortened  Gait Abnormalities: Decreased step clearance; Step to gait  Distance (ft): 15 Feet  Assistive Device: Walker, rolling     PT Exercises  A/AROM Exercises: AROM exs: AP, LAQ, seated marches X 15 with increased time     Safety Devices  Type of Devices: All fall risk precautions in place;Call light within reach; Chair alarm in place;Gait belt;Left in chair;Nurse notified     Patient Education  Education Given To: Patient  Education Provided: Role of Therapy;Plan of Care;Home Exercise Program;Transfer Training; Fall Prevention Strategies;Precautions; Equipment; Energy Conservation  Education Provided Comments: Pt educated on importance of OOB mobility for current deficits, req mod cues for motivation to participate. Education Method: Demonstration;Verbal  Barriers to Learning: Cognition  Education Outcome: Verbalized understanding;Demonstrated understanding;Continued education needed    Goals  Short Term Goals  Time Frame for Short term goals: 1 week, 4/30/22 (unless otherwise specified); Goals extended to 5/05/22   Short term goal 1: Pt will transfer supine <-> sit with SBA--4/28 NT  Short term goal 2: Pt will transfer sit <-> stand and bed>chair using appropriate AD as needed with CGA x 1--4/28 CGA-min(A)  Short term goal 3: Pt will ambulate x 60 feet using appropriate AD as needed with CGA x 1--4/28 15 ft  Short term goal 4: By 4/26/22: Pt will tolerate 12-15 reps BLE exercise for strengthening, balance, and endurance - MET for ankle pumps on 4/25/22, goal ongoing  Patient Goals   Patient goals :  \"To get stronger\"      Therapy Time   Individual Concurrent Group Co-treatment   Time In 0941         Time Out 1019         Minutes [id number]

## 2022-05-03 NOTE — PROGRESS NOTES
Discharge instructions given. Pt family demonstrated. pleurex drain care and verbalized understanding. Kike Miner RN

## 2022-05-03 NOTE — PLAN OF CARE
Problem: Chronic Conditions and Co-morbidities  Goal: Patient's chronic conditions and co-morbidity symptoms are monitored and maintained or improved  5/3/2022 0431 by Carol Narvaez RN  Outcome: Progressing  Problem: Skin/Tissue Integrity  Goal: Absence of new skin breakdown  Description: 1. Monitor for areas of redness and/or skin breakdown  2. Assess vascular access sites hourly  3. Every 4-6 hours minimum:  Change oxygen saturation probe site  4. Every 4-6 hours:  If on nasal continuous positive airway pressure, respiratory therapy assess nares and determine need for appliance change or resting period.   5/3/2022 0431 by Carol Narvaez RN  Outcome: Progressing  Problem: Safety - Adult  Goal: Free from fall injury  5/2/2022 2021 by Any Spann RN  Outcome: Progressing

## 2022-05-04 ENCOUNTER — CARE COORDINATION (OUTPATIENT)
Dept: CASE MANAGEMENT | Age: 67
End: 2022-05-04

## 2022-05-04 NOTE — CARE COORDINATION
[facility] Transitions Initial Follow Up Call    Call within 2 business days of discharge: Yes    Patient: Lisa Rankin Patient : 1955   MRN: [id number]  Reason for Admission: Endobronchial mass  Discharge Date: 5/3/22 RARS: Readmission Risk Score: 17.4 ( )      Last Discharge [facility]       Complaint Diagnosis Description Type Department Provider    22   Admission (Discharged) Дмитрий Patricio MD           Spoke with: [id number]: [facility]    Non-face-to-face services provided:  Obtained and reviewed discharge summary and/or continuity of care documents     Was this an external facility discharge? No    Challenges to be reviewed by the provider   Additional needs identified to be addressed with provider No  none       Method of communication with provider : none    Advance Care Planning:   Does patient have an Advance Directive:  not on file; education provided. Was this a readmission? Yes  Patient stated reason for admission: shortness of breath  Patients top risk factors for readmission:   medical condition-ARF w/ hypoxia, CHF, afib, HTN, CAD, DM, small cell lung CA, COPD    Care Transition Nurse (CTN) contacted the patient by telephone to perform post hospital discharge assessment. Verified name and  with patient as identifiers. Provided introduction to self, and explanation of the CTN role. CTN reviewed discharge instructions, medical action plan and red flags with patient who verbalized understanding. Patient given an opportunity to ask questions and does not have any further questions or concerns at this time. Were discharge instructions available to patient? Yes. Reviewed appropriate site of care based on symptoms and resources available to patient including: PCP  Specialist  Home health. The patient agrees to contact the PCP office for questions related to their healthcare.      Medication reconciliation was performed with patient, who verbalizes understanding of administration of home medications. COVID Risk Education    Educated patient about risk for severe COVID-19 due to risk factors according to CDC guidelines. Education provided on COVID-19 vaccination as appropriate. Discussed exposure protocols and quarantine with CDC Guidelines. Patient was given an opportunity to verbalize any questions and concerns and agrees to contact CTN or health care provider for questions related to their healthcare. Was patient discharged with a pulse oximeter? No Discussed and confirmed pulse oximeter discharge instructions and when to notify provider or seek emergency care. Patient answered call and verified . Patient pleasant and agreeable to transition call. Patient confirmed that home care nurse completed [address] visit yesterday. Pleur-x catheter instructions reviewed- Pleur-x catheter- Can drain daily or every other day until drainage is less than 150 ml, then drain  every 2-3 days. Once you are draining less than 150 ml every 3 days you may drain every 4th day. Once you are draining  less than 150 ml every 4th day, please call Dr. Yesenia Linares office for follow up ([phone]). Stated understanding. Denied any shortness of breath or pain at time of call. Declined full medication reconciliation d/t home care nurse and d/c nurse reviewed thoroughly and denied any questions. Confirmed that he is taking all medication as directed. Declined assistance with scheduling follow up visits. Patient stated he had numbers on d/c summary and will schedule visits. Denied any acute needs at present time. Educated on the use of urgent care or physicians 24 hr access line if assistance is needed after hours. CTN provided contact information for future needs. No further follow-up call identified based on severity of symptoms and risk factors.       Care Transitions 24 Hour Call    Do you have a copy of your discharge instructions?: Yes  Do you have all of your prescriptions and are they filled?: Yes  Have you been contacted by a [address]?: No  Have you scheduled your follow up appointment?: Yes  How are you going to get to your appointment?: Car - family or friend to transport  Do you feel like you have everything you need to keep you well at home?: Yes  Care Transitions Interventions         Follow Up  No future appointments.     Maryellen Jones RN

## 2022-05-04 NOTE — PLAN OF CARE
Problem: Chronic Conditions and Co-morbidities  Goal: Patient's chronic conditions and co-morbidity symptoms are monitored and maintained or improved  Outcome: Completed     Problem: Skin/Tissue Integrity  Goal: Absence of new skin breakdown  Description: 1. Monitor for areas of redness and/or skin breakdown  2. Assess vascular access sites hourly  3. Every 4-6 hours minimum:  Change oxygen saturation probe site  4. Every 4-6 hours:  If on nasal continuous positive airway pressure, respiratory therapy assess nares and determine need for appliance change or resting period.   Outcome: Completed     Problem: Nutrition Deficit:  Goal: Optimize nutritional status  Outcome: Completed     Problem: Safety - Adult  Goal: Free from fall injury  Outcome: Completed

## 2022-05-05 ENCOUNTER — TELEPHONE (OUTPATIENT)
Dept: CARDIOTHORACIC SURGERY | Age: 67
End: 2022-05-05

## 2022-05-05 NOTE — TELEPHONE ENCOUNTER
Attempted to contact patient to check on pleurX drainage. [facility] asking patient to call back. Awaiting call back.

## 2022-05-05 NOTE — TELEPHONE ENCOUNTER
Pt has been d/c. Pt is to follow up with Dr. Ariana Griffith. Will watch to make sure pt follows up, then determine need for f/u with Dr. Sreedhar Phelps.

## 2022-05-07 NOTE — OP NOTE
Operative Note      Patient: Lucita Rosado  YOB: 1955  MRN: [id number]    Date of Procedure: 4/21/2022    Pre-Op Diagnosis: -Left upper lobe partial obstruction: Left large pleural effusion. Post-Op Diagnosis: Same       Procedure(s):  BRONCHOSCOPY WITH ENDOBRONCHIAL YAG LASER AND CHEST TUBE PLACEMENT    Surgeon(s):  MD Dejuan Gill MD    Assistant:   Surgical Assistant: Marta Avendaño  Physician Assistant: LINA Wang    Anesthesia: General    Estimated Blood Loss (mL): Minimal    Complications: None    Specimens:   * No specimens in log *    Implants:  * No implants in log *      Drains:   [REMOVED] Chest Tube Left Pleural 1 (Removed)   Chest Tube Airleak No 04/26/22 0833   Status Gravity 04/26/22 0833   Suction To water seal 04/26/22 0833   Y Connector Used No 04/26/22 0833   Drainage Description Serous; Yellow 04/26/22 0833   Dressing Status Clean, dry & intact 04/26/22 0833   Chest Tube Dressing Split Gauze 04/26/22 0833   Site Assessment Not assessed 04/26/22 0833   Surrounding Skin Unable to view 04/26/22 0833   Output (ml) 65 ml 04/26/22 1200       [REMOVED] Chest Tube Left Pleural (Removed)   Chest Tube Airleak No 05/02/22 2200   Status Gravity 05/03/22 1206   Suction To water seal 05/02/22 2200   Y Connector Used No 05/02/22 2200   Drainage Description Other (Comment) 05/03/22 1206   Dressing Status New dressing applied 05/03/22 1206   Chest Tube Dressing Split Gauze 05/02/22 2200   Site Assessment Clean, dry & intact 05/03/22 1206   Surrounding Skin Reddened 05/03/22 1206   Output (ml) 0 ml 05/03/22 1206       [REMOVED] External Urinary Catheter (Removed)   Placement Replaced 04/13/22 0600   Suction 40 mmgHg continuous 04/13/22 0600   Urine Color Yellow 04/13/22 0600   Urine Appearance Hazy 04/13/22 0600   Output (mL) 900 mL 04/13/22 1614       [REMOVED] External Urinary Catheter (Removed)   Site Assessment Clean,dry & intact 04/23/22 0925   Placement Repositioned 04/23/22 7667   Securement Method Leg strap 04/23/22 0925   Perineal Care Yes 04/22/22 2000   Suction N/A 04/23/22 0430   Urine Color Yellow 04/23/22 0925   Urine Appearance Clear 04/23/22 0925   Output (mL) 35 mL 04/23/22 0486       Findings: Partial obstruction left upper lobe total obstruction of left lower lobe large pleural effusion    Detailed Description of Procedure:   Patient was taken to the operating room after informed consent was obtained lying supine under general anesthesia ET tube was placed with no difficulty bronchoscopy was performed showed total obstruction of left lower lobe partial obstruction left lobe we used YAG laser to left upper lobe 40 marte continuous pulse total of .55 kJ clear path to the left upper lobe was seen hemostasis was secured no active bleeding was seen scope was left chest was prepped and draped in standard fashion pause for the course was done in standard manner needle was inserted backflow of clear fluid was seen wire was advanced sequential dilation was pigtail was inserted 900 cc of clear fluid was drained tube was secured in position patient was dispositioned to recovery room in stable condition complications      Electronically signed by Lynsey Rangel MD on 5/7/2022 at 9:44 AM

## 2022-05-07 NOTE — BRIEF OP NOTE
Brief Postoperative Note      Patient: Lucita Rosado  YOB: 1955  MRN: [id number]    Date of Procedure: 4/21/2022    Pre-Op Diagnosis: -Squamous cell carcinoma with intraparenchymal, large left-sided pleural effusion    Post-Op Diagnosis: Same       Procedure(s):  BRONCHOSCOPY WITH ENDOBRONCHIAL YAG LASER AND CHEST TUBE PLACEMENT    Surgeon(s):  MD Hank Gill MD    Assistant:  Surgical Assistant: Marta Avendaño  Physician Assistant: LINA Wang    Anesthesia: General    Estimated Blood Loss (mL): less than 550     Complications: None    Findings: Partial obstruction left upper lobe    Electronically signed by Heather Pina MD on 5/7/2022 at 9:42 AM

## 2022-05-10 ENCOUNTER — TELEPHONE (OUTPATIENT)
Dept: CARDIOTHORACIC SURGERY | Age: 67
End: 2022-05-10

## 2022-05-16 LAB
FUNGUS (MYCOLOGY) CULTURE: NORMAL
FUNGUS (MYCOLOGY) CULTURE: NORMAL
FUNGUS STAIN: NORMAL
FUNGUS STAIN: NORMAL

## 2022-05-17 NOTE — DISCHARGE SUMMARY
Name:  Kevin Louis  Room:  0218/0218-01  MRN:    [id number]    Discharge Summary      This discharge summary is in conjunction with a complete physical exam done on the day of discharge. 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 *          Procedures (Please Review Full Report for Details)      Consults    IP CONSULT TO PHARMACY  IP CONSULT TO CRITICAL CARE  IP CONSULT TO HOSPITALIST  PALLIATIVE CARE EVAL  IP CONSULT TO PALLIATIVE CARE      HPI:    Patient transferred here from [facility] 77-year-old male with recent diagnosis of squamous cell lung cancer. Patient was admitted here at [facility] From 4/12 to 4/15. He has a left lung mass which was biopsied, pathology positive for squamous cell lung cancer. Patient has left lung collapse. He was discharged home on home oxygen 3L with plan to follow-up with oncology/radiation oncology this week. Patient presented back next day 4/16 to [facility] with increased shortness of breath. Patient continues to smoke tobacco. He had taken his O2 off. He was hypoxic, with sats in the 80s. . Placed on O2 3 L; required BIPAP on transfer . CXR with left lung white out   Labs with elevated WBC, procalcitonin, lactic acid  . ABG with hypoxia and hypercarbia      Admitted to ICU. Pulmonology  consulted   Currently on supplemental oxygen 3 L. sats stable . Patient is a poor historian.  Denies any complaints.          Physical Exam at Discharge:  BP (!) 151/94   Pulse 74 Temp 95.5 °F (35.3 °C) (Oral)   Resp 16   Ht 6' (1.829 m)   Wt 275 lb (124.7 kg)   SpO2 92%   BMI 37.30 kg/m²         Hospital Course        # Acute on chronic hypoxic Respiratory  Failure   # COPD exacerbation  # Left lung collapse  # post obstructive PNA   -hypoxic 80%, requiring BIPAP. Admitted to ICU . Seen by pulmonology. Off biPap now. sats now stable on O2 3L   -CXR with left lung white out -> bronchoscopy planned for today. Patient refused. - cont IBD, IV steroids--> prednisone, now at 30 mg daily  - IV abx  - IV levaquin day 8/10  As left-sided pleural effusion-not enough for thoracentesis.     Squamous cell lung cancer left lung    seen by Oncology last admit  . Plan to F/w Dr. Kolby Thao next week to plan for cancer treatment   Possible debulking versus laser therapy per cardiothoracic surgery, requires 5 days of Eliquis, last dose 4/15. Plan transfer to [facility] for CTS evaluation later this week.     # H/O Hypercalcemia   Secondary to squamous cell lung cancer  - Calcium elevated last admit,  likely with bone mets .  required Zometa   Levels stable now      #Atrial fibrillation   - rate controlled , on BB, cardizem, digoxin   - on eliquis->  held for possible procedures        #Chronic CHF - diastolic with preserved EF   # Non obstructive CAD  - f.w [place] [facility]  - cont  statins, BB, lisinopril  -hold  ASA   Restart home dose torsemide 4/19     # DM 2   - on lantus,  SSI      #Chronic anxiety    - on wellbutrin     #Hypothyroid   - on synthroid             CT CHEST WO CONTRAST   Final Result   Complete opacification of the left hemithorax secondary to a large left   pleural effusion with compressive atelectasis of the left lung. No shift of   the mediastinum to the right. There is a gap between the 8th and 9th rib   associated with a nonunited fracture of the posterior aspect of the 9th rib. Through this gap, there is herniation of fluid into chest wall.       Small right pleural effusion with mild ground-glass infiltration noted in the   right upper lobe. Complete obliteration of the midportion of the left mainstem bronchus with   non visualization of rest of the left bronchial tree. Evidence of prior granulomatous disease. XR CHEST PORTABLE   Final Result   1. Worsening left-sided atelectasis/pneumonia and increasing parapneumonic   effusion. 2. Developing right basilar pneumonia.                 Discharge Medications     Medication List      CONTINUE taking these medications    TechLite Pen Needles 31G X 8 MM Misc  Generic drug: Insulin Pen Needle        ASK your doctor about these medications    apixaban 2.5 MG Tabs tablet  Commonly known as: Eliquis  Take 1 tablet by mouth 2 times daily     aspirin 81 MG tablet     atorvastatin 20 MG tablet  Commonly known as: LIPITOR     buPROPion 100 MG extended release tablet  Commonly known as: WELLBUTRIN SR     Cartia  MG extended release capsule  Generic drug: dilTIAZem     digoxin 125 MCG tablet  Commonly known as: LANOXIN     fish oil 1000 MG Caps     guaiFENesin 600 MG extended release tablet  Commonly known as: MUCINEX  Take 1 tablet by mouth 2 times daily     hydrALAZINE 25 MG tablet  Commonly known as: APRESOLINE     Levemir 100 UNIT/ML injection vial  Generic drug: insulin detemir     * levothyroxine 200 MCG tablet  Commonly known as: SYNTHROID     * Euthyrox 50 MCG tablet  Generic drug: levothyroxine     lisinopril 20 MG tablet  Commonly known as: PRINIVIL;ZESTRIL     metFORMIN 850 MG tablet  Commonly known as: GLUCOPHAGE     nitroGLYCERIN 0.4 MG SL tablet  Commonly known as: NITROSTAT     nortriptyline 10 MG capsule  Commonly known as: PAMELOR     NovoLOG 100 UNIT/ML injection vial  Generic drug: insulin aspart     potassium chloride 10 MEQ extended release capsule  Commonly known as: MICRO-K     predniSONE 10 MG tablet  Commonly known as: DELTASONE  30 mg x 3 days, 20 mg x 3 days, 10 mg x 3 days then stop     tiotropium 2.5 MCG/ACT Aers inhaler  Commonly known as: Spiriva Respimat  Inhale 2 puffs into the lungs daily     torsemide 100 MG tablet  Commonly known as: DEMADEX         * This list has 2 medication(s) that are the same as other medications prescribed for you. Read the directions carefully, and ask your doctor or other care provider to review them with you.                   Transfer : Stable to Elia Ayala MD 5/17/2022 1:53 PM

## 2022-05-31 LAB
AFB CULTURE (MYCOBACTERIA): NORMAL
AFB CULTURE (MYCOBACTERIA): NORMAL
AFB SMEAR: NORMAL
AFB SMEAR: NORMAL

## 2022-06-06 NOTE — TELEPHONE ENCOUNTER
Jefferson Chavez states he is currently doing radiation treatments 5 days a week. Then he will start chemo.

## 2022-06-08 ENCOUNTER — TELEPHONE (OUTPATIENT)
Dept: CARDIOTHORACIC SURGERY | Age: 67
End: 2022-06-08

## 2022-06-08 NOTE — TELEPHONE ENCOUNTER
Received fax from [facility] with updated pleurX drainage amounts. Patient has been draining every other day on average and draining 650 ml-1000 ml. Patient to continue to drain daily or every other day.

## 2022-06-19 ENCOUNTER — APPOINTMENT (OUTPATIENT)
Dept: CT IMAGING | Age: 67
DRG: 871 | End: 2022-06-19
Attending: INTERNAL MEDICINE
Payer: MEDICARE

## 2022-06-19 ENCOUNTER — HOSPITAL ENCOUNTER (INPATIENT)
Age: 67
LOS: 4 days | Discharge: HOME HEALTH | DRG: 871 | End: 2022-06-23
Attending: INTERNAL MEDICINE | Admitting: INTERNAL MEDICINE
Payer: MEDICARE

## 2022-06-19 ENCOUNTER — APPOINTMENT (OUTPATIENT)
Dept: GENERAL RADIOLOGY | Age: 67
DRG: 871 | End: 2022-06-19
Attending: INTERNAL MEDICINE
Payer: MEDICARE

## 2022-06-19 PROBLEM — J13 CAP (COMMUNITY ACQUIRED PNEUMONIA) DUE TO PNEUMOCOCCUS (HCC): Status: ACTIVE | Noted: 2022-06-19

## 2022-06-19 LAB
A/G RATIO: 0.9 (ref 1.1–2.2)
ALBUMIN SERPL-MCNC: 2.9 G/DL (ref 3.4–5)
ALP BLD-CCNC: 139 U/L (ref 40–129)
ALT SERPL-CCNC: 20 U/L (ref 10–40)
ANION GAP SERPL CALCULATED.3IONS-SCNC: 8 MMOL/L (ref 3–16)
AST SERPL-CCNC: 15 U/L (ref 15–37)
BASOPHILS ABSOLUTE: 0 K/UL (ref 0–0.2)
BASOPHILS RELATIVE PERCENT: 0.2 %
BILIRUB SERPL-MCNC: <0.2 MG/DL (ref 0–1)
BUN BLDV-MCNC: 10 MG/DL (ref 7–20)
CALCIUM SERPL-MCNC: 8.8 MG/DL (ref 8.3–10.6)
CHLORIDE BLD-SCNC: 97 MMOL/L (ref 99–110)
CO2: 33 MMOL/L (ref 21–32)
CREAT SERPL-MCNC: <0.5 MG/DL (ref 0.8–1.3)
EKG ATRIAL RATE: 119 BPM
EKG DIAGNOSIS: NORMAL
EKG Q-T INTERVAL: 344 MS
EKG QRS DURATION: 88 MS
EKG QTC CALCULATION (BAZETT): 434 MS
EKG R AXIS: 97 DEGREES
EKG T AXIS: 28 DEGREES
EKG VENTRICULAR RATE: 96 BPM
EOSINOPHILS ABSOLUTE: 0 K/UL (ref 0–0.6)
EOSINOPHILS RELATIVE PERCENT: 0 %
GFR AFRICAN AMERICAN: >60
GFR NON-AFRICAN AMERICAN: >60
GLUCOSE BLD-MCNC: 253 MG/DL (ref 70–99)
GLUCOSE BLD-MCNC: 84 MG/DL (ref 70–99)
GLUCOSE BLD-MCNC: 97 MG/DL (ref 70–99)
GLUCOSE BLD-MCNC: 99 MG/DL (ref 70–99)
HCT VFR BLD CALC: 35.8 % (ref 40.5–52.5)
HEMOGLOBIN: 11.4 G/DL (ref 13.5–17.5)
INR BLD: 1.3 (ref 0.87–1.14)
LYMPHOCYTES ABSOLUTE: 0.3 K/UL (ref 1–5.1)
LYMPHOCYTES RELATIVE PERCENT: 1.9 %
MAGNESIUM: 1.9 MG/DL (ref 1.8–2.4)
MCH RBC QN AUTO: 29.2 PG (ref 26–34)
MCHC RBC AUTO-ENTMCNC: 31.8 G/DL (ref 31–36)
MCV RBC AUTO: 91.8 FL (ref 80–100)
MONOCYTES ABSOLUTE: 0.5 K/UL (ref 0–1.3)
MONOCYTES RELATIVE PERCENT: 3.3 %
NEUTROPHILS ABSOLUTE: 13.7 K/UL (ref 1.7–7.7)
NEUTROPHILS RELATIVE PERCENT: 94.6 %
PDW BLD-RTO: 17.8 % (ref 12.4–15.4)
PERFORMED ON: ABNORMAL
PERFORMED ON: NORMAL
PERFORMED ON: NORMAL
PLATELET # BLD: 352 K/UL (ref 135–450)
PMV BLD AUTO: 5.8 FL (ref 5–10.5)
POTASSIUM SERPL-SCNC: 4.4 MMOL/L (ref 3.5–5.1)
PROTHROMBIN TIME: 16 SEC (ref 11.7–14.5)
RBC # BLD: 3.9 M/UL (ref 4.2–5.9)
SODIUM BLD-SCNC: 138 MMOL/L (ref 136–145)
TOTAL PROTEIN: 6.3 G/DL (ref 6.4–8.2)
WBC # BLD: 14.5 K/UL (ref 4–11)

## 2022-06-19 PROCEDURE — 6360000004 HC RX CONTRAST MEDICATION: Performed by: THORACIC SURGERY (CARDIOTHORACIC VASCULAR SURGERY)

## 2022-06-19 PROCEDURE — 83036 HEMOGLOBIN GLYCOSYLATED A1C: CPT

## 2022-06-19 PROCEDURE — 71260 CT THORAX DX C+: CPT

## 2022-06-19 PROCEDURE — 93010 ELECTROCARDIOGRAM REPORT: CPT | Performed by: INTERNAL MEDICINE

## 2022-06-19 PROCEDURE — 85025 COMPLETE CBC W/AUTO DIFF WBC: CPT

## 2022-06-19 PROCEDURE — 02HV33Z INSERTION OF INFUSION DEVICE INTO SUPERIOR VENA CAVA, PERCUTANEOUS APPROACH: ICD-10-PCS | Performed by: RADIOLOGY

## 2022-06-19 PROCEDURE — 83735 ASSAY OF MAGNESIUM: CPT

## 2022-06-19 PROCEDURE — 6360000002 HC RX W HCPCS: Performed by: INTERNAL MEDICINE

## 2022-06-19 PROCEDURE — 94640 AIRWAY INHALATION TREATMENT: CPT

## 2022-06-19 PROCEDURE — 2700000000 HC OXYGEN THERAPY PER DAY

## 2022-06-19 PROCEDURE — 93005 ELECTROCARDIOGRAM TRACING: CPT | Performed by: INTERNAL MEDICINE

## 2022-06-19 PROCEDURE — 1200000000 HC SEMI PRIVATE

## 2022-06-19 PROCEDURE — 94761 N-INVAS EAR/PLS OXIMETRY MLT: CPT

## 2022-06-19 PROCEDURE — 80053 COMPREHEN METABOLIC PANEL: CPT

## 2022-06-19 PROCEDURE — 36415 COLL VENOUS BLD VENIPUNCTURE: CPT

## 2022-06-19 PROCEDURE — 85610 PROTHROMBIN TIME: CPT

## 2022-06-19 PROCEDURE — 6370000000 HC RX 637 (ALT 250 FOR IP): Performed by: INTERNAL MEDICINE

## 2022-06-19 PROCEDURE — 2580000003 HC RX 258: Performed by: INTERNAL MEDICINE

## 2022-06-19 PROCEDURE — 71045 X-RAY EXAM CHEST 1 VIEW: CPT

## 2022-06-19 RX ORDER — SODIUM CHLORIDE 0.9 % (FLUSH) 0.9 %
5-40 SYRINGE (ML) INJECTION PRN
Status: DISCONTINUED | OUTPATIENT
Start: 2022-06-19 | End: 2022-06-23 | Stop reason: HOSPADM

## 2022-06-19 RX ORDER — FLUTICASONE PROPIONATE 110 UG/1
1 AEROSOL, METERED RESPIRATORY (INHALATION) 2 TIMES DAILY
COMMUNITY

## 2022-06-19 RX ORDER — DIGOXIN 125 MCG
125 TABLET ORAL DAILY
Status: DISCONTINUED | OUTPATIENT
Start: 2022-06-19 | End: 2022-06-23 | Stop reason: HOSPADM

## 2022-06-19 RX ORDER — BUDESONIDE AND FORMOTEROL FUMARATE DIHYDRATE 160; 4.5 UG/1; UG/1
2 AEROSOL RESPIRATORY (INHALATION) 2 TIMES DAILY
COMMUNITY

## 2022-06-19 RX ORDER — TIZANIDINE 2 MG/1
2 TABLET ORAL EVERY 12 HOURS PRN
COMMUNITY

## 2022-06-19 RX ORDER — ONDANSETRON 2 MG/ML
4 INJECTION INTRAMUSCULAR; INTRAVENOUS EVERY 6 HOURS PRN
Status: DISCONTINUED | OUTPATIENT
Start: 2022-06-19 | End: 2022-06-23 | Stop reason: HOSPADM

## 2022-06-19 RX ORDER — ATORVASTATIN CALCIUM 10 MG/1
20 TABLET, FILM COATED ORAL NIGHTLY
Status: DISCONTINUED | OUTPATIENT
Start: 2022-06-19 | End: 2022-06-23 | Stop reason: HOSPADM

## 2022-06-19 RX ORDER — ACETAMINOPHEN 325 MG/1
650 TABLET ORAL EVERY 6 HOURS PRN
Status: DISCONTINUED | OUTPATIENT
Start: 2022-06-19 | End: 2022-06-23 | Stop reason: HOSPADM

## 2022-06-19 RX ORDER — SODIUM CHLORIDE 0.9 % (FLUSH) 0.9 %
5-40 SYRINGE (ML) INJECTION EVERY 12 HOURS SCHEDULED
Status: DISCONTINUED | OUTPATIENT
Start: 2022-06-19 | End: 2022-06-23 | Stop reason: HOSPADM

## 2022-06-19 RX ORDER — DEXTROSE MONOHYDRATE 50 MG/ML
100 INJECTION, SOLUTION INTRAVENOUS PRN
Status: DISCONTINUED | OUTPATIENT
Start: 2022-06-19 | End: 2022-06-23 | Stop reason: HOSPADM

## 2022-06-19 RX ORDER — IPRATROPIUM BROMIDE AND ALBUTEROL SULFATE 2.5; .5 MG/3ML; MG/3ML
1 SOLUTION RESPIRATORY (INHALATION)
Status: DISCONTINUED | OUTPATIENT
Start: 2022-06-19 | End: 2022-06-19

## 2022-06-19 RX ORDER — DILTIAZEM HYDROCHLORIDE 120 MG/1
120 CAPSULE, COATED, EXTENDED RELEASE ORAL DAILY
Status: DISCONTINUED | OUTPATIENT
Start: 2022-06-19 | End: 2022-06-23 | Stop reason: HOSPADM

## 2022-06-19 RX ORDER — ENOXAPARIN SODIUM 100 MG/ML
40 INJECTION SUBCUTANEOUS DAILY
Status: DISCONTINUED | OUTPATIENT
Start: 2022-06-20 | End: 2022-06-19 | Stop reason: ALTCHOICE

## 2022-06-19 RX ORDER — BUPROPION HYDROCHLORIDE 100 MG/1
100 TABLET, EXTENDED RELEASE ORAL 2 TIMES DAILY
Status: DISCONTINUED | OUTPATIENT
Start: 2022-06-19 | End: 2022-06-23 | Stop reason: HOSPADM

## 2022-06-19 RX ORDER — IPRATROPIUM BROMIDE AND ALBUTEROL SULFATE 2.5; .5 MG/3ML; MG/3ML
1 SOLUTION RESPIRATORY (INHALATION) 2 TIMES DAILY
Status: DISCONTINUED | OUTPATIENT
Start: 2022-06-20 | End: 2022-06-23 | Stop reason: HOSPADM

## 2022-06-19 RX ORDER — ERGOCALCIFEROL 1.25 MG/1
50000 CAPSULE ORAL WEEKLY
COMMUNITY

## 2022-06-19 RX ORDER — INSULIN LISPRO 100 [IU]/ML
0-6 INJECTION, SOLUTION INTRAVENOUS; SUBCUTANEOUS NIGHTLY
Status: DISCONTINUED | OUTPATIENT
Start: 2022-06-19 | End: 2022-06-23 | Stop reason: HOSPADM

## 2022-06-19 RX ORDER — HYDROCODONE BITARTRATE AND ACETAMINOPHEN 5; 325 MG/1; MG/1
1 TABLET ORAL EVERY 12 HOURS PRN
COMMUNITY

## 2022-06-19 RX ORDER — AZITHROMYCIN 250 MG/1
500 TABLET, FILM COATED ORAL EVERY 24 HOURS
Status: COMPLETED | OUTPATIENT
Start: 2022-06-19 | End: 2022-06-21

## 2022-06-19 RX ORDER — SODIUM CHLORIDE 9 MG/ML
INJECTION, SOLUTION INTRAVENOUS PRN
Status: DISCONTINUED | OUTPATIENT
Start: 2022-06-19 | End: 2022-06-23 | Stop reason: HOSPADM

## 2022-06-19 RX ORDER — POLYETHYLENE GLYCOL 3350 17 G/17G
17 POWDER, FOR SOLUTION ORAL DAILY PRN
Status: DISCONTINUED | OUTPATIENT
Start: 2022-06-19 | End: 2022-06-23 | Stop reason: HOSPADM

## 2022-06-19 RX ORDER — LEVOTHYROXINE SODIUM 0.1 MG/1
200 TABLET ORAL DAILY
Status: DISCONTINUED | OUTPATIENT
Start: 2022-06-19 | End: 2022-06-23 | Stop reason: HOSPADM

## 2022-06-19 RX ORDER — INSULIN DEGLUDEC INJECTION 100 U/ML
9 INJECTION, SOLUTION SUBCUTANEOUS 2 TIMES DAILY
Status: ON HOLD | COMMUNITY
End: 2022-08-15 | Stop reason: SDUPTHER

## 2022-06-19 RX ORDER — FUROSEMIDE 10 MG/ML
40 INJECTION INTRAMUSCULAR; INTRAVENOUS 2 TIMES DAILY
Status: DISCONTINUED | OUTPATIENT
Start: 2022-06-19 | End: 2022-06-20

## 2022-06-19 RX ORDER — INSULIN LISPRO 100 [IU]/ML
0-12 INJECTION, SOLUTION INTRAVENOUS; SUBCUTANEOUS
Status: DISCONTINUED | OUTPATIENT
Start: 2022-06-19 | End: 2022-06-23 | Stop reason: HOSPADM

## 2022-06-19 RX ORDER — ONDANSETRON 4 MG/1
4 TABLET, ORALLY DISINTEGRATING ORAL EVERY 8 HOURS PRN
Status: DISCONTINUED | OUTPATIENT
Start: 2022-06-19 | End: 2022-06-23 | Stop reason: HOSPADM

## 2022-06-19 RX ORDER — MELOXICAM 15 MG/1
15 TABLET ORAL DAILY PRN
COMMUNITY

## 2022-06-19 RX ORDER — ASPIRIN 81 MG/1
81 TABLET, CHEWABLE ORAL DAILY
Status: DISCONTINUED | OUTPATIENT
Start: 2022-06-19 | End: 2022-06-23 | Stop reason: HOSPADM

## 2022-06-19 RX ORDER — AMOXICILLIN AND CLAVULANATE POTASSIUM 875; 125 MG/1; MG/1
1 TABLET, FILM COATED ORAL 2 TIMES DAILY
Status: ON HOLD | COMMUNITY
End: 2022-06-22 | Stop reason: HOSPADM

## 2022-06-19 RX ORDER — FOLIC ACID 1 MG/1
1 TABLET ORAL DAILY
COMMUNITY

## 2022-06-19 RX ORDER — INSULIN GLARGINE 100 [IU]/ML
0.25 INJECTION, SOLUTION SUBCUTANEOUS NIGHTLY
Status: DISCONTINUED | OUTPATIENT
Start: 2022-06-19 | End: 2022-06-23 | Stop reason: HOSPADM

## 2022-06-19 RX ORDER — ACETAMINOPHEN 650 MG/1
650 SUPPOSITORY RECTAL EVERY 6 HOURS PRN
Status: DISCONTINUED | OUTPATIENT
Start: 2022-06-19 | End: 2022-06-23 | Stop reason: HOSPADM

## 2022-06-19 RX ORDER — GUAIFENESIN 600 MG/1
600 TABLET, EXTENDED RELEASE ORAL 2 TIMES DAILY
Status: DISCONTINUED | OUTPATIENT
Start: 2022-06-19 | End: 2022-06-23 | Stop reason: HOSPADM

## 2022-06-19 RX ADMIN — INSULIN LISPRO 3 UNITS: 100 INJECTION, SOLUTION INTRAVENOUS; SUBCUTANEOUS at 21:06

## 2022-06-19 RX ADMIN — DILTIAZEM HYDROCHLORIDE 120 MG: 120 CAPSULE, COATED, EXTENDED RELEASE ORAL at 17:02

## 2022-06-19 RX ADMIN — DIGOXIN 125 MCG: 125 TABLET ORAL at 17:01

## 2022-06-19 RX ADMIN — IPRATROPIUM BROMIDE AND ALBUTEROL SULFATE 1 AMPULE: .5; 3 SOLUTION RESPIRATORY (INHALATION) at 19:13

## 2022-06-19 RX ADMIN — BUPROPION HYDROCHLORIDE 100 MG: 100 TABLET, EXTENDED RELEASE ORAL at 17:01

## 2022-06-19 RX ADMIN — ACETAMINOPHEN 650 MG: 325 TABLET ORAL at 21:05

## 2022-06-19 RX ADMIN — AZITHROMYCIN MONOHYDRATE 500 MG: 250 TABLET ORAL at 17:01

## 2022-06-19 RX ADMIN — POLYETHYLENE GLYCOL 3350 17 G: 17 POWDER, FOR SOLUTION ORAL at 18:42

## 2022-06-19 RX ADMIN — CEFTRIAXONE SODIUM 1000 MG: 1 INJECTION, POWDER, FOR SOLUTION INTRAMUSCULAR; INTRAVENOUS at 15:16

## 2022-06-19 RX ADMIN — FUROSEMIDE 40 MG: 10 INJECTION, SOLUTION INTRAMUSCULAR; INTRAVENOUS at 17:02

## 2022-06-19 RX ADMIN — IOPAMIDOL 75 ML: 755 INJECTION, SOLUTION INTRAVENOUS at 16:44

## 2022-06-19 RX ADMIN — GUAIFENESIN 600 MG: 600 TABLET, EXTENDED RELEASE ORAL at 21:02

## 2022-06-19 RX ADMIN — INSULIN GLARGINE 20 UNITS: 100 INJECTION, SOLUTION SUBCUTANEOUS at 21:07

## 2022-06-19 RX ADMIN — APIXABAN 5 MG: 5 TABLET, FILM COATED ORAL at 21:02

## 2022-06-19 RX ADMIN — APIXABAN 5 MG: 5 TABLET, FILM COATED ORAL at 17:01

## 2022-06-19 RX ADMIN — BUPROPION HYDROCHLORIDE 100 MG: 100 TABLET, EXTENDED RELEASE ORAL at 21:02

## 2022-06-19 RX ADMIN — GUAIFENESIN 600 MG: 600 TABLET, EXTENDED RELEASE ORAL at 17:01

## 2022-06-19 RX ADMIN — ASPIRIN 81 MG: 81 TABLET, CHEWABLE ORAL at 17:01

## 2022-06-19 RX ADMIN — LEVOTHYROXINE SODIUM 200 MCG: 0.1 TABLET ORAL at 17:01

## 2022-06-19 RX ADMIN — IPRATROPIUM BROMIDE AND ALBUTEROL SULFATE 1 AMPULE: .5; 3 SOLUTION RESPIRATORY (INHALATION) at 16:14

## 2022-06-19 RX ADMIN — ATORVASTATIN CALCIUM 20 MG: 10 TABLET, FILM COATED ORAL at 21:02

## 2022-06-19 ASSESSMENT — PAIN DESCRIPTION - LOCATION: LOCATION: BACK

## 2022-06-19 ASSESSMENT — PAIN DESCRIPTION - DESCRIPTORS: DESCRIPTORS: SHARP

## 2022-06-19 ASSESSMENT — PAIN SCALES - GENERAL
PAINLEVEL_OUTOF10: 5
PAINLEVEL_OUTOF10: 0

## 2022-06-19 ASSESSMENT — PAIN - FUNCTIONAL ASSESSMENT: PAIN_FUNCTIONAL_ASSESSMENT: PREVENTS OR INTERFERES SOME ACTIVE ACTIVITIES AND ADLS

## 2022-06-19 ASSESSMENT — PAIN DESCRIPTION - ORIENTATION: ORIENTATION: LEFT

## 2022-06-19 NOTE — PROGRESS NOTES
Pt arrived to the floor at approx 1109 via stretcher in stable condition on 4L NC. Pt was oriented to his room and his belongings were accounted for and placed in his locker. Pts admission and 4eye were completed. Pt is a/o x4. VSS. Assessment as charted. - Pt currently denies any increased dyspnea from his baseline but is intermittently tachypneic. Pt was able to ambulate to the BR. Pts SpO2 is currently >90% on 3L NC - will titrate to baseline of 2L as tolerated. Pt also has abdominal breathing, denies ant cough. - Pt is on telemetry running A-Fib, HR can get as high on 110s. - Pt has f/c in place draining an adequate amount of yellow UO. Pt is currently resting in his bed that is locked and in its lowest position w/ his call light within reach, non-skid socks, and bed alarm on. Pt denies any other needs at this time. I spoke with Dr. Nati Constantino on the phone regarding the consult, he requested a CT scan w/ contrast and, if possible, place the pts PleurX catheter to water seal. If not possible he stated to just drain it once and his team would reevaluate it tomorrow. - We did not have the correct connections to place the PleurX to water seal, so he was drained once and the patient had an output of 3,225ml that was serosanguinous. The patient was still having steady output but requested the staff to stop d/t burning in his chest. The draining of the plueurX cath was done immediately before his CT scan. Pt states he was receiving radiation therapy from Dr. Caterina Lam and had completed 3 out of 6 rounds. He stated that his fourth round had to be postponed d/t a week long hospitalization at [facility] r/t respiratory failure. Pt states he normally is on 2L of O2 nocturnally and will intermittently wear it throughout the day if he becomes short a breat. Pt states he continues to smoke 1/2 a pack a day - tobacco cessation education was given and pt verbalized his understanding.      Pt did not have cell phone on arrival - [facility] was called and they stated that they had it and that Arline Postal, the patients daughter, was coming by to pick it up.

## 2022-06-19 NOTE — H&P
Hospital Medicine  History and Physical    Patient:  Pranav Cruz  MRN: [id number]    CHIEF COMPLAINT:   Short of breath    History Obtained From:  patient, electronic medical record  Primary Care Physician: Lisa Eaton MD    HISTORY OF PRESENT ILLNESS:   The patient is a 74 y.o. male, transfer from [facility] for pneumonia and heart failure with left pleural effusion that appears larger. May need pleur-X catheter by interventional radiology. He was hospitalized at [facility] for a week and discharged on Friday. He was sent back to the emergency department yesterday. Chart review shows he required 100% on 15 LPM non-rebreather mask at [facility]. COVID-19 negative. Previously on Augmentin. Hospitalized (4/19 - 5/3) with acute on chronic respiratory failure with hypoxia and hypercapnia secondary to small cell lung CA left lung with mainstem endobronchial mass causing left lung collapse and postobstructive pneumonia. Debulking and laser therapy by CT surgery (4/20) with chest tube placed. Normally has oxygen at home. Declined BiPAP. CT surgery placed PleurX catheter 4/27/22. Past Medical History:      Diagnosis Date    A-fib [facility])     CAD (coronary artery disease)     Cancer ([facility] [address]     Left squamous cell carcinoma    CHF (congestive heart failure) (HCC)     COPD (chronic obstructive pulmonary disease) (HCC)     CVA (cerebral vascular accident) ([facility] [address]     Diabetes mellitus ([facility] [address]     Hyperlipidemia     Hypertension     Thyroid disease        Past Surgical History:      Procedure Laterality Date    BRONCHOSCOPY N/A 4/12/2022    EBUS WF W/ANES.  (11:00) performed by Soledad Oliveira MD at [address]  4/12/2022    BRONCHOSCOPY ALVEOLAR LAVAGE performed by Soledad Oliveira MD at [address]  4/12/2022    BRONCHOSCOPY BIOPSY BRONCHUS performed by Soledad Oliveira MD at [address]  4/12/2022    BRONCHOSCOPY/TRANSBRONCHIAL NEEDLE BIOPSY performed by Ethan Prather MD at [facility] N/A 4/13/2022    Katheryne Sacks NF performed by Ethan Prather MD at [facility] N/A 4/21/2022    BRONCHOSCOPY WITH ENDOBRONCHIAL YAG LASER AND CHEST TUBE PLACEMENT performed by Lydia Corbin MD at [address] Left 4/27/2022    LEFT PLEUR-X CATHETER PLACEMENT performed by Melba Fuentes MD at [facility]       Medications Prior to Admission:    Prior to Admission medications    Medication Sig Start Date End Date Taking?  Authorizing Provider   apixaban (ELIQUIS) 5 MG TABS tablet Take 1 tablet by mouth 2 times daily 5/2/22   Karen Salinas MD   insulin glargine (LANTUS) 100 UNIT/ML injection vial Inject 40 Units into the skin nightly 5/2/22   Karen Salinas MD   insulin lispro (HUMALOG) 100 UNIT/ML SOLN injection vial Inject 0-12 Units into the skin 3 times daily (with meals) 5/2/22   Karen Salinas MD   insulin lispro (HUMALOG) 100 UNIT/ML SOLN injection vial Inject 0-6 Units into the skin nightly 5/2/22   Karen Salinas MD   insulin regular (HUMULIN R;NOVOLIN R) 100 UNIT/ML injection Inject 8 Units into the skin 3 times daily (before meals) 5/2/22   Karen Salinas MD   dilTIAZem (CARDIZEM CD) 120 MG extended release capsule Take 1 capsule by mouth daily 5/3/22   Karen Salinas MD   TECHLITE PEN NEEDLES 31G X 8 MM MISC USE 1 FIVE TIMES DAILY 4/3/22   Historical Provider, MD   levothyroxine (SYNTHROID) 200 MCG tablet Take 200 mcg by mouth Daily  4/11/22   Historical Provider, MD   EUTHYROX 50 MCG tablet Take 50 mcg by mouth Daily  4/11/22   Historical Provider, MD   guaiFENesin (MUCINEX) 600 MG extended release tablet Take 1 tablet by mouth 2 times daily 4/15/22   Eloina Hernandez MD   digoxin (LANOXIN) 125 MCG tablet Take 0.125 mg by mouth daily 11/8/21   Historical Provider, MD   tiotropium (SPIRIVA RESPIMAT) 2.5 MCG/ACT AERS inhaler Inhale 2 puffs into the lungs daily 4/7/22   Ethan Prather MD   atorvastatin (LIPITOR) 20 MG tablet Take 20 mg by mouth daily  1/19/19   Historical Provider, MD   buPROPion [facility]) 100 MG extended release tablet Take 100 mg by mouth 2 times daily  11/21/18   Historical Provider, MD   metFORMIN (GLUCOPHAGE) 850 MG tablet Take 850 mg by mouth daily (with breakfast)   Patient not taking: Reported on 4/19/2022 1/19/19   Historical Provider, MD   nitroGLYCERIN (NITROSTAT) 0.4 MG SL tablet Place 0.4 mg under the tongue every 5 minutes as needed  1/4/19   Historical Provider, MD   nortriptyline (PAMELOR) 10 MG capsule Take 10 mg by mouth nightly  1/19/19   Historical Provider, MD   Omega-3 Fatty Acids (FISH OIL) 1000 MG CAPS Take 3,000 mg by mouth 3 times daily    Historical Provider, MD   aspirin 81 MG tablet Take 81 mg by mouth daily    Historical Provider, MD       Allergies:  Codeine    Social History:   TOBACCO:   reports that he has been smoking. He has a 20.00 pack-year smoking history. He has never used smokeless tobacco.  ETOH:   reports previous alcohol use. Family History:   History reviewed. No pertinent family history. REVIEW OF SYSTEMS:  Ten systems reviewed and negative except for dyspnea and generalized fatigue. Physical Exam:    Vitals: /91   Pulse 104 irreg   Temp 97.5 °F (36.4 °C) (Oral)   Resp 18   Ht 6' (1.829 m)   Wt 176 lb 12.9 oz (80.2 kg)   SpO2 99% on 4 LPM  BMI 23.98 kg/m²   General appearance: alert, appears stated age and cooperative  Skin: Skin color, texture, turgor normal. No rashes or lesions  HEENT: Head: Normocephalic, no lesions, without obvious abnormality.   Neck: no adenopathy, no carotid bruit, no JVD, supple, symmetrical, trachea midline and thyroid not enlarged, symmetric, no tenderness/mass/nodules  Lungs: diffuse rhonchi, minimal breath sound on left with egophony  Heart: irregular rate and rhythm, no appreciable murmur  Abdomen: soft, non-tender; bowel sounds normal; no masses,  no organomegaly  Extremities: 1+ edema, neurovascular status intact  Neurologic: Mental status: Alert, oriented, thought content appropriate      SARS-CoV-2 NAAT (6/19) negative    Portable CXR (6/16) Interval fracture and migration of the catheter of the patient's right sided [id number] with tip located approximately 2.8 cm superior to the portacatheter. Stable complete opacification of the lef hemithorax. Persistent right lower lung field infiltrate, slightly more pronounced on today's study. LABS from [facility]  WBC 15.8  Hgb 12.1  Plt 364    Na 138  K 4.2  Cl 100  CO2 31  KAMILA 7  Cr 0.6    Albumin (6/18) 3.0 gm/dL  Calcium 8.3, corrects to 9.1 for albumin  Troponin negative  INR (6/18) 1.23    ABG (6/18) 7.40  50 / 222 on 100% FiO2    Noncontrast CT head (6/18) no acute intracranial abnormality     Contrast CT Chest (6/18) previously described discontinuous portacatheter is intact. The radiographic findings were artifactual.  Large left hilar mass causes obstruction of the left mainstem bronchus and collape of the left lung. The mass is poorly defined but invades the ediastinum and either begins to invade or abut/partially compress the left lateral aspect of the left atrium. Large left pleural effusion. There is herniation of pleural fluid between the eighth and ninth ribs laterally, partially imaged. Congestive changes in the right lung. ECG (6/18) atrial fibrillation with rapid ventricular response (), incomplete RBBB. Echocardiogram (4/19) Technically difficult examination. Pleurx drain on left side, poor apical windows. Normal left ventricle systolic function with an estimated ejection fraction of 55%. Regional wall motion abnormalities cannot be ruled out. Diastolic dysfunction grade and filling pressure are indeterminate. The right atrium is mildly dilated. Inadequate tricuspid regurgitation to estimate systolic pulmonary artery  Pressure.   IVC is normal in size (< 2.1 cm)

## 2022-06-19 NOTE — PROGRESS NOTES
4 Eyes Skin Assessment     The patient is being assess for  Admission    I agree that 2 RN's have performed a thorough Head to Toe Skin Assessment on the patient. ALL assessment sites listed below have been assessed. Areas assessed by both nurses: Bryce Adams, Vu  [x]   Head, Face, and Ears   [x]   Shoulders, Back, and Chest  [x]   Arms, Elbows, and Hands   [x]   Coccyx, Sacrum, and Ischum  [x]   Legs, Feet, and Heels  - blanchable redness on heels - foam dressings applied  - moist/redness in groin folds - inter dry applied  - preventative sacral heart applied. Does the Patient have Skin Breakdown?   No         Jhonny Prevention initiated:  No   Wound Care Orders initiated:  No      WOC nurse consulted for Pressure Injury (Stage 3,4, Unstageable, DTI, NWPT, and Complex wounds):  No      Nurse 1 eSignature: Electronically signed by Mounika Esparza RN on 6/19/22 at 5:36 PM EDT    **SHARE this note so that the co-signing nurse is able to place an eSignature**    Nurse 2 eSignature: Electronically signed by Suyapa Carlson RN on 6/19/22 at 5:37 PM EDT

## 2022-06-20 PROBLEM — D72.829 LEUKOCYTOSIS: Status: ACTIVE | Noted: 2022-06-20

## 2022-06-20 PROBLEM — J18.1 LUNG CONSOLIDATION (HCC): Status: ACTIVE | Noted: 2022-06-20

## 2022-06-20 PROBLEM — F17.200 CURRENT SMOKER: Status: ACTIVE | Noted: 2022-06-20

## 2022-06-20 LAB
ANION GAP SERPL CALCULATED.3IONS-SCNC: 8 MMOL/L (ref 3–16)
BASOPHILS ABSOLUTE: 0 K/UL (ref 0–0.2)
BASOPHILS RELATIVE PERCENT: 0.1 %
BUN BLDV-MCNC: 17 MG/DL (ref 7–20)
CALCIUM SERPL-MCNC: 8.5 MG/DL (ref 8.3–10.6)
CHLORIDE BLD-SCNC: 99 MMOL/L (ref 99–110)
CO2: 32 MMOL/L (ref 21–32)
CREAT SERPL-MCNC: <0.5 MG/DL (ref 0.8–1.3)
EOSINOPHILS ABSOLUTE: 0 K/UL (ref 0–0.6)
EOSINOPHILS RELATIVE PERCENT: 0 %
ESTIMATED AVERAGE GLUCOSE: 125.5 MG/DL
GFR AFRICAN AMERICAN: >60
GFR NON-AFRICAN AMERICAN: >60
GLUCOSE BLD-MCNC: 103 MG/DL (ref 70–99)
GLUCOSE BLD-MCNC: 105 MG/DL (ref 70–99)
GLUCOSE BLD-MCNC: 127 MG/DL (ref 70–99)
GLUCOSE BLD-MCNC: 130 MG/DL (ref 70–99)
GLUCOSE BLD-MCNC: 228 MG/DL (ref 70–99)
GLUCOSE BLD-MCNC: 323 MG/DL (ref 70–99)
HBA1C MFR BLD: 6 %
HCT VFR BLD CALC: 34.1 % (ref 40.5–52.5)
HEMOGLOBIN: 10.8 G/DL (ref 13.5–17.5)
LACTIC ACID, SEPSIS: 1.1 MMOL/L (ref 0.4–1.9)
LACTIC ACID, SEPSIS: 1.3 MMOL/L (ref 0.4–1.9)
LYMPHOCYTES ABSOLUTE: 0.3 K/UL (ref 1–5.1)
LYMPHOCYTES RELATIVE PERCENT: 1.9 %
MCH RBC QN AUTO: 28.9 PG (ref 26–34)
MCHC RBC AUTO-ENTMCNC: 31.7 G/DL (ref 31–36)
MCV RBC AUTO: 91.2 FL (ref 80–100)
MONOCYTES ABSOLUTE: 1 K/UL (ref 0–1.3)
MONOCYTES RELATIVE PERCENT: 6.5 %
NEUTROPHILS ABSOLUTE: 14.7 K/UL (ref 1.7–7.7)
NEUTROPHILS RELATIVE PERCENT: 91.5 %
PDW BLD-RTO: 17.8 % (ref 12.4–15.4)
PERFORMED ON: ABNORMAL
PLATELET # BLD: 389 K/UL (ref 135–450)
PMV BLD AUTO: 5.9 FL (ref 5–10.5)
POTASSIUM REFLEX MAGNESIUM: 4 MMOL/L (ref 3.5–5.1)
RBC # BLD: 3.74 M/UL (ref 4.2–5.9)
SODIUM BLD-SCNC: 139 MMOL/L (ref 136–145)
WBC # BLD: 16 K/UL (ref 4–11)

## 2022-06-20 PROCEDURE — 83605 ASSAY OF LACTIC ACID: CPT

## 2022-06-20 PROCEDURE — 6370000000 HC RX 637 (ALT 250 FOR IP): Performed by: INTERNAL MEDICINE

## 2022-06-20 PROCEDURE — 2700000000 HC OXYGEN THERAPY PER DAY

## 2022-06-20 PROCEDURE — 36415 COLL VENOUS BLD VENIPUNCTURE: CPT

## 2022-06-20 PROCEDURE — 87040 BLOOD CULTURE FOR BACTERIA: CPT

## 2022-06-20 PROCEDURE — 2580000003 HC RX 258: Performed by: INTERNAL MEDICINE

## 2022-06-20 PROCEDURE — 85025 COMPLETE CBC W/AUTO DIFF WBC: CPT

## 2022-06-20 PROCEDURE — 94640 AIRWAY INHALATION TREATMENT: CPT

## 2022-06-20 PROCEDURE — 1200000000 HC SEMI PRIVATE

## 2022-06-20 PROCEDURE — 99232 SBSQ HOSP IP/OBS MODERATE 35: CPT | Performed by: NURSE PRACTITIONER

## 2022-06-20 PROCEDURE — 99222 1ST HOSP IP/OBS MODERATE 55: CPT | Performed by: INTERNAL MEDICINE

## 2022-06-20 PROCEDURE — 6360000002 HC RX W HCPCS: Performed by: INTERNAL MEDICINE

## 2022-06-20 PROCEDURE — 94761 N-INVAS EAR/PLS OXIMETRY MLT: CPT

## 2022-06-20 PROCEDURE — 80048 BASIC METABOLIC PNL TOTAL CA: CPT

## 2022-06-20 PROCEDURE — 99223 1ST HOSP IP/OBS HIGH 75: CPT | Performed by: INTERNAL MEDICINE

## 2022-06-20 RX ORDER — FUROSEMIDE 10 MG/ML
40 INJECTION INTRAMUSCULAR; INTRAVENOUS 2 TIMES DAILY
Status: DISPENSED | OUTPATIENT
Start: 2022-06-20 | End: 2022-06-22

## 2022-06-20 RX ORDER — OXYCODONE HYDROCHLORIDE AND ACETAMINOPHEN 5; 325 MG/1; MG/1
1 TABLET ORAL EVERY 6 HOURS PRN
Status: DISCONTINUED | OUTPATIENT
Start: 2022-06-20 | End: 2022-06-23 | Stop reason: HOSPADM

## 2022-06-20 RX ORDER — FUROSEMIDE 40 MG/1
40 TABLET ORAL DAILY
Status: DISCONTINUED | OUTPATIENT
Start: 2022-06-22 | End: 2022-06-23 | Stop reason: HOSPADM

## 2022-06-20 RX ADMIN — CEFTRIAXONE SODIUM 1000 MG: 1 INJECTION, POWDER, FOR SOLUTION INTRAMUSCULAR; INTRAVENOUS at 15:26

## 2022-06-20 RX ADMIN — SODIUM CHLORIDE, PRESERVATIVE FREE 10 ML: 5 INJECTION INTRAVENOUS at 09:54

## 2022-06-20 RX ADMIN — IPRATROPIUM BROMIDE AND ALBUTEROL SULFATE 1 AMPULE: .5; 2.5 SOLUTION RESPIRATORY (INHALATION) at 07:18

## 2022-06-20 RX ADMIN — OXYCODONE AND ACETAMINOPHEN 1 TABLET: 5; 325 TABLET ORAL at 12:14

## 2022-06-20 RX ADMIN — APIXABAN 5 MG: 5 TABLET, FILM COATED ORAL at 09:40

## 2022-06-20 RX ADMIN — INSULIN LISPRO 2 UNITS: 100 INJECTION, SOLUTION INTRAVENOUS; SUBCUTANEOUS at 20:08

## 2022-06-20 RX ADMIN — DILTIAZEM HYDROCHLORIDE 120 MG: 120 CAPSULE, COATED, EXTENDED RELEASE ORAL at 09:40

## 2022-06-20 RX ADMIN — INSULIN LISPRO 8 UNITS: 100 INJECTION, SOLUTION INTRAVENOUS; SUBCUTANEOUS at 16:45

## 2022-06-20 RX ADMIN — DIGOXIN 125 MCG: 125 TABLET ORAL at 09:40

## 2022-06-20 RX ADMIN — GUAIFENESIN 600 MG: 600 TABLET, EXTENDED RELEASE ORAL at 09:39

## 2022-06-20 RX ADMIN — BUPROPION HYDROCHLORIDE 100 MG: 100 TABLET, EXTENDED RELEASE ORAL at 20:05

## 2022-06-20 RX ADMIN — SODIUM CHLORIDE, PRESERVATIVE FREE 10 ML: 5 INJECTION INTRAVENOUS at 20:05

## 2022-06-20 RX ADMIN — ATORVASTATIN CALCIUM 20 MG: 10 TABLET, FILM COATED ORAL at 20:00

## 2022-06-20 RX ADMIN — GUAIFENESIN 600 MG: 600 TABLET, EXTENDED RELEASE ORAL at 20:00

## 2022-06-20 RX ADMIN — IPRATROPIUM BROMIDE AND ALBUTEROL SULFATE 1 AMPULE: .5; 2.5 SOLUTION RESPIRATORY (INHALATION) at 19:49

## 2022-06-20 RX ADMIN — ASPIRIN 81 MG: 81 TABLET, CHEWABLE ORAL at 09:40

## 2022-06-20 RX ADMIN — AZITHROMYCIN MONOHYDRATE 500 MG: 250 TABLET ORAL at 15:21

## 2022-06-20 RX ADMIN — APIXABAN 5 MG: 5 TABLET, FILM COATED ORAL at 20:00

## 2022-06-20 RX ADMIN — LEVOTHYROXINE SODIUM 200 MCG: 0.1 TABLET ORAL at 06:17

## 2022-06-20 RX ADMIN — BUPROPION HYDROCHLORIDE 100 MG: 100 TABLET, EXTENDED RELEASE ORAL at 09:52

## 2022-06-20 RX ADMIN — FUROSEMIDE 40 MG: 10 INJECTION, SOLUTION INTRAMUSCULAR; INTRAVENOUS at 19:11

## 2022-06-20 RX ADMIN — ONDANSETRON 4 MG: 2 INJECTION INTRAMUSCULAR; INTRAVENOUS at 10:46

## 2022-06-20 ASSESSMENT — PAIN DESCRIPTION - LOCATION
LOCATION: CHEST
LOCATION: CHEST;BACK;RIB CAGE

## 2022-06-20 ASSESSMENT — PAIN SCALES - GENERAL
PAINLEVEL_OUTOF10: 5

## 2022-06-20 ASSESSMENT — PAIN DESCRIPTION - DESCRIPTORS: DESCRIPTORS: STABBING

## 2022-06-20 ASSESSMENT — ENCOUNTER SYMPTOMS: TACHYPNEA: 1

## 2022-06-20 ASSESSMENT — PAIN DESCRIPTION - ORIENTATION
ORIENTATION: LEFT
ORIENTATION: LEFT

## 2022-06-20 ASSESSMENT — PAIN - FUNCTIONAL ASSESSMENT: PAIN_FUNCTIONAL_ASSESSMENT: PREVENTS OR INTERFERES SOME ACTIVE ACTIVITIES AND ADLS

## 2022-06-20 NOTE — ACP (ADVANCE CARE PLANNING)
Advance Care Planning     General Advance Care Planning (ACP) Conversation    Date of Conversation: 6/19/2022  Conducted with: Patient with Decision Making Capacity    Healthcare Decision Maker:    Primary Decision Maker: Dolly Davenport  [phone]  Click here to complete Healthcare Decision Makers including selection of the Healthcare Decision Maker Relationship (ie \"Primary\"). Today we documented Decision Maker(s) consistent with Legal Next of Kin hierarchy. Content/Action Overview:   Has ACP document(s) on file - reflects the patient's care preferences  Reviewed DNR/DNI and patient elects Full Code (Attempt Resuscitation)      Length of Voluntary ACP Conversation in minutes:  <16 minutes (Non-Billable)    Michael Owusu RN

## 2022-06-20 NOTE — CARE COORDINATION
CASE MANAGEMENT INITIAL ASSESSMENT      Reviewed chart and completed assessment with patient:bedside  Family present: none  Explained Case Management role/services. Primary contact information:[facility] Decision Maker :   Primary Decision Maker: Eugenia Davenport - [phone]          Can this person be reached and be able to respond quickly, such as within a few minutes or hours? Yes      Admit date/status:6/19/22  Diagnosis:CAP   Is this a Readmission?:  No      Insurance:medicare   Precert required for SNF: No       3 night stay required: No    Living arrangements, Adls, care needs, prior to admission:lives in home alone, ramp entry. Durable Medical Equipment at home:  Walker__Cane_x_RTS__ BSC__Shower Chair__  02_x 2LNC provided by [facility]_ HHN__ CPAP__  BiPap__  Hospital Bed__ W/C___ Other___MOW__    Services in the home and/or outpatient, prior to admission:active with [facility]. Skilled for pleures and non skilled aides. Notified of admission    Current PCP:Marlon                                Medications Prescription coverage? yes    Transportation needs: none stated dtr would drive   Uses [facility] transport as well. PT/OT recs:requested    Hospital Exemption Notification (HEN):needed for SNF    Barriers to discharge:none    Plan/comments:spoke with patient reported from home alone. Ramp entry. Stated ambulatory with cane at baseline. Stated Zainab assist with some needs as well and he had aide services with [facility]. 3x/week x2hrs. Will resume at d/c. Has good supply of Pleurex needs. Will follow clinical progress.  Dipti Stone RN      ECOC on chart for MD signature

## 2022-06-20 NOTE — PROGRESS NOTES
06/20/22 0719   Treatment   Treatment Type HHN;IS   $Treatment Type $Inhaled Therapy/Meds   Medications Albuterol/Ipratropium   Pre-Tx Pulse 93   Pre-Tx Resps 20   Breath Sounds Pre-Tx KYLIE Expiratory Wheezes   Breath Sounds Pre-Tx LLL Diminished   Breath Sounds Pre-Tx RUL Expiratory Wheezes   Breath Sounds Pre-Tx RML Diminished   Breath Sounds Pre-Tx RLL Diminished   Breath Sounds Post-Tx KYLIE Expiratory Wheezes   Breath Sounds Post-Tx LLL Diminished   Breath Sounds Post-Tx RUL Expiratory Wheezes   Breath Sounds Post-Tx RML Diminished   Breath Sounds Post-Tx RLL Diminished   Post-Tx Pulse 91   Post-Tx Resps 18   Delivery Source Air   Position Semi-Edward's   Treatment Tolerance Well   Oxygen Therapy/Pulse Ox   O2 Therapy Oxygen humidified   $Oxygen $Daily Charge   O2 Device Nasal cannula   O2 Flow Rate (L/min) 2 L/min   Resp 20   SpO2 92 %   $Pulse Oximeter $Spot check (multiple/continuous)   Incentive Spirometry Tx   Achieved Volume (mL)   (was unable to make a good seal with lips.  effort poor also)   Cough/Sputum   Sputum How Obtained Spontaneous cough   Cough Congested;Non-productive

## 2022-06-20 NOTE — CONSULTS
[address] [facility]  [phone]      Attending Physician: Tiff Forbes MD  Reason for Consultation/Chief Complaint: Shortness of breath    Subjective   History of Present Illness:  Mercedes Negron is a 74 y.o. patient who presented to the hospital with complaints of shortness of breath, patient presented to the hospital was admitted on June 19, 2022, was treated recently at [facility] for pneumonia and transferred from there. Also was felt to have heart failure and has a lung mass, was hospitalized and April to May for respiratory failure related to small cell lung cancer with left lung mainstem endobronchial mass. Patient ultimately underwent debulking and laser therapy with CT surgery in April and had a Pleurx catheter placed in April 2022. Course of work-up, patient did have a CT scan of the chest which showed no significant change in appearance of primary left hilar mass as compared to study from April 2022. There was stable complete consolidation of left lung, likely a postobstructive collapse. Chronically, patient does have diabetes, hypertension, diastolic heart failure, atrial fibrillation, he has been on chronic anticoagulation with Eliquis. He gets his usual cardiac care through the [facility] system, his last office visit was in November 2021, he sees Dr. Hanny Kirkpatrick there. He did have a cardiac catheterization 2019 with nonobstructive coronary artery disease. Patient's weight at the time of discharge from recent hospital admission up to May 2022, patient weighed 252 pounds and currently weighs 176 pounds. Past Medical History:   has a past medical history of A-fib ([address] CAD (coronary artery disease), Cancer ([address] CHF (congestive heart failure) ([address] COPD (chronic obstructive pulmonary disease) ([address] CVA (cerebral vascular accident) ([address] Diabetes mellitus ([address] Hyperlipidemia, Hypertension, and Thyroid disease.     Surgical History:   has a past surgical history that includes bronchoscopy (N/A, 4/12/2022); bronchoscopy (4/12/2022); bronchoscopy (4/12/2022); bronchoscopy (4/12/2022); bronchoscopy (N/A, 4/13/2022); bronchoscopy (N/A, 4/21/2022); Chest surgery (Left, 4/27/2022); and joint replacement. Social History:   reports that he has been smoking. He has a 20.00 pack-year smoking history. He has never used smokeless tobacco. He reports previous alcohol use. He reports that he does not use drugs. Home Medications:  Were reviewed and are listed in nursing record and/or below  Prior to Admission medications    Medication Sig Start Date End Date Taking? Authorizing Provider   Insulin Degludec (TRESIBA FLEXTOUCH) 100 UNIT/ML SOPN Inject 9 Units into the skin in the morning and at bedtime   Yes Historical Provider, MD   fluticasone (FLOVENT HFA) 110 MCG/ACT inhaler Inhale 1 puff into the lungs 2 times daily   Yes Historical Provider, MD   amoxicillin-clavulanate (AUGMENTIN) 875-125 MG per tablet Take 1 tablet by mouth 2 times daily   Yes Historical Provider, MD   folic acid (FOLVITE) 1 MG tablet Take 1 mg by mouth daily   Yes Historical Provider, MD   HYDROcodone-acetaminophen (NORCO) 5-325 MG per tablet Take 1 tablet by mouth every 12 hours as needed for Pain.    Yes Historical Provider, MD   meloxicam (MOBIC) 15 MG tablet Take 15 mg by mouth daily as needed for Pain   Yes Historical Provider, MD   budesonide-formoterol (SYMBICORT) 160-4.5 MCG/ACT AERO Inhale 2 puffs into the lungs 2 times daily   Yes Historical Provider, MD   tiZANidine (ZANAFLEX) 2 MG tablet Take 2 mg by mouth every 12 hours as needed   Yes Historical Provider, MD   vitamin D (ERGOCALCIFEROL) 1.25 MG (25266 UT) CAPS capsule Take 50,000 Units by mouth once a week   Yes Historical Provider, MD   apixaban (ELIQUIS) 5 MG TABS tablet Take 1 tablet by mouth 2 times daily 5/2/22   Mercedes Pedersen MD   insulin glargine (LANTUS) 100 UNIT/ML injection vial Inject 40 Units into the skin nightly  Patient not taking: Reported on 6/19/2022 5/2/22   Aryan Garzon MD   insulin lispro (HUMALOG) 100 UNIT/ML SOLN injection vial Inject 0-12 Units into the skin 3 times daily (with meals)  Patient not taking: Reported on 6/19/2022 5/2/22   Aryan Garzon MD   insulin lispro (HUMALOG) 100 UNIT/ML SOLN injection vial Inject 0-6 Units into the skin nightly  Patient not taking: Reported on 6/19/2022 5/2/22   Aryan Garzon MD   insulin regular (HUMULIN R;NOVOLIN R) 100 UNIT/ML injection Inject 8 Units into the skin 3 times daily (before meals)  Patient not taking: Reported on 6/19/2022 5/2/22   Aryan Garzon MD   dilTIAZem (CARDIZEM CD) 120 MG extended release capsule Take 1 capsule by mouth daily 5/3/22   Aryan Garzon MD   TECHLITE PEN NEEDLES 31G X 8 MM MISC USE 1 FIVE TIMES DAILY 4/3/22   Historical Provider, MD   levothyroxine (SYNTHROID) 200 MCG tablet Take 150 mcg by mouth Daily  4/11/22   Historical Provider, MD   EUTHYROX 50 MCG tablet Take 50 mcg by mouth Daily  4/11/22   Historical Provider, MD   guaiFENesin (MUCINEX) 600 MG extended release tablet Take 1 tablet by mouth 2 times daily  Patient not taking: Reported on 6/19/2022 4/15/22   Marjorie Grullon MD   digoxin (LANOXIN) 125 MCG tablet Take 0.125 mg by mouth daily 11/8/21   Historical Provider, MD   tiotropium (SPIRIVA RESPIMAT) 2.5 MCG/ACT AERS inhaler Inhale 2 puffs into the lungs daily  Patient not taking: Reported on 6/19/2022 4/7/22   Coleman Vergara MD   atorvastatin (LIPITOR) 20 MG tablet Take 80 mg by mouth daily  1/19/19   Historical Provider, MD   buPROPion [facility]) 100 MG extended release tablet Take 100 mg by mouth 2 times daily  11/21/18   Historical Provider, MD   metFORMIN (GLUCOPHAGE) 850 MG tablet Take 850 mg by mouth daily (with breakfast)  1/19/19   Historical Provider, MD   nitroGLYCERIN (NITROSTAT) 0.4 MG SL tablet Place 0.4 mg under the tongue every 5 minutes as needed  1/4/19   Historical Provider, MD   nortriptyline (PAMELOR) 10 MG capsule Take 10 mg by mouth nightly   Patient not taking: Reported on 6/19/2022 1/19/19   Historical Provider, MD   Omega-3 Fatty Acids (FISH OIL) 1000 MG CAPS Take 3,000 mg by mouth 3 times daily    Historical Provider, MD   aspirin 81 MG tablet Take 81 mg by mouth daily    Historical Provider, MD        CURRENT Medications:  sodium chloride flush 0.9 % injection 5-40 mL, 2 times per day  sodium chloride flush 0.9 % injection 5-40 mL, PRN  0.9 % sodium chloride infusion, PRN  ondansetron (ZOFRAN-ODT) disintegrating tablet 4 mg, Q8H PRN   Or  ondansetron (ZOFRAN) injection 4 mg, Q6H PRN  polyethylene glycol (GLYCOLAX) packet 17 g, Daily PRN  acetaminophen (TYLENOL) tablet 650 mg, Q6H PRN   Or  acetaminophen (TYLENOL) suppository 650 mg, Q6H PRN  cefTRIAXone (ROCEPHIN) 1000 mg IVPB in 50 mL D5W minibag, Q24H   And  azithromycin (ZITHROMAX) tablet 500 mg, Q24H  furosemide (LASIX) injection 40 mg, BID  insulin lispro (HUMALOG) injection vial 0-12 Units, TID WC  insulin lispro (HUMALOG) injection vial 0-6 Units, Nightly  glucose chewable tablet 16 g, PRN  dextrose bolus 10% 125 mL, PRN   Or  dextrose bolus 10% 250 mL, PRN  glucagon (rDNA) injection 1 mg, PRN  dextrose 5 % solution, PRN  insulin glargine (LANTUS) injection vial 20 Units, Nightly  apixaban (ELIQUIS) tablet 5 mg, BID  dilTIAZem (CARDIZEM CD) extended release capsule 120 mg, Daily  levothyroxine (SYNTHROID) tablet 200 mcg, Daily  guaiFENesin (MUCINEX) extended release tablet 600 mg, BID  digoxin (LANOXIN) tablet 125 mcg, Daily  atorvastatin (LIPITOR) tablet 20 mg, Nightly  buPROPion (WELLBUTRIN SR) extended release tablet 100 mg, BID  aspirin chewable tablet 81 mg, Daily  tiotropium (SPIRIVA RESPIMAT) 2.5 MCG/ACT inhaler 2 puff, PRN  ipratropium-albuterol (DUONEB) nebulizer solution 1 ampule, BID        Allergies:  Codeine     Review of Systems:   Unable to obtain, patient is lethargic      Objective   PHYSICAL EXAM: Vitals:    06/20/22 0815   BP: 115/86   Pulse: 89   Resp: 22   Temp: 97.5 °F (36.4 °C)   SpO2: 96%    Weight: 176 lb 12.9 oz (80.2 kg)         General Appearance:   Lethargic, not fully cooperative, no distress, appears older than stated age   Head:  Normocephalic, without obvious abnormality, atraumatic, positive outpatient   Throat: Lips, mucosa, and tongue dry   Neck: Supple, borderline JVP   Lungs:    Decreased breath sounds, particularly left side, respirations unlabored   Chest Wall:  No deformity or tenderness   Heart:  Regular rate and rhythm, S1, S2 normal, dsnt ht snds    Abdomen:   Soft, distended    Extremities: Extremities tc le edema   Pulses: 2+ and symmetric in UE   Skin: Skin pale   Pysch: flat mood and affect   Neurologic: gen weakness         Labs   CBC:   Lab Results   Component Value Date    WBC 16.0 06/20/2022    RBC 3.74 06/20/2022    HGB 10.8 06/20/2022    HCT 34.1 06/20/2022    MCV 91.2 06/20/2022    RDW 17.8 06/20/2022     06/20/2022     CMP:  Lab Results   Component Value Date     06/20/2022    K 4.0 06/20/2022    CL 99 06/20/2022    CO2 32 06/20/2022    BUN 17 06/20/2022    CREATININE <0.5 06/20/2022    GFRAA >60 06/20/2022    AGRATIO 0.9 06/19/2022    LABGLOM >60 06/20/2022    GLUCOSE 105 06/20/2022    PROT 6.3 06/19/2022    CALCIUM 8.5 06/20/2022    BILITOT <0.2 06/19/2022    ALKPHOS 139 06/19/2022    AST 15 06/19/2022    ALT 20 06/19/2022     PT/INR:  No results found for: PTINR  HgBA1c:  Lab Results   Component Value Date    LABA1C 6.3 04/12/2022     Lab Results   Component Value Date    TROPONINI <0.01 04/12/2022         Cardiac Data     Last EKG:    Atrial fibrillation, nonspecific ST changes, baseline artifact    Echo:    4/2022    Technically difficult examination. Pleurx drain on left side, poor apical   windows. Normal left ventricle systolic function with an estimated ejection fraction   of 55%. Regional wall motion abnormalities cannot be ruled out. Diastolic dysfunction grade and filling pressure are indeterminate. The right atrium is mildly dilated. Inadequate tricuspid regurgitation to estimate systolic pulmonary artery   pressure. IVC is normal in size (< 2.1 cm) and collapses > 50% with respiration   consistent with normal right atrial pressure (3 mmHg). There is a small localized near right atrium/ventricle pericardial effusion   noted. Irregular heart rate throughout study. Definity® used for myocardial border enhancement. Stress Test:    Cath:    Studies:     Ct chest       Impression   1. No significant change in appearance of the patient's primary left hilar   mass, similar to the study of 04/19/2022, most likely a primary focus of lung   malignancy. 2. Stable complete consolidation of the left lung, likely postobstructive   collapse.  Underlying pneumonia or metastatic disease cannot be excluded. 3. Interval placement of a left pleural catheter, with decrease in size of a   mild-to-moderate left pleural effusion. 4. Slight interval decrease in size of a trace right pleural effusion. 5. New reticular opacities within the basilar aspect of the right lower lobe   likely reflect atelectasis and pulmonary edema, less likely metastatic   disease. 6. Multiple new nonspecific ground-glass nodular opacities throughout the   right upper and right middle lobes likely reflect multifocal pneumonia, less   likely metastatic disease. 7. Stable mediastinal and bilateral hilar lymphadenopathy. 8. Stable nodular enlargement of the bilateral adrenal glands likely reflects   either benign adenomatous disease or hyperplasia as this was previously PET   negative. I have reviewed labs and imaging/xray/diagnostic testing in this note.     Assessment and Plan          Patient Active Problem List   Diagnosis    Spinal stenosis of lumbar region with neurogenic claudication    Mediastinal adenopathy    Lung mass    Multiple tracheobronchial mucus plugs    Mass of left lung    Acute on chronic respiratory failure with hypoxia (HCC)    Acute hypercapnic respiratory failure (HCC)    COPD with acute exacerbation (HCC)    Acute encephalopathy    Recurrent left pleural effusion    Metabolic encephalopathy    Atrial fibrillation, permanent (HCC)    Acute respiratory failure with hypoxia and hypercapnia (HCC)    Lung collapse    Postobstructive pneumonia    Mucus plugging of bronchi    Endobronchial mass    Hypoxia    COPD (chronic obstructive pulmonary disease) (HCC)    Acute hypoxemic respiratory failure (HCC)    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 Atrial fibrillation, appears to be permanent, continue anticoagulation with Eliquis    Hypercholesterolemia, continue statin    Dm, Lung mass,/pneumonia, as per primary service    No further inpatient cardiac work-up or treatment plan, will sign off, please call questions    Thank you for allowing us to participate in the care of [address] [place] [address]. Please call me with any questions [phone].     Kareem Michael MD, [facility]   Interventional Cardiologist  [facility]  [phone] [facility]  [phone] [facility]  6/20/2022 9:10 AM

## 2022-06-20 NOTE — CONSULTS
CVTS Thoracic Progress Note:          CC: Shortness of breath    Patient known to service:     The patient is a 74 y.o. obese male current smoker (1PPD for 40+ years) with significant past medical history of COPD, HTN, CVA, HLD, PAF (on Eliquis chronically), DM, chronic back pain, hypothyroidism, and hypercalcemia (presumed to be from his malignancy) who was recently diagnosed with squamous cell carcinoma (follows with Dr. Tabatha Vargas at [facility]) who presents as a transfer from [facility] for evaluation and possible surgical intervention of his endobronchial mass. Mr. Brenton Tompkins was taken to the OR on 4/21/2022 for bronchoscopy with endobronchial YAG laser, debulking. He was taken back to the operating room on 4/27 for left Pleurx catheter placement for management of his left pleural effusion. Patient was discharged back home with home health care on 5/3. He states home care is draining his Pleurx catheter every other day. Per his report it appears to be functioning well. The patient was transferred to [facility] on 6/19 for pneumonia and CHF with worsening left pleural effusion. He apparently just had a recent admission at [address] [place] [address]: Breathing improved today following Pleurx drainage yesterday (3.2 L removed). Obj:    Blood pressure 115/86, pulse 89, temperature 97.5 °F (36.4 °C), temperature source Oral, resp. rate 22, height 6' (1.829 m), weight 176 lb 12.9 oz (80.2 kg), SpO2 96 %. Alert, oriented   Lungs diminished on the left, mild expiratory wheezes bilaterally   Abdomen soft, nontender. Normoactive bowel sounds   Pleurx catheter intact to left chest wall.   3.2 L removed yesterday with intermittent drainage    Diagnostics:     Recent Labs     06/19/22  1430 06/20/22  0400   WBC 14.5* 16.0*   HGB 11.4* 10.8*   HCT 35.8* 34.1*    389                                                                  Recent Labs     06/19/22  1430 06/20/22  0518    139   K 4.4 4.0   CL 97* 99   CO2 33* 32   BUN 10 17   CREATININE <0.5* <0.5*   GLUCOSE 97 105*            Recent Labs     06/19/22  1430   MG 1.90      No results for input(s): TROPONINI in the last 72 hours. Recent Labs     06/19/22  1430   INR 1.30*     CXR: 6/19/22  FINDINGS:   Complete opacification of the left hemithorax similar to the prior study. Presumed left chest tube remains in place.  Right internal jugular center   venous catheter tip is in the SVC.  Right lung clear.  No pneumothorax. Right costophrenic sulcus sharp.  Cardiac and mediastinal contours obscured   by left hemithorax opacification.  No new osseous abnormality.           Impression   1. Stable complete opacification of the left hemithorax with presumed left   chest tube in place. 2. Right internal jugular center venous catheter tip in the SVC.  No   pneumothorax. CT chest: 6/19/2022  Impression   1. No significant change in appearance of the patient's primary left hilar   mass, similar to the study of 04/19/2022, most likely a primary focus of lung   malignancy. 2. Stable complete consolidation of the left lung, likely postobstructive   collapse.  Underlying pneumonia or metastatic disease cannot be excluded. 3. Interval placement of a left pleural catheter, with decrease in size of a   mild-to-moderate left pleural effusion. 4. Slight interval decrease in size of a trace right pleural effusion. 5. New reticular opacities within the basilar aspect of the right lower lobe   likely reflect atelectasis and pulmonary edema, less likely metastatic   disease. 6. Multiple new nonspecific ground-glass nodular opacities throughout the   right upper and right middle lobes likely reflect multifocal pneumonia, less   likely metastatic disease. 7. Stable mediastinal and bilateral hilar lymphadenopathy.    8. Stable nodular enlargement of the bilateral adrenal glands likely reflects   either benign adenomatous disease or hyperplasia as this was previously PET   negative. Assess/Plan:   Labs and imaging reviewed as above. IM and cardiology notes reviewed. Left pleural effusion status post Pleurx catheter placement 4/27  -CT chest 6/19 with large, left pleural effusion  -Nursing staff drained Pleurx yesterday with approximately 3.2 L removed  -Pleurx catheter placed to atrium to allow for complete drainage and lung reexpansion  -No surgical intervention planned at this time  -On 2 L nasal cannula with satisfactory oxygen saturations  -Expansion measures: IS, oobtc, ambulation     Squamous cell lung cancer  -Follows with Dr. Lewis Dee   -Per patient, has not received chemo/radiation  -Recommend oncology consultation for further recommendations    HCAP  -On Rocephin, Zithromax  -WBC 16, afebrile  -Blood cultures pending    Acute on chronic diastolic heart failure  -Cardiology consulted  -Cardiology recommending IV Lasix for today, transition to oral Lasix tomorrow.   Follow-up as outpatient for echocardiogram    Atrial fibrillation, permanent  -Rate controlled  -On Eliquis    DMII  -Controlled  -Lantus, sliding scale insulin per IM    HLD  -Statin    HTN  -Controlled    Thyroid disease  -On Synthroid  ________________________________________________________________    MELISSA Torres - CNP  6/20/2022  11:11 AM

## 2022-06-20 NOTE — PROGRESS NOTES
Hospitalist Progress Note      PCP: Sohan Richardson MD    Date of Admission: 6/19/2022    Chief Complaint: [facility] Course: 73 yo WM with hx of left lung cancer, chronic resp failure(2L), p/w worsening sob. Pulm/CTsurg/Oncology consulted     Subjective:  Denies sob currently, on 2L       Medications:  Reviewed    Infusion Medications    sodium chloride      dextrose       Scheduled Medications    furosemide  40 mg IntraVENous BID    [START ON 6/22/2022] furosemide  40 mg Oral Daily    sodium chloride flush  5-40 mL IntraVENous 2 times per day    cefTRIAXone (ROCEPHIN) IV  1,000 mg IntraVENous Q24H    And    azithromycin  500 mg Oral Q24H    insulin lispro  0-12 Units SubCUTAneous TID WC    insulin lispro  0-6 Units SubCUTAneous Nightly    insulin glargine  0.25 Units/kg SubCUTAneous Nightly    apixaban  5 mg Oral BID    dilTIAZem  120 mg Oral Daily    levothyroxine  200 mcg Oral Daily    guaiFENesin  600 mg Oral BID    digoxin  125 mcg Oral Daily    atorvastatin  20 mg Oral Nightly    buPROPion  100 mg Oral BID    aspirin  81 mg Oral Daily    ipratropium-albuterol  1 ampule Inhalation BID     PRN Meds: sodium chloride flush, sodium chloride, ondansetron **OR** ondansetron, polyethylene glycol, acetaminophen **OR** acetaminophen, glucose, dextrose bolus **OR** dextrose bolus, glucagon (rDNA), dextrose, tiotropium      Intake/Output Summary (Last 24 hours) at 6/20/2022 0919  Last data filed at 6/20/2022 0403  Gross per 24 hour   Intake 0 ml   Output 4875 ml   Net -4875 ml       Physical Exam Performed:    /86   Pulse 89   Temp 97.5 °F (36.4 °C) (Oral)   Resp 22   Ht 6' (1.829 m)   Wt 176 lb 12.9 oz (80.2 kg)   SpO2 96%   BMI 23.98 kg/m²     General appearance: No apparent distress, appears stated age and cooperative. obese  HEENT: Pupils equal, round, and reactive to light. Conjunctivae/corneas clear. Neck: Supple, with full range of motion. No jugular venous distention. right pleural effusion. 5. New reticular opacities within the basilar aspect of the right lower lobe   likely reflect atelectasis and pulmonary edema, less likely metastatic   disease. 6. Multiple new nonspecific ground-glass nodular opacities throughout the   right upper and right middle lobes likely reflect multifocal pneumonia, less   likely metastatic disease. 7. Stable mediastinal and bilateral hilar lymphadenopathy. 8. Stable nodular enlargement of the bilateral adrenal glands likely reflects   either benign adenomatous disease or hyperplasia as this was previously PET   negative. XR CHEST PORTABLE   Final Result   1. Stable complete opacification of the left hemithorax with presumed left   chest tube in place. 2. Right internal jugular center venous catheter tip in the SVC. No   pneumothorax. Assessment/Plan:    Active Hospital Problems    Diagnosis     CAP (community acquired pneumonia) due to Pneumococcus [facility]) Andres Alfredo      Priority: Medium     Healthcare associated pneumonia:  Admitted to inpatient status. Initial antibiotic therapy with ceftriaxone and azithromycin pending record review. -pulm consulted, apprec mgmt given postobstructive collapse on CT    Grade I Diastolic Heart failure:  Diuresis with IV furosemide 40 mg every 12 hours. Follow electrolytes including potassium and magnesium and replace as needed. Hypoxia:  Normally on 2 LPM at night. Left pleural effusion and mass:  Pt has hx of Sq Cell carcinoma of L lung  and apparently sees Dr. Medina [facility]),had pleurex placed in 4/20222  -Consulted CT surgery for evaluation/mgmt, apprec recs. Permanent atrial fibrillation:  Anticoagulation with apixaban. Rate control with digoxin and diltiazem 120 mg daily. Type 2 Diabetes:  Basal glargine insulin 30 units nightly (usual home dose is 40 units). Cover with a \"sliding scale\" lispro moderate scale prandial correction insulin.   Hold

## 2022-06-20 NOTE — PROGRESS NOTES
Daily Nutrient Needs:  Energy Requirements Based On: Kcal/kg (25-30)  Weight Used for Energy Requirements: Ideal  Energy (kcal/day): 6998-5602 kcal  Weight Used for Protein Requirements: Ideal (1.0-1.2 g/kg)  Protein (g/day): 80-96 g  Method Used for Fluid Requirements: 1 ml/kcal  Fluid (ml/day): 4176-6366 mL    Nutrition Diagnosis:   · Inadequate oral intake related to inadequate protein-energy intake as evidenced by poor intake prior to admission,weight loss      Nutrition Interventions:   Food and/or Nutrient Delivery: Continue Current Diet,Start Oral Nutrition Supplement  Nutrition Education/Counseling: No recommendation at this time  Coordination of Nutrition Care: Continue to monitor while inpatient  Plan of Care discussed with: Patient    Goals:     Goals: PO intake 50% or greater,prior to discharge       Nutrition Monitoring and Evaluation:   Behavioral-Environmental Outcomes: None Identified  Food/Nutrient Intake Outcomes: Food and Nutrient Intake,Supplement Intake  Physical Signs/Symptoms Outcomes: Biochemical Data,Meal Time Behavior,Nutrition Focused Physical Findings,Weight    Discharge Planning:    Continue current diet,Continue Oral Nutrition Supplement     Joseph Schmid MS, RD, LD  Contact: [phone]

## 2022-06-20 NOTE — CONSULTS
INPATIENT PULMONARY CRITICAL CARE CONSULT NOTE      Chief Complaint/Referring Provider:  Patient is being seen at the request of Dr. Marya Pérez a consultation for Pneumonia with hypoxia     Presenting HPI: Patient came to the hospital because of increasing shortness of breath    As per the admitting provider-The patient is a 74 y.o. male, transfer from [facility] for pneumonia and heart failure with left pleural effusion that appears larger. May need pleur-X catheter by interventional radiology. He was hospitalized at [facility] for a week and discharged on Friday. He was sent back to the emergency department yesterday. Chart review shows he required 100% on 15 LPM non-rebreather mask at [facility]. COVID-19 negative. Previously on Augmentin.       Hospitalized (4/19 - 5/3) with acute on chronic respiratory failure with hypoxia and hypercapnia secondary to small cell lung CA left lung with mainstem endobronchial mass causing left lung collapse and postobstructive pneumonia. Debulking and laser therapy by CT surgery (4/20) with chest tube placed. Normally has oxygen at home. Declined BiPAP. CT surgery placed PleurX catheter 4/27/22.     Patient when seen this morning states that he still continues to have some shortness of breath, patient has cough but is not able to expectorate anything significant, patient does not have any hemoptysis, patient does not have any significant rhinorrhea nasal congestion, no otalgia no ear discharge, patient does not have any significant sore throat, patient feels tired and having less energy in the daytime, patient states that his Lasix has been increased and patient states that he is going to the bathroom quite often, patient does not have any significant pleuritic chest pain at this time, patient on questioning further states that he does not have any significant increasing abdominal symptoms of concern, patient does not have any confusion lethargy, patient was afebrile and hemodynamically maintained, patient was in atrial fibrillation with controlled medical rate on the monitor, patient's glycemic control was acceptable, patient was on 2 L of nasal cannula oxygen with saturation 95% when seen, patient was not giving any other pertinent review of system of concern       Patient Active Problem List    Diagnosis Date Noted    Lung consolidation ([address] 06/20/2022    Metastatic squamous cell carcinoma ([address] 06/20/2022    Current smoker 06/20/2022    Leukocytosis 06/20/2022    CAP (community acquired pneumonia) due to Pneumococcus ([address] 06/19/2022    Small cell lung cancer ([address] 04/21/2022    Acute on chronic respiratory failure with hypoxia and hypercapnia (HCC)     Chronic diastolic congestive heart failure (HCC)     Acute hypoxemic respiratory failure (HCC)     Squamous cell carcinoma of left lung (HCC)     Hypoxia 04/16/2022    COPD (chronic obstructive pulmonary disease) ([address] 04/16/2022    Endobronchial mass     Pleural effusion     Metabolic encephalopathy     Atrial fibrillation, permanent (HCC)     Acute respiratory failure with hypoxia and hypercapnia (HCC)     Lung collapse     Postobstructive pneumonia     Mucus plugging of bronchi     Acute on chronic respiratory failure with hypoxia ([address] 04/12/2022    Mediastinal adenopathy     Lung mass     Multiple tracheobronchial mucus plugs     Mass of left lung     Acute hypercapnic respiratory failure (HCC)     COPD with acute exacerbation (HCC)     Acute encephalopathy     Spinal stenosis of lumbar region with neurogenic claudication 01/28/2019       Past Medical History:   Diagnosis Date    A-fib [facility])     CAD (coronary artery disease)     Cancer ([address]     Left squamous cell carcinoma    CHF (congestive heart failure) ([address]     COPD (chronic obstructive pulmonary disease) ([address]     CVA (cerebral vascular accident) ([address]     Diabetes mellitus ([address]     Hyperlipidemia     Hypertension     Thyroid disease         Past Surgical History:   Procedure Laterality Date    BRONCHOSCOPY N/A 4/12/2022    EBUS WF W/ANES. (11:00) performed by Bong Don MD at [address]  4/12/2022    BRONCHOSCOPY ALVEOLAR LAVAGE performed by Bong Don MD at [address]  4/12/2022    BRONCHOSCOPY BIOPSY BRONCHUS performed by Bong Don MD at [address]  4/12/2022    BRONCHOSCOPY/TRANSBRONCHIAL NEEDLE BIOPSY performed by Bong Don MD at [address] N/A 4/13/2022    Patsey Mauricio NF performed by Bong Don MD at [address] N/EMILIANA 4/21/2022    BRONCHOSCOPY WITH ENDOBRONCHIAL YAG LASER AND CHEST TUBE PLACEMENT performed by Ellen Gabriel MD at [facility] Left 4/27/2022    LEFT PLEUR-X CATHETER PLACEMENT performed by Emiliano Eagle MD at [address] [place] [address] knee        History reviewed. No pertinent family history. Social History     Tobacco Use    Smoking status: Current Every Day Smoker     Packs/day: 0.50     Years: 40.00     Pack years: 20.00    Smokeless tobacco: Never Used   Substance Use Topics    Alcohol use: Not Currently        Allergies   Allergen Reactions    Codeine                Physical Exam:  Blood pressure 115/76, pulse 90, temperature 97.3 °F (36.3 °C), temperature source Oral, resp. rate 20, height 6' (1.829 m), weight 176 lb 12.9 oz (80.2 kg), SpO2 98 %.'   Constitutional:  No acute distress. HENT:  Oropharynx is clear and moist. No thyromegaly. Eyes:  Conjunctivae are normal. Pupils equal, round, and reactive to light. No scleral icterus. Neck: . No tracheal deviation present. No obvious thyroid mass. Short enlarged neck  Cardiovascular: S1-S2 irregularly irregular, normal heart sounds. No right ventricular heave. No lower extremity edema. Pulmonary/Chest: No wheezes. Basilar left more than right rales. Chest wall is not dull to percussion. No accessory muscle usage or stridor. Decreased breath sound intensity, Pleurx catheter  Abdominal: Soft. Bowel sounds present. No distension or hernia. No tenderness. Musculoskeletal: No cyanosis. No clubbing. No obvious joint deformity. Lymphadenopathy: No cervical or supraclavicular adenopathy. Skin: Skin is warm and dry. No rash or nodules on the exposed extremities. Psychiatric: Normal mood and affect. Behavior is normal.  No anxiety. Neurologic: Alert, awake and oriented. PERRL. Speech fluent        Results:  CBC:   Recent Labs     06/19/22  1430 06/20/22  0400   WBC 14.5* 16.0*   HGB 11.4* 10.8*   HCT 35.8* 34.1*   MCV 91.8 91.2    389     BMP:   Recent Labs     06/19/22  1430 06/20/22  0518    139   K 4.4 4.0   CL 97* 99   CO2 33* 32   BUN 10 17   CREATININE <0.5* <0.5*     LIVER PROFILE:   Recent Labs     06/19/22  1430   AST 15   ALT 20   BILITOT <0.2   ALKPHOS 139*     PT/INR:   Recent Labs     06/19/22  1430   PROTIME 16.0*   INR 1.30*       Imaging:  I have reviewed radiology images personally. CT CHEST W CONTRAST   Preliminary Result   1. No significant change in appearance of the patient's primary left hilar   mass, similar to the study of 04/19/2022, most likely a primary focus of lung   malignancy. 2. Stable complete consolidation of the left lung, likely postobstructive   collapse. Underlying pneumonia or metastatic disease cannot be excluded. 3. Interval placement of a left pleural catheter, with decrease in size of a   mild-to-moderate left pleural effusion. 4. Slight interval decrease in size of a trace right pleural effusion. 5. New reticular opacities within the basilar aspect of the right lower lobe   likely reflect atelectasis and pulmonary edema, less likely metastatic   disease.    6. Multiple new nonspecific ground-glass nodular opacities throughout the   right upper and right middle lobes likely reflect multifocal pneumonia, less   likely metastatic disease. 7. Stable mediastinal and bilateral hilar lymphadenopathy. 8. Stable nodular enlargement of the bilateral adrenal glands likely reflects   either benign adenomatous disease or hyperplasia as this was previously PET   negative. XR CHEST PORTABLE   Final Result   1. Stable complete opacification of the left hemithorax with presumed left   chest tube in place. 2. Right internal jugular center venous catheter tip in the SVC. No   pneumothorax. IR FLUORO GUIDED CVA DEVICE PLMT/REPLACE/REMOVAL    Result Date: 2022  RADIOLOGY REPORT Patient: Wili HURTADO) MRN: [id number] Location: [facility] Referring Physician: [facility] Account Number: [de-identified] Exam Date/Time: May 26, 2022 06:42:38 : Oct 9, 1955 David Crocker. Addr.: [address] [address] Primary Physician: Derek De Los Santos HX: PORT A CATH PLACEMENT FLUORO VIJAY CATH OR PICC - FLUOROSCOPY COMPARISON: No comparison exams available at the time of dictation. FINDINGS: Fluoroscopy was provided to the surgeon during the procedure. 3 fluoroscopic images were submitted. Right IJ port tip in SVC. Total fluoroscopy time was 50.8 seconds. IMPRESSION: Fluoroscopic support during right IJ port insertion. Please see operative report for additional detail. Report electronically signed by:  Elie Graves DO  on 2022, May 27, 04:14 PM EDT     Left lower lobe lung mass, endobronchial biopsies:   - Squamous cell carcinoma, moderately differentiated. A. Lymph node, subcarinal, transbronchial ultrasound guided fine needle   aspiration:   - Positive for malignant cells, squamous cell carcinoma. B. Lung, left lower lobe, bronchial alveolar lavage:   - Positive for malignant cells, squamous cell carcinoma. - [place] methenamine silver stain is negative for fungal elements and   Pneumocystis jiroveci organisms. C. Bronchial washings:   - Positive for malignant cells, squamous cell carcinoma. Echocardiogram:Summary   Technically difficult examination. Pleurx drain on left side, poor apical   windows. Normal left ventricle systolic function with an estimated ejection fraction   of 55%. Regional wall motion abnormalities cannot be ruled out. Diastolic dysfunction grade and filling pressure are indeterminate. The right atrium is mildly dilated. Inadequate tricuspid regurgitation to estimate systolic pulmonary artery   pressure. IVC is normal in size (< 2.1 cm) and collapses > 50% with respiration   consistent with normal right atrial pressure (3 mmHg). There is a small localized near right atrium/ventricle pericardial effusion   noted. Irregular heart rate throughout study. Definity® used for myocardial border enhancement. PFT: None in Epic         Assessment:  Principal Problem:    CAP (community acquired pneumonia) due to Pneumococcus [facility])  Active Problems:    Lung consolidation ([facility]    Metastatic squamous cell carcinoma ([facility]    Current smoker    Leukocytosis    Mediastinal adenopathy    Pleural effusion    Postobstructive pneumonia  Resolved Problems:    * No resolved hospital problems.  *          Plan:   · Oxygen supplementation to keep saturation between 90 to 94% only  · Please titrate oxygen as per the above parameters  · Pulmonary toilet  · Patient continues to have lung consolidation with postobstructive  pneumonia like picture along with pleural effusion  · Patient has Pleurx catheter in place  Patient's consolidation is significant along with that patient has worsening leukocytosis-patient will benefit from bronchoscopy with BAL to assess for any infections which needs broadening of antibiotics-patient was told about the procedure along with the pros and cons and patient was agreeable- will arrange for tomorrow morning  · Patient is on IV Rocephin and Zithromax at this time which can continue for now but will reassess as per clinical status and cultures  · Bronchodilators  · Patient has history of metastatic squamous cell carcinoma  · Patient has been started on increased doses of Lasix by cardiology  · Monitor input output and BMP  · Correct electrolytes on whenever necessary basis  · Patient is on Lantus insulin which is being put on hold as patient will be n.p.o. after midnight to prevent any hypoglycemia  · Blood glucose monitoring with sliding scale insulin  · Synthroid as per TSH as per IM  · Monitor for any hypoglycemia  · Patient is on Eliquis which can be continued for now  · Monitor for any bleeding  · PUD prophylaxis as per IM    Case discussed with patient and nursing            Electronically signed by:  Colleen Mejias MD    6/20/2022    12:47 PM.

## 2022-06-21 ENCOUNTER — APPOINTMENT (OUTPATIENT)
Dept: GENERAL RADIOLOGY | Age: 67
DRG: 871 | End: 2022-06-21
Attending: INTERNAL MEDICINE
Payer: MEDICARE

## 2022-06-21 LAB
GLUCOSE BLD-MCNC: 103 MG/DL (ref 70–99)
GLUCOSE BLD-MCNC: 151 MG/DL (ref 70–99)
GLUCOSE BLD-MCNC: 205 MG/DL (ref 70–99)
GLUCOSE BLD-MCNC: 290 MG/DL (ref 70–99)
GLUCOSE BLD-MCNC: >600 MG/DL (ref 70–99)
PERFORMED ON: ABNORMAL

## 2022-06-21 PROCEDURE — 97166 OT EVAL MOD COMPLEX 45 MIN: CPT

## 2022-06-21 PROCEDURE — 2580000003 HC RX 258: Performed by: INTERNAL MEDICINE

## 2022-06-21 PROCEDURE — 97535 SELF CARE MNGMENT TRAINING: CPT

## 2022-06-21 PROCEDURE — 6370000000 HC RX 637 (ALT 250 FOR IP): Performed by: INTERNAL MEDICINE

## 2022-06-21 PROCEDURE — 97116 GAIT TRAINING THERAPY: CPT

## 2022-06-21 PROCEDURE — 97530 THERAPEUTIC ACTIVITIES: CPT

## 2022-06-21 PROCEDURE — 97162 PT EVAL MOD COMPLEX 30 MIN: CPT

## 2022-06-21 PROCEDURE — 2700000000 HC OXYGEN THERAPY PER DAY

## 2022-06-21 PROCEDURE — 94640 AIRWAY INHALATION TREATMENT: CPT

## 2022-06-21 PROCEDURE — 99232 SBSQ HOSP IP/OBS MODERATE 35: CPT | Performed by: INTERNAL MEDICINE

## 2022-06-21 PROCEDURE — 1200000000 HC SEMI PRIVATE

## 2022-06-21 PROCEDURE — C8923 2D TTE W OR W/O FOL W/CON,CO: HCPCS

## 2022-06-21 PROCEDURE — 99232 SBSQ HOSP IP/OBS MODERATE 35: CPT | Performed by: NURSE PRACTITIONER

## 2022-06-21 PROCEDURE — 94761 N-INVAS EAR/PLS OXIMETRY MLT: CPT

## 2022-06-21 PROCEDURE — 6360000002 HC RX W HCPCS: Performed by: INTERNAL MEDICINE

## 2022-06-21 PROCEDURE — 71045 X-RAY EXAM CHEST 1 VIEW: CPT

## 2022-06-21 RX ADMIN — APIXABAN 5 MG: 5 TABLET, FILM COATED ORAL at 20:18

## 2022-06-21 RX ADMIN — SODIUM CHLORIDE, PRESERVATIVE FREE 10 ML: 5 INJECTION INTRAVENOUS at 20:24

## 2022-06-21 RX ADMIN — ASPIRIN 81 MG: 81 TABLET, CHEWABLE ORAL at 10:13

## 2022-06-21 RX ADMIN — GUAIFENESIN 600 MG: 600 TABLET, EXTENDED RELEASE ORAL at 20:18

## 2022-06-21 RX ADMIN — IPRATROPIUM BROMIDE AND ALBUTEROL SULFATE 1 AMPULE: .5; 2.5 SOLUTION RESPIRATORY (INHALATION) at 19:42

## 2022-06-21 RX ADMIN — SODIUM CHLORIDE, PRESERVATIVE FREE 10 ML: 5 INJECTION INTRAVENOUS at 10:22

## 2022-06-21 RX ADMIN — INSULIN GLARGINE 20 UNITS: 100 INJECTION, SOLUTION SUBCUTANEOUS at 20:20

## 2022-06-21 RX ADMIN — IPRATROPIUM BROMIDE AND ALBUTEROL SULFATE 1 AMPULE: .5; 2.5 SOLUTION RESPIRATORY (INHALATION) at 07:49

## 2022-06-21 RX ADMIN — DIGOXIN 125 MCG: 125 TABLET ORAL at 08:08

## 2022-06-21 RX ADMIN — BUPROPION HYDROCHLORIDE 100 MG: 100 TABLET, EXTENDED RELEASE ORAL at 10:13

## 2022-06-21 RX ADMIN — POLYETHYLENE GLYCOL 3350 17 G: 17 POWDER, FOR SOLUTION ORAL at 10:31

## 2022-06-21 RX ADMIN — INSULIN LISPRO 6 UNITS: 100 INJECTION, SOLUTION INTRAVENOUS; SUBCUTANEOUS at 11:38

## 2022-06-21 RX ADMIN — ATORVASTATIN CALCIUM 20 MG: 10 TABLET, FILM COATED ORAL at 20:18

## 2022-06-21 RX ADMIN — DILTIAZEM HYDROCHLORIDE 120 MG: 120 CAPSULE, COATED, EXTENDED RELEASE ORAL at 08:08

## 2022-06-21 RX ADMIN — AZITHROMYCIN MONOHYDRATE 500 MG: 250 TABLET ORAL at 14:53

## 2022-06-21 RX ADMIN — LEVOTHYROXINE SODIUM 200 MCG: 0.1 TABLET ORAL at 06:08

## 2022-06-21 RX ADMIN — APIXABAN 5 MG: 5 TABLET, FILM COATED ORAL at 10:13

## 2022-06-21 RX ADMIN — CEFTRIAXONE SODIUM 1000 MG: 1 INJECTION, POWDER, FOR SOLUTION INTRAMUSCULAR; INTRAVENOUS at 14:49

## 2022-06-21 RX ADMIN — INSULIN LISPRO 1 UNITS: 100 INJECTION, SOLUTION INTRAVENOUS; SUBCUTANEOUS at 20:20

## 2022-06-21 RX ADMIN — GUAIFENESIN 600 MG: 600 TABLET, EXTENDED RELEASE ORAL at 10:13

## 2022-06-21 RX ADMIN — INSULIN LISPRO 4 UNITS: 100 INJECTION, SOLUTION INTRAVENOUS; SUBCUTANEOUS at 10:28

## 2022-06-21 RX ADMIN — BUPROPION HYDROCHLORIDE 100 MG: 100 TABLET, EXTENDED RELEASE ORAL at 20:18

## 2022-06-21 RX ADMIN — SODIUM CHLORIDE, PRESERVATIVE FREE 1 ML: 5 INJECTION INTRAVENOUS at 20:23

## 2022-06-21 ASSESSMENT — PAIN DESCRIPTION - DESCRIPTORS: DESCRIPTORS: STABBING

## 2022-06-21 ASSESSMENT — PAIN SCALES - GENERAL: PAINLEVEL_OUTOF10: 5

## 2022-06-21 ASSESSMENT — PAIN - FUNCTIONAL ASSESSMENT: PAIN_FUNCTIONAL_ASSESSMENT: PREVENTS OR INTERFERES SOME ACTIVE ACTIVITIES AND ADLS

## 2022-06-21 ASSESSMENT — PAIN DESCRIPTION - LOCATION: LOCATION: CHEST;BACK;RIB CAGE

## 2022-06-21 ASSESSMENT — PAIN DESCRIPTION - ORIENTATION: ORIENTATION: LEFT

## 2022-06-21 NOTE — DISCHARGE INSTR - COC
Continuity of Care Form    Patient Name: Ryoer Puga   :  1955  MRN:  [id number]    Admit date:  2022  Discharge date:  2022    Code Status Order: Full Code   Advance Directives:      Admitting Physician:  Cuba Benoit MD  PCP: Seamus Dorado MD    Discharging Nurse: Prepared by Hudson Thompson RN . [place] Discharged by [address] Unit/Room#: 1603/1271-54  Discharging Unit Phone Number: [phone]    Emergency Contact:   Extended Emergency Contact Information  Primary Emergency Contact: Chela Burgos  Mobile Phone: [phone]  Relation: Child   needed? No    Past Surgical History:  Past Surgical History:   Procedure Laterality Date    BRONCHOSCOPY N/A 2022    EBUS WF W/ANES. (11:00) performed by Robert Chang MD at [address] [facility]  2022    BRONCHOSCOPY ALVEOLAR LAVAGE performed by Robert Chang MD at [facility]  2022    BRONCHOSCOPY BIOPSY BRONCHUS performed by Robert Chang MD at [facility]  2022    BRONCHOSCOPY/TRANSBRONCHIAL NEEDLE BIOPSY performed by Robert Chang MD at [facility] [address] [place] N/A 2022    Macie Arrington NF performed by Robert Chang MD at [facility] N/A 2022    BRONCHOSCOPY WITH ENDOBRONCHIAL YAG LASER AND CHEST TUBE PLACEMENT performed by Luis A Rosario MD at [facility] 2022    LEFT PLEUR-X CATHETER PLACEMENT performed by Suzy Mcpherson MD at [facility] knee       Immunization History: There is no immunization history on file for this patient.     Active Problems:  Patient Active Problem List   Diagnosis Code    Spinal stenosis of lumbar region with neurogenic claudication M48.062    Mediastinal adenopathy R59.0    Lung mass R91.8    Multiple tracheobronchial mucus plugs T17.800A    Mass of left lung R91.8    Acute on chronic respiratory failure with hypoxia [facility]) J96.21    Acute hypercapnic respiratory failure (HCC) J96.02    COPD with acute exacerbation (HCC) J44.1    Acute encephalopathy G93.40    Pleural effusion O17    Metabolic encephalopathy M04.10    Atrial fibrillation, permanent (HCC) I48.21    Acute respiratory failure with hypoxia and hypercapnia (HCC) J96.01, J96.02    Lung collapse J98.19    Postobstructive pneumonia J18.9    Mucus plugging of bronchi T17.500A    Endobronchial mass R91.8    Hypoxia R09.02    COPD (chronic obstructive pulmonary disease) (HCC) J44.9    Acute hypoxemic respiratory failure (HCC) J96.01    Squamous cell carcinoma of left lung ([facility] C34.92    Acute on chronic respiratory failure with hypoxia and hypercapnia (HCC) J96.21, J96.22    Chronic diastolic congestive heart failure ([facility]) I50.32    Small cell lung cancer ([facility]) C34.90    CAP (community acquired pneumonia) due to Pneumococcus ([address] J13    Lung consolidation ([address] J18.1    Metastatic squamous cell carcinoma (HCC) C79.9    Current smoker F17.200    Leukocytosis D72.829    SOB (shortness of breath) R06.02       Isolation/Infection:   Isolation            No Isolation          Patient Infection Status       None to display            Nurse Assessment:  Last Vital Signs: /72   Pulse 97   Temp 97.5 °F (36.4 °C)   Resp 22   Ht 6' (1.829 m)   Wt 176 lb 12.9 oz (80.2 kg)   SpO2 94%   BMI 23.98 kg/m²     Last documented pain score (0-10 scale): Pain Level: 5  Last Weight:   Wt Readings from Last 1 Encounters:   06/19/22 176 lb 12.9 oz (80.2 kg)     Mental Status:  oriented, alert, coherent, logical, thought processes intact, and able to concentrate and follow conversation    IV Access:  - None    Nursing Mobility/ADLs:  Walking   Assisted  Transfer  Assisted  Bathing  Assisted  Dressing  Assisted  Toileting  Independent  Feeding  Assisted  Med Admin  Independent  Med Delivery   whole    Wound Care Documentation and Therapy:  Wound 04/28/22 Scrotum Anterior (Active) Number of days: 54        Elimination:  Continence: Bowel: Yes  Bladder: Yes  Urinary Catheter: None   Colostomy/Ileostomy/Ileal Conduit: No       Date of Last BM: ***    Intake/Output Summary (Last 24 hours) at 6/21/2022 1023  Last data filed at 6/21/2022 0554  Gross per 24 hour   Intake 480 ml   Output 3350 ml   Net -2870 ml     I/O last 3 completed shifts: In: 36 [P.O.:720; I.V.:10]  Out: 4500 [Urine:3150; Chest Tube:1350]    Safety Concerns:     History of Falls (last 30 days)    Impairments/Disabilities:      None    Nutrition Therapy:  Current Nutrition Therapy:   - Oral Diet:  General    Routes of Feeding: Oral  Liquids: No Restrictions  Daily Fluid Restriction: no  Last Modified Barium Swallow with Video (Video Swallowing Test): not done    Treatments at the Time of Hospital Discharge:   Respiratory Treatments: ***  Oxygen Therapy:  is on oxygen at 2 L/min per nasal cannula. Ventilator:    - No ventilator support    Rehab Therapies: Physical Therapy and Occupational Therapy  Weight Bearing Status/Restrictions: No weight bearing restrictions  Other Medical Equipment (for information only, NOT a DME order):  wheelchair, cane, walker, and hospital bed  Other Treatments: Draining PleurX catheter daily until the output is less than 150ml per day. Then continue to drain every other day. Patient's personal belongings (please select all that are sent with patient):   Watch, clothes, and wallet    RN SIGNATURE:  Electronically signed by Nenita Yuen RN on 6/22/22 at 4:14 PM EDT    CASE MANAGEMENT/SOCIAL WORK SECTION    Inpatient Status Date: 6/19/22    Readmission Risk Assessment Score:  Readmission Risk              Risk of Unplanned Readmission:  23           Discharging to Facility/ Agency    [facility] [address]  #[phone]  Fax [phone]  / signature: Electronically signed by Connie Leslie RN on 6/23/22 at 9:45 AM EDT    PHYSICIAN SECTION    Prognosis: Fair    Condition at Discharge: Stable    Rehab Potential (if transferring to Rehab): Fair    Recommended Labs or Other Treatments After Discharge: primary hem/onc to resume radiation/chemo  Recommended Follow-up, Labs or Other Treatments After Discharge:    primary hem/onc to resume radiation/chemo    Resume pleurex care and maintenance         Physician Certification: I certify the above information and transfer of Mc Brooks  is necessary for the continuing treatment of the diagnosis listed and that he requires [address] for less 30 days.      Update Admission H&P: No change in H&P    PHYSICIAN SIGNATURE:  Electronically signed by Thurnell Saint, MD on 6/23/22 at 9:53 AM EDT

## 2022-06-21 NOTE — PROGRESS NOTES
Pt is a/o x4. VSS. Assessment as charted. - O2 was attempted to be weaned to his baseline of RA during the day and 2L HS. Unfortunately pt needed 1L to sustain SpO2 >90%. Pts PleurX was drained for 300ml that was serosanguinous. Pt continues to have weheezed lung sounds. - Pt is on telemetry running A-Fib (HR controlled). - F/C was d/c'd per nursing protocol. Pt is currently resting in his bed that is locked and in its lowest position w/ his call light within reach, non-skid socks, and bed alarm on. Pt denies any other needs at this time.  Pts daughter was called and updated on the pts plan of care

## 2022-06-21 NOTE — PROGRESS NOTES
Pt assessment completed and charted. VSS. Pt a/o x4. Pt on 2L NC. Pt states this is baseline. Lung sounds are wheezy. Pleurex drain hooked to suction. Output is serosanguinous. 150 output at this time. Dressing is dry and intact. Left lower quadrant is distended. Pt is afib on tele. HR running . Heels are red/blanchable (mepilex applied). F/C draining approprietly. Bed alarm on. Bed in lowest position and wheels locked. Call light within reach. Bedside table within reach. Non-skid footwear in place. Pt denies any other needs at this time. Pt calls out appropriately. Will continue to monitor.

## 2022-06-21 NOTE — PROGRESS NOTES
CVTS Thoracic Progress Note:          CC: Shortness of breath    Subj: Continues to report improvement in breathing with continued Pleurx drainage    Obj:    Blood pressure 116/72, pulse 97, temperature 97.5 °F (36.4 °C), resp. rate 22, height 6' (1.829 m), weight 176 lb 12.9 oz (80.2 kg), SpO2 94 %. Alert, oriented   Lungs diminished on the left, mild expiratory wheezes bilaterally   Abdomen soft, nontender. Normoactive bowel sounds   Pleurx catheter with 1350 mL of serous drainage over 24 hours    Diagnostics:     Recent Labs     06/19/22  1430 06/20/22  0400   WBC 14.5* 16.0*   HGB 11.4* 10.8*   HCT 35.8* 34.1*    389                                                                  Recent Labs     06/19/22  1430 06/20/22  0518    139   K 4.4 4.0   CL 97* 99   CO2 33* 32   BUN 10 17   CREATININE <0.5* <0.5*   GLUCOSE 97 105*            Recent Labs     06/19/22  1430   MG 1.90     Recent Labs     06/19/22  1430   INR 1.30*     CXR: 6/19/22  FINDINGS:   Complete opacification of the left hemithorax similar to the prior study. Presumed left chest tube remains in place.  Right internal jugular center   venous catheter tip is in the SVC.  Right lung clear.  No pneumothorax. Right costophrenic sulcus sharp.  Cardiac and mediastinal contours obscured   by left hemithorax opacification.  No new osseous abnormality.           Impression   1. Stable complete opacification of the left hemithorax with presumed left   chest tube in place. 2. Right internal jugular center venous catheter tip in the SVC.  No   pneumothorax. CT chest: 6/19/2022  Impression   1. No significant change in appearance of the patient's primary left hilar   mass, similar to the study of 04/19/2022, most likely a primary focus of lung   malignancy. 2. Stable complete consolidation of the left lung, likely postobstructive   collapse.  Underlying pneumonia or metastatic disease cannot be excluded.    3. Interval placement of a left pleural catheter, with decrease in size of a   mild-to-moderate left pleural effusion. 4. Slight interval decrease in size of a trace right pleural effusion. 5. New reticular opacities within the basilar aspect of the right lower lobe   likely reflect atelectasis and pulmonary edema, less likely metastatic   disease. 6. Multiple new nonspecific ground-glass nodular opacities throughout the   right upper and right middle lobes likely reflect multifocal pneumonia, less   likely metastatic disease. 7. Stable mediastinal and bilateral hilar lymphadenopathy. 8. Stable nodular enlargement of the bilateral adrenal glands likely reflects   either benign adenomatous disease or hyperplasia as this was previously PET   negative. Assess/Plan:   Labs and imaging reviewed as above. IM, cardiology, oncology notes reviewed. Left pleural effusion status post Pleurx catheter placement 4/27  -CT chest 6/19 with large, left pleural effusion  -Pleurx catheter placed to atrium yesterday, 1.3 L out over 24 hours. Pleurx catheter appears to be functioning well  -No surgical intervention planned at this time  -On 2 L nasal cannula with satisfactory oxygen saturations  -Expansion measures: IS, oobtc, ambulation   -We will Pleurx catheter today. Continue to drain daily. Given large amount of chest tube output, patient may require 1 L Pleurx bottles upon discharge     Squamous cell lung cancer  -Follows with Dr. Tabatha Vargas   -Oncology consulted  -Per oncology note, patient started RT a couple weeks ago at [facility].   Chemo to be started in the near future    HCAP  -On Rocephin, Zithromax  -WBC 16 on 6/20, afebrile  -Blood cultures with no growth to date  -Given obstructing tumor/left hilar mass, do not feel that patient would benefit from bronchoscopy at this time    Acute on chronic diastolic heart failure  -Cardiology consulted  -Cardiology recommending IV Lasix for today, transition to oral Lasix tomorrow  -Echo ordered for today    Atrial fibrillation, permanent  -Rate controlled  -On Eliquis    DMII  -Glucose high for the past 24 hours  -Lantus, sliding scale insulin per IM    HLD  -Statin    HTN  -Controlled    Thyroid disease  -On Synthroid    Will sign off. Please call with questions or concerns.    ________________________________________________________________    MELISSA Noel - CNP  6/21/2022  9:09 AM

## 2022-06-21 NOTE — PROGRESS NOTES
training,Endurance training,Patient/Caregiver education & training,Home exercise program  Safety Devices  Type of Devices: Gait belt,Call light within reach,Nurse notified,Bed alarm in place,Left in bed     Restrictions  Restrictions/Precautions  Restrictions/Precautions: Fall Risk     Subjective   Pain: L side and low back 5/10  General  Chart Reviewed: Yes  Patient assessed for rehabilitation services?: Yes  Family / Caregiver Present: No  Referring Practitioner: Jami Scott MD  Referral Date : 06/21/22  Diagnosis: pneumonia  Follows Commands: Within Functional Limits  General Comment  Comments: Pt supine in bed upon arrival.  Subjective  Subjective: Pt agreeable to evaluation. Social/Functional History  Social/Functional History  Lives With: Alone  Type of Home: House  Home Layout: One level  Home Access: Ramped entrance  Bathroom Shower/Tub: Tub/Shower unit  Bathroom Toilet: Standard  Bathroom Equipment: Grab bars in [address]: Oxygen,Cane,Hospital bed (2L at night)  Has the patient had two or more falls in the past year or any fall with injury in the past year?: Yes (\"my legs just give out\" Pt is able to get self up.)  Receives Help From: Personal care attendant,Home health (RN for medicine for mgmt, drains lungs and vitals; laundry, cleaning aide. 5 days a week.  HHOT & PT)  ADL Assistance: Independent  Homemaking Responsibilities: Yes  Meal Prep Responsibility: Primary  Laundry Responsibility: No (aide)  Cleaning Responsibility: No (aide)  Shopping Responsibility: No (daughter)  Ambulation Assistance: Independent (with cane)  Transfer Assistance: Independent  Active : No  Patient's  Info: Daughter  Occupation: Retired  Type of Occupation: drove semi  Leisure & Hobbies: goes with son to race car  Vision/Hearing  Vision  Vision: Impaired  Vision Exceptions: Wears glasses for reading  Hearing  Hearing: Within functional limits      Objective   Heart Rate: 96  Heart Rate Source: Monitor  BP: 111/70  BP Location: Right upper arm  BP Method: Automatic  Patient Position: Semi fowlers  MAP (Calculated): 83.67  Resp: 24  SpO2: 97 %  O2 Device: Nasal cannula        Gross Assessment  AROM: Within functional limits  Strength: Generally decreased, functional     Bed Mobility Training  Bed Mobility Training: Yes  Supine to Sit: Modified independent  Sit to Supine: Modified independent  Balance  Sitting: Intact  Transfer Training  Transfer Training: Yes  Sit to Stand: Stand-by assistance (up to SW)  Stand to Sit: Stand-by assistance  Bed to Chair: Stand-by assistance  Toilet Transfer: Stand-by assistance  Gait Training  Gait Training: Yes  Gait  Overall Level of Assistance: Stand-by assistance  Speed/Latosha: Pace decreased (< 100 feet/min); Slow  Step Length: Left shortened;Right shortened  Gait Abnormalities: Decreased step clearance  Distance (ft): 30 Feet (x2)  Assistive Device: Other (comment) (standard walker for first gait distance, progressed to no device on 2nd distance with improved gait quality)    AM-PAC Score  AM-PAC Inpatient Mobility Raw Score : 21 (06/21/22 1408)  AM-PAC Inpatient T-Scale Score : 50.25 (06/21/22 1408)  Mobility Inpatient CMS 0-100% Score: 28.97 (06/21/22 1408)  Mobility Inpatient CMS G-Code Modifier : CJ (06/21/22 1408)        Goals  Short Term Goals  Time Frame for Short term goals: 7 days (6/28/22)  Short term goal 1: Pt will perform transfers mod I  Short term goal 2: Pt will ambulate 100' without device mod I  Short term goal 3: By 6/23/22, Pt will tolerate 15 reps of LE ther ex for improved strength and activity tolerance  Patient Goals   Patient goals : \"to return home\"     Education  Patient Education  Education Given To: Patient  Education Provided: Role of Therapy;Plan of Care;Energy Conservation  Education Method: Verbal  Barriers to Learning: None  Education Outcome: Verbalized understanding    Therapy Time   Individual Concurrent Group Co-treatment   Time In 9725         Time Out 1354         Minutes 37         Timed Code Treatment Minutes: [address] [place],   [facility]

## 2022-06-21 NOTE — PROGRESS NOTES
INPATIENT PULMONARY CRITICAL CARE PROGRESS NOTE      Reason for visit    Pneumonia with hypoxia    SUBJECTIVE: Patient when seen this morning continues to be having some shortness of breath, patient does not have any increased coughing or expectoration which he can bring up, patient does not have any chest pain or palpitations, patient was on 2 L of nasal cannula oxygen with saturation 94%, patient was afebrile and continues to be in atrial fibrillation with controlled medical rate on the monitor, patient was hemodynamically maintained , patient's blood sugars are not optimally controlled at this time, about 1350 mL of fluid was removed today from the pleural cavity via the Pleurx catheter, patient has good urine output overnight with cumulative fluid balance of -7.4 L, patient was supposed to have a bronchoscopy this morning but as per Dr. Nas Ledbetter ; patient has obstructing tumor and endobronchial lesions and bronchoscopy may not yield anything significant, and for that reason after discussion with CT surgery the bronchoscopy was deferred, patient was alert and communicative, patient does not have any other pertinent review of system of concern               Physical Exam:  Blood pressure 111/70, pulse 96, temperature 97.8 °F (36.6 °C), temperature source Oral, resp. rate 24, height 6' (1.829 m), weight 176 lb 12.9 oz (80.2 kg), SpO2 94 %.'     Constitutional:  No acute distress. HENT:  Oropharynx is clear and moist. No thyromegaly. Eyes:  Conjunctivae are normal. Pupils equal, round, and reactive to light. No scleral icterus. Neck: . No tracheal deviation present. No obvious thyroid mass. Short enlarged neck  Cardiovascular: S1-S2 irregularly irregular, normal heart sounds. No right ventricular heave. No lower extremity edema. Pulmonary/Chest: No wheezes. decreased Basilar left more than right rales. Chest wall is not dull to percussion. No accessory muscle usage or stridor.   Decreased breath sound intensity, Pleurx catheter  Abdominal: Soft. Bowel sounds present. No distension or hernia. No tenderness. Musculoskeletal: No cyanosis. No clubbing. No obvious joint deformity. Lymphadenopathy: No cervical or supraclavicular adenopathy. Skin: Skin is warm and dry. No rash or nodules on the exposed extremities. Psychiatric: Normal mood and affect. Behavior is normal.  No anxiety. Neurologic: Alert, awake and oriented. PERRL. Speech fluent            Results:  CBC:   Recent Labs     06/19/22  1430 06/20/22  0400   WBC 14.5* 16.0*   HGB 11.4* 10.8*   HCT 35.8* 34.1*   MCV 91.8 91.2    389     BMP:   Recent Labs     06/19/22  1430 06/20/22  0518    139   K 4.4 4.0   CL 97* 99   CO2 33* 32   BUN 10 17   CREATININE <0.5* <0.5*     LIVER PROFILE:   Recent Labs     06/19/22  1430   AST 15   ALT 20   BILITOT <0.2   ALKPHOS 139*     PT/INR:   Recent Labs     06/19/22  1430   PROTIME 16.0*   INR 1.30*       Imaging:  I have reviewed radiology images personally. XR CHEST PORTABLE   Final Result   Persistent complete opacification of left hemithorax, which can reflect   pleural effusion with associated atelectasis or airspace disease. CT CHEST W CONTRAST   Final Result   1. No significant change in appearance of the patient's primary left hilar   mass, similar to the study of 04/19/2022, most likely a primary focus of lung   malignancy. 2. There is stable complete consolidation of the left lung, likely   postobstructive collapse. However, underlying pneumonia or metastatic   disease cannot be excluded. 3. Interval placement of a left pleural catheter, with decrease in size of a   mild-to-moderate left pleural effusion. 4. Slight interval decrease in size of a trace right pleural effusion. 5. New reticular opacities within the basilar aspect of the right lower lobe   likely reflect atelectasis and pulmonary edema, less likely metastatic   disease.    6. Multiple new nonspecific ground-glass nodular opacities throughout the   right upper and right middle lobes, likely reflecting multifocal pneumonia,   less likely metastatic disease. 7. Stable mediastinal and bilateral hilar lymphadenopathy. 8. Stable nodular enlargement of the bilateral adrenal glands likely reflects   either benign adenomatous disease or hyperplasia as this was previously PET   negative. XR CHEST PORTABLE   Final Result   1. Stable complete opacification of the left hemithorax with presumed left   chest tube in place. 2. Right internal jugular center venous catheter tip in the SVC. No   pneumothorax. CT CHEST W CONTRAST    Result Date: 6/20/2022  EXAMINATION: CT OF THE CHEST WITH CONTRAST 6/19/2022 4:39 pm TECHNIQUE: CT of the chest was performed with the administration of intravenous contrast. Multiplanar reformatted images are provided for review. Automated exposure control, iterative reconstruction, and/or weight based adjustment of the mA/kV was utilized to reduce the radiation dose to as low as reasonably achievable. COMPARISON: 04/19/2022, 04/08/2022, 03/22/2022 HISTORY: ORDERING SYSTEM PROVIDED HISTORY: CT Surgery request d/t squamous cell carcinoma TECHNOLOGIST PROVIDED HISTORY: Reason for exam:->CT Surgery request d/t squamous cell carcinoma Reason for Exam: evaluate lung cancer FINDINGS: Mediastinum: The visualized portion of the thyroid is unremarkable. There is no pathologic supraclavicular or axillary lymphadenopathy. There is a persistent large approximate 5.3 x 3.8 cm metastatic subcarinal lymph node, which is similar to the study of 04/19/2022. Left hilar lymphadenopathy is also stable and unchanged. There is also stable mild paratracheal and right hilar lymphadenopathy, similar to the study of 03/22/2022. No new focus of pathologic lymphadenopathy is identified. There is atherosclerotic disease of the thoracic aorta, without evidence of aneurysm or dissection.   The pulmonary arteries are grossly patent, without evidence of filling defect. There is coronary atherosclerosis. No pericardial abnormality is identified. Lungs/pleura: There are retained mucus secretions within the thoracic trachea. There is complete obliteration and obstruction of the left mainstem bronchus by focus left perihilar soft tissue, similar to the study of 04/19/2022. The right-sided central airways remain patent. There has been no significant change in appearance of the suspected persistent left hilar mass, measuring approximately 6.6 x 5.7 cm on image 52 of series 2, similar to the study of 04/19/2022. This results in complete obstruction and associated consolidation of the entirety of the left lung, similar to prior studies. There is a new left-sided pleural catheter in place with interval decrease in size of a small to moderate left pleural effusion in comparison with prior exams. A trace right pleural effusion is noted, though has decreased in size in comparison with the prior exam.  There are stable subpleural reticular opacities throughout the right lung, likely a mild element of chronic interstitial fibrosis. There are multiple new reticular opacities throughout the basilar aspect of the right lower lobe, new from 04/19/2022, which could represent pulmonary edema or less likely metastatic disease. There are also multiple new nonspecific foci of somewhat nodular ground-glass opacity within the right upper lobe and right middle lobes, the largest measuring approximately 3.3 x 3.1 cm within the anterior right upper lobe, representing either multifocal pneumonia or less likely metastatic disease. There is no evidence of a pneumothorax. Upper Abdomen: There is stable mild nodular enlargement of the bilateral adrenal glands, likely secondary to either benign adenomatous disease or hyperplasia as this was previously PET negative on the study of 04/08/2022.  The remainder of the upper abdomen is grossly unremarkable. Soft Tissues/Bones: There is a stable left-sided chest wall hernia, unchanged. The skeletal structures demonstrate multiple chronic left rib fractures. There is degenerative change throughout the thoracic spine. No osteolytic or osteoblastic lesion is seen. A right-sided Port-A-Cath is noted. 1. No significant change in appearance of the patient's primary left hilar mass, similar to the study of 2022, most likely a primary focus of lung malignancy. 2. There is stable complete consolidation of the left lung, likely postobstructive collapse. However, underlying pneumonia or metastatic disease cannot be excluded. 3. Interval placement of a left pleural catheter, with decrease in size of a mild-to-moderate left pleural effusion. 4. Slight interval decrease in size of a trace right pleural effusion. 5. New reticular opacities within the basilar aspect of the right lower lobe likely reflect atelectasis and pulmonary edema, less likely metastatic disease. 6. Multiple new nonspecific ground-glass nodular opacities throughout the right upper and right middle lobes, likely reflecting multifocal pneumonia, less likely metastatic disease. 7. Stable mediastinal and bilateral hilar lymphadenopathy. 8. Stable nodular enlargement of the bilateral adrenal glands likely reflects either benign adenomatous disease or hyperplasia as this was previously PET negative. IR FLUORO GUIDED CVA DEVICE PLMT/REPLACE/REMOVAL    Result Date: 2022  RADIOLOGY REPORT Patient: Bibi HURTADO) MRN: [id number] Location: [facility] Referring Physician: Kia Wero [facility] Account Number: [de-identified] Exam Date/Time: May 26, 2022 06:42:38 : Oct 9, 1955 Rosamaria Monzon Addr.: [address] [address] Primary Physician: Amilcar De Los Santos HX: PORT A CATH PLACEMENT FLUORO VIJAY CATH OR PICC - FLUOROSCOPY COMPARISON: No comparison exams available at the time of dictation.  FINDINGS: Fluoroscopy was provided to the surgeon during the procedure. 3 fluoroscopic images were submitted. Right IJ port tip in SVC. Total fluoroscopy time was 50.8 seconds. IMPRESSION: Fluoroscopic support during right IJ port insertion. Please see operative report for additional detail. Report electronically signed by:  Ruma Kline DO  on 2022, May 27, 04:14 PM EDT     XR CHEST PORTABLE    Result Date: 6/21/2022  EXAMINATION: ONE XRAY VIEW OF THE CHEST 6/21/2022 6:35 am COMPARISON: 06/19/2022 HISTORY: ORDERING SYSTEM PROVIDED HISTORY: re-evaluate effusion TECHNOLOGIST PROVIDED HISTORY: Reason for exam:->re-evaluate effusion Reason for Exam: pneumonia FINDINGS: Port is seen in the right chest wall. Left chest tube extends to the medial mid left hemithorax. Complete opacification of the left hemithorax is again seen. Eventration of the right hemidiaphragm. No gross pneumothorax. Cardiac and mediastinal silhouette evaluation is limited given left-sided opacity. Persistent complete opacification of left hemithorax, which can reflect pleural effusion with associated atelectasis or airspace disease. XR CHEST PORTABLE    Result Date: 6/19/2022  EXAMINATION: ONE XRAY VIEW OF THE CHEST 6/19/2022 1:39 pm COMPARISON: 04/27/2022 HISTORY: ORDERING SYSTEM PROVIDED HISTORY: transfer for pneumonia TECHNOLOGIST PROVIDED HISTORY: Reason for exam:->transfer for pneumonia Reason for Exam: pneumonia FINDINGS: Complete opacification of the left hemithorax similar to the prior study. Presumed left chest tube remains in place. Right internal jugular center venous catheter tip is in the SVC. Right lung clear. No pneumothorax. Right costophrenic sulcus sharp. Cardiac and mediastinal contours obscured by left hemithorax opacification. No new osseous abnormality. 1. Stable complete opacification of the left hemithorax with presumed left chest tube in place. 2. Right internal jugular center venous catheter tip in the SVC. No pneumothorax. XR CHEST PORTABLE    Result Date: 2022  RADIOLOGY REPORT Patient: Magda HURTADO) MRN: [id number] Location: [facility] Referring Physician: Celestino Joe [facility] Account Number: [de-identified] Exam Date/Time: May 26, 2022 07:51:09 : Oct 9, 1955 Cameron Seek. Addr.: [address] [address] Primary Physician: Santhosh De Los Santos HX: CHEST XRAY PORTABLE - CHEST COMPARISON: Chest x-ray 2022. Time Performed: 2022 at 7:51 AM. FINDINGS: CARDIOVASCULAR: The cardiomediastinal silhouette is obscured by an opaque left hemithorax. LUNGS: Opaque left hemithorax unchanged from . PET study from 2022 showed a hypermetabolic mass surrounding the left mainstem bronchus causing bronchial obstruction. It is not known if there has been any intervention since that study. Mild increase in bronchovascular markings right hemithorax slightly less prominent. No focal lung consolidation or focal mass right hemithorax. No right effusion. OSSEOUS STRUCTURES: Bony structures are intact. Anterior cervical fixation plate again noted overlying the lower cervical spine. TUBES/LINES/IMPLANTABLE DEVICES: A new Expwhg-f-Kuzx catheter is present with a catheter tip overlying the superior vena cava. No pneumothorax. IMPRESSION: Opaque left hemithorax unchanged from . PET study from 2022 showed a hypermetabolic mass surrounding the left mainstem bronchus causing bronchial obstruction. It is not known if there has been any intervention since that study. Mild increase in bronchovascular markings right hemithorax slightly less prominent. No focal lung consolidation or focal mass right hemithorax. No right effusion.  Report electronically signed by:  Terrance Conteh MD  on 2022, May 26, 08:33 AM EDT             Assessment:  Principal Problem:    CAP (community acquired pneumonia) due to Pneumococcus [facility])  Active Problems:    Lung consolidation ([address]    Metastatic squamous cell carcinoma ([address] Current smoker    Leukocytosis    SOB (shortness of breath)    Mediastinal adenopathy    Pleural effusion    Postobstructive pneumonia  Resolved Problems:    * No resolved hospital problems. *          Plan:   · Oxygen supplementation to keep saturation between 90 to 94% only  · Please titrate oxygen as per the above parameters  · Pulmonary toilet  · Patient continues to have lung consolidation with postoperative pneumonia like picture along with pleural effusion  · Patient has Pleurx catheter in place with removal of 1350 ml of fluid   · Patient's consolidation is significant along with that patient has worsening leukocytosis-bronchoscopy was planned but deferred after discussion with CT surgery  · Patient is on IV Rocephin and Zithromax at this time which can be changed to PO  but will reassess as per clinical status and cultures  · Bronchodilators  · Patient has history of metastatic squamous cell carcinoma-needs chemoradiation   · Patient has been started on increased doses of Lasix by cardiology  · Monitor input output and BMP  · Correct electrolytes on whenever necessary basis  · Lantus insulin restarted   · Blood glucose monitoring with sliding scale insulin  · Synthroid as per TSH as per IM  · Monitor for any hypoglycemia  · Patient is on Eliquis which can be continued for now  · Monitor for any bleeding  · PUD prophylaxis as per IM     Case discussed with patient and case management    Case d/w Dr Naranjo Erp     ? Discharge planning             Electronically signed by:  Carole Le MD    6/21/2022    11:09 AM.

## 2022-06-21 NOTE — PROGRESS NOTES
Hospitalist Progress Note      PCP: Donny lA MD    Date of Admission: 6/19/2022         Chief Complaint: sob     Hospital Course: 75 yo WM with hx of left lung cancer, chronic resp failure(2L), p/w worsening sob. Pulm/CTsurg/Oncology consulted      Subjective:  Denies sob currently, on 1-2L, had pleurex deaccessed today       Medications:  Reviewed    Infusion Medications    sodium chloride      dextrose       Scheduled Medications    furosemide  40 mg IntraVENous BID    [START ON 6/22/2022] furosemide  40 mg Oral Daily    sodium chloride flush  5-40 mL IntraVENous 2 times per day    cefTRIAXone (ROCEPHIN) IV  1,000 mg IntraVENous Q24H    And    azithromycin  500 mg Oral Q24H    insulin lispro  0-12 Units SubCUTAneous TID WC    insulin lispro  0-6 Units SubCUTAneous Nightly    [Held by provider] insulin glargine  0.25 Units/kg SubCUTAneous Nightly    apixaban  5 mg Oral BID    dilTIAZem  120 mg Oral Daily    levothyroxine  200 mcg Oral Daily    guaiFENesin  600 mg Oral BID    digoxin  125 mcg Oral Daily    atorvastatin  20 mg Oral Nightly    buPROPion  100 mg Oral BID    aspirin  81 mg Oral Daily    ipratropium-albuterol  1 ampule Inhalation BID     PRN Meds: perflutren lipid microspheres, oxyCODONE-acetaminophen, sodium chloride flush, sodium chloride, ondansetron **OR** ondansetron, polyethylene glycol, acetaminophen **OR** acetaminophen, glucose, dextrose bolus **OR** dextrose bolus, glucagon (rDNA), dextrose      Intake/Output Summary (Last 24 hours) at 6/21/2022 1007  Last data filed at 6/21/2022 0554  Gross per 24 hour   Intake 480 ml   Output 3350 ml   Net -2870 ml       Physical Exam Performed:    /72   Pulse 97   Temp 97.5 °F (36.4 °C)   Resp 22   Ht 6' (1.829 m)   Wt 176 lb 12.9 oz (80.2 kg)   SpO2 94%   BMI 23.98 kg/m²     General appearance: No apparent distress, appears stated age and cooperative. obese  HEENT: Pupils equal, round, and reactive to light. Conjunctivae/corneas clear. Neck: Supple, with full range of motion. No jugular venous distention. Trachea midline. Respiratory:  Normal respiratory effort. bilaterally without signif Wheezes/Rhonchi. Mild rales at bases, dec'd lung sounds on left side  Chest: left sided pleurex dressing noted  Cardiovascular: Regular rate and rhythm with normal S1/S2 without murmurs, rubs or gallops. Abdomen: Soft, non-tender, non-distended with normal bowel sounds. Musculoskeletal: No clubbing, cyanosis, 1-2+  LE edema bilaterally. Full range of motion without deformity. Skin: Skin color, texture, turgor normal.  No rashes or lesions. Neurologic:  Neurovascularly intact without any focal sensory/motor deficits. Cranial nerves: II-XII intact, grossly non-focal.  Psychiatric: Alert and oriented, thought content appropriate, normal insight  Capillary Refill: Brisk,3 seconds, normal   Peripheral Pulses: +2 palpable, equal bilaterally             Labs:   Recent Labs     06/19/22  1430 06/20/22  0400   WBC 14.5* 16.0*   HGB 11.4* 10.8*   HCT 35.8* 34.1*    389     Recent Labs     06/19/22  1430 06/20/22  0518    139   K 4.4 4.0   CL 97* 99   CO2 33* 32   BUN 10 17   CREATININE <0.5* <0.5*   CALCIUM 8.8 8.5     Recent Labs     06/19/22  1430   AST 15   ALT 20   BILITOT <0.2   ALKPHOS 139*     Recent Labs     06/19/22  1430   INR 1.30*     No results for input(s): Yesy Hurt in the last 72 hours. Urinalysis:    No results found for: Alayna Niharika, BACTERIA, RBCUA, BLOODU, [address], [place] [place] [address]    Radiology:  XR CHEST PORTABLE   Final Result   Persistent complete opacification of left hemithorax, which can reflect   pleural effusion with associated atelectasis or airspace disease. CT CHEST W CONTRAST   Final Result   1. No significant change in appearance of the patient's primary left hilar   mass, similar to the study of 04/19/2022, most likely a primary focus of lung   malignancy.    2. There is stable complete consolidation of the left lung, likely   postobstructive collapse. However, underlying pneumonia or metastatic   disease cannot be excluded. 3. Interval placement of a left pleural catheter, with decrease in size of a   mild-to-moderate left pleural effusion. 4. Slight interval decrease in size of a trace right pleural effusion. 5. New reticular opacities within the basilar aspect of the right lower lobe   likely reflect atelectasis and pulmonary edema, less likely metastatic   disease. 6. Multiple new nonspecific ground-glass nodular opacities throughout the   right upper and right middle lobes, likely reflecting multifocal pneumonia,   less likely metastatic disease. 7. Stable mediastinal and bilateral hilar lymphadenopathy. 8. Stable nodular enlargement of the bilateral adrenal glands likely reflects   either benign adenomatous disease or hyperplasia as this was previously PET   negative. XR CHEST PORTABLE   Final Result   1. Stable complete opacification of the left hemithorax with presumed left   chest tube in place. 2. Right internal jugular center venous catheter tip in the SVC. No   pneumothorax.                  Assessment/Plan:    Active Hospital Problems    Diagnosis     Lung consolidation ([facility] [J18.1]      Priority: Medium    Metastatic squamous cell carcinoma (HCC) [C79.9]      Priority: Medium    Current smoker [F17.200]      Priority: Medium    Leukocytosis [D72.829]      Priority: Medium    SOB (shortness of breath) [R06.02]      Priority: Medium    CAP (community acquired pneumonia) due to Pneumococcus ([facility] [J13]      Priority: Medium    Pleural effusion [J90]     Postobstructive pneumonia [J18.9]     Mediastinal adenopathy [R59.0]            Healthcare associated pneumonia:  Admitted to inpatient status.  Initial antibiotic therapy with ceftriaxone and azithromycin pending record review.    -pulm consulted, apprec mgmt given postobstructive collapse on CT   -planned for bronch but cancelled given unlikely to yield anything significant    Grade I Diastolic Heart failure:  Diuresis with IV furosemide 40 mg every 12 hours.  Follow electrolytes including potassium and magnesium and replace as needed.      Hypoxia:  Normally on 2 LPM at night.       Left pleural effusion and mass:  Pt has hx of Sq Cell carcinoma of L lung  and apparently sees Dr. Frank Dolan [facility]),had pleurex placed in 4/20222  -Consulted CT surgery for evaluation/mgmt, apprec recs.     -consulted hemonc, rec continue outpt radiation and chemo thereafter, no inpt tx needed    Permanent atrial fibrillation:  Anticoagulation with apixaban.  Rate control with digoxin and diltiazem 120 mg daily.      Type 2 Diabetes:  Basal glargine insulin 30 units nightly (usual home dose is 40 units).  Cover with a \"sliding scale\" lispro moderate scale prandial correction insulin.  Hold metformin.      Dyslipidemia on atorvastatin 20 mg nightly.      Hypothyroid on levothyroxine 200 mcg daily.      COPD:  Continue tiotropium (Spiriva) inhaler daily.       Nicotine dependence:       Chronic anxiety:  Continue bupropion (Wellbutrin SR) 100 mg daily.         DVT Prophylaxis: on apixaban(for afib)  Diet: ADULT DIET;  Regular  ADULT ORAL NUTRITION SUPPLEMENT; Breakfast, Lunch, Dinner; Diabetic Oral Supplement  Code Status: Full Code       PT/OT Eval Status: rec home prn assist     Dispo - pending workup, ctsurg recs, pulm recs, hemonc recs, stable off oxygen, monitor wbc, likely tomorrow    Estevan Busby MD

## 2022-06-21 NOTE — CONSULTS
ONCOLOGY HEMATOLOGY CARE CONSULT NOTE      Requesting Physician:  Dr. Connor Lagunas:  SCC of the LLL of the lung        HISTORY OF PRESENT ILLNESS:      Mr. Diallo Gilbert  is a 74 y.o. male we are seeing in consultation for a squamous cell carcinoma of the LLL of the lung. He is followed by Dr. Tabatha Vargas. He was diagnosed with a squamous cell carcinoma of the LLL by bronchoscopy with EBUS with Dr. Tom Ken on 4/12/2022. A friable fungating left mainstem endobronchial lesion was identified. An endobronchial biopsy showed a squamous cell carcinoma, moderately differentiated. FNA of the subcarinum was also positive. He had a left-sided pleurX placed on 4/27/2022 with Dr. Palmer Pichardo. He was accepted in transfer from [facility] for pneumonia on 6/19/2022. A CT on the day of transfer still showed complete collapse of the LLL and an enlarged subcarinal LN. He reported that he started RT a couple of weeks ago at [facility]. Chemo starts soon. Past Medical History:    Past Medical History:   Diagnosis Date    A-fib [facility])     CAD (coronary artery disease)     Cancer ([facility] [address]     Left squamous cell carcinoma    CHF (congestive heart failure) (HCC)     COPD (chronic obstructive pulmonary disease) (HCC)     CVA (cerebral vascular accident) ([facility] [address] [facility]     Diabetes mellitus ([facility] [address] [facility]     Hyperlipidemia     Hypertension     Thyroid disease      Past Surgical History:    Past Surgical History:   Procedure Laterality Date    BRONCHOSCOPY N/A 4/12/2022    EBUS WF W/ANES.  (11:00) performed by Vincenzo Phelps MD at [address]  4/12/2022    BRONCHOSCOPY ALVEOLAR LAVAGE performed by Vincenzo Phelps MD at [address]  4/12/2022    BRONCHOSCOPY BIOPSY BRONCHUS performed by Vincenzo Phelps MD at [address]  4/12/2022    BRONCHOSCOPY/TRANSBRONCHIAL NEEDLE BIOPSY performed by Vincenzo Phelps MD at [place] [id number] 4/13/2022    BRONCH NF performed by Robert Granados MD at [address] N/A 4/21/2022    BRONCHOSCOPY WITH ENDOBRONCHIAL YAG LASER AND CHEST TUBE PLACEMENT performed by Raysa Valente MD at [address] Left 4/27/2022    LEFT PLEUR-X CATHETER PLACEMENT performed by Shayy Wilson MD at [address] [facility]      L knee       Current Medications:    Current Facility-Administered Medications   Medication Dose Route Frequency Provider Last Rate Last Admin    furosemide (LASIX) injection 40 mg  40 mg IntraVENous BID Honorio Carlisle MD   40 mg at 06/20/22 1911    [START ON 6/22/2022] furosemide (LASIX) tablet 40 mg  40 mg Oral Daily Honorio Carlisle MD        perflutren lipid microspheres (DEFINITY) injection 1.65 mg  1.5 mL IntraVENous ONCE PRN Honorio Carlisle MD        oxyCODONE-acetaminophen (PERCOCET) 5-325 MG per tablet 1 tablet  1 tablet Oral Q6H PRN Kinjal Chin MD   1 tablet at 06/20/22 1214    sodium chloride flush 0.9 % injection 5-40 mL  5-40 mL IntraVENous 2 times per day Rl Karimi MD   10 mL at 06/20/22 2005    sodium chloride flush 0.9 % injection 5-40 mL  5-40 mL IntraVENous PRN Rl Karimi MD        0.9 % sodium chloride infusion   IntraVENous PRN Rl Karimi MD        ondansetron (ZOFRAN-ODT) disintegrating tablet 4 mg  4 mg Oral Q8H PRN Rl Karimi MD        Or    ondansetron [facility]) injection 4 mg  4 mg IntraVENous Q6H PRN Rl Karimi MD   4 mg at 06/20/22 1046    polyethylene glycol (GLYCOLAX) packet 17 g  17 g Oral Daily PRN Rl Karimi MD   17 g at 06/19/22 1842    acetaminophen (TYLENOL) tablet 650 mg  650 mg Oral Q6H PRN Rl Karimi MD   650 mg at 06/19/22 2105    Or    acetaminophen (TYLENOL) suppository 650 mg  650 mg Rectal Q6H PRN Rl Karimi MD        cefTRIAXone (ROCEPHIN) 1000 mg IVPB in 50 mL D5W minibag  1,000 mg IntraVENous Q24H Rl Karimi MD Stopped at 06/20/22 1640    And    azithromycin (ZITHROMAX) tablet 500 mg  500 mg Oral Q24H Francois Gotti MD   500 mg at 06/20/22 1521    insulin lispro (HUMALOG) injection vial 0-12 Units  0-12 Units SubCUTAneous TID WC Francois Gotti MD   8 Units at 06/20/22 1645    insulin lispro (HUMALOG) injection vial 0-6 Units  0-6 Units SubCUTAneous Nightly Francois Gotti MD   2 Units at 06/20/22 2008    glucose chewable tablet 16 g  4 tablet Oral PRN Francois Gotti MD        dextrose bolus 10% 125 mL  125 mL IntraVENous PRN Francois Gotti MD        Or    dextrose bolus 10% 250 mL  250 mL IntraVENous PRN Francois Gotti MD        glucagon (rDNA) injection 1 mg  1 mg IntraMUSCular PRN Francois Gotti MD        dextrose 5 % solution  100 mL/hr IntraVENous PRN Francois Gotti MD       [facility] by provider] insulin glargine (LANTUS) injection vial 20 Units  0.25 Units/kg SubCUTAneous Nightly Francois Gotti MD   20 Units at 06/19/22 2107    apixaban (ELIQUIS) tablet 5 mg  5 mg Oral BID Francois Gotti MD   5 mg at 06/20/22 2000    dilTIAZem (CARDIZEM CD) extended release capsule 120 mg  120 mg Oral Daily Francois Gotti MD   120 mg at 06/20/22 0940    levothyroxine (SYNTHROID) tablet 200 mcg  200 mcg Oral Daily Francois Gotti MD   200 mcg at 06/21/22 9807    guaiFENesin (MUCINEX) extended release tablet 600 mg  600 mg Oral BID Francois Gotti MD   600 mg at 06/20/22 2000    digoxin (LANOXIN) tablet 125 mcg  125 mcg Oral Daily Francois Gotti MD   125 mcg at 06/20/22 0940    atorvastatin (LIPITOR) tablet 20 mg  20 mg Oral Nightly Francois Gotti MD   20 mg at 06/20/22 2000    buPROPion [facility]) extended release tablet 100 mg  100 mg Oral BID Francois Gotti MD   100 mg at 06/20/22 2005    aspirin chewable tablet 81 mg  81 mg Oral Daily Francois Gotti MD   81 mg at 06/20/22 0940    ipratropium-albuterol ([place]) nebulizer solution 1 ampule  1 ampule Inhalation BID Richard Keys MD   1 ampule at 06/20/22 1949     Allergies: Allergies   Allergen Reactions    Codeine        Social History:   Social History     Socioeconomic History    Marital status:      Spouse name: Not on file    Number of children: Not on file    Years of education: Not on file    Highest education level: Not on file   Occupational History    Not on file   Tobacco Use    Smoking status: Current Every Day Smoker     Packs/day: 0.50     Years: 40.00     Pack years: 20.00    Smokeless tobacco: Never Used   Vaping Use    Vaping Use: Never used   Substance and Sexual Activity    Alcohol use: Not Currently    Drug use: Never    Sexual activity: Not Currently   Other Topics Concern    Not on file   Social History Narrative    Not on file     Social Determinants of Health     Financial Resource Strain:     Difficulty of Paying Living Expenses: Not on file   Food Insecurity:     Worried About Running Out of Food in the Last Year: Not on file    Ric of Food in the Last Year: Not on file   Transportation Needs:     Lack of Transportation (Medical): Not on file    Lack of Transportation (Non-Medical):  Not on file   Physical Activity:     Days of Exercise per Week: Not on file    Minutes of Exercise per Session: Not on file   Stress:     Feeling of Stress : Not on file   Social Connections:     Frequency of Communication with Friends and Family: Not on file    Frequency of Social Gatherings with Friends and Family: Not on file    Attends Orthodox Services: Not on file    Active Member of Clubs or Organizations: Not on file    Attends Club or Organization Meetings: Not on file    Marital Status: Not on file   Intimate Partner Violence:     Fear of Current or Ex-Partner: Not on file    Emotionally Abused: Not on file    Physically Abused: Not on file    Sexually Abused: Not on file   Housing Stability:     Unable to Pay for Housing in the Last Year: Not on file    Number of Places Lived in the Last Year: Not on file    Unstable Housing in the Last Year: Not on file          Family History:     History reviewed. No pertinent family history. REVIEW OF SYSTEMS:    Review of Systems    PHYSICAL EXAM:      Vitals:  /86   Pulse 94   Temp 98.6 °F (37 °C) (Oral)   Resp 22   Ht 6' (1.829 m)   Wt 176 lb 12.9 oz (80.2 kg)   SpO2 92%   BMI 23.98 kg/m²     CONSTITUTIONAL:  awake, alert, cooperative, no apparent distress, and appears stated age NAD  EYES:  pupils equal, round and reactive to light, extra ocular muscles intact,sclera clear, conjunctiva normal  NECK:  Supple, symmetrical, trachea midline, no adenopathy, thyroid symmetric, not enlarged and no tenderness, skin normal  HEMATOLOGIC/LYMPHATICS:  no cervical lymphadenopathy, nosupraclavicular lymphadenopathy, no axillary lymphadenopathy and no inguinal lymphadenopathy  LUNGS:  No increased work of breathing, good air exchange, clear to auscultation bilaterally, no crackles or wheezing  CARDIOVASCULAR:  , regular rate and rhythm, normal S1 and S2, no S3 or S4, and no murmur noted  ABDOMEN:  No scars, normal bowel sounds, soft, non-distended, non-tender, no masses palpated, no hepatosplenomegally      MUSCULOSKELETAL:  There is no redness, warmth, or swelling of the joints. Full range of motion noted. Motor strength is 5 out of 5 all extremities bilaterally. NEUROLOGIC:  Awake, alert, oriented to name, place andtime. Cranial nerves II-XII are grossly intact. Motor is 5 out of 5 bilaterally. SKIN:  no bruising or bleeding      DATA:    PT/INR:    Recent Labs     06/19/22  1430   PROT 6.3*   INR 1.30*     PTT:No results for input(s): APTT in the last 72 hours.   CMP:    Lab Results   Component Value Date     06/20/2022    K 4.0 06/20/2022    CL 99 06/20/2022    CO2 32 06/20/2022    BUN 17 06/20/2022    PROT 6.3 06/19/2022     Magnesium:    Lab Results 8. Stable nodular enlargement of the bilateral adrenal glands likely reflects   either benign adenomatous disease or hyperplasia as this was previously PET   negative.         Problem List  Patient Active Problem List   Diagnosis    Spinal stenosis of lumbar region with neurogenic claudication    Mediastinal adenopathy    Lung mass    Multiple tracheobronchial mucus plugs    Mass of left lung    Acute on chronic respiratory failure with hypoxia (HCC)    Acute hypercapnic respiratory failure (HCC)    COPD with acute exacerbation (HCC)    Acute encephalopathy    Pleural effusion    Metabolic encephalopathy    Atrial fibrillation, permanent (HCC)    Acute respiratory failure with hypoxia and hypercapnia (HCC)    Lung collapse    Postobstructive pneumonia    Mucus plugging of bronchi    Endobronchial mass    Hypoxia    COPD (chronic obstructive pulmonary disease) ([address]    Acute hypoxemic respiratory failure (HCC)    Squamous cell carcinoma of left lung (HCC)    Acute on chronic respiratory failure with hypoxia and hypercapnia (HCC)    Chronic diastolic congestive heart failure (HCC)    Small cell lung cancer ([address]    CAP (community acquired pneumonia) due to Pneumococcus ([address]    Lung consolidation ([address]    Metastatic squamous cell carcinoma ([address]    Current smoker    Leukocytosis    SOB (shortness of breath)       IMPRESSION/RECOMMENDATIONS:  1.) SCC of the LLL of the lung  - diagnosed with a squamous cell carcinoma of the LLL by bronchoscopy with EBUS with Dr. Hedy Morales on 4/12/2022. A friable fungating left mainstem endobronchial lesion was identified. An endobronchial biopsy showed a squamous cell carcinoma, moderately differentiated. FNA of the subcarinum was also positive. - PET/CT, 4/08/2022, showed a large mass in the left hilum extending into the LLL with complete obstruction of the left mainstem bronchus. A hypermetabolic subcarinal LN was identified as well.   - He reported that he started RT a couple of weeks ago at [facility]. Chemo starts soon. - Agree with chemoradiation. 2.) HCAP  - Ceftriaxone/Azithro started, 6/19.    3.) Permanent atrial fib  - On Eliquis    4.) DM2    5.) Hypothyroidism    6.) COPD  - Duonebs BID      Thank you for asking me to see the patient.        Inez Leyden, MD  PleaseContact Through [employer]

## 2022-06-22 LAB
ALBUMIN SERPL-MCNC: 2.9 G/DL (ref 3.4–5)
ANION GAP SERPL CALCULATED.3IONS-SCNC: 7 MMOL/L (ref 3–16)
BASOPHILS ABSOLUTE: 0.1 K/UL (ref 0–0.2)
BASOPHILS RELATIVE PERCENT: 0.8 %
BUN BLDV-MCNC: 9 MG/DL (ref 7–20)
CALCIUM SERPL-MCNC: 8.4 MG/DL (ref 8.3–10.6)
CHLORIDE BLD-SCNC: 97 MMOL/L (ref 99–110)
CO2: 34 MMOL/L (ref 21–32)
CREAT SERPL-MCNC: <0.5 MG/DL (ref 0.8–1.3)
EOSINOPHILS ABSOLUTE: 0.3 K/UL (ref 0–0.6)
EOSINOPHILS RELATIVE PERCENT: 4 %
GFR AFRICAN AMERICAN: >60
GFR NON-AFRICAN AMERICAN: >60
GLUCOSE BLD-MCNC: 105 MG/DL (ref 70–99)
GLUCOSE BLD-MCNC: 107 MG/DL (ref 70–99)
GLUCOSE BLD-MCNC: 136 MG/DL (ref 70–99)
GLUCOSE BLD-MCNC: 219 MG/DL (ref 70–99)
GLUCOSE BLD-MCNC: 253 MG/DL (ref 70–99)
GLUCOSE BLD-MCNC: 93 MG/DL (ref 70–99)
HCT VFR BLD CALC: 37.2 % (ref 40.5–52.5)
HEMOGLOBIN: 11.7 G/DL (ref 13.5–17.5)
LYMPHOCYTES ABSOLUTE: 0.4 K/UL (ref 1–5.1)
LYMPHOCYTES RELATIVE PERCENT: 4.5 %
MAGNESIUM: 1.9 MG/DL (ref 1.8–2.4)
MCH RBC QN AUTO: 28.8 PG (ref 26–34)
MCHC RBC AUTO-ENTMCNC: 31.6 G/DL (ref 31–36)
MCV RBC AUTO: 91.2 FL (ref 80–100)
MONOCYTES ABSOLUTE: 0.8 K/UL (ref 0–1.3)
MONOCYTES RELATIVE PERCENT: 9.6 %
NEUTROPHILS ABSOLUTE: 6.5 K/UL (ref 1.7–7.7)
NEUTROPHILS RELATIVE PERCENT: 81.1 %
PDW BLD-RTO: 17.8 % (ref 12.4–15.4)
PERFORMED ON: ABNORMAL
PHOSPHORUS: 2.4 MG/DL (ref 2.5–4.9)
PLATELET # BLD: 342 K/UL (ref 135–450)
PMV BLD AUTO: 5.8 FL (ref 5–10.5)
POTASSIUM SERPL-SCNC: 3.4 MMOL/L (ref 3.5–5.1)
RBC # BLD: 4.07 M/UL (ref 4.2–5.9)
SODIUM BLD-SCNC: 138 MMOL/L (ref 136–145)
WBC # BLD: 8 K/UL (ref 4–11)

## 2022-06-22 PROCEDURE — 94761 N-INVAS EAR/PLS OXIMETRY MLT: CPT

## 2022-06-22 PROCEDURE — 83735 ASSAY OF MAGNESIUM: CPT

## 2022-06-22 PROCEDURE — 6370000000 HC RX 637 (ALT 250 FOR IP): Performed by: INTERNAL MEDICINE

## 2022-06-22 PROCEDURE — 99232 SBSQ HOSP IP/OBS MODERATE 35: CPT | Performed by: INTERNAL MEDICINE

## 2022-06-22 PROCEDURE — 1200000000 HC SEMI PRIVATE

## 2022-06-22 PROCEDURE — 36415 COLL VENOUS BLD VENIPUNCTURE: CPT

## 2022-06-22 PROCEDURE — 2580000003 HC RX 258: Performed by: INTERNAL MEDICINE

## 2022-06-22 PROCEDURE — 80069 RENAL FUNCTION PANEL: CPT

## 2022-06-22 PROCEDURE — 94640 AIRWAY INHALATION TREATMENT: CPT

## 2022-06-22 PROCEDURE — 2700000000 HC OXYGEN THERAPY PER DAY

## 2022-06-22 PROCEDURE — 85025 COMPLETE CBC W/AUTO DIFF WBC: CPT

## 2022-06-22 PROCEDURE — 6360000002 HC RX W HCPCS: Performed by: INTERNAL MEDICINE

## 2022-06-22 RX ORDER — LEVOFLOXACIN 500 MG/1
500 TABLET, FILM COATED ORAL DAILY
Status: DISCONTINUED | OUTPATIENT
Start: 2022-06-23 | End: 2022-06-22

## 2022-06-22 RX ORDER — FUROSEMIDE 40 MG/1
40 TABLET ORAL DAILY
Qty: 60 TABLET | Refills: 0 | Status: SHIPPED | OUTPATIENT
Start: 2022-06-23

## 2022-06-22 RX ORDER — LEVOFLOXACIN 500 MG/1
500 TABLET, FILM COATED ORAL DAILY
Qty: 4 TABLET | Refills: 0 | Status: CANCELLED | OUTPATIENT
Start: 2022-06-23 | End: 2022-06-27

## 2022-06-22 RX ORDER — ATORVASTATIN CALCIUM 20 MG/1
20 TABLET, FILM COATED ORAL NIGHTLY
Qty: 30 TABLET | Refills: 1 | Status: CANCELLED | OUTPATIENT
Start: 2022-06-22

## 2022-06-22 RX ADMIN — APIXABAN 5 MG: 5 TABLET, FILM COATED ORAL at 20:15

## 2022-06-22 RX ADMIN — INSULIN LISPRO 6 UNITS: 100 INJECTION, SOLUTION INTRAVENOUS; SUBCUTANEOUS at 12:47

## 2022-06-22 RX ADMIN — IPRATROPIUM BROMIDE AND ALBUTEROL SULFATE 1 AMPULE: .5; 2.5 SOLUTION RESPIRATORY (INHALATION) at 19:59

## 2022-06-22 RX ADMIN — GUAIFENESIN 600 MG: 600 TABLET, EXTENDED RELEASE ORAL at 09:25

## 2022-06-22 RX ADMIN — INSULIN GLARGINE 20 UNITS: 100 INJECTION, SOLUTION SUBCUTANEOUS at 20:16

## 2022-06-22 RX ADMIN — BUPROPION HYDROCHLORIDE 100 MG: 100 TABLET, EXTENDED RELEASE ORAL at 09:25

## 2022-06-22 RX ADMIN — SODIUM CHLORIDE, PRESERVATIVE FREE 10 ML: 5 INJECTION INTRAVENOUS at 20:16

## 2022-06-22 RX ADMIN — ONDANSETRON 4 MG: 2 INJECTION INTRAMUSCULAR; INTRAVENOUS at 12:54

## 2022-06-22 RX ADMIN — GUAIFENESIN 600 MG: 600 TABLET, EXTENDED RELEASE ORAL at 20:15

## 2022-06-22 RX ADMIN — APIXABAN 5 MG: 5 TABLET, FILM COATED ORAL at 09:25

## 2022-06-22 RX ADMIN — CEFTRIAXONE SODIUM 1000 MG: 1 INJECTION, POWDER, FOR SOLUTION INTRAMUSCULAR; INTRAVENOUS at 14:28

## 2022-06-22 RX ADMIN — ATORVASTATIN CALCIUM 20 MG: 10 TABLET, FILM COATED ORAL at 20:15

## 2022-06-22 RX ADMIN — INSULIN LISPRO 4 UNITS: 100 INJECTION, SOLUTION INTRAVENOUS; SUBCUTANEOUS at 17:05

## 2022-06-22 RX ADMIN — ASPIRIN 81 MG: 81 TABLET, CHEWABLE ORAL at 09:25

## 2022-06-22 RX ADMIN — FUROSEMIDE 40 MG: 40 TABLET ORAL at 09:25

## 2022-06-22 RX ADMIN — SODIUM CHLORIDE, PRESERVATIVE FREE 10 ML: 5 INJECTION INTRAVENOUS at 09:00

## 2022-06-22 RX ADMIN — BUPROPION HYDROCHLORIDE 100 MG: 100 TABLET, EXTENDED RELEASE ORAL at 20:15

## 2022-06-22 RX ADMIN — IPRATROPIUM BROMIDE AND ALBUTEROL SULFATE 1 AMPULE: .5; 2.5 SOLUTION RESPIRATORY (INHALATION) at 08:10

## 2022-06-22 RX ADMIN — LEVOTHYROXINE SODIUM 200 MCG: 0.1 TABLET ORAL at 06:11

## 2022-06-22 RX ADMIN — DIGOXIN 125 MCG: 125 TABLET ORAL at 09:25

## 2022-06-22 RX ADMIN — DILTIAZEM HYDROCHLORIDE 120 MG: 120 CAPSULE, COATED, EXTENDED RELEASE ORAL at 09:25

## 2022-06-22 ASSESSMENT — PAIN DESCRIPTION - LOCATION: LOCATION: BACK;CHEST;RIB CAGE

## 2022-06-22 ASSESSMENT — PAIN - FUNCTIONAL ASSESSMENT: PAIN_FUNCTIONAL_ASSESSMENT: PREVENTS OR INTERFERES SOME ACTIVE ACTIVITIES AND ADLS

## 2022-06-22 ASSESSMENT — PAIN DESCRIPTION - ORIENTATION: ORIENTATION: LEFT

## 2022-06-22 ASSESSMENT — PAIN SCALES - GENERAL
PAINLEVEL_OUTOF10: 4
PAINLEVEL_OUTOF10: 0

## 2022-06-22 ASSESSMENT — PAIN DESCRIPTION - DESCRIPTORS: DESCRIPTORS: STABBING

## 2022-06-22 NOTE — PROGRESS NOTES
INPATIENT PULMONARY CRITICAL CARE PROGRESS NOTE      Reason for visit    Pneumonia with hypoxia    SUBJECTIVE: Patient when seen this morning continues to be having some shortness of breath, patient does not have any increased coughing or expectoration  patient does not have any chest pain or palpitations, patient was on 1 L of nasal cannula oxygen with saturation 95%, patient was afebrile and continues to be in atrial fibrillation with controlled ventricular rate  rate on the monitor, patient was hemodynamically maintained , patient's blood sugars are better controlled at this time, about 600 mL of fluid was removed today from the pleural cavity via the Pleurx catheter, patient has good urine output overnight with cumulative fluid balance of -7.9 L,, patient was alert and communicative, patient does not have any other pertinent review of system of concern       Physical Exam:  Blood pressure 102/68, pulse 97, temperature 97.6 °F (36.4 °C), temperature source Oral, resp. rate 18, height 6' (1.829 m), weight 176 lb 12.9 oz (80.2 kg), SpO2 95 %.'     Constitutional:  No acute distress. HENT:  Oropharynx is clear and moist. No thyromegaly. Eyes:  Conjunctivae are normal. Pupils equal, round, and reactive to light. No scleral icterus. Neck: . No tracheal deviation present. No obvious thyroid mass. Short enlarged neck  Cardiovascular: S1-S2 irregularly irregular, normal heart sounds. No right ventricular heave. No lower extremity edema. Pulmonary/Chest: No wheezes. decreased Basilar left more than right rales. Chest wall is not dull to percussion. No accessory muscle usage or stridor. Decreased breath sound intensity, Pleurx catheter  Abdominal: Soft. Bowel sounds present. No distension or hernia. No tenderness. Musculoskeletal: No cyanosis. No clubbing. No obvious joint deformity. Lymphadenopathy: No cervical or supraclavicular adenopathy. Skin: Skin is warm and dry.  No rash or nodules on the exposed extremities. Psychiatric: Normal mood and affect. Behavior is normal.  No anxiety. Neurologic: Alert, awake and oriented. PERRL. Speech fluent            Results:  CBC:   Recent Labs     06/19/22  1430 06/20/22  0400 06/22/22  0528   WBC 14.5* 16.0* 8.0   HGB 11.4* 10.8* 11.7*   HCT 35.8* 34.1* 37.2*   MCV 91.8 91.2 91.2    389 342     BMP:   Recent Labs     06/19/22  1430 06/20/22  0518 06/22/22  0528    139 138   K 4.4 4.0 3.4*   CL 97* 99 97*   CO2 33* 32 34*   PHOS  --   --  2.4*   BUN 10 17 9   CREATININE <0.5* <0.5* <0.5*     LIVER PROFILE:   Recent Labs     06/19/22  1430   AST 15   ALT 20   BILITOT <0.2   ALKPHOS 139*     PT/INR:   Recent Labs     06/19/22  1430   PROTIME 16.0*   INR 1.30*       Imaging:  I have reviewed radiology images personally. XR CHEST PORTABLE   Final Result   Persistent complete opacification of left hemithorax, which can reflect   pleural effusion with associated atelectasis or airspace disease. CT CHEST W CONTRAST   Final Result   1. No significant change in appearance of the patient's primary left hilar   mass, similar to the study of 04/19/2022, most likely a primary focus of lung   malignancy. 2. There is stable complete consolidation of the left lung, likely   postobstructive collapse. However, underlying pneumonia or metastatic   disease cannot be excluded. 3. Interval placement of a left pleural catheter, with decrease in size of a   mild-to-moderate left pleural effusion. 4. Slight interval decrease in size of a trace right pleural effusion. 5. New reticular opacities within the basilar aspect of the right lower lobe   likely reflect atelectasis and pulmonary edema, less likely metastatic   disease. 6. Multiple new nonspecific ground-glass nodular opacities throughout the   right upper and right middle lobes, likely reflecting multifocal pneumonia,   less likely metastatic disease.    7. Stable mediastinal and bilateral hilar lymphadenopathy. 8. Stable nodular enlargement of the bilateral adrenal glands likely reflects   either benign adenomatous disease or hyperplasia as this was previously PET   negative. XR CHEST PORTABLE   Final Result   1. Stable complete opacification of the left hemithorax with presumed left   chest tube in place. 2. Right internal jugular center venous catheter tip in the SVC. No   pneumothorax. Assessment:  Principal Problem:    CAP (community acquired pneumonia) due to Pneumococcus [facility])  Active Problems:    Lung consolidation ([facility] [address]    Metastatic squamous cell carcinoma ([facility] [address]    Current smoker    Leukocytosis    SOB (shortness of breath)    Mediastinal adenopathy    Pleural effusion    Postobstructive pneumonia  Resolved Problems:    * No resolved hospital problems.  *          Plan:   · Oxygen supplementation to keep saturation between 90 to 94% only  · Please titrate oxygen as per the above parameters  · Pulmonary toilet  · Patient continues to have lung consolidation with postoperative pneumonia like picture along with pleural effusion  · Patient has Pleurx catheter in place with removal of 600ml of fluid   · Patient's consolidation is significant along with that patient has worsening leukocytosis-bronchoscopy was planned but deferred after discussion with CT surgery  · Patient is on IV Rocephin and Zithromax which can be changed to PO or d/kareem -as per IM   · Bronchodilators  · Patient has history of metastatic squamous cell carcinoma-needs chemoradiation   · Patient has been started on increased doses of Lasix by cardiology  · Monitor input output and BMP  · Correct electrolytes on whenever necessary basis  · Lantus insulin as per BGM with SSI   · Blood glucose monitoring with sliding scale insulin  · Synthroid as per TSH as per IM  · Monitor for any hypoglycemia  · Patient is on Eliquis which can be continued for now  · Monitor for any bleeding  · PUD prophylaxis as per IM     Case discussed with patient and case management      ? Discharge planning     No other recommendations from Pulmonary/CCM stand point -will sign off -please call on PRN basis ,if the patient is not discharged             Electronically signed by:  Tami Hernandez MD    6/22/2022    10:23 AM.

## 2022-06-22 NOTE — PROGRESS NOTES
PleurX catheter was drained for the second consecutive day after it was clamped from continuous suction via chest tube. 500ml of serosanguinous drainage was drained. Pt tolerated well.

## 2022-06-22 NOTE — PROGRESS NOTES
Pt assessment completed and charted. VSS. Pt a/o x4. O 90% on 1L NC. Lung sounds w/ expiratory  Wheezing. Pleurex drain CDI. Pt using urinal at bedside. Bed alarm on. Bed in lowest position and wheels locked. Call light within reach. Bedside table within reach. Non-skid footwear in place. Pt denies any other needs at this time. Pt calls out appropriately. Will continue to monitor.

## 2022-06-22 NOTE — PLAN OF CARE
Problem: Safety - Adult  Goal: Free from fall injury  6/22/2022 1617 by Melvin Cabrales RN  Outcome: Completed  6/22/2022 1616 by Sobeida Reardon RN  Outcome: Progressing  6/22/2022 1049 by Sobeida Reardon RN  Outcome: Progressing  Flowsheets (Taken 6/22/2022 0915)  Free From Fall Injury: Instruct family/caregiver on patient safety     Problem: Pain  Goal: Verbalizes/displays adequate comfort level or baseline comfort level  6/22/2022 1617 by Melvin Cabrales RN  Outcome: Completed  6/22/2022 1616 by Sobeida Reardon RN  Outcome: Progressing  6/22/2022 1049 by Sobeida Reardon RN  Outcome: Progressing  Flowsheets (Taken 6/22/2022 0915)  Verbalizes/displays adequate comfort level or baseline comfort level:   Encourage patient to monitor pain and request assistance   Assess pain using appropriate pain scale   Administer analgesics based on type and severity of pain and evaluate response   Implement non-pharmacological measures as appropriate and evaluate response   Consider cultural and social influences on pain and pain management   Notify Licensed Independent Practitioner if interventions unsuccessful or patient reports new pain     Problem: Skin/Tissue Integrity  Goal: Absence of new skin breakdown  Description: 1. Monitor for areas of redness and/or skin breakdown  2. Assess vascular access sites hourly  3. Every 4-6 hours minimum:  Change oxygen saturation probe site  4. Every 4-6 hours:  If on nasal continuous positive airway pressure, respiratory therapy assess nares and determine need for appliance change or resting period.   6/22/2022 1617 by Melvin Cabrales RN  Outcome: Completed  6/22/2022 1616 by Sobeida Reardon RN  Outcome: Progressing  6/22/2022 1049 by Sobeida Reardon RN  Outcome: Progressing     Problem: Nutrition Deficit:  Goal: Optimize nutritional status  6/22/2022 1617 by Melvin Cabrales RN  Outcome: Completed  6/22/2022

## 2022-06-22 NOTE — PLAN OF CARE
Problem: Safety - Adult  Goal: Free from fall injury  6/22/2022 1616 by Dulce Maria Beckham RN  Outcome: Progressing  6/22/2022 1049 by Dulce Maria Beckham RN  Outcome: Progressing  Flowsheets (Taken 6/22/2022 0915)  Free From Fall Injury: Instruct family/caregiver on patient safety     Problem: Pain  Goal: Verbalizes/displays adequate comfort level or baseline comfort level  6/22/2022 1616 by Dulce Maria Beckham RN  Outcome: Progressing  6/22/2022 1049 by Dulce Maria Beckham RN  Outcome: Progressing  Flowsheets (Taken 6/22/2022 0915)  Verbalizes/displays adequate comfort level or baseline comfort level:   Encourage patient to monitor pain and request assistance   Assess pain using appropriate pain scale   Administer analgesics based on type and severity of pain and evaluate response   Implement non-pharmacological measures as appropriate and evaluate response   Consider cultural and social influences on pain and pain management   Notify Licensed Independent Practitioner if interventions unsuccessful or patient reports new pain     Problem: Skin/Tissue Integrity  Goal: Absence of new skin breakdown  Description: 1.   Monitor for areas of redness and/or skin breakdown    6/22/2022 1616 by Dulce Maria Beckham RN  Outcome: Progressing  6/22/2022 1049 by Dulce Maria Beckham RN  Outcome: Progressing     Problem: Nutrition Deficit:  Goal: Optimize nutritional status  6/22/2022 1616 by Dulce Maria Beckham RN  Outcome: Progressing  6/22/2022 1049 by Dulce Maria Beckham RN  Outcome: Progressing     Problem: Chronic Conditions and Co-morbidities  Goal: Patient's chronic conditions and co-morbidity symptoms are monitored and maintained or improved  6/22/2022 1616 by Dulce Maria Beckham RN  Outcome: Progressing  6/22/2022 1049 by Dulce Maria Beckham RN  Outcome: Progressing  Flowsheets (Taken 6/22/2022 0915)  Care Plan - Patient's Chronic Conditions and Co-Morbidity Symptoms are Monitored and Maintained or Improved:   Monitor and assess patient's chronic conditions and comorbid symptoms for stability, deterioration, or improvement   Collaborate with multidisciplinary team to address chronic and comorbid conditions and prevent exacerbation or deterioration   Update acute care plan with appropriate goals if chronic or comorbid symptoms are exacerbated and prevent overall improvement and discharge     Problem: ABCDS Injury Assessment  Goal: Absence of physical injury  6/22/2022 1616 by Roberth Galdamez RN  Outcome: Progressing  6/22/2022 1049 by Roberth Galdamez RN  Outcome: Progressing  Flowsheets (Taken 6/22/2022 0915)  Absence of Physical Injury: Implement safety measures based on patient assessment

## 2022-06-22 NOTE — PROGRESS NOTES
Pts discharge documentation was completed and reviewed with the patient as well as the daughter over the phone. They both verbalized understanding of the discharge instructions, hospital course, and medication changes. The daughter stated that she will attempt to bring his O2 for transport. She stated she will attempt to be here in the AM, approx 1000.

## 2022-06-22 NOTE — CARE COORDINATION
Chart reviewed day 3. Spoke with Mevvy. Stated patient will not have a ride home till tomorrow. Plan to resume [facility] skilled and unskilled. Will fax order to [address] [place] [address] on Aging for transportation assistance with passport. Will send extra pleurex cath supplies. Following. Benjamin Carlos RN  Message left for clipkit of likely d/c tomorrow and to watch out for [facility] to be faxed.  Benjamin Carlos RN

## 2022-06-22 NOTE — PLAN OF CARE
Problem: Safety - Adult  Goal: Free from fall injury  Outcome: Progressing     Problem: Pain  Goal: Verbalizes/displays adequate comfort level or baseline comfort level  Outcome: Progressing  Flowsheets (Taken 6/22/2022 0915)  Verbalizes/displays adequate comfort level or baseline comfort level:   Encourage patient to monitor pain and request assistance   Assess pain using appropriate pain scale   Administer analgesics based on type and severity of pain and evaluate response   Implement non-pharmacological measures as appropriate and evaluate response   Consider cultural and social influences on pain and pain management   Notify Licensed Independent Practitioner if interventions unsuccessful or patient reports new pain     Problem: Skin/Tissue Integrity  Goal: Absence of new skin breakdown  Description: 1. Monitor for areas of redness and/or skin breakdown  2. Assess vascular access sites hourly  3. Every 4-6 hours minimum:  Change oxygen saturation probe site  4. Every 4-6 hours:  If on nasal continuous positive airway pressure, respiratory therapy assess nares and determine need for appliance change or resting period.   Outcome: Progressing     Problem: Nutrition Deficit:  Goal: Optimize nutritional status  Outcome: Progressing     Problem: Chronic Conditions and Co-morbidities  Goal: Patient's chronic conditions and co-morbidity symptoms are monitored and maintained or improved  Outcome: Progressing  Flowsheets (Taken 6/22/2022 0915)  Care Plan - Patient's Chronic Conditions and Co-Morbidity Symptoms are Monitored and Maintained or Improved:   Monitor and assess patient's chronic conditions and comorbid symptoms for stability, deterioration, or improvement   Collaborate with multidisciplinary team to address chronic and comorbid conditions and prevent exacerbation or deterioration   Update acute care plan with appropriate goals if chronic or comorbid symptoms are exacerbated and prevent overall improvement Pulses obtained via doppler, slightly weaker on right  Palpable on left, non palpable on right, possible d/t + 3 edema and guarding upon palpation and discharge     Problem: ABCDS Injury Assessment  Goal: Absence of physical injury  Outcome: Progressing

## 2022-06-22 NOTE — PROGRESS NOTES
Hospitalist Progress Note      PCP: Aleksandar Oseguera MD    Date of Admission: 6/19/2022         Chief Complaint: sob     Hospital Course: 67 yo WM with hx of left lung cancer, chronic resp failure(2L), p/w worsening sob. Pulm/CTsurg/Oncology consulted        Subjective: resting in bed, on room air, no sob currently, aware of how to use his pleurex       Medications:  Reviewed    Infusion Medications    sodium chloride      dextrose       Scheduled Medications    furosemide  40 mg Oral Daily    sodium chloride flush  5-40 mL IntraVENous 2 times per day    cefTRIAXone (ROCEPHIN) IV  1,000 mg IntraVENous Q24H    insulin lispro  0-12 Units SubCUTAneous TID WC    insulin lispro  0-6 Units SubCUTAneous Nightly    insulin glargine  0.25 Units/kg SubCUTAneous Nightly    apixaban  5 mg Oral BID    dilTIAZem  120 mg Oral Daily    levothyroxine  200 mcg Oral Daily    guaiFENesin  600 mg Oral BID    digoxin  125 mcg Oral Daily    atorvastatin  20 mg Oral Nightly    buPROPion  100 mg Oral BID    aspirin  81 mg Oral Daily    ipratropium-albuterol  1 ampule Inhalation BID     PRN Meds: perflutren lipid microspheres, oxyCODONE-acetaminophen, sodium chloride flush, sodium chloride, ondansetron **OR** ondansetron, polyethylene glycol, acetaminophen **OR** acetaminophen, glucose, dextrose bolus **OR** dextrose bolus, glucagon (rDNA), dextrose      Intake/Output Summary (Last 24 hours) at 6/22/2022 1053  Last data filed at 6/22/2022 0556  Gross per 24 hour   Intake 840 ml   Output 975 ml   Net -135 ml       Physical Exam Performed:    /68   Pulse 97   Temp 97.6 °F (36.4 °C) (Oral)   Resp 18   Ht 6' (1.829 m)   Wt 176 lb 12.9 oz (80.2 kg)   SpO2 95% Comment: 95  BMI 23.98 kg/m²      General appearance: No apparent distress, appears stated age and cooperative. obese  HEENT: Pupils equal, round, and reactive to light. Conjunctivae/corneas clear. Neck: Supple, with full range of motion.  No jugular venous distention. Trachea midline. Respiratory:  Normal respiratory effort. bilaterally without signif Wheezes/Rhonchi. Mild rales at bases, dec'd lung sounds on left side  Chest: left sided pleurex dressing noted  Cardiovascular: Regular rate and rhythm with normal S1/S2 without murmurs, rubs or gallops. Abdomen: Soft, non-tender, non-distended with normal bowel sounds. Musculoskeletal: No clubbing, cyanosis, 1-2+  LE edema bilaterally.  Full range of motion without deformity. Skin: Skin color, texture, turgor normal.  No rashes or lesions. Neurologic:  Neurovascularly intact without any focal sensory/motor deficits. Cranial nerves: II-XII intact, grossly non-focal.  Psychiatric: Alert and oriented, thought content appropriate, normal insight  Capillary Refill: Brisk,3 seconds, normal   Peripheral Pulses: +2 palpable, equal bilaterally       Labs:   Recent Labs     06/19/22  1430 06/20/22  0400 06/22/22  0528   WBC 14.5* 16.0* 8.0   HGB 11.4* 10.8* 11.7*   HCT 35.8* 34.1* 37.2*    389 342     Recent Labs     06/19/22  1430 06/20/22  0518 06/22/22  0528    139 138   K 4.4 4.0 3.4*   CL 97* 99 97*   CO2 33* 32 34*   BUN 10 17 9   CREATININE <0.5* <0.5* <0.5*   CALCIUM 8.8 8.5 8.4   PHOS  --   --  2.4*     Recent Labs     06/19/22  1430   AST 15   ALT 20   BILITOT <0.2   ALKPHOS 139*     Recent Labs     06/19/22  1430   INR 1.30*     No results for input(s): CKTOTAL, TROPONINI in the last 72 hours. Urinalysis:    No results found for: Campbell Jones, BACTERIA, RBCUA, BLOODU, [address], [address] [place] [address]    Radiology:  XR CHEST PORTABLE   Final Result   Persistent complete opacification of left hemithorax, which can reflect   pleural effusion with associated atelectasis or airspace disease. CT CHEST W CONTRAST   Final Result   1. No significant change in appearance of the patient's primary left hilar   mass, similar to the study of 04/19/2022, most likely a primary focus of lung   malignancy.    2. There is stable complete consolidation of the left lung, likely   postobstructive collapse. However, underlying pneumonia or metastatic   disease cannot be excluded. 3. Interval placement of a left pleural catheter, with decrease in size of a   mild-to-moderate left pleural effusion. 4. Slight interval decrease in size of a trace right pleural effusion. 5. New reticular opacities within the basilar aspect of the right lower lobe   likely reflect atelectasis and pulmonary edema, less likely metastatic   disease. 6. Multiple new nonspecific ground-glass nodular opacities throughout the   right upper and right middle lobes, likely reflecting multifocal pneumonia,   less likely metastatic disease. 7. Stable mediastinal and bilateral hilar lymphadenopathy. 8. Stable nodular enlargement of the bilateral adrenal glands likely reflects   either benign adenomatous disease or hyperplasia as this was previously PET   negative. XR CHEST PORTABLE   Final Result   1. Stable complete opacification of the left hemithorax with presumed left   chest tube in place. 2. Right internal jugular center venous catheter tip in the SVC. No   pneumothorax.                  Assessment/Plan:    Active Hospital Problems    Diagnosis     Lung consolidation ([facility] [address] [J18.1]      Priority: Medium    Metastatic squamous cell carcinoma (HCC) [C79.9]      Priority: Medium    Current smoker [F17.200]      Priority: Medium    Leukocytosis [D72.829]      Priority: Medium    SOB (shortness of breath) [R06.02]      Priority: Medium    CAP (community acquired pneumonia) due to Pneumococcus ([facility] [address] [J13]      Priority: Medium    Pleural effusion [J90]     Postobstructive pneumonia [J18.9]     Mediastinal adenopathy [R59.0]          Healthcare associated pneumonia:  Admitted to inpatient status.  Initial antibiotic therapy with ceftriaxone and azithromycin pending record review.    -pulm consulted, apprec mgmt given postobstructive collapse on CT   -planned for bronch but after further discussion with CT surg, cancelled given unlikely to yield anything significant     Grade I Diastolic Heart failure:  Diuresed with IV furosemide 40 mg every 12 hours.  Follow electrolytes including potassium and magnesium and replace as needed.    -started on lasix daily PO    Hypoxia:  Normally on 2 LPM at night.       Left pleural effusion and mass:  Pt has hx of Sq Cell carcinoma of L lung  and apparently sees Dr. Salas Cruz [facility]),had pleurex placed in 4/20222  -Consulted CT surgery for evaluation/mgmt, apprec recs.     -consulted hemonc, rec continue outpt radiation and chemo thereafter, no inpt tx needed     Permanent atrial fibrillation:  Anticoagulation with apixaban.  Rate control with digoxin and diltiazem 120 mg daily.      Type 2 Diabetes:  Basal glargine insulin 30 units nightly (usual home dose is 40 units).  Cover with a \"sliding scale\" lispro moderate scale prandial correction insulin.  Hold metformin.      Dyslipidemia on atorvastatin 20 mg nightly.      Hypothyroid on levothyroxine 200 mcg daily.      COPD:  Continue tiotropium (Spiriva) inhaler daily.       Nicotine dependence:       Chronic anxiety:  Continue bupropion (Wellbutrin SR) 100 mg daily.         DVT Prophylaxis: on apixaban(for afib)  Diet: ADULT DIET;  Regular  ADULT ORAL NUTRITION SUPPLEMENT; Breakfast, Lunch, Dinner; Diabetic Oral Supplement  Code Status: Full Code    PT/OT Eval Status: home pt/ot    Dispo - likely tomorrow given family transport issues    Dustin Hernandez MD

## 2022-06-23 VITALS
SYSTOLIC BLOOD PRESSURE: 113 MMHG | WEIGHT: 176.81 LBS | RESPIRATION RATE: 18 BRPM | TEMPERATURE: 97.6 F | OXYGEN SATURATION: 97 % | BODY MASS INDEX: 23.95 KG/M2 | HEIGHT: 72 IN | HEART RATE: 81 BPM | DIASTOLIC BLOOD PRESSURE: 74 MMHG

## 2022-06-23 LAB
GLUCOSE BLD-MCNC: 108 MG/DL (ref 70–99)
GLUCOSE BLD-MCNC: 128 MG/DL (ref 70–99)
PERFORMED ON: ABNORMAL
PERFORMED ON: ABNORMAL

## 2022-06-23 PROCEDURE — 6370000000 HC RX 637 (ALT 250 FOR IP): Performed by: INTERNAL MEDICINE

## 2022-06-23 PROCEDURE — 94640 AIRWAY INHALATION TREATMENT: CPT

## 2022-06-23 PROCEDURE — 2700000000 HC OXYGEN THERAPY PER DAY

## 2022-06-23 PROCEDURE — 94761 N-INVAS EAR/PLS OXIMETRY MLT: CPT

## 2022-06-23 RX ADMIN — DIGOXIN 125 MCG: 125 TABLET ORAL at 10:36

## 2022-06-23 RX ADMIN — ASPIRIN 81 MG: 81 TABLET, CHEWABLE ORAL at 10:37

## 2022-06-23 RX ADMIN — FUROSEMIDE 40 MG: 40 TABLET ORAL at 10:36

## 2022-06-23 RX ADMIN — IPRATROPIUM BROMIDE AND ALBUTEROL SULFATE 1 AMPULE: .5; 2.5 SOLUTION RESPIRATORY (INHALATION) at 09:24

## 2022-06-23 RX ADMIN — LEVOTHYROXINE SODIUM 200 MCG: 0.1 TABLET ORAL at 07:00

## 2022-06-23 RX ADMIN — GUAIFENESIN 600 MG: 600 TABLET, EXTENDED RELEASE ORAL at 10:36

## 2022-06-23 RX ADMIN — DILTIAZEM HYDROCHLORIDE 120 MG: 120 CAPSULE, COATED, EXTENDED RELEASE ORAL at 10:36

## 2022-06-23 RX ADMIN — APIXABAN 5 MG: 5 TABLET, FILM COATED ORAL at 10:36

## 2022-06-23 ASSESSMENT — PAIN SCALES - GENERAL
PAINLEVEL_OUTOF10: 0

## 2022-06-23 NOTE — CARE COORDINATION
CASE MANAGEMENT DISCHARGE SUMMARY      Discharge to: home with resumption of [facility] and passport services thru [address] [place] [address] on [facility].        IMM given: 6/21/22    New Durable Medical Equipment ordered/agency: none    Transportation: dtr to transport will have O2 tank       Confirmed discharge plan with:     Patient: yes     Family:  Yes he will notify dtr     Facility/Agency, name:  [facility], name: Arnol Morales RN

## 2022-06-23 NOTE — DISCHARGE SUMMARY
Hospital Medicine Discharge Summary    Patient ID: Sweetie Enriquez      Patient's PCP: Horace Javier MD    Admit Date: 6/19/2022     Discharge Date: 6/23/2022      Admitting Provider: Aubrie De La Rosa MD     Discharge Provider: Edwin Gillespie MD     Discharge Diagnoses: Active Hospital Problems    Diagnosis     Lung consolidation ([facility] [address] [J18.1]      Priority: Medium    Metastatic squamous cell carcinoma ([facility] [address] [C79.9]      Priority: Medium    Current smoker [F17.200]      Priority: Medium    Leukocytosis [D72.829]      Priority: Medium    SOB (shortness of breath) [R06.02]      Priority: Medium    CAP (community acquired pneumonia) due to Pneumococcus ([facility] [address] [J13]      Priority: Medium    Pleural effusion [J90]     Postobstructive pneumonia [J18.9]     Mediastinal adenopathy [R59.0]        The patient was seen and examined on day of discharge and this discharge summary is in conjunction with any daily progress note from day of discharge. Hospital Course:   HISTORY OF PRESENT ILLNESS:   The patient is a 74 y.o. male, transfer from [facility] for pneumonia and heart failure with left pleural effusion that appears larger. May need pleur-X catheter by interventional radiology. He was hospitalized at [facility] for a week and discharged on Friday. He was sent back to the emergency department yesterday. Chart review shows he required 100% on 15 LPM non-rebreather mask at [facility]. COVID-19 negative. Previously on Augmentin.       Hospitalized (4/19 - 5/3) with acute on chronic respiratory failure with hypoxia and hypercapnia secondary to small cell lung CA left lung with mainstem endobronchial mass causing left lung collapse and postobstructive pneumonia. Debulking and laser therapy by CT surgery (4/20) with chest tube placed. Normally has oxygen at home. Declined BiPAP. CT surgery placed PleurX catheter 4/27/22.         Healthcare associated pneumonia:  Admitted to inpatient status.  Initial antibiotic therapy with ceftriaxone and azithromycin pending record review.    -pulm consulted, apprec mgmt given postobstructive collapse on CT   -planned for bronch but after further discussion with CT surg, cancelled given unlikely to yield anything significant     Grade I Diastolic Heart failure:  Diuresed with IV furosemide 40 mg every 12 hours.  Follow electrolytes including potassium and magnesium and replace as needed.    -started on lasix daily PO     Hypoxia:  Normally on 2 LPM at night.       Left pleural effusion and mass:  Pt has hx of Sq Cell carcinoma of L lung  and apparently sees Dr. Schroeder [facility]),had pleurex placed in 4/20222  -Consulted CT surgery for evaluation/mgmt, apprec recs.     -consulted hemonc, rec continue outpt radiation and chemo thereafter, no inpt tx needed     Permanent atrial fibrillation:  Anticoagulation with apixaban.  Rate control with digoxin and diltiazem 120 mg daily.      Type 2 Diabetes:  Basal glargine insulin 30 units nightly (usual home dose is 40 units).  Cover with a \"sliding scale\" lispro moderate scale prandial correction insulin.  Hold metformin.      Dyslipidemia on atorvastatin 20 mg nightly.      Hypothyroid on levothyroxine 200 mcg daily.      COPD:  Continue tiotropium (Spiriva) inhaler daily.       Nicotine dependence:       Chronic anxiety:  Continue bupropion (Wellbutrin SR) 100 mg daily.             Physical Exam Performed:     /74   Pulse 81   Temp 97.6 °F (36.4 °C) (Oral)   Resp 18   Ht 6' (1.829 m)   Wt 176 lb 12.9 oz (80.2 kg)   SpO2 97%   BMI 23.98 kg/m²       General appearance:  No apparent distress, appears stated age and cooperative. HEENT:  Normal cephalic, atraumatic without obvious deformity. Pupils equal, round, and reactive to light. Extra ocular muscles intact. Conjunctivae/corneas clear. Neck: Supple, with full range of motion. No jugular venous distention. Trachea midline.   Respiratory:  Normal respiratory effort. Clear to auscultation, bilaterally without Rales/Wheezes/Rhonchi. Cardiovascular:  Regular rate and rhythm with normal S1/S2 without murmurs, rubs or gallops. Abdomen: Soft, non-tender, non-distended with normal bowel sounds. Musculoskeletal:  No clubbing, cyanosis or edema bilaterally. Full range of motion without deformity. Skin: Skin color, texture, turgor normal.  No rashes or lesions. Neurologic:  Neurovascularly intact without any focal sensory/motor deficits. Cranial nerves: II-XII intact, grossly non-focal.  Psychiatric:  Alert and oriented, thought content appropriate, normal insight  Capillary Refill: Brisk,< 3 seconds   Peripheral Pulses: +2 palpable, equal bilaterally       Labs: For convenience and continuity at follow-up the following most recent labs are provided:      CBC:    Lab Results   Component Value Date    WBC 8.0 06/22/2022    HGB 11.7 06/22/2022    HCT 37.2 06/22/2022     06/22/2022       Renal:    Lab Results   Component Value Date     06/22/2022    K 3.4 06/22/2022    K 4.0 06/20/2022    CL 97 06/22/2022    CO2 34 06/22/2022    BUN 9 06/22/2022    CREATININE <0.5 06/22/2022    CALCIUM 8.4 06/22/2022    PHOS 2.4 06/22/2022         Significant Diagnostic Studies    Radiology:   XR CHEST PORTABLE   Final Result   Persistent complete opacification of left hemithorax, which can reflect   pleural effusion with associated atelectasis or airspace disease. CT CHEST W CONTRAST   Final Result   1. No significant change in appearance of the patient's primary left hilar   mass, similar to the study of 04/19/2022, most likely a primary focus of lung   malignancy. 2. There is stable complete consolidation of the left lung, likely   postobstructive collapse. However, underlying pneumonia or metastatic   disease cannot be excluded. 3. Interval placement of a left pleural catheter, with decrease in size of a   mild-to-moderate left pleural effusion.    4. Slight interval decrease in size of a trace right pleural effusion. 5. New reticular opacities within the basilar aspect of the right lower lobe   likely reflect atelectasis and pulmonary edema, less likely metastatic   disease. 6. Multiple new nonspecific ground-glass nodular opacities throughout the   right upper and right middle lobes, likely reflecting multifocal pneumonia,   less likely metastatic disease. 7. Stable mediastinal and bilateral hilar lymphadenopathy. 8. Stable nodular enlargement of the bilateral adrenal glands likely reflects   either benign adenomatous disease or hyperplasia as this was previously PET   negative. XR CHEST PORTABLE   Final Result   1. Stable complete opacification of the left hemithorax with presumed left   chest tube in place. 2. Right internal jugular center venous catheter tip in the SVC. No   pneumothorax.                 Consults:     IP CONSULT TO PULMONOLOGY  IP CONSULT TO CARDIOLOGY  IP CONSULT TO CARDIOTHORACIC SURGERY  IP CONSULT TO HEM/ONC  IP CONSULT TO HEART FAILURE NURSE/COORDINATOR  IP CONSULT TO DIETITIAN    Disposition:  home     Condition at Discharge: Stable    Discharge Instructions/Follow-up:  Primary hemonc as outpt to resume radiation/chemo    Code Status:  FULL    Activity: activity as tolerated    Diet: diabetic diet      Discharge Medications:     Discharge Medication List as of 6/23/2022 10:25 AM           Details   furosemide (LASIX) 40 MG tablet Take 1 tablet by mouth daily, Disp-60 tablet, R-0Normal              Details   Insulin Degludec (TRESIBA FLEXTOUCH) 100 UNIT/ML SOPN Inject 9 Units into the skin in the morning and at bedtimeHistorical Med      fluticasone (FLOVENT HFA) 110 MCG/ACT inhaler Inhale 1 puff into the lungs 2 times dailyHistorical Med      folic acid (FOLVITE) 1 MG tablet Take 1 mg by mouth dailyHistorical Med      HYDROcodone-acetaminophen (NORCO) 5-325 MG per tablet Take 1 tablet by mouth every 12 hours as needed for Pain. Historical Med      meloxicam (MOBIC) 15 MG tablet Take 15 mg by mouth daily as needed for PainHistorical Med      budesonide-formoterol (SYMBICORT) 160-4.5 MCG/ACT AERO Inhale 2 puffs into the lungs 2 times dailyHistorical Med      tiZANidine (ZANAFLEX) 2 MG tablet Take 2 mg by mouth every 12 hours as neededHistorical Med      vitamin D (ERGOCALCIFEROL) 1.25 MG (63598 UT) CAPS capsule Take 50,000 Units by mouth once a weekHistorical Med      apixaban (ELIQUIS) 5 MG TABS tablet Take 1 tablet by mouth 2 times daily, Disp-60 tablet, R-0Normal      insulin glargine (LANTUS) 100 UNIT/ML injection vial Inject 40 Units into the skin nightly, Disp-10 mL, R-3Normal      !! insulin lispro (HUMALOG) 100 UNIT/ML SOLN injection vial Inject 0-12 Units into the skin 3 times daily (with meals), Disp-10 mL, R-1Normal      !! insulin lispro (HUMALOG) 100 UNIT/ML SOLN injection vial Inject 0-6 Units into the skin nightly, Disp-10 mL, R-0Normal      insulin regular (HUMULIN R;NOVOLIN R) 100 UNIT/ML injection Inject 8 Units into the skin 3 times daily (before meals), Disp-10 mL, R-3Normal      dilTIAZem (CARDIZEM CD) 120 MG extended release capsule Take 1 capsule by mouth daily, Disp-30 capsule, R-3Normal      TECHLITE PEN NEEDLES 31G X 8 MM MISC USE 1 FIVE TIMES DAILY, DAWHistorical Med      !! levothyroxine (SYNTHROID) 200 MCG tablet Take 150 mcg by mouth Daily Historical Med      !! EUTHYROX 50 MCG tablet Take 50 mcg by mouth Daily , DAWHistorical Med      guaiFENesin (MUCINEX) 600 MG extended release tablet Take 1 tablet by mouth 2 times daily, Disp-20 tablet, R-0Normal      digoxin (LANOXIN) 125 MCG tablet Take 0.125 mg by mouth dailyHistorical Med      tiotropium (SPIRIVA RESPIMAT) 2.5 MCG/ACT AERS inhaler Inhale 2 puffs into the lungs daily, Disp-4 g, R-5Normal      atorvastatin (LIPITOR) 20 MG tablet Take 80 mg by mouth daily Historical Med      buPROPion (WELLBUTRIN SR) 100 MG extended release tablet Take 100 mg by mouth 2 times daily Historical Med      metFORMIN (GLUCOPHAGE) 850 MG tablet Take 850 mg by mouth daily (with breakfast) Historical Med      nitroGLYCERIN (NITROSTAT) 0.4 MG SL tablet Place 0.4 mg under the tongue every 5 minutes as needed Historical Med      Omega-3 Fatty Acids (FISH OIL) 1000 MG CAPS Take 3,000 mg by mouth 3 times dailyHistorical Med      aspirin 81 MG tablet Take 81 mg by mouth dailyHistorical Med       !! - Potential duplicate medications found. Please discuss with provider. Time Spent on discharge is more than 30 minutes in the examination, evaluation, counseling and review of medications and discharge plan. Signed:    Kristyn Cardenas MD   6/23/2022      Thank you Zaki Rodriges MD for the opportunity to be involved in this patient's care. If you have any questions or concerns, please feel free to contact me at [phone].

## 2022-06-23 NOTE — PROGRESS NOTES
Physician Progress Note      Oscar Durham  [facility] #:                  [id number]  :                       1955  ADMIT DATE:       2022 11:18 AM  DISCH DATE:        2022 11:22 AM  RESPONDING  PROVIDER #:        Robin Barboza MD          QUERY TEXT:    Pt admitted with PNA. Noted documentation of acute on chronic dCHF per   cardiology and CT surgery consults vs gr 1 Diastolic HF of attending PN . If possible, please document in progress notes and discharge summary the   acuity of dCHF:    The medical record reflects the following:  Risk Factors: AF, DM, CHF, COPD    Clinical Indicators: H&P and PN  of attending- \"Grade I Diastolic Heart   failure\"  Cardiology c/s-\"Acute on chronic diastolic heart failure, appears to have   stabilized/resolved\"  CT surgery c/s-\"Acute on chronic diastolic heart failure\"    Treatment: Lasix 40 mg IV 2 times daily until  switched to po tabs daily,   I&O, Limited echo pending, CT and cardiology c/s, O2, CXR/CT, supportive care    Thank you,  [email or web address]  Options provided:  -- Acute on Chronic dCHF confirmed POA  -- Other - I will add my own diagnosis  -- Disagree - Not applicable / Not valid  -- Disagree - Clinically unable to determine / Unknown  -- Refer to Clinical Documentation Reviewer    PROVIDER RESPONSE TEXT:    The diagnosis of acute on chronic dCHF was confirmed as POA.     Query created by: Lyudmila Tian on 2022 3:51 PM      Electronically signed by:  Robin Barboza MD 2022 6:02 PM

## 2022-06-23 NOTE — PROGRESS NOTES
Discharge education provided to patient at bedside. Prescription sent from outpatient pharmacy. Pleurx supplies gathered and handed to patient. Patient was taken to main entrance via wheelchair. All questions answered.

## 2022-06-24 LAB
BLOOD CULTURE, ROUTINE: NORMAL
CULTURE, BLOOD 2: NORMAL

## 2022-06-30 ENCOUNTER — TELEPHONE (OUTPATIENT)
Dept: CARDIOTHORACIC SURGERY | Age: 67
End: 2022-06-30

## 2022-06-30 NOTE — PROGRESS NOTES
Physician Progress Note      PATIENTDamirella Reardon  CSN #:                  [id number]  :                       1955  ADMIT DATE:       2022 11:18 AM  DISCH DATE:        2022 11:22 AM  RESPONDING  PROVIDER #:        Denzel Khalil MD          QUERY TEXT:    Pt admitted with  \" Healthcare associated pneumonia\". If possible, please   document in the progress notes and discharge summary if you are evaluating and   /or treating any of the following: The medical record reflects the following:  Risk Factors: recent postobstructive PNA -with hospitalization sent home on   Augmentin   lung CA -pleurex catheter  Clinical Indicators:   WBC-16, , Healthcare Pneumonia   CXR-Persistent   right lower lung field infiltrate, slightly more pronounced on today's study. Multiple new nonspecific ground-glass nodular opacities throughout theright   upper and right middle lobes, likely reflecting multifocal pneumonia\"    Pulmonary-  \"Patient continues to have lung consolidation with postoperative   pneumonia like picture along with pleural effusion-Patient's consolidation is   significant along with that patient has worsening leukocytosis-bronchoscopy   was planned but deferred after discussion with C  Treatment:  Ceftriaxone/Azithro started,  and continued, Pulmonary,   Oncology consults, serial labs, supportive care    Thank-You, Elizabeth Koehler RN, BSN, CCDS  Options provided:  -- Sepsis, due to Healthcare associated pneumonia present on arrival, resolved  -- Healthcare pneumonia without Sepsis  -- Other - I will add my own diagnosis  -- Disagree - Not applicable / Not valid  -- Disagree - Clinically unable to determine / Unknown  -- Refer to Clinical Documentation Reviewer    PROVIDER RESPONSE TEXT:    This patient has sepsis due to healthcare associated pneumonia which was   present on arrival, resolved.     Query created by: Floresita Márquez on 2022 9:16 AM      Electronically signed by:  Robin Barboza MD 6/30/2022 11:19 AM

## 2022-06-30 NOTE — TELEPHONE ENCOUNTER
[address] to get an update on patient's pleurx drain output. Left a message to call the office. Vance Leslie RN    [facility] faxed pleurx drain output. Documenting on pleurx drain log. Will check back next week.      Vance Leslie RN

## 2022-07-11 ENCOUNTER — TELEPHONE (OUTPATIENT)
Dept: CARDIOTHORACIC SURGERY | Age: 67
End: 2022-07-11

## 2022-07-11 NOTE — TELEPHONE ENCOUNTER
Home care faxed over output information on Left pleurx drain. 550-900 ml out daily. No change or increase in output at this time. Patient is scheduled for clinic tomorrow to assess pleurx drain.      Kylie Esquivel RN

## 2022-07-12 ENCOUNTER — OFFICE VISIT (OUTPATIENT)
Dept: CARDIOTHORACIC SURGERY | Age: 67
End: 2022-07-12

## 2022-07-12 VITALS
OXYGEN SATURATION: 91 % | TEMPERATURE: 97.3 F | DIASTOLIC BLOOD PRESSURE: 78 MMHG | HEIGHT: 72 IN | HEART RATE: 86 BPM | SYSTOLIC BLOOD PRESSURE: 116 MMHG | BODY MASS INDEX: 23.98 KG/M2

## 2022-07-12 DIAGNOSIS — J90 RECURRENT PLEURAL EFFUSION: Primary | ICD-10-CM

## 2022-07-12 PROCEDURE — 99024 POSTOP FOLLOW-UP VISIT: CPT

## 2022-07-12 RX ORDER — CLINDAMYCIN HYDROCHLORIDE 300 MG/1
300 CAPSULE ORAL 2 TIMES DAILY
Qty: 14 CAPSULE | Refills: 0 | Status: SHIPPED | OUTPATIENT
Start: 2022-07-12 | End: 2022-07-19

## 2022-07-12 NOTE — PROGRESS NOTES
[facility]  Clinic Note     7/12/2022 10:58 AM  Surgeon:  Jeanie Manrique     S/P :   S/p L pleurX 4/27/22    Chief complaint : pleur-x cath site check - suture  Subjective:  Mr. Thuy Peter is having some pain to his left side. He is unsure of his medication list. He states the sutures have fallen out. Vital Signs: /78 (Site: Right Upper Arm, Position: Sitting, Cuff Size: Medium Adult)   Pulse 86   Temp 97.3 °F (36.3 °C) (Temporal)   Ht 6' (1.829 m)   SpO2 91%   BMI 23.98 kg/m²      I/O:  No intake or output data in the 24 hours ending 07/12/22 1058    Exam:   Cardiovascular: Clear S1, S2. No MRG. Pulmonary: CTAB    Lower extremity: No peripheral edema. Incision: PleurX site with surrounding erythema, and tenderness. No purulence. Labs:   CBC: No results for input(s): WBC, HGB, HCT, MCV, PLT in the last 72 hours. BMP: No results for input(s): NA, K, CL, CO2, PHOS, BUN, CREATININE, CA in the last 72 hours. PT/INR: No results for input(s): PROTIME, INR in the last 72 hours. APTT: No results for input(s): APTT in the last 72 hours.     Scheduled Meds:     Patient Active Problem List   Diagnosis    Spinal stenosis of lumbar region with neurogenic claudication    Mediastinal adenopathy    Lung mass    Multiple tracheobronchial mucus plugs    Mass of left lung    Acute on chronic respiratory failure with hypoxia (HCC)    Acute hypercapnic respiratory failure (HCC)    COPD with acute exacerbation (HCC)    Acute encephalopathy    Pleural effusion    Metabolic encephalopathy    Atrial fibrillation, permanent (HCC)    Acute respiratory failure with hypoxia and hypercapnia (HCC)    Lung collapse    Postobstructive pneumonia    Mucus plugging of bronchi    Endobronchial mass    Hypoxia    COPD (chronic obstructive pulmonary disease) (HCC)    Acute hypoxemic respiratory failure (HCC)    Squamous cell carcinoma of left lung (HCC)    Acute on chronic respiratory failure with hypoxia and hypercapnia (HCC)    Chronic diastolic congestive heart failure (HCC)    [facility] cell lung cancer ([facility] [address]    CAP (community acquired pneumonia) due to Pneumococcus ([facility] [address]    Lung consolidation ([facility] [address]    Metastatic squamous cell carcinoma (HCC)    Current smoker    Leukocytosis    SOB (shortness of breath)       Assessment/Plan:   1. S/p L pleurX 4/27/22  - Suture placed without complications. Min bleeding, pain.    - Cont draining every other day   - F/u PRN    Zane Gold PA-C

## 2022-07-12 NOTE — LETTER
Edson Creda was seen and treated in our office on 7/12/2022. If you have any questions or concerns, please don't hesitate to call.           LINA Montanez

## 2022-07-20 ENCOUNTER — TELEPHONE (OUTPATIENT)
Dept: CARDIOTHORACIC SURGERY | Age: 67
End: 2022-07-20

## 2022-07-20 NOTE — TELEPHONE ENCOUNTER
Contacted home care to get patient's output for pleurx. They faxed outputs. They are still averaging 750-1400 ml of fluid. Will continue to monitor.      David Espitia RN

## 2022-07-22 ENCOUNTER — TELEPHONE (OUTPATIENT)
Dept: CARDIOTHORACIC SURGERY | Age: 67
End: 2022-07-22

## 2022-07-22 NOTE — TELEPHONE ENCOUNTER
[address] [facility] faxed over patients log of pleurx output. 7/20/2022- 600 ml reddish-orange and foamy  7/21/2022- 900 ml tea colored and foamy    No issues noted.      Vance Naranjo RN

## 2022-08-02 ENCOUNTER — TELEPHONE (OUTPATIENT)
Dept: CARDIOTHORACIC SURGERY | Age: 67
End: 2022-08-02

## 2022-08-02 NOTE — TELEPHONE ENCOUNTER
Jan Ragsdale from [facility] called to let me know that the patient is stating that he now is having constant burning now in his left side not just when draining his pleurx. His drainage has decreased slightly from ~ 900 ml every three days to ~500 ml every three days. He told the home care nurse that he is not going to do chemotherapy anymore and plans to tell his oncologist at his visit on 8/11/22. She also stated that he no longer has any minutes on his cell phone so if we need to get in touch with him we can contact them. I passed this information on to Dr. Angela Youssef. We will continue to monitor.      Catalino Chaudhari RN

## 2022-08-10 ENCOUNTER — HOSPITAL ENCOUNTER (INPATIENT)
Age: 67
LOS: 5 days | Discharge: NURSING, REHAB OR LONG-TERM CARE | DRG: 181 | End: 2022-08-15
Attending: FAMILY MEDICINE | Admitting: FAMILY MEDICINE
Payer: MEDICARE

## 2022-08-10 PROBLEM — J18.9 PNEUMONIA: Status: ACTIVE | Noted: 2022-08-10

## 2022-08-10 PROBLEM — J91.0 MALIGNANT PLEURAL EFFUSION: Status: ACTIVE | Noted: 2022-08-10

## 2022-08-10 LAB
A/G RATIO: 0.7 (ref 1.1–2.2)
ALBUMIN SERPL-MCNC: 2.8 G/DL (ref 3.4–5)
ALP BLD-CCNC: 158 U/L (ref 40–129)
ALT SERPL-CCNC: 22 U/L (ref 10–40)
ANION GAP SERPL CALCULATED.3IONS-SCNC: 9 MMOL/L (ref 3–16)
AST SERPL-CCNC: 18 U/L (ref 15–37)
BASOPHILS ABSOLUTE: 0 K/UL (ref 0–0.2)
BASOPHILS RELATIVE PERCENT: 0 %
BILIRUB SERPL-MCNC: 0.3 MG/DL (ref 0–1)
BUN BLDV-MCNC: 16 MG/DL (ref 7–20)
CALCIUM SERPL-MCNC: 9.2 MG/DL (ref 8.3–10.6)
CHLORIDE BLD-SCNC: 99 MMOL/L (ref 99–110)
CO2: 29 MMOL/L (ref 21–32)
CREAT SERPL-MCNC: 0.8 MG/DL (ref 0.8–1.3)
EOSINOPHILS ABSOLUTE: 0 K/UL (ref 0–0.6)
EOSINOPHILS RELATIVE PERCENT: 0 %
GFR AFRICAN AMERICAN: >60
GFR NON-AFRICAN AMERICAN: >60
GLUCOSE BLD-MCNC: 231 MG/DL (ref 70–99)
GLUCOSE BLD-MCNC: 239 MG/DL (ref 70–99)
HCT VFR BLD CALC: 35.9 % (ref 40.5–52.5)
HEMOGLOBIN: 11.4 G/DL (ref 13.5–17.5)
LYMPHOCYTES ABSOLUTE: 0.3 K/UL (ref 1–5.1)
LYMPHOCYTES RELATIVE PERCENT: 1.6 %
MCH RBC QN AUTO: 28.7 PG (ref 26–34)
MCHC RBC AUTO-ENTMCNC: 31.9 G/DL (ref 31–36)
MCV RBC AUTO: 90 FL (ref 80–100)
MONOCYTES ABSOLUTE: 0.1 K/UL (ref 0–1.3)
MONOCYTES RELATIVE PERCENT: 0.6 %
NEUTROPHILS ABSOLUTE: 15.4 K/UL (ref 1.7–7.7)
NEUTROPHILS RELATIVE PERCENT: 97.8 %
PDW BLD-RTO: 17 % (ref 12.4–15.4)
PERFORMED ON: ABNORMAL
PHOSPHORUS: 2.1 MG/DL (ref 2.5–4.9)
PLATELET # BLD: 427 K/UL (ref 135–450)
PMV BLD AUTO: 6.3 FL (ref 5–10.5)
POTASSIUM REFLEX MAGNESIUM: 4.5 MMOL/L (ref 3.5–5.1)
RBC # BLD: 3.99 M/UL (ref 4.2–5.9)
SODIUM BLD-SCNC: 137 MMOL/L (ref 136–145)
TOTAL PROTEIN: 7 G/DL (ref 6.4–8.2)
WBC # BLD: 15.7 K/UL (ref 4–11)

## 2022-08-10 PROCEDURE — 6360000002 HC RX W HCPCS: Performed by: INTERNAL MEDICINE

## 2022-08-10 PROCEDURE — 85025 COMPLETE CBC W/AUTO DIFF WBC: CPT

## 2022-08-10 PROCEDURE — 80053 COMPREHEN METABOLIC PANEL: CPT

## 2022-08-10 PROCEDURE — 2580000003 HC RX 258: Performed by: FAMILY MEDICINE

## 2022-08-10 PROCEDURE — 87641 MR-STAPH DNA AMP PROBE: CPT

## 2022-08-10 PROCEDURE — 6370000000 HC RX 637 (ALT 250 FOR IP): Performed by: INTERNAL MEDICINE

## 2022-08-10 PROCEDURE — 6360000002 HC RX W HCPCS: Performed by: FAMILY MEDICINE

## 2022-08-10 PROCEDURE — 2580000003 HC RX 258: Performed by: INTERNAL MEDICINE

## 2022-08-10 PROCEDURE — 84145 PROCALCITONIN (PCT): CPT

## 2022-08-10 PROCEDURE — 84100 ASSAY OF PHOSPHORUS: CPT

## 2022-08-10 PROCEDURE — 82550 ASSAY OF CK (CPK): CPT

## 2022-08-10 PROCEDURE — 36415 COLL VENOUS BLD VENIPUNCTURE: CPT

## 2022-08-10 PROCEDURE — 2060000000 HC ICU INTERMEDIATE R&B

## 2022-08-10 RX ORDER — ATORVASTATIN CALCIUM 80 MG/1
80 TABLET, FILM COATED ORAL DAILY
Status: DISCONTINUED | OUTPATIENT
Start: 2022-08-11 | End: 2022-08-15 | Stop reason: HOSPADM

## 2022-08-10 RX ORDER — DEXTROSE MONOHYDRATE 100 MG/ML
INJECTION, SOLUTION INTRAVENOUS CONTINUOUS PRN
Status: DISCONTINUED | OUTPATIENT
Start: 2022-08-10 | End: 2022-08-15 | Stop reason: HOSPADM

## 2022-08-10 RX ORDER — ONDANSETRON 2 MG/ML
4 INJECTION INTRAMUSCULAR; INTRAVENOUS EVERY 4 HOURS PRN
Status: DISCONTINUED | OUTPATIENT
Start: 2022-08-10 | End: 2022-08-15 | Stop reason: HOSPADM

## 2022-08-10 RX ORDER — ACETAMINOPHEN 325 MG/1
650 TABLET ORAL EVERY 4 HOURS PRN
Status: DISCONTINUED | OUTPATIENT
Start: 2022-08-10 | End: 2022-08-15 | Stop reason: HOSPADM

## 2022-08-10 RX ORDER — ACETAMINOPHEN 650 MG/1
650 SUPPOSITORY RECTAL EVERY 4 HOURS PRN
Status: DISCONTINUED | OUTPATIENT
Start: 2022-08-10 | End: 2022-08-15 | Stop reason: HOSPADM

## 2022-08-10 RX ORDER — SODIUM CHLORIDE 0.9 % (FLUSH) 0.9 %
10 SYRINGE (ML) INJECTION PRN
Status: DISCONTINUED | OUTPATIENT
Start: 2022-08-10 | End: 2022-08-15 | Stop reason: HOSPADM

## 2022-08-10 RX ORDER — INSULIN GLARGINE 100 [IU]/ML
64 INJECTION, SOLUTION SUBCUTANEOUS 2 TIMES DAILY
Status: DISCONTINUED | OUTPATIENT
Start: 2022-08-10 | End: 2022-08-13

## 2022-08-10 RX ORDER — ASPIRIN 81 MG/1
81 TABLET, CHEWABLE ORAL DAILY
Status: DISCONTINUED | OUTPATIENT
Start: 2022-08-11 | End: 2022-08-15 | Stop reason: HOSPADM

## 2022-08-10 RX ORDER — TIZANIDINE 4 MG/1
2 TABLET ORAL EVERY 12 HOURS PRN
Status: DISCONTINUED | OUTPATIENT
Start: 2022-08-10 | End: 2022-08-15 | Stop reason: HOSPADM

## 2022-08-10 RX ORDER — DILTIAZEM HYDROCHLORIDE 120 MG/1
120 CAPSULE, COATED, EXTENDED RELEASE ORAL DAILY
Status: DISCONTINUED | OUTPATIENT
Start: 2022-08-11 | End: 2022-08-15 | Stop reason: HOSPADM

## 2022-08-10 RX ORDER — SODIUM CHLORIDE 9 MG/ML
INJECTION, SOLUTION INTRAVENOUS PRN
Status: DISCONTINUED | OUTPATIENT
Start: 2022-08-10 | End: 2022-08-15 | Stop reason: HOSPADM

## 2022-08-10 RX ORDER — BUPROPION HYDROCHLORIDE 100 MG/1
100 TABLET, EXTENDED RELEASE ORAL DAILY
Status: DISCONTINUED | OUTPATIENT
Start: 2022-08-11 | End: 2022-08-15 | Stop reason: HOSPADM

## 2022-08-10 RX ORDER — DIGOXIN 125 MCG
0.12 TABLET ORAL DAILY
Status: DISCONTINUED | OUTPATIENT
Start: 2022-08-11 | End: 2022-08-15 | Stop reason: HOSPADM

## 2022-08-10 RX ORDER — POLYETHYLENE GLYCOL 3350 17 G/17G
17 POWDER, FOR SOLUTION ORAL DAILY PRN
Status: DISCONTINUED | OUTPATIENT
Start: 2022-08-10 | End: 2022-08-15 | Stop reason: HOSPADM

## 2022-08-10 RX ORDER — HEPARIN SODIUM 5000 [USP'U]/ML
5000 INJECTION, SOLUTION INTRAVENOUS; SUBCUTANEOUS EVERY 8 HOURS SCHEDULED
Status: DISCONTINUED | OUTPATIENT
Start: 2022-08-10 | End: 2022-08-12

## 2022-08-10 RX ORDER — HYDROCODONE BITARTRATE AND ACETAMINOPHEN 5; 325 MG/1; MG/1
1 TABLET ORAL EVERY 12 HOURS PRN
Status: DISCONTINUED | OUTPATIENT
Start: 2022-08-10 | End: 2022-08-11

## 2022-08-10 RX ORDER — FOLIC ACID 1 MG/1
1 TABLET ORAL DAILY
Status: DISCONTINUED | OUTPATIENT
Start: 2022-08-11 | End: 2022-08-15 | Stop reason: HOSPADM

## 2022-08-10 RX ORDER — LEVOTHYROXINE SODIUM 0.12 MG/1
250 TABLET ORAL DAILY
Status: DISCONTINUED | OUTPATIENT
Start: 2022-08-11 | End: 2022-08-15 | Stop reason: HOSPADM

## 2022-08-10 RX ORDER — FUROSEMIDE 40 MG/1
40 TABLET ORAL DAILY
Status: DISCONTINUED | OUTPATIENT
Start: 2022-08-11 | End: 2022-08-15 | Stop reason: HOSPADM

## 2022-08-10 RX ORDER — SODIUM CHLORIDE 0.9 % (FLUSH) 0.9 %
10 SYRINGE (ML) INJECTION EVERY 12 HOURS SCHEDULED
Status: DISCONTINUED | OUTPATIENT
Start: 2022-08-10 | End: 2022-08-15 | Stop reason: HOSPADM

## 2022-08-10 RX ORDER — INSULIN LISPRO 100 [IU]/ML
0-8 INJECTION, SOLUTION INTRAVENOUS; SUBCUTANEOUS EVERY 4 HOURS
Status: DISCONTINUED | OUTPATIENT
Start: 2022-08-10 | End: 2022-08-12

## 2022-08-10 RX ADMIN — HEPARIN SODIUM 5000 UNITS: 5000 INJECTION INTRAVENOUS; SUBCUTANEOUS at 23:58

## 2022-08-10 RX ADMIN — INSULIN GLARGINE 64 UNITS: 100 INJECTION, SOLUTION SUBCUTANEOUS at 23:58

## 2022-08-10 RX ADMIN — SODIUM CHLORIDE: 9 INJECTION, SOLUTION INTRAVENOUS at 23:46

## 2022-08-10 RX ADMIN — CEFEPIME 2000 MG: 2 INJECTION, POWDER, FOR SOLUTION INTRAVENOUS at 23:47

## 2022-08-10 RX ADMIN — INSULIN LISPRO 2 UNITS: 100 INJECTION, SOLUTION INTRAVENOUS; SUBCUTANEOUS at 23:58

## 2022-08-10 RX ADMIN — HYDROCODONE BITARTRATE AND ACETAMINOPHEN 1 TABLET: 5; 325 TABLET ORAL at 23:58

## 2022-08-10 ASSESSMENT — PAIN DESCRIPTION - DESCRIPTORS: DESCRIPTORS: ACHING;CRAMPING

## 2022-08-10 ASSESSMENT — PAIN DESCRIPTION - LOCATION
LOCATION: BACK;RIB CAGE
LOCATION: BACK

## 2022-08-10 ASSESSMENT — PAIN SCALES - GENERAL
PAINLEVEL_OUTOF10: 8
PAINLEVEL_OUTOF10: 7

## 2022-08-10 ASSESSMENT — PAIN DESCRIPTION - ORIENTATION: ORIENTATION: LEFT

## 2022-08-11 ENCOUNTER — APPOINTMENT (OUTPATIENT)
Dept: GENERAL RADIOLOGY | Age: 67
DRG: 181 | End: 2022-08-11
Attending: FAMILY MEDICINE
Payer: MEDICARE

## 2022-08-11 PROBLEM — E03.9 ACQUIRED HYPOTHYROIDISM: Status: ACTIVE | Noted: 2022-08-11

## 2022-08-11 PROBLEM — I10 ESSENTIAL HYPERTENSION: Status: ACTIVE | Noted: 2022-08-11

## 2022-08-11 PROBLEM — D72.9 NEUTROPHILIC LEUKOCYTOSIS: Status: ACTIVE | Noted: 2022-06-20

## 2022-08-11 PROBLEM — E78.5 HYPERLIPIDEMIA: Status: ACTIVE | Noted: 2022-08-11

## 2022-08-11 PROBLEM — E83.39 HYPOPHOSPHATEMIA: Status: ACTIVE | Noted: 2022-08-11

## 2022-08-11 PROBLEM — E44.0 PROTEIN-CALORIE MALNUTRITION, MODERATE (HCC): Status: ACTIVE | Noted: 2022-08-11

## 2022-08-11 PROBLEM — E11.9 DMII (DIABETES MELLITUS, TYPE 2) (HCC): Status: ACTIVE | Noted: 2022-08-11

## 2022-08-11 LAB
A/G RATIO: 0.7 (ref 1.1–2.2)
ALBUMIN SERPL-MCNC: 3.1 G/DL (ref 3.4–5)
ALP BLD-CCNC: 162 U/L (ref 40–129)
ALT SERPL-CCNC: 18 U/L (ref 10–40)
ANION GAP SERPL CALCULATED.3IONS-SCNC: 8 MMOL/L (ref 3–16)
ANION GAP SERPL CALCULATED.3IONS-SCNC: 9 MMOL/L (ref 3–16)
AST SERPL-CCNC: 19 U/L (ref 15–37)
BASOPHILS ABSOLUTE: 0 K/UL (ref 0–0.2)
BASOPHILS ABSOLUTE: 0.1 K/UL (ref 0–0.2)
BASOPHILS RELATIVE PERCENT: 0 %
BASOPHILS RELATIVE PERCENT: 0.3 %
BILIRUB SERPL-MCNC: 0.3 MG/DL (ref 0–1)
BUN BLDV-MCNC: 13 MG/DL (ref 7–20)
BUN BLDV-MCNC: 17 MG/DL (ref 7–20)
CALCIUM SERPL-MCNC: 9.1 MG/DL (ref 8.3–10.6)
CALCIUM SERPL-MCNC: 9.1 MG/DL (ref 8.3–10.6)
CHLORIDE BLD-SCNC: 97 MMOL/L (ref 99–110)
CHLORIDE BLD-SCNC: 97 MMOL/L (ref 99–110)
CO2: 30 MMOL/L (ref 21–32)
CO2: 31 MMOL/L (ref 21–32)
CREAT SERPL-MCNC: 0.6 MG/DL (ref 0.8–1.3)
CREAT SERPL-MCNC: 1 MG/DL (ref 0.8–1.3)
EOSINOPHILS ABSOLUTE: 0 K/UL (ref 0–0.6)
EOSINOPHILS ABSOLUTE: 0 K/UL (ref 0–0.6)
EOSINOPHILS RELATIVE PERCENT: 0 %
EOSINOPHILS RELATIVE PERCENT: 0 %
GFR AFRICAN AMERICAN: >60
GFR AFRICAN AMERICAN: >60
GFR NON-AFRICAN AMERICAN: >60
GFR NON-AFRICAN AMERICAN: >60
GLUCOSE BLD-MCNC: 141 MG/DL (ref 70–99)
GLUCOSE BLD-MCNC: 155 MG/DL (ref 70–99)
GLUCOSE BLD-MCNC: 171 MG/DL (ref 70–99)
GLUCOSE BLD-MCNC: 192 MG/DL (ref 70–99)
GLUCOSE BLD-MCNC: 203 MG/DL (ref 70–99)
GLUCOSE BLD-MCNC: 208 MG/DL (ref 70–99)
GLUCOSE BLD-MCNC: 270 MG/DL (ref 70–99)
HCT VFR BLD CALC: 35.1 % (ref 40.5–52.5)
HCT VFR BLD CALC: 39.5 % (ref 40.5–52.5)
HEMOGLOBIN: 11.4 G/DL (ref 13.5–17.5)
HEMOGLOBIN: 12.6 G/DL (ref 13.5–17.5)
LACTIC ACID: 1.5 MMOL/L (ref 0.4–2)
LACTIC ACID: 2 MMOL/L (ref 0.4–2)
LYMPHOCYTES ABSOLUTE: 0.5 K/UL (ref 1–5.1)
LYMPHOCYTES ABSOLUTE: 0.5 K/UL (ref 1–5.1)
LYMPHOCYTES RELATIVE PERCENT: 1.7 %
LYMPHOCYTES RELATIVE PERCENT: 2.5 %
MCH RBC QN AUTO: 29 PG (ref 26–34)
MCH RBC QN AUTO: 29.3 PG (ref 26–34)
MCHC RBC AUTO-ENTMCNC: 31.9 G/DL (ref 31–36)
MCHC RBC AUTO-ENTMCNC: 32.6 G/DL (ref 31–36)
MCV RBC AUTO: 89.9 FL (ref 80–100)
MCV RBC AUTO: 90.8 FL (ref 80–100)
MONOCYTES ABSOLUTE: 0.4 K/UL (ref 0–1.3)
MONOCYTES ABSOLUTE: 1.4 K/UL (ref 0–1.3)
MONOCYTES RELATIVE PERCENT: 2.1 %
MONOCYTES RELATIVE PERCENT: 4.5 %
NEUTROPHILS ABSOLUTE: 19.2 K/UL (ref 1.7–7.7)
NEUTROPHILS ABSOLUTE: 28.1 K/UL (ref 1.7–7.7)
NEUTROPHILS RELATIVE PERCENT: 93.5 %
NEUTROPHILS RELATIVE PERCENT: 95.4 %
PDW BLD-RTO: 16.9 % (ref 12.4–15.4)
PDW BLD-RTO: 17.3 % (ref 12.4–15.4)
PERFORMED ON: ABNORMAL
PHOSPHORUS: 3.9 MG/DL (ref 2.5–4.9)
PLATELET # BLD: 408 K/UL (ref 135–450)
PLATELET # BLD: 591 K/UL (ref 135–450)
PMV BLD AUTO: 6.3 FL (ref 5–10.5)
PMV BLD AUTO: 6.3 FL (ref 5–10.5)
POTASSIUM REFLEX MAGNESIUM: 4.1 MMOL/L (ref 3.5–5.1)
POTASSIUM REFLEX MAGNESIUM: 4.5 MMOL/L (ref 3.5–5.1)
PROCALCITONIN: 0.07 NG/ML (ref 0–0.15)
RBC # BLD: 3.9 M/UL (ref 4.2–5.9)
RBC # BLD: 4.34 M/UL (ref 4.2–5.9)
SODIUM BLD-SCNC: 136 MMOL/L (ref 136–145)
SODIUM BLD-SCNC: 136 MMOL/L (ref 136–145)
TOTAL CK: 31 U/L (ref 39–308)
TOTAL PROTEIN: 7.4 G/DL (ref 6.4–8.2)
TROPONIN: 0.02 NG/ML
VANCOMYCIN RANDOM: 11.7 UG/ML
WBC # BLD: 20.2 K/UL (ref 4–11)
WBC # BLD: 30.1 K/UL (ref 4–11)

## 2022-08-11 PROCEDURE — 36415 COLL VENOUS BLD VENIPUNCTURE: CPT

## 2022-08-11 PROCEDURE — 2580000003 HC RX 258: Performed by: INTERNAL MEDICINE

## 2022-08-11 PROCEDURE — 84484 ASSAY OF TROPONIN QUANT: CPT

## 2022-08-11 PROCEDURE — 94761 N-INVAS EAR/PLS OXIMETRY MLT: CPT

## 2022-08-11 PROCEDURE — 2700000000 HC OXYGEN THERAPY PER DAY

## 2022-08-11 PROCEDURE — 6370000000 HC RX 637 (ALT 250 FOR IP): Performed by: INTERNAL MEDICINE

## 2022-08-11 PROCEDURE — 71045 X-RAY EXAM CHEST 1 VIEW: CPT

## 2022-08-11 PROCEDURE — 80053 COMPREHEN METABOLIC PANEL: CPT

## 2022-08-11 PROCEDURE — 99223 1ST HOSP IP/OBS HIGH 75: CPT | Performed by: INTERNAL MEDICINE

## 2022-08-11 PROCEDURE — 80202 ASSAY OF VANCOMYCIN: CPT

## 2022-08-11 PROCEDURE — 84100 ASSAY OF PHOSPHORUS: CPT

## 2022-08-11 PROCEDURE — 80048 BASIC METABOLIC PNL TOTAL CA: CPT

## 2022-08-11 PROCEDURE — 87449 NOS EACH ORGANISM AG IA: CPT

## 2022-08-11 PROCEDURE — 2060000000 HC ICU INTERMEDIATE R&B

## 2022-08-11 PROCEDURE — 83605 ASSAY OF LACTIC ACID: CPT

## 2022-08-11 PROCEDURE — 6360000002 HC RX W HCPCS: Performed by: INTERNAL MEDICINE

## 2022-08-11 PROCEDURE — 85025 COMPLETE CBC W/AUTO DIFF WBC: CPT

## 2022-08-11 PROCEDURE — 94640 AIRWAY INHALATION TREATMENT: CPT

## 2022-08-11 PROCEDURE — 87040 BLOOD CULTURE FOR BACTERIA: CPT

## 2022-08-11 PROCEDURE — 2500000003 HC RX 250 WO HCPCS: Performed by: INTERNAL MEDICINE

## 2022-08-11 PROCEDURE — 76604 US EXAM CHEST: CPT | Performed by: INTERNAL MEDICINE

## 2022-08-11 PROCEDURE — 2580000003 HC RX 258: Performed by: FAMILY MEDICINE

## 2022-08-11 PROCEDURE — 6360000002 HC RX W HCPCS: Performed by: FAMILY MEDICINE

## 2022-08-11 RX ORDER — OXYCODONE HYDROCHLORIDE 5 MG/1
5 TABLET ORAL EVERY 4 HOURS PRN
Status: DISCONTINUED | OUTPATIENT
Start: 2022-08-11 | End: 2022-08-15 | Stop reason: HOSPADM

## 2022-08-11 RX ORDER — HYDROMORPHONE HYDROCHLORIDE 1 MG/ML
0.5 INJECTION, SOLUTION INTRAMUSCULAR; INTRAVENOUS; SUBCUTANEOUS EVERY 4 HOURS PRN
Status: DISCONTINUED | OUTPATIENT
Start: 2022-08-11 | End: 2022-08-15 | Stop reason: HOSPADM

## 2022-08-11 RX ORDER — 0.9 % SODIUM CHLORIDE 0.9 %
500 INTRAVENOUS SOLUTION INTRAVENOUS ONCE
Status: COMPLETED | OUTPATIENT
Start: 2022-08-12 | End: 2022-08-12

## 2022-08-11 RX ORDER — OXYCODONE HYDROCHLORIDE 5 MG/1
10 TABLET ORAL EVERY 4 HOURS PRN
Status: DISCONTINUED | OUTPATIENT
Start: 2022-08-11 | End: 2022-08-15 | Stop reason: HOSPADM

## 2022-08-11 RX ADMIN — VANCOMYCIN HYDROCHLORIDE 1750 MG: 1 INJECTION, POWDER, LYOPHILIZED, FOR SOLUTION INTRAVENOUS at 00:38

## 2022-08-11 RX ADMIN — FOLIC ACID 1 MG: 1 TABLET ORAL at 09:36

## 2022-08-11 RX ADMIN — INSULIN GLARGINE 64 UNITS: 100 INJECTION, SOLUTION SUBCUTANEOUS at 09:44

## 2022-08-11 RX ADMIN — FUROSEMIDE 40 MG: 40 TABLET ORAL at 09:36

## 2022-08-11 RX ADMIN — Medication 10 ML: at 00:00

## 2022-08-11 RX ADMIN — DILTIAZEM HYDROCHLORIDE 120 MG: 120 CAPSULE, COATED, EXTENDED RELEASE ORAL at 09:36

## 2022-08-11 RX ADMIN — Medication 2 PUFF: at 09:19

## 2022-08-11 RX ADMIN — CEFEPIME 2000 MG: 2 INJECTION, POWDER, FOR SOLUTION INTRAVENOUS at 21:13

## 2022-08-11 RX ADMIN — ATORVASTATIN CALCIUM 80 MG: 80 TABLET, FILM COATED ORAL at 09:36

## 2022-08-11 RX ADMIN — ONDANSETRON 4 MG: 2 INJECTION INTRAMUSCULAR; INTRAVENOUS at 21:20

## 2022-08-11 RX ADMIN — OXYCODONE 10 MG: 5 TABLET ORAL at 02:06

## 2022-08-11 RX ADMIN — Medication 2 PUFF: at 20:36

## 2022-08-11 RX ADMIN — INSULIN LISPRO 2 UNITS: 100 INJECTION, SOLUTION INTRAVENOUS; SUBCUTANEOUS at 12:32

## 2022-08-11 RX ADMIN — Medication 10 ML: at 21:14

## 2022-08-11 RX ADMIN — INSULIN GLARGINE 64 UNITS: 100 INJECTION, SOLUTION SUBCUTANEOUS at 21:15

## 2022-08-11 RX ADMIN — INSULIN LISPRO 2 UNITS: 100 INJECTION, SOLUTION INTRAVENOUS; SUBCUTANEOUS at 17:44

## 2022-08-11 RX ADMIN — SODIUM PHOSPHATE, MONOBASIC, MONOHYDRATE 15 MMOL: 276; 142 INJECTION, SOLUTION INTRAVENOUS at 03:25

## 2022-08-11 RX ADMIN — SODIUM CHLORIDE: 9 INJECTION, SOLUTION INTRAVENOUS at 13:59

## 2022-08-11 RX ADMIN — OXYCODONE 10 MG: 5 TABLET ORAL at 16:13

## 2022-08-11 RX ADMIN — HEPARIN SODIUM 5000 UNITS: 5000 INJECTION INTRAVENOUS; SUBCUTANEOUS at 21:14

## 2022-08-11 RX ADMIN — HEPARIN SODIUM 5000 UNITS: 5000 INJECTION INTRAVENOUS; SUBCUTANEOUS at 06:25

## 2022-08-11 RX ADMIN — BUPROPION HYDROCHLORIDE 100 MG: 100 TABLET, FILM COATED, EXTENDED RELEASE ORAL at 09:36

## 2022-08-11 RX ADMIN — HYDROMORPHONE HYDROCHLORIDE 0.5 MG: 1 INJECTION, SOLUTION INTRAMUSCULAR; INTRAVENOUS; SUBCUTANEOUS at 21:20

## 2022-08-11 RX ADMIN — DIGOXIN 0.12 MG: 125 TABLET ORAL at 09:36

## 2022-08-11 RX ADMIN — VANCOMYCIN HYDROCHLORIDE 1250 MG: 10 INJECTION, POWDER, LYOPHILIZED, FOR SOLUTION INTRAVENOUS at 14:00

## 2022-08-11 RX ADMIN — LEVOTHYROXINE SODIUM 250 MCG: 0.12 TABLET ORAL at 06:25

## 2022-08-11 RX ADMIN — CEFEPIME 2000 MG: 2 INJECTION, POWDER, FOR SOLUTION INTRAVENOUS at 16:11

## 2022-08-11 RX ADMIN — SODIUM CHLORIDE: 9 INJECTION, SOLUTION INTRAVENOUS at 16:10

## 2022-08-11 RX ADMIN — CEFEPIME 2000 MG: 2 INJECTION, POWDER, FOR SOLUTION INTRAVENOUS at 06:28

## 2022-08-11 RX ADMIN — HYDROMORPHONE HYDROCHLORIDE 0.5 MG: 1 INJECTION, SOLUTION INTRAMUSCULAR; INTRAVENOUS; SUBCUTANEOUS at 12:48

## 2022-08-11 RX ADMIN — OXYCODONE 10 MG: 5 TABLET ORAL at 06:31

## 2022-08-11 RX ADMIN — ASPIRIN 81 MG: 81 TABLET, CHEWABLE ORAL at 09:36

## 2022-08-11 RX ADMIN — HEPARIN SODIUM 5000 UNITS: 5000 INJECTION INTRAVENOUS; SUBCUTANEOUS at 14:05

## 2022-08-11 ASSESSMENT — PAIN DESCRIPTION - ORIENTATION
ORIENTATION: LEFT

## 2022-08-11 ASSESSMENT — PAIN SCALES - GENERAL
PAINLEVEL_OUTOF10: 8
PAINLEVEL_OUTOF10: 4
PAINLEVEL_OUTOF10: 6
PAINLEVEL_OUTOF10: 7
PAINLEVEL_OUTOF10: 7

## 2022-08-11 ASSESSMENT — PAIN DESCRIPTION - LOCATION
LOCATION: BACK;RIB CAGE

## 2022-08-11 ASSESSMENT — PAIN DESCRIPTION - DESCRIPTORS
DESCRIPTORS: ACHING;CRAMPING
DESCRIPTORS: ACHING;CRAMPING;DISCOMFORT
DESCRIPTORS: ACHING;CRAMPING
DESCRIPTORS: ACHING;CRAMPING

## 2022-08-11 NOTE — H&P
Hospital Medicine  History and Physical    PCP: Jose F Briseno MD  Patient Name: Hayden Johnson    Date of Service: Pt seen/examined on 8/10/22 and admitted to Inpatient with expected LOS greater than two midnights due to medical therapy    CHIEF COMPLAINT:  Pt c/o shortness of breath,  HISTORY OF PRESENT ILLNESS: Pt is an 74y.o. 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 He presented to the [facility] ER c/o shortness of breath. He was found to have a reaccumulation of his pleural effusion, tachypnea and to be hypoxic. The ER provider was able to manipulate the catheter and drain approximately 1 liter of the effusion and sent a sample for culture. There was concern for possible post-obstructive pneumonia and antibiotics were initiated. He is being admitted for further evaluation and treatment. Associated signs and symptoms do not include fever or chills, nausea, vomiting, abdominal pain, hemoptysis, hematochezia, diarrhea, constipation or urinary symptoms. Past Medical History:        Diagnosis Date    A-fib [facility])     CAD (coronary artery disease)     Cancer ([facility] [address]     Left squamous cell carcinoma    CHF (congestive heart failure) (HCC)     COPD (chronic obstructive pulmonary disease) (HCC)     CVA (cerebral vascular accident) ([facility] [address]     Diabetes mellitus ([facility] [address]     Hyperlipidemia     Hypertension     Thyroid disease        Past Surgical History:        Procedure Laterality Date    BRONCHOSCOPY N/A 4/12/2022    EBUS WF W/ANES.  (11:00) performed by Fuad Epstein MD at [address]  4/12/2022    BRONCHOSCOPY ALVEOLAR LAVAGE performed by Fuad Epstein MD at [address]  4/12/2022    BRONCHOSCOPY BIOPSY BRONCHUS performed by Fuad Epstein MD at [address] [place] [address] ENDOSCOPY    BRONCHOSCOPY  4/12/2022    BRONCHOSCOPY/TRANSBRONCHIAL NEEDLE [facility]OPSY [facility] [address] N/A 4/13/2022    Lydia Hodge NF [facility] at [address] N/A 4/21/2022    BRONCHOSCOPY WITH ENDOBRONCHIAL YAG LASER AND CHEST TUBE PLACEMENT performed by Ray Allen MD at [facility] 4/27/2022    LEFT PLEUR-X CATHETER PLACEMENT performed by Augusto Rivera MD at [place] [address] knee       Allergies:  Codeine    Medications Prior to Admission:    Prior to Admission medications    Medication Sig Start Date End Date Taking? Authorizing Provider   furosemide (LASIX) 40 MG tablet Take 1 tablet by mouth daily 6/23/22   Jose Todd MD   Insulin Degludec (TRESIBA FLEXTOUCH) 100 UNIT/ML SOPN Inject 9 Units into the skin in the morning and at bedtime    Historical Provider, MD   fluticasone (FLOVENT HFA) 110 MCG/ACT inhaler Inhale 1 puff into the lungs 2 times daily    Historical Provider, MD   folic acid (FOLVITE) 1 MG tablet Take 1 mg by mouth daily    Historical Provider, MD   HYDROcodone-acetaminophen (NORCO) 5-325 MG per tablet Take 1 tablet by mouth every 12 hours as needed for Pain.     Historical Provider, MD   meloxicam (MOBIC) 15 MG tablet Take 15 mg by mouth daily as needed for Pain    Historical Provider, MD   budesonide-formoterol (SYMBICORT) 160-4.5 MCG/ACT AERO Inhale 2 puffs into the lungs 2 times daily    Historical Provider, MD   tiZANidine (ZANAFLEX) 2 MG tablet Take 2 mg by mouth every 12 hours as needed    Historical Provider, MD   vitamin D (ERGOCALCIFEROL) 1.25 MG (89394 UT) CAPS capsule Take 50,000 Units by mouth once a week    Historical Provider, MD   apixaban (ELIQUIS) 5 MG TABS tablet Take 1 tablet by mouth 2 times daily 5/2/22   Leon Edgar MD   dilTIAZem (CARDIZEM CD) 120 MG extended release capsule Take 1 capsule by mouth daily 5/3/22   Leon Edgar MD SHAFER PEN NEEDLES 31G X 8 MM MISC USE 1 FIVE TIMES DAILY 4/3/22   Historical Provider, MD   levothyroxine (SYNTHROID) 200 MCG tablet Take 150 mcg by mouth Daily  4/11/22   Historical Provider, MD   EUTHYROX 50 MCG tablet Take 50 mcg by mouth Daily  4/11/22   Historical Provider, MD   digoxin (LANOXIN) 125 MCG tablet Take 0.125 mg by mouth daily 11/8/21   Historical Provider, MD   atorvastatin (LIPITOR) 20 MG tablet Take 80 mg by mouth daily  1/19/19   Historical Provider, MD   buPROPion [facility] SR) 100 MG extended release tablet Take 100 mg by mouth 2 times daily  11/21/18   Historical Provider, MD   metFORMIN (GLUCOPHAGE) 850 MG tablet Take 850 mg by mouth daily (with breakfast)  1/19/19   Historical Provider, MD   nitroGLYCERIN (NITROSTAT) 0.4 MG SL tablet Place 0.4 mg under the tongue every 5 minutes as needed  1/4/19   Historical Provider, MD   Omega-3 Fatty Acids (FISH OIL) 1000 MG CAPS Take 3,000 mg by mouth 3 times daily    Historical Provider, MD   aspirin 81 MG tablet Take 81 mg by mouth daily    Historical Provider, MD       Family History:   Family history is negative for accelerated coronary artery disease, diabetes or malignancies. Social History:   TOBACCO:   reports that he has been smoking. He has a 20.00 pack-year smoking history. He has never used smokeless tobacco.  ETOH:   reports that he does not currently use alcohol. OCCUPATION:      REVIEW OF SYSTEMS:  A full review of systems was performed and is negative except for that which appears in the HPI    Physical Exam:    Vitals: BP (!) 144/79   Pulse 88   Temp 97.5 °F (36.4 °C) (Oral)   Resp 18   Ht 6' (1.829 m)   Wt 245 lb 3.2 oz (111.2 kg)   SpO2 99%   BMI 33.26 kg/m²   General appearance: Ill but not toxic appearing 74y.o. year-old male who is alert, appears stated age and is cooperative  HEENT: Head: Normocephalic, no lesions, without obvious abnormality.   Eye: Normal external eye, conjunctiva, lids cornea, PEERL. Ears: Normal external ears. Non-tender. Nose: Normal external nose, mucus membranes and septum. Pharynx: Dental Hygiene adequate. Normal buccal mucosa. Normal pharynx. Neck: no adenopathy, no carotid bruit, no JVD, supple, symmetrical, trachea midline and thyroid not enlarged, symmetric, no tenderness/mass/nodules  Lungs: scattered rhonchi and crackles on auscultation bilaterally and no use of accessory muscles  Heart: regular rate and rhythm, S1, S2 normal, no murmur, click, rub or gallop and normal apical impulse  Abdomen: soft, non-tender; bowel sounds normal; no masses, no organomegaly  Extremities: extremities atraumatic, no cyanosis, trace edema bilateral lower extremities and Homans sign is negative, no sign of DVT. Capillary Refill: Acceptable < 3 seconds   Peripheral Pulses: +3 easily felt, not easily obliterated with pressures   Skin: Skin color, texture, turgor normal. No rashes or lesions on exposed skin  Neurologic: Neurovascularly intact without any focal sensory/motor deficits. Cranial nerves: II-XII intact, grossly non-focal. Gait was not tested. Mental Status: Alert and oriented, thought content appropriate, normal insight        CBC:   Recent Labs     08/10/22  2240   WBC 15.7*   HGB 11.4*        BMP:    Recent Labs     08/10/22  2240      K 4.5   CL 99   CO2 29   BUN 16   CREATININE 0.8   GLUCOSE 231*     Troponin: No results for input(s): TROPONINI in the last 72 hours.   PT/INR:  No results found for: PTINR  U/A:  No results found for: LEUKOCYTESUR, NITRITE, RBCUA, SPECGRAV, UROBILINOGEN, BILIRUBINUR, BLOODU, GLUCOSEU, PROTEINU        Assessment:   Principal Problem:    Postobstructive pneumonia  Active Problems:    Neutrophilic leukocytosis    Malignant pleural effusion    Hypophosphatemia    Protein-calorie malnutrition, moderate ([address]    Essential hypertension    Hyperlipidemia    DMII (diabetes mellitus, type 2) (HCC)    Acquired hypothyroidism    Acute on chronic respiratory failure with hypoxia (HCC)    Atrial fibrillation, permanent (HCC)    COPD (chronic obstructive pulmonary disease) (HCC)    Squamous cell carcinoma of left lung (HCC)    Chronic diastolic congestive heart failure (HCC)  Resolved Problems:    * No resolved hospital problems. *      Plan:       Squamous cell carcinoma of left lung [facility]) - Oncology consulted. Plan to start chemotherapy soon? Postobstructive pneumonia possible, Neutrophilic leukocytosis  - Pt was started on Vancomycin and Cefepime  - Strep pneumo and legionella urine antigens ordered  - MRSA DNA probe ordered  - Blood cultures x 2 ordered  - Sputum gram stain and respiratory culture ordered  - Pulmonary and Oncology consulted    Malignant pleural effusion - He was found to have a reaccumulation of his pleural effusion, tachypnea and to be hypoxic. The [facility] ER provider was able to manipulate the catheter and drain approximately 1 liter of the effusion and sent a sample for culture. - Will hold Eliquis initially and kep NPO in case the catheter needs to be moved or replaced    Acute on chronic respiratory failure with hypoxia ([address] - We will provide oxygen as necessary to maintain an oxygen saturation of 92% or higher     Hypophosphatemia - Phosphorus replacement ordered. Recheck in the am    Protein-calorie malnutrition, moderate ([address] - consider starting high protein, high calorie dietary supplements when able to eat    Acquired hypothyroidism - stable; continue Levothyroxine    Atrial fibrillation, permanent (HCC) - rate adequately controlled. Continue Digoxin, Cardizem. Hold Eliquis initially, cover with Heparin    COPD (chronic obstructive pulmonary disease) - without acute exacerbation. Will provide Nebulizer treatments as needed and continue home medications. Patient will be monitored closely, and deep breathing and coughing will be encouraged while awake. Chronic diastolic congestive heart failure ([address] - compensated. Continue home lasix. Monitor I&Os and daily weights    Diabetes mellitus II - Lantus, SSI and when able to eat, start a carb control diet    Essential (primary) hypertension - continue home meds and monitor blood pressure    Hyperlipidemia - No current evidence of Rhabdomyolysis or other adverse effects. Continue statin therapy while in the hospital    Non-morbid obesity due to excess calories (Body mass index is 33.26 kg/m². ) - Complicating assessment and treatment. Placing patient at risk for multiple co-morbidities as well as early death and contributing to the patient's presentation. Code Status: Full Code  Diet: Diet NPO  DVT Prophylaxis: Heparin (Eliquis held)    (Advanced care planning has been discussed with patient and/or responsible family member and is reflected in the code status.  Further orders associated with this have been entered if appropriate)    Disposition: Anticipate that patient will remain in the hospital for 2 or more midnights depending on further evaluation and clinical course     Please note that over 50 minutes was spent in evaluating the patient, review of records and results, discussion with staff/family, etc.      Mohini Ortiz MD

## 2022-08-11 NOTE — PROGRESS NOTES
Clinical Pharmacy Note: Pharmacy to Dose Vancomycin    Melvin Patel is a 74 y.o. male started on Vancomycin; consult received from Dr. Fede Quezada to manage therapy. Also receiving the following antibiotics: cefepime. Goal AUC: 400-600 mg/L*hr  Goal Trough Level: 15-20 mcg/mL    Assessment/Plan:  A 1750 mg loading dose was given on 8/11 at 0038  Initiate vancomycin 1250 mg IV every 12 hours. Bayesian modeling predicts an AUC of 491 mg/L*hr and a trough of 15.7 mcg/mL at steady state concentration. A vancomycin random level has been ordered for 8/11 at 1200  Changes in regimen will be determined based on culture results, renal function, and clinical response. Pharmacy will continue to monitor and adjust regimen as necessary. Allergies:  Codeine     Recent Labs     08/10/22  2240   CREATININE 0.8       Recent Labs     08/10/22  2240   WBC 15.7*       Ht Readings from Last 1 Encounters:   08/10/22 6' (1.829 m)        Wt Readings from Last 1 Encounters:   08/10/22 245 lb 3.2 oz (111.2 kg)         Estimated Creatinine Clearance: 114 mL/min (based on SCr of 0.8 mg/dL).       Thank you for the consult,    Cain DevineD

## 2022-08-11 NOTE — CONSULTS
PULMONARY AND CRITICAL CARE MEDICINE CONSULTATION NOTE    CONSULTING PHYSICIAN:  Chacha Paez MD    ADMISSION DATE: 8/10/2022  ADMISSION DIAGNOSIS: Pleural effusion [J90]    REASON FOR CONSULT: No chief complaint on file. DATE OF CONSULT: 8/10/2022    HISTORY OF PRESENT ILLNESS: 74y.o. year old male with a past medical history significant for squamous cell cancer, congestive heart failure, hypothyroidism, diabetes, hyperlipidemia, hypertension, COPD not on home oxygen, recurrent malignant pleural effusion s/p Pleurx catheter who presented to the hospital with shortness of breath. Patient had initially presented to [facility] ER. Pleurx catheter was drained and 1 L of fluid was collected. This has been sent for gram stain and culture. Patient felt improvement in his breathing after pleural fluid drainage. He was diagnosed with a squamous cell carcinoma of the LLL by bronchoscopy with EBUS with Dr. Marie Spear on 4/12/2022. A friable fungating left mainstem endobronchial lesion was identified. An endobronchial biopsy showed a squamous cell carcinoma, moderately differentiated. FNA of the subcarinum was also positive. He had a left-sided pleurX placed on 4/27/2022 with Dr. Charlene Wong. He reported that he completed XRT at [facility] and was due to start chemotherapy next week. Patient has been draining his Pleurx catheter every other day with 500 cc to 1 L of fluid drained in every session. Also reports that he uses oxygen at 2 L/min mostly at nighttime but occasionally during the day too. Used to smoke 1 pack/day for 40 to 45 years before quitting 4 months ago. At the time of interview he is lying in bed in no apparent respiratory distress. Reports improvement in his breathing. Currently on room air saturating 91 to 92%. Denies any cough, discolored expectoration, fevers, night sweats. Has had weight loss with poor appetite.     REVIEW OF SYSTEMS:     CONSTITUTIONAL SYMPTOMS: The patient tablet Take 50 mcg by mouth Daily       digoxin (LANOXIN) 125 MCG tablet Take 0.125 mg by mouth daily      atorvastatin (LIPITOR) 20 MG tablet Take 80 mg by mouth daily       buPROPion (WELLBUTRIN SR) 100 MG extended release tablet Take 100 mg by mouth 2 times daily       metFORMIN (GLUCOPHAGE) 850 MG tablet Take 850 mg by mouth daily (with breakfast)       nitroGLYCERIN (NITROSTAT) 0.4 MG SL tablet Place 0.4 mg under the tongue every 5 minutes as needed       Omega-3 Fatty Acids (FISH OIL) 1000 MG CAPS Take 3,000 mg by mouth 3 times daily      aspirin 81 MG tablet Take 81 mg by mouth daily          vancomycin  1,250 mg IntraVENous Q12H    levothyroxine  250 mcg Oral Daily    furosemide  40 mg Oral Daily    folic acid  1 mg Oral Daily    dilTIAZem  120 mg Oral Daily    digoxin  0.125 mg Oral Daily    buPROPion  100 mg Oral Daily    mometasone-formoterol  2 puff Inhalation BID    atorvastatin  80 mg Oral Daily    aspirin  81 mg Oral Daily    heparin (porcine)  5,000 Units SubCUTAneous 3 times per day    sodium chloride flush  10 mL IntraVENous 2 times per day    insulin lispro  0-8 Units SubCUTAneous Q4H    cefepime  2,000 mg IntraVENous Q8H    insulin glargine  64 Units SubCUTAneous BID      dextrose      sodium chloride 30 mL/hr at 08/10/22 2346     oxyCODONE **OR** oxyCODONE, tiZANidine, dextrose bolus **OR** dextrose bolus, glucagon (rDNA), dextrose, sodium chloride flush, sodium chloride, ondansetron, polyethylene glycol, acetaminophen **OR** acetaminophen      ALLERGIES:   Allergies as of 08/10/2022 - Fully Reviewed 08/10/2022   Allergen Reaction Noted    Codeine  01/28/2019      OBJECTIVE:   height is 6' (1.829 m) and weight is 253 lb 6.4 oz (114.9 kg). His oral temperature is 97.8 °F (36.6 °C). His blood pressure is 138/76 and his pulse is 77. His respiration is 20 and oxygen saturation is 92%. No intake/output data recorded.      PHYSICAL EXAM:    CONSTITUTIONAL: He is a 74y.o.-year-old who appears his stated age. He is alert and oriented x 3 and in no acute distress. HEENT: PERRLA, EOMI. No scleral icterus. No thrush, atraumatic, normocephalic. NECK: Supple, without cervical or supraclavicular lymphadenopathy:  CARDIOVASCULAR: S1 S2 RRR. Without murmer  RESPIRATORY & CHEST: Decreased breath sounds in the left side of the chest.  Right-sided Port-A-Cath well localized. Left-sided Pleurx catheter covered with a clean dry gauze dressing. GASTROINTESTINAL & ABDOMEN: Soft, nontender, positive bowel sounds in all quadrants, non-distended, without hepatosplenomegaly. GENITOURINARY: Deferred. MUSCULOSKELETAL: No tenderness to palpation of the axial skeleton. There is no clubbing. No cyanosis. No edema of the lower extremities. SKIN OF BODY: No rash or jaundice. PSYCHIATRIC EVALUATION: Normal affect. Patient answers questions appropriately. HEMATOLOGIC/LYMPHATIC/ IMMUNOLOGIC: No palpable lymphadenopathy. NEUROLOGIC: Alert and oriented x 3. Groslly non-focal. Motor strength is 5+/5 in all muscle groups. The patient has a normal sensorium globally. LABS:  Recent Labs     08/10/22  2240 08/11/22  0630   WBC 15.7* 20.2*   HGB 11.4* 11.4*   HCT 35.9* 35.1*    408   ALT 22  --    AST 18  --     136   K 4.5 4.1   CL 99 97*   CREATININE 0.8 0.6*   BUN 16 13   CO2 29 31       Recent Labs     08/10/22  2240 08/11/22  0630   GLUCOSE 231* 192*   CALCIUM 9.2 9.1    136   K 4.5 4.1   CO2 29 31   CL 99 97*   BUN 16 13   CREATININE 0.8 0.6*       No results for input(s): PHART, PAS3PBH, PO2ART, ITB8ALG, R1BBUZZZ, BEART, U3VQIAXY in the last 72 hours.     Lab Results   Component Value Date    INR 1.30 (H) 06/19/2022    INR 1.14 (H) 04/12/2022    PROTIME 16.0 (H) 06/19/2022    PROTIME 12.9 (H) 04/12/2022     No results found for: AMYLASE   Lab Results   Component Value Date    LABA1C 6.0 06/19/2022     Lab Results   Component Value Date    .5 06/19/2022     No results found for: TSH, C4UNYLC, Z4APQWY, THYROIDAB, FT3, T4FREE  Lab Results   Component Value Date    CKTOTAL 31 (L) 08/10/2022    TROPONINI <0.01 04/12/2022      No results found for: CRP   No results found for: BNP   No results found for: DDIMER   No results found for: FERRITIN   Lab Results   Component Value Date    LACTA 1.5 08/11/2022           IMAGING:    EXAMINATION:   ONE XRAY VIEW OF THE CHEST       8/11/2022 5:57 am       FINDINGS:   Complete opacification of the left hemithorax similar to prior study. Left-sided chest tube is noted. Mild congestion noted in the right lung. Cardiac silhouette is obscured. Right-sided port tip is in the superior vena   cava. There is no pneumothorax. Moderate osteopenic changes and   degenerative changes noted in the bony structures. .           No improvement. Persistent complete opacification left hemithorax. Mild   congestion right lung. EXAMINATION:   CT OF THE CHEST WITH CONTRAST 6/19/2022 4:39 pm       1. No significant change in appearance of the patient's primary left hilar   mass, similar to the study of 04/19/2022, most likely a primary focus of lung   malignancy. 2. There is stable complete consolidation of the left lung, likely   postobstructive collapse. However, underlying pneumonia or metastatic   disease cannot be excluded. 3. Interval placement of a left pleural catheter, with decrease in size of a   mild-to-moderate left pleural effusion. 4. Slight interval decrease in size of a trace right pleural effusion. 5. New reticular opacities within the basilar aspect of the right lower lobe   likely reflect atelectasis and pulmonary edema, less likely metastatic   disease. 6. Multiple new nonspecific ground-glass nodular opacities throughout the   right upper and right middle lobes, likely reflecting multifocal pneumonia,   less likely metastatic disease. 7. Stable mediastinal and bilateral hilar lymphadenopathy.    8. Stable nodular enlargement of the bilateral adrenal glands likely reflects   either benign adenomatous disease or hyperplasia as this was previously PET   negative. EXAMINATION:   CT OF THE CHEST WITHOUT CONTRAST 4/19/2022 11:30 am           Complete opacification of the left hemithorax secondary to a large left   pleural effusion with compressive atelectasis of the left lung. No shift of   the mediastinum to the right. There is a gap between the 8th and 9th rib   associated with a nonunited fracture of the posterior aspect of the 9th rib. Through this gap, there is herniation of fluid into chest wall. Small right pleural effusion with mild ground-glass infiltration noted in the   right upper lobe. Complete obliteration of the midportion of the left mainstem bronchus with   non visualization of rest of the left bronchial tree. Evidence of prior granulomatous disease. IMPRESSION:     Acute respiratory distress, improving  Left lung squamous cell cancer, stage IV  Left-sided malignant pleural effusion s/p Pleurx catheter  Left lung chronic atelectasis  Previous history of tobacco abuse  COPD not in exacerbation  Chronic hypoxic respiratory failure    RECOMMENDATION:     Patient has presented to the hospital with acute respiratory distress and underwent drainage of pleural catheter with 1 L of fluid removed in [facility] ER. Patient has had chronic atelectasis of the left lung due to left hilar squamous cell cancer obstructing the left main bronchus. This was also seen during the initial diagnostic bronchoscopy. Since April, the left lung atelectasis has been persistent. Additionally, patient has had recurrent left-sided pleural effusion which was proved to be malignant and therefore he underwent Pleurx catheter placement in April of this year. He has been draining the Pleurx catheter frequently. Catheter has been working fine without any problem.      I performed a bedside thoracic ultrasound to assess if there is any residual pleural effusion left. A representative image is as below:    Left pleural space. Diaphragm and spleen are at 3 o'clock position. Atelectatic lung is at 9 o'clock position. In between then there is a triangular opacity suggestive of mild pleural effusion. At this time I have connected the pleural catheter to the drainage canister with -20 cm water suction. This will maximize pleural fluid drainage. Ultimately, he  will need to start on chemotherapy in order to reduce the size of the left hilar mass and thereby reduce extraluminal compression of the left main bronchus. This will help in improving the left lung atelectasis. Currently his respiratory status is improving and oxygen requirements are stabilizing. No bronchoscopy or airway intervention is needed at this time. Patient has no fever, discolored expectoration with normal procalcitonin levels. Suspicion for pneumonia is low. As his clinical picture stabilizes, and pleural fluid cultures are resulted, antibiotics can be de-escalated/stopped. He can resume regular diet. Also can restart on anticoagulation. Continue with Dulera and DuoNeb nebulization. Thank you for your consultation. We will continue to follow the patient. Sulaiman Finley MD  Pulmonary Critical Care and Sleep Medicine  8/10/2022, 9:46 AM    This note was completed using dragon medical speech recognition software. Grammatical errors, random word insertions, pronoun errors and incomplete sentences are occasional consequences of this technology due to software limitations. If there are questions or concerns about the content of this note of information contained within the body of this dictation they should be addressed with the provider for clarification.

## 2022-08-11 NOTE — PROGRESS NOTES
Admission complete, meds given www. Pt DA from [facility]. With complaints of SOB, and home health nurse worried about his pleurx drainage. Chest xr: Bilat pleural eff, which is reoccurrent for him. Already has a pleurx drain in place, ED was able to get 1000mL OP, it was a white/milky color. Is less SOB now. WBC elevated. Pt currently being treated for lung CA, finished radiation and will start chemo soon. Pulm and Onc consulted. Covid neg. Pt was NPO at [facility] for possible drain replacement. Pt is A&O x4, alert to room and call light. Has been ambulating independently, and has urinal at bedside. No other concerns at this time.

## 2022-08-11 NOTE — CONSULTS
[facility]   CONSULT NOTE    8/11/2022 8:57 AM    Patient Information: Alejandra Watson   Date of Admit:  8/10/2022  Primary Care Physician:  Zaki Rodriges MD  Requesting Physician:  Ana Abdullahi MD    Reason for consult:   Evaluation and recommendations for squamous cell lung cancer    Chief complaint:  No chief complaint on file. History of Present Illness:  Alejandra Watson is a 74 y.o. male on Ana Abdullahi MD service who was admitted on 8/10/2022 for post obstructive pneumonia. 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 He is followed by Dr. Monique Tripathi in [facility]. He was diagnosed with a squamous cell carcinoma of the LLL by bronchoscopy with EBUS with Dr. Wilberto Weeks on 4/12/2022. A friable fungating left mainstem endobronchial lesion was identified. An endobronchial biopsy showed a squamous cell carcinoma, moderately differentiated. FNA of the subcarinum was also positive. He had a left-sided pleurX placed on 4/27/2022 with Dr. Megha George. He reported that he completed XRT at [facility] and was due to start chemotherapy next week. He presented to [facility] ED yesterday with c/o SOB, found to have re-accumulation of his pleural effusion, tachycardia, and hypoxia. In the ED they were able to manipulate the pleurx catheter and drain approx. 1 liter of fluid. There was concern for possible post-obstructive pneumonia and antibiotics were initiated. Past Medical History:     has a past medical history of A-fib ([address] CAD (coronary artery disease), Cancer ([address] CHF (congestive heart failure) ([address] COPD (chronic obstructive pulmonary disease) ([address] CVA (cerebral vascular accident) ([address] Diabetes mellitus ([address] Hyperlipidemia, Hypertension, and Thyroid disease. Past Surgical History:    Past Surgical History:   Procedure Laterality Date    BRONCHOSCOPY N/A 4/12/2022    EBUS WF W/ANES.  (11:00) performed by Ethan Prather MD at [facility]    BRONCHOSCOPY  4/12/2022    BRONCHOSCOPY ALVEOLAR LAVAGE performed by Isabela Chaudhry MD at [place] [address]  4/12/2022    BRONCHOSCOPY BIOPSY BRONCHUS performed by Isabela Chaudhry MD at [place] [address]  4/12/2022    BRONCHOSCOPY/TRANSBRONCHIAL NEEDLE BIOPSY performed by Isabela Chaudhry MD at [place] [address] N/A 4/13/2022    Bee Ar NF performed by Isabela Chaudhry MD at [place] [address] N/A 4/21/2022    BRONCHOSCOPY WITH ENDOBRONCHIAL YAG LASER AND CHEST TUBE PLACEMENT performed by Mimi Jacome MD at [place] Left 4/27/2022    LEFT PLEUR-X CATHETER PLACEMENT performed by Carolyn Angeles MD at [place] [address] knee        Current Medications:     vancomycin  1,250 mg IntraVENous Q12H    levothyroxine  250 mcg Oral Daily    furosemide  40 mg Oral Daily    folic acid  1 mg Oral Daily    dilTIAZem  120 mg Oral Daily    digoxin  0.125 mg Oral Daily    buPROPion  100 mg Oral Daily    mometasone-formoterol  2 puff Inhalation BID    atorvastatin  80 mg Oral Daily    aspirin  81 mg Oral Daily    heparin (porcine)  5,000 Units SubCUTAneous 3 times per day    sodium chloride flush  10 mL IntraVENous 2 times per day    insulin lispro  0-8 Units SubCUTAneous Q4H    cefepime  2,000 mg IntraVENous Q8H    insulin glargine  64 Units SubCUTAneous BID       Allergies: Allergies   Allergen Reactions    Codeine         Social History:    reports that he has been smoking. He has a 20.00 pack-year smoking history. He has never used smokeless tobacco. He reports that he does not currently use alcohol. He reports that he does not use drugs. Family History:     family history is not on file.      ROS:    Constitutional:  Negative for fever, chills or night sweats  Eyes:  Negative for exudate, itching  Ears:  Negative for drainage   Nose:  Negative for rhinorrhea, epistaxis  Mouth/Throat:  Negative for hoarseness, sore throat. Respiratory:   Negative for shortness of breath, hemoptysis, wheezing  Cardiovascular: Negative for chest pain, palpitations   Gastrointestinal:  Negative for nausea, vomiting, diarrhea, black stool, bright red blood in the stool  Genitourinary:  Negative for dysuria, hematuria   Hematologic/Lymphatic:  Negative for  bleeding tendency, easy bruising  Musculoskeletal:  Negative for myalgias, arthralgias  Neurologic:  Negative for  confusion,dysarthria. Skin :  Negative for itching, rash  Psychiatric:  Negative for depression, anxiety. Endocrine:  Negative for polydipsia, polyuria, heat /cold intolerance. PHYSICAL EXAM:    Vitals:  Vitals:    08/11/22 0715   BP: 138/76   Pulse: 77   Resp: 20   Temp: 97.8 °F (36.6 °C)   SpO2: 97%        Intake/Output Summary (Last 24 hours) at 8/11/2022 0857  Last data filed at 8/11/2022 0325  Gross per 24 hour   Intake --   Output 550 ml   Net -550 ml      Wt Readings from Last 3 Encounters:   08/11/22 253 lb 6.4 oz (114.9 kg)   06/19/22 176 lb 12.9 oz (80.2 kg)   04/26/22 252 lb 6.8 oz (114.5 kg)        General appearance: Appears comfortable. Eyes: Sclera clear, pupils equal  ENT: Moist mucus membranes, no thrush  Neck: Trachea midline, symmetrical  Cardiovascular: Regular rhythm, normal S1, S2. No murmur, gallop, rub. No edema in  lower extremities  Respiratory: breathing easy, diminished breath sounds on left side. Good inspiratory effort  Gastrointestinal: Abdomen soft, not tender, not distended, normal bowel sounds  Musculoskeletal: No cyanosis in digits, warm extremities  Neurologic: Cranial nerves grossly intact, no motor or speech deficits. Psychiatric: Normal affect. Alert and oriented to time, place and person.   Skin: Warm, dry, normal turgor, no rash    DATA:  CBC:   Lab Results   Component Value Date/Time    WBC 20.2 08/11/2022 06:30 AM    RBC 3.90 08/11/2022 06:30 AM    HGB 11.4 08/11/2022 06:30 AM    HCT 35.1 08/11/2022 06:30 AM    MCV 89.9 08/11/2022 06:30 AM    MCH 29.3 08/11/2022 06:30 AM    MCHC 32.6 08/11/2022 06:30 AM    RDW 16.9 08/11/2022 06:30 AM     08/11/2022 06:30 AM    MPV 6.3 08/11/2022 06:30 AM     BMP:  Lab Results   Component Value Date/Time     08/11/2022 06:30 AM    K 4.1 08/11/2022 06:30 AM    CL 97 08/11/2022 06:30 AM    CO2 31 08/11/2022 06:30 AM    BUN 13 08/11/2022 06:30 AM    CREATININE 0.6 08/11/2022 06:30 AM    CALCIUM 9.1 08/11/2022 06:30 AM    GFRAA >60 08/11/2022 06:30 AM    LABGLOM >60 08/11/2022 06:30 AM    GLUCOSE 192 08/11/2022 06:30 AM     Magnesium:   Lab Results   Component Value Date/Time    MG 1.90 06/22/2022 05:28 AM    MG 1.90 06/19/2022 02:30 PM     LIVER PROFILE:   Recent Labs     08/10/22  2240   AST 18   ALT 22   BILITOT 0.3   ALKPHOS 158*     PT/INR:    Lab Results   Component Value Date/Time    PROTIME 16.0 06/19/2022 02:30 PM    PROTIME 12.9 04/12/2022 10:05 AM    INR 1.30 06/19/2022 02:30 PM    INR 1.14 04/12/2022 10:05 AM       IMPRESSION/RECOMMENDATIONS:    Principal Problem:    Postobstructive pneumonia  Active Problems:    Neutrophilic leukocytosis    Malignant pleural effusion    Hypophosphatemia    Protein-calorie malnutrition, moderate (HCC)    Essential hypertension    Hyperlipidemia    DMII (diabetes mellitus, type 2) (HCC)    Acquired hypothyroidism    Acute on chronic respiratory failure with hypoxia (HCC)    Atrial fibrillation, permanent (HCC)    COPD (chronic obstructive pulmonary disease) (HCC)    Squamous cell carcinoma of left lung (HCC)    Chronic diastolic congestive heart failure ([address]  Resolved Problems:    * No resolved hospital problems. *       Squamous cell lung cancer  - diagnosed 4/12/22  - PET/CT, 4/08/2022, showed a large mass in the left hilum extending into the LLL with complete obstruction of the left mainstem bronchus. A hypermetabolic subcarinal LN was identified as well.   - sees Dr. Francis Castillo  - patient reports completing radiation therapy with plans to start chemo soon  - we will attempt to contact Dr. Landry Cleaning regarding chemotherapy plans    Malignant pleural effusion  - PleurX catheter in place  - drained 1L 8/10/22    Post obstructive pneumonia  - on IV antibiotics    A fib  - on Eliquis, currently on hold per primary team in case procedure is needed on PleurX, receiving heparin injections. This plan was discussed with the patient and he/she verbalized understanding. Thank you for allowing us to participate in the care of this patient. Efren Morales, DONI  [facility]        Known patient with squamous cell carcinoma of the lung admitted for shortness of breath from recurrent malignant pleural effusions. Spoke to his primary oncologist Dr. Robert Manley. Per him plans to start palliative chemotherapy as an outpatient on Monday. If respiratory status can be stabilized can be discharged from hematology/oncology standpoint.     Estuardo Reich MD  [facility]

## 2022-08-12 ENCOUNTER — APPOINTMENT (OUTPATIENT)
Dept: GENERAL RADIOLOGY | Age: 67
DRG: 181 | End: 2022-08-12
Attending: FAMILY MEDICINE
Payer: MEDICARE

## 2022-08-12 LAB
ANION GAP SERPL CALCULATED.3IONS-SCNC: 8 MMOL/L (ref 3–16)
BUN BLDV-MCNC: 17 MG/DL (ref 7–20)
CALCIUM SERPL-MCNC: 8.6 MG/DL (ref 8.3–10.6)
CHLORIDE BLD-SCNC: 97 MMOL/L (ref 99–110)
CO2: 31 MMOL/L (ref 21–32)
CREAT SERPL-MCNC: 0.7 MG/DL (ref 0.8–1.3)
GFR AFRICAN AMERICAN: >60
GFR NON-AFRICAN AMERICAN: >60
GLUCOSE BLD-MCNC: 123 MG/DL (ref 70–99)
GLUCOSE BLD-MCNC: 142 MG/DL (ref 70–99)
GLUCOSE BLD-MCNC: 149 MG/DL (ref 70–99)
GLUCOSE BLD-MCNC: 152 MG/DL (ref 70–99)
GLUCOSE BLD-MCNC: 154 MG/DL (ref 70–99)
GLUCOSE BLD-MCNC: 158 MG/DL (ref 70–99)
GLUCOSE BLD-MCNC: 163 MG/DL (ref 70–99)
L. PNEUMOPHILA SEROGP 1 UR AG: NORMAL
MRSA SCREEN RT-PCR: NORMAL
PERFORMED ON: ABNORMAL
PHOSPHORUS: 3.2 MG/DL (ref 2.5–4.9)
POTASSIUM REFLEX MAGNESIUM: 4.5 MMOL/L (ref 3.5–5.1)
SODIUM BLD-SCNC: 136 MMOL/L (ref 136–145)
STREP PNEUMONIAE ANTIGEN, URINE: NORMAL

## 2022-08-12 PROCEDURE — 6360000002 HC RX W HCPCS: Performed by: INTERNAL MEDICINE

## 2022-08-12 PROCEDURE — 99233 SBSQ HOSP IP/OBS HIGH 50: CPT | Performed by: INTERNAL MEDICINE

## 2022-08-12 PROCEDURE — 2580000003 HC RX 258: Performed by: INTERNAL MEDICINE

## 2022-08-12 PROCEDURE — 2060000000 HC ICU INTERMEDIATE R&B

## 2022-08-12 PROCEDURE — 2580000003 HC RX 258: Performed by: PHYSICIAN ASSISTANT

## 2022-08-12 PROCEDURE — 6370000000 HC RX 637 (ALT 250 FOR IP): Performed by: INTERNAL MEDICINE

## 2022-08-12 PROCEDURE — 97535 SELF CARE MNGMENT TRAINING: CPT

## 2022-08-12 PROCEDURE — 2580000003 HC RX 258: Performed by: FAMILY MEDICINE

## 2022-08-12 PROCEDURE — 6360000002 HC RX W HCPCS: Performed by: FAMILY MEDICINE

## 2022-08-12 PROCEDURE — 94640 AIRWAY INHALATION TREATMENT: CPT

## 2022-08-12 PROCEDURE — 97166 OT EVAL MOD COMPLEX 45 MIN: CPT

## 2022-08-12 PROCEDURE — 97162 PT EVAL MOD COMPLEX 30 MIN: CPT

## 2022-08-12 PROCEDURE — 71045 X-RAY EXAM CHEST 1 VIEW: CPT

## 2022-08-12 PROCEDURE — 2700000000 HC OXYGEN THERAPY PER DAY

## 2022-08-12 PROCEDURE — 80048 BASIC METABOLIC PNL TOTAL CA: CPT

## 2022-08-12 PROCEDURE — 97530 THERAPEUTIC ACTIVITIES: CPT

## 2022-08-12 PROCEDURE — 84100 ASSAY OF PHOSPHORUS: CPT

## 2022-08-12 PROCEDURE — 94761 N-INVAS EAR/PLS OXIMETRY MLT: CPT

## 2022-08-12 RX ORDER — INSULIN LISPRO 100 [IU]/ML
0-8 INJECTION, SOLUTION INTRAVENOUS; SUBCUTANEOUS
Status: DISCONTINUED | OUTPATIENT
Start: 2022-08-12 | End: 2022-08-15 | Stop reason: HOSPADM

## 2022-08-12 RX ADMIN — OXYCODONE 10 MG: 5 TABLET ORAL at 21:43

## 2022-08-12 RX ADMIN — CEFEPIME 2000 MG: 2 INJECTION, POWDER, FOR SOLUTION INTRAVENOUS at 06:06

## 2022-08-12 RX ADMIN — Medication 2 PUFF: at 20:17

## 2022-08-12 RX ADMIN — VANCOMYCIN HYDROCHLORIDE 1250 MG: 10 INJECTION, POWDER, LYOPHILIZED, FOR SOLUTION INTRAVENOUS at 01:54

## 2022-08-12 RX ADMIN — DILTIAZEM HYDROCHLORIDE 120 MG: 120 CAPSULE, COATED, EXTENDED RELEASE ORAL at 09:02

## 2022-08-12 RX ADMIN — HYDROMORPHONE HYDROCHLORIDE 0.5 MG: 1 INJECTION, SOLUTION INTRAMUSCULAR; INTRAVENOUS; SUBCUTANEOUS at 01:46

## 2022-08-12 RX ADMIN — INSULIN GLARGINE 64 UNITS: 100 INJECTION, SOLUTION SUBCUTANEOUS at 09:03

## 2022-08-12 RX ADMIN — CEFEPIME 2000 MG: 2 INJECTION, POWDER, FOR SOLUTION INTRAVENOUS at 22:36

## 2022-08-12 RX ADMIN — INSULIN GLARGINE 64 UNITS: 100 INJECTION, SOLUTION SUBCUTANEOUS at 21:44

## 2022-08-12 RX ADMIN — LEVOTHYROXINE SODIUM 250 MCG: 0.12 TABLET ORAL at 06:05

## 2022-08-12 RX ADMIN — FUROSEMIDE 40 MG: 40 TABLET ORAL at 09:02

## 2022-08-12 RX ADMIN — CEFEPIME 2000 MG: 2 INJECTION, POWDER, FOR SOLUTION INTRAVENOUS at 15:19

## 2022-08-12 RX ADMIN — SODIUM CHLORIDE 500 ML: 9 INJECTION, SOLUTION INTRAVENOUS at 00:01

## 2022-08-12 RX ADMIN — SODIUM CHLORIDE 25 ML: 9 INJECTION, SOLUTION INTRAVENOUS at 11:32

## 2022-08-12 RX ADMIN — DIGOXIN 0.12 MG: 125 TABLET ORAL at 09:02

## 2022-08-12 RX ADMIN — ONDANSETRON 4 MG: 2 INJECTION INTRAMUSCULAR; INTRAVENOUS at 01:47

## 2022-08-12 RX ADMIN — Medication 10 ML: at 21:49

## 2022-08-12 RX ADMIN — Medication 2 PUFF: at 08:17

## 2022-08-12 RX ADMIN — HYDROMORPHONE HYDROCHLORIDE 0.5 MG: 1 INJECTION, SOLUTION INTRAMUSCULAR; INTRAVENOUS; SUBCUTANEOUS at 09:03

## 2022-08-12 RX ADMIN — ASPIRIN 81 MG: 81 TABLET, CHEWABLE ORAL at 09:02

## 2022-08-12 RX ADMIN — FOLIC ACID 1 MG: 1 TABLET ORAL at 09:02

## 2022-08-12 RX ADMIN — Medication 10 ML: at 10:18

## 2022-08-12 RX ADMIN — APIXABAN 5 MG: 5 TABLET, FILM COATED ORAL at 21:51

## 2022-08-12 RX ADMIN — VANCOMYCIN HYDROCHLORIDE 1250 MG: 10 INJECTION, POWDER, LYOPHILIZED, FOR SOLUTION INTRAVENOUS at 11:33

## 2022-08-12 RX ADMIN — SODIUM CHLORIDE 25 ML: 9 INJECTION, SOLUTION INTRAVENOUS at 15:17

## 2022-08-12 RX ADMIN — HEPARIN SODIUM 5000 UNITS: 5000 INJECTION INTRAVENOUS; SUBCUTANEOUS at 06:05

## 2022-08-12 RX ADMIN — BUPROPION HYDROCHLORIDE 100 MG: 100 TABLET, FILM COATED, EXTENDED RELEASE ORAL at 09:02

## 2022-08-12 RX ADMIN — ATORVASTATIN CALCIUM 80 MG: 80 TABLET, FILM COATED ORAL at 09:02

## 2022-08-12 RX ADMIN — SODIUM CHLORIDE 25 ML/HR: 9 INJECTION, SOLUTION INTRAVENOUS at 22:35

## 2022-08-12 ASSESSMENT — PAIN DESCRIPTION - ORIENTATION
ORIENTATION: LEFT

## 2022-08-12 ASSESSMENT — PAIN SCALES - GENERAL
PAINLEVEL_OUTOF10: 7
PAINLEVEL_OUTOF10: 6
PAINLEVEL_OUTOF10: 7
PAINLEVEL_OUTOF10: 5
PAINLEVEL_OUTOF10: 4

## 2022-08-12 ASSESSMENT — ENCOUNTER SYMPTOMS
ALLERGIC/IMMUNOLOGIC NEGATIVE: 1
EYES NEGATIVE: 1
SHORTNESS OF BREATH: 1
TROUBLE SWALLOWING: 1
ABDOMINAL DISTENTION: 1

## 2022-08-12 ASSESSMENT — PAIN DESCRIPTION - LOCATION
LOCATION: BACK;RIB CAGE
LOCATION: BACK
LOCATION: BACK;RIB CAGE
LOCATION: BACK
LOCATION: BACK;RIB CAGE

## 2022-08-12 NOTE — PROGRESS NOTES
[facility]   Progress Note      8/12/2022 8:32 AM        Name: Ines Saavedra . Admitted: 8/10/2022    SUBJECTIVE:  He is doing OK, he reports that his breathing is better, offers no new complaints.      Reviewed interval ancillary notes    Current Medications  vancomycin (VANCOCIN) 1,250 mg in dextrose 5 % 250 mL IVPB, Q12H  oxyCODONE (ROXICODONE) immediate release tablet 5 mg, Q4H PRN   Or  oxyCODONE (ROXICODONE) immediate release tablet 10 mg, Q4H PRN  HYDROmorphone HCl PF (DILAUDID) injection 0.5 mg, Q4H PRN  tiZANidine (ZANAFLEX) tablet 2 mg, Q12H PRN  levothyroxine (SYNTHROID) tablet 250 mcg, Daily  furosemide (LASIX) tablet 40 mg, Daily  folic acid (FOLVITE) tablet 1 mg, Daily  dilTIAZem (CARDIZEM CD) extended release capsule 120 mg, Daily  digoxin (LANOXIN) tablet 0.125 mg, Daily  buPROPion (WELLBUTRIN SR) extended release tablet 100 mg, Daily  mometasone-formoterol (DULERA) 200-5 MCG/ACT inhaler 2 puff, BID  atorvastatin (LIPITOR) tablet 80 mg, Daily  aspirin chewable tablet 81 mg, Daily  heparin (porcine) injection 5,000 Units, 3 times per day  dextrose bolus 10% 125 mL, PRN   Or  dextrose bolus 10% 250 mL, PRN  glucagon (rDNA) injection 1 mg, PRN  dextrose 10 % infusion, Continuous PRN  sodium chloride flush 0.9 % injection 10 mL, 2 times per day  sodium chloride flush 0.9 % injection 10 mL, PRN  0.9 % sodium chloride infusion, PRN  ondansetron (ZOFRAN) injection 4 mg, Q4H PRN  polyethylene glycol (GLYCOLAX) packet 17 g, Daily PRN  acetaminophen (TYLENOL) tablet 650 mg, Q4H PRN   Or  acetaminophen (TYLENOL) suppository 650 mg, Q4H PRN  insulin lispro (HUMALOG) injection vial 0-8 Units, Q4H  cefepime (MAXIPIME) 2000 mg IVPB minibag, Q8H  insulin glargine (LANTUS) injection vial 64 Units, BID        Objective:  /83   Pulse 87   Temp 97.6 °F (36.4 °C) (Oral)   Resp 18   Ht 6' (1.829 m)   Wt 252 lb 6.4 oz (114.5 kg)   SpO2 96%   BMI 34.23 kg/m²     Intake/Output Summary (Last 24 hours) at 8/12/2022 0832  Last data filed at 8/12/2022 0733  Gross per 24 hour   Intake 980.01 ml   Output 2135 ml   Net -1154.99 ml      Wt Readings from Last 3 Encounters:   08/12/22 252 lb 6.4 oz (114.5 kg)   06/19/22 176 lb 12.9 oz (80.2 kg)   04/26/22 252 lb 6.8 oz (114.5 kg)       General appearance:  Appears comfortable  Eyes: Sclera clear. Pupils equal.  ENT: Moist oral mucosa. Trachea midline, no adenopathy. Cardiovascular: Regular rhythm, normal S1, S2. No murmur. No edema in lower extremities  Respiratory: Not using accessory muscles. Good inspiratory effort. Clear to auscultation bilaterally, no wheeze or crackles. GI: Abdomen soft, no tenderness, not distended  Musculoskeletal: No cyanosis in digits, neck supple  Neurology: CN 2-12 grossly intact. No speech or motor deficits  Psych: Normal affect.  Alert and oriented in time, place and person  Skin: Warm, dry, normal turgor    Labs and Tests:  CBC:   Recent Labs     08/10/22  2240 08/11/22  0630 08/11/22  2250   WBC 15.7* 20.2* 30.1*   HGB 11.4* 11.4* 12.6*    408 591*     BMP:    Recent Labs     08/11/22  0630 08/11/22  2250 08/12/22  0615    136 136   K 4.1 4.5 4.5   CL 97* 97* 97*   CO2 31 30 31   BUN 13 17 17   CREATININE 0.6* 1.0 0.7*   GLUCOSE 192* 155* 158*     Hepatic:   Recent Labs     08/10/22  2240 08/11/22  2250   AST 18 19   ALT 22 18   BILITOT 0.3 0.3   ALKPHOS 158* 162*       ASSESSMENT AND PLAN    Principal Problem:    Postobstructive pneumonia  Active Problems:    Neutrophilic leukocytosis    Malignant pleural effusion    Hypophosphatemia    Protein-calorie malnutrition, moderate (HCC)    Essential hypertension    Hyperlipidemia    DMII (diabetes mellitus, type 2) (HCC)    Acquired hypothyroidism    Acute on chronic respiratory failure with hypoxia (HCC)    Atrial fibrillation, permanent (HCC)    COPD (chronic obstructive pulmonary disease) (HCC)    Squamous cell carcinoma of left lung (HCC)    Chronic diastolic congestive heart failure ([facility] [address] [facility]  Resolved Problems:    * No resolved hospital problems. *      Squamous cell lung cancer  - diagnosed 4/12/22  - PET/CT, 4/08/2022, showed a large mass in the left hilum extending into the LLL with complete obstruction of the left mainstem bronchus. A hypermetabolic subcarinal LN was identified as well. - sees Dr. Carla Potter  - patient reports completing radiation therapy with plans to start chemo soon  - patient is scheduled to start chemotherapy on 8/15/22 with Dr. Carla Potter      Malignant pleural effusion  - PleurX catheter in place  - drained 1L 8/10/22  - currently connected to drainage     Post obstructive pneumonia  - on IV antibiotics     A fib  - on Eliquis, currently on hold per primary team in case procedure is needed on PleurX, receiving heparin injections. Sharee Thorne CNP  [facility]     Patient was seen with [facility] in the morning. Squamous cell carcinoma of the lung. Had radiation therapy. Leukocytosis and thrombocytosis likely reactive. Pleurx catheter in place. On antibiotics for postobstructive pneumonia. Plan was discussed. Zohreh Hayes.  Kareem Goetz MD, ite Alfredo   Hematology and Oncology  [facility]  [phone]

## 2022-08-12 NOTE — CONSULTS
PALLIATIVE MEDICINE CONSULTATION     Patient name:Saul Treviño   MAXIMUS:[id number]    :1955  Room/Bed:[facility]3376/3376-01   LOS: 2 days         Date of consult:2022    Consult Information  Palliative Medicine Consult performed by: MELISSA Calzada CNP, CNP    Inpatient consult to Palliative Care  Consult performed by: MELISSA Calzada CNP  Consult ordered by: Pam Mejias MD  Reason for consult: [address] and Code status           ASSESSMENT/RECOMMENDATIONS     74 y.o. male with Dyspnea and debility related to metastatic lung cancer      Symptom Management:  Dyspnea- pt on 3L oxygen today with chest tube to suction, managed by pulmonology  Debility- pt reports that he spends 95% of his day in bed due to pain of CT and weakness   Goals of Care- talked to patient his goal id to be more mobile and get some strength back. He is not sure if he wants any additional Tx for his cancer he feels like none of it has helped. We discussed that he is not a great candidate for CPR. He feels that [facility] best aligns with his wishes at this time. I called his daughter Muriel Mccann to offer support and to get info on pts cancer Tx team in [place] [address] so I could touch base with them on his prognosis. Patient/Family Goals of Care :    talked to patient his goal id to be more mobile and get some strength back. He is not sure if he wants any additional Tx for his cancer he feels like none of it has helped. We discussed that he is not a great candidate for CPR. He feels that [facility] best aligns with his wishes at this time. I called his daughter Muriel Mccann to offer support and to get info on pts cancer Tx team in [place] [address] so I could touch base with them on his prognosis.      Disposition/Discharge Plan:   pending    Advance Directives:  Surrogate Decision Maker: Zainab-child  Code status:  DNR-CCA    Case discussed with: patient, floor RN, left message for daughter Muriel Mccann  Thank you for allowing us to participate in the care of this patient. HISTORY     CC: Dyspnea  HPI: The patient is a 74 y.o. male with history of squamous cell lung cancer with malignant effusion s/p PleurX, HTN, DM 2, hyperlipidemia, chronic respiratory failure with hypoxia on 2 L O2 via NC, Afib, CAD, chronic diastolic CHF directly admitted from [facility] ER with A on C RF with hypoxia, post obstructive PNA and malignant L pleural effusion. Palliative Medicine SymptomScreening/ROS:    Review of Systems   Constitutional:  Positive for activity change, appetite change and unexpected weight change. HENT:  Positive for trouble swallowing. Eyes: Negative. Respiratory:  Positive for shortness of breath. Cardiovascular:  Positive for leg swelling. Gastrointestinal:  Positive for abdominal distention. Endocrine: Negative. Genitourinary: Negative. Musculoskeletal:  Positive for arthralgias. Skin:  Positive for pallor. Allergic/Immunologic: Negative. Neurological:  Positive for weakness. Psychiatric/Behavioral:  Negative for confusion. Palliative Performance Scale:     [x] 60%  Amb reduced; Sig dz. Can't do hobbies/housework; Intake normal or reduced, Occasional assist; LOC full/confusion   [] 50%  Mainly sit/lie; Extensive disease. Mainly assist, Intake normal or reduced; Occasional assist; LOC full/confusion   [] 40%  Mainly in bed; Extensive disease; Mainly assist; Intake normal or reduced; Occasional assist; LOC full/confusion   [] 30%  Bed bound, Extensive disease; Total care; Intake reduced; LOC full/confusion   [] 20%  Bed bound; Extensive disease; Total care; Intake minimal; Drowsy/coma   [] 10%  Bed bound; Extensive disease;  Total care; Mouth care only; Drowsy/coma   []  0%   Death       Home med list and hospital medications reviewed in chart as of 8/12/2022     EXAM     Vitals:    08/12/22 0903   BP:    Pulse:    Resp: 18   Temp:    SpO2:        Physical Exam  Constitutional:       Appearance: He is ill-appearing. HENT:      Head: Normocephalic and atraumatic. Nose: Nose normal.      Mouth/Throat:      Mouth: Mucous membranes are dry. Eyes:      Pupils: Pupils are equal, round, and reactive to light. Cardiovascular:      Rate and Rhythm: Normal rate. Pulses: Normal pulses. Heart sounds: No murmur heard. No gallop. Pulmonary:      Effort: Pulmonary effort is normal.      Comments: Chest tube in place  Chest:      Chest wall: Tenderness present. Abdominal:      General: There is distension. Musculoskeletal:      Cervical back: Neck supple. Right lower leg: Edema present. Left lower leg: Edema present. Skin:     General: Skin is dry. Coloration: Skin is pale. Neurological:      Mental Status: He is alert. Mental status is at baseline. Psychiatric:         Mood and Affect: Mood normal.         Behavior: Behavior normal.         Thought Content:  Thought content normal.         Judgment: Judgment normal.              Current labs in the epic chart reviewed as of 8/12/2022   Review of previous notes, admits, labs, radiology and testing relevant to this consult done in this chart today 8/12/2022      Total time: 70 minutes  >50% of time spent counseling patient at bedside or POA/family member if applicable , reviewing information and discussing care, coordinating with care team  Signed By: Electronically signed by Griselda Hazel, APRN - CNP on 8/12/2022 at 9:07 AM  Palliative Medicine   [phone]    August 12, 2022

## 2022-08-12 NOTE — PROGRESS NOTES
PULMONARY AND CRITICAL CARE MEDICINE PROGRESS NOTE    Subjective: Patient sitting in bed in no apparent respiratory distress. Appears slightly depressed. Currently on 3 L/min of oxygen supplementation saturating in low to mid 90s. Overnight as chest tube drained 1800 cc of pleural fluid. Chest imaging did not show any significant improvement. REVIEW OF SYSTEMS:   8 point review of system is negative except that mentioned in the subjective portion. PAST MEDICAL HISTORY:   Past Medical History:   Diagnosis Date    A-fib [facility])     CAD (coronary artery disease)     Cancer (HCC)     Left squamous cell carcinoma    CHF (congestive heart failure) (HCC)     COPD (chronic obstructive pulmonary disease) (HCC)     CVA (cerebral vascular accident) ([facility] [address]     Diabetes mellitus ([facility] [address]     Hyperlipidemia     Hypertension     Thyroid disease        PAST SURGICAL HISTORY:   Past Surgical History:   Procedure Laterality Date    BRONCHOSCOPY N/A 4/12/2022    EBUS WF W/ANES.  (11:00) performed by Fuad Epstein MD at [facility]  4/12/2022    BRONCHOSCOPY ALVEOLAR LAVAGE performed by Fuad Epstein MD at [facility]  4/12/2022    BRONCHOSCOPY BIOPSY BRONCHUS performed by Fuad Epstein MD at [facility]  4/12/2022    BRONCHOSCOPY/TRANSBRONCHIAL NEEDLE BIOPSY performed by Fuad Epstein MD at [facility] N/A 4/13/2022    Carlee Hines NF performed by Fuad Epstein MD at [facility] N/A 4/21/2022    BRONCHOSCOPY WITH ENDOBRONCHIAL YAG LASER AND CHEST TUBE PLACEMENT performed by Florinda Vance MD at [facility] 4/27/2022    LEFT PLEUR-X CATHETER PLACEMENT performed by Mago Phillips MD at [place] [id number] knee       SOCIAL HISTORY:   Social History     Tobacco Use    Smoking status: Every Day     Packs/day: 0.50     Years: 40.00     Pack years: 20.00     Types: Cigarettes    Smokeless tobacco: Never   Vaping Use    Vaping Use: Never used   Substance Use Topics    Alcohol use: Not Currently    Drug use: Never       FAMILY HISTORY: History reviewed. No pertinent family history. MEDICATIONS:     No current facility-administered medications on file prior to encounter.      Current Outpatient Medications on File Prior to Encounter   Medication Sig Dispense Refill    furosemide (LASIX) 40 MG tablet Take 1 tablet by mouth daily 60 tablet 0    Insulin Degludec (TRESIBA FLEXTOUCH) 100 UNIT/ML SOPN Inject 9 Units into the skin in the morning and at bedtime      fluticasone (FLOVENT HFA) 110 MCG/ACT inhaler Inhale 1 puff into the lungs 2 times daily      folic acid (FOLVITE) 1 MG tablet Take 1 mg by mouth daily      HYDROcodone-acetaminophen (NORCO) 5-325 MG per tablet Take 1 tablet by mouth every 12 hours as needed for Pain.      meloxicam (MOBIC) 15 MG tablet Take 15 mg by mouth daily as needed for Pain      budesonide-formoterol (SYMBICORT) 160-4.5 MCG/ACT AERO Inhale 2 puffs into the lungs 2 times daily      tiZANidine (ZANAFLEX) 2 MG tablet Take 2 mg by mouth every 12 hours as needed      vitamin D (ERGOCALCIFEROL) 1.25 MG (10197 UT) CAPS capsule Take 50,000 Units by mouth once a week      apixaban (ELIQUIS) 5 MG TABS tablet Take 1 tablet by mouth 2 times daily 60 tablet 0    dilTIAZem (CARDIZEM CD) 120 MG extended release capsule Take 1 capsule by mouth daily 30 capsule 3    TECHLITE PEN NEEDLES 31G X 8 MM MISC USE 1 FIVE TIMES DAILY      levothyroxine (SYNTHROID) 200 MCG tablet Take 150 mcg by mouth Daily       EUTHYROX 50 MCG tablet Take 50 mcg by mouth Daily       digoxin (LANOXIN) 125 MCG tablet Take 0.125 mg by mouth daily      atorvastatin (LIPITOR) 20 MG tablet Take 80 mg by mouth daily       buPROPion (WELLBUTRIN SR) 100 MG extended release tablet Take 100 mg by mouth 2 times daily       metFORMIN (GLUCOPHAGE) 850 MG tablet Take 850 mg by mouth daily (with breakfast)       nitroGLYCERIN (NITROSTAT) 0.4 MG SL tablet Place 0.4 mg under the tongue every 5 minutes as needed       Omega-3 Fatty Acids (FISH OIL) 1000 MG CAPS Take 3,000 mg by mouth 3 times daily      aspirin 81 MG tablet Take 81 mg by mouth daily          vancomycin  1,250 mg IntraVENous Q12H    levothyroxine  250 mcg Oral Daily    furosemide  40 mg Oral Daily    folic acid  1 mg Oral Daily    dilTIAZem  120 mg Oral Daily    digoxin  0.125 mg Oral Daily    buPROPion  100 mg Oral Daily    mometasone-formoterol  2 puff Inhalation BID    atorvastatin  80 mg Oral Daily    aspirin  81 mg Oral Daily    heparin (porcine)  5,000 Units SubCUTAneous 3 times per day    sodium chloride flush  10 mL IntraVENous 2 times per day    insulin lispro  0-8 Units SubCUTAneous Q4H    cefepime  2,000 mg IntraVENous Q8H    insulin glargine  64 Units SubCUTAneous BID      dextrose      sodium chloride 25 mL (08/12/22 1132)     oxyCODONE **OR** oxyCODONE, HYDROmorphone, tiZANidine, dextrose bolus **OR** dextrose bolus, glucagon (rDNA), dextrose, sodium chloride flush, sodium chloride, ondansetron, polyethylene glycol, acetaminophen **OR** acetaminophen      ALLERGIES:   Allergies as of 08/10/2022 - Fully Reviewed 08/10/2022   Allergen Reaction Noted    Codeine  01/28/2019      OBJECTIVE:   height is 6' (1.829 m) and weight is 252 lb 6.4 oz (114.5 kg). His oral temperature is 97.6 °F (36.4 °C). His blood pressure is 134/82 and his pulse is 89. His respiration is 18 and oxygen saturation is 96%. I/O this shift:  In: 10 [I.V.:10]  Out: 180 [Chest Tube:180]     PHYSICAL EXAM:    CONSTITUTIONAL: He is a 74y.o.-year-old who appears his stated age. He is alert and oriented x 3 and in no acute distress. HEENT: PERRLA, EOMI. No scleral icterus. No thrush, atraumatic, normocephalic. NECK: Supple, without cervical or supraclavicular lymphadenopathy:  CARDIOVASCULAR: S1 S2 RRR.  Without murmer  RESPIRATORY & CHEST: Decreased breath sounds in the left side of the chest. Right-sided Port-A-Cath well localized. Left-sided Pleurx catheter covered with a clean dry gauze dressing. GASTROINTESTINAL & ABDOMEN: Soft, nontender, positive bowel sounds in all quadrants, non-distended, without hepatosplenomegaly. GENITOURINARY: Deferred. MUSCULOSKELETAL: No tenderness to palpation of the axial skeleton. There is no clubbing. No cyanosis. No edema of the lower extremities. SKIN OF BODY: No rash or jaundice. PSYCHIATRIC EVALUATION: Normal affect. Patient answers questions appropriately. HEMATOLOGIC/LYMPHATIC/ IMMUNOLOGIC: No palpable lymphadenopathy. NEUROLOGIC: Alert and oriented x 3. Groslly non-focal. Motor strength is 5+/5 in all muscle groups. The patient has a normal sensorium globally. LABS:  Recent Labs     08/10/22  2240 08/11/22  0630 08/11/22  2250 08/12/22  0615   WBC 15.7* 20.2* 30.1*  --    HGB 11.4* 11.4* 12.6*  --    HCT 35.9* 35.1* 39.5*  --     408 591*  --    ALT 22  --  18  --    AST 18  --  19  --     136 136 136   K 4.5 4.1 4.5 4.5   CL 99 97* 97* 97*   CREATININE 0.8 0.6* 1.0 0.7*   BUN 16 13 17 17   CO2 29 31 30 31       Recent Labs     08/11/22 0630 08/11/22 2250 08/12/22  0615   GLUCOSE 192* 155* 158*   CALCIUM 9.1 9.1 8.6    136 136   K 4.1 4.5 4.5   CO2 31 30 31   CL 97* 97* 97*   BUN 13 17 17   CREATININE 0.6* 1.0 0.7*       No results for input(s): PHART, MRP7UGV, PO2ART, OVX9GEJ, C5DURXAY, BEART, W4JPIUAE in the last 72 hours.     Lab Results   Component Value Date    INR 1.30 (H) 06/19/2022    INR 1.14 (H) 04/12/2022    PROTIME 16.0 (H) 06/19/2022    PROTIME 12.9 (H) 04/12/2022     No results found for: AMYLASE   Lab Results   Component Value Date    LABA1C 6.0 06/19/2022     Lab Results   Component Value Date    .5 06/19/2022     No results found for: TSH, X9JVWBT, N2NNNJW, THYROIDAB, FT3, T4FREE  Lab Results   Component Value Date    CKTOTAL 31 (L) 08/10/2022    TROPONINI 0.02 (H) 08/11/2022      No results found for: CRP   No results found for: BNP   No results found for: DDIMER   No results found for: FERRITIN   Lab Results   Component Value Date    LACTA 2.0 08/11/2022           IMAGING:    EXAMINATION:   ONE XRAY VIEW OF THE CHEST       8/11/2022 5:57 am       FINDINGS:   Complete opacification of the left hemithorax similar to prior study. Left-sided chest tube is noted. Mild congestion noted in the right lung. Cardiac silhouette is obscured. Right-sided port tip is in the superior vena   cava. There is no pneumothorax. Moderate osteopenic changes and   degenerative changes noted in the bony structures. .           No improvement. Persistent complete opacification left hemithorax. Mild   congestion right lung. EXAMINATION:   CT OF THE CHEST WITH CONTRAST 6/19/2022 4:39 pm       1. No significant change in appearance of the patient's primary left hilar   mass, similar to the study of 04/19/2022, most likely a primary focus of lung   malignancy. 2. There is stable complete consolidation of the left lung, likely   postobstructive collapse. However, underlying pneumonia or metastatic   disease cannot be excluded. 3. Interval placement of a left pleural catheter, with decrease in size of a   mild-to-moderate left pleural effusion. 4. Slight interval decrease in size of a trace right pleural effusion. 5. New reticular opacities within the basilar aspect of the right lower lobe   likely reflect atelectasis and pulmonary edema, less likely metastatic   disease. 6. Multiple new nonspecific ground-glass nodular opacities throughout the   right upper and right middle lobes, likely reflecting multifocal pneumonia,   less likely metastatic disease. 7. Stable mediastinal and bilateral hilar lymphadenopathy. 8. Stable nodular enlargement of the bilateral adrenal glands likely reflects   either benign adenomatous disease or hyperplasia as this was previously PET   negative.          EXAMINATION:   CT OF THE CHEST WITHOUT CONTRAST 4/19/2022 11:30 am           Complete opacification of the left hemithorax secondary to a large left   pleural effusion with compressive atelectasis of the left lung. No shift of   the mediastinum to the right. There is a gap between the 8th and 9th rib   associated with a nonunited fracture of the posterior aspect of the 9th rib. Through this gap, there is herniation of fluid into chest wall. Small right pleural effusion with mild ground-glass infiltration noted in the   right upper lobe. Complete obliteration of the midportion of the left mainstem bronchus with   non visualization of rest of the left bronchial tree. Evidence of prior granulomatous disease. IMPRESSION:     Acute respiratory distress, improving  Left lung squamous cell cancer, stage IV  Left-sided malignant pleural effusion s/p Pleurx catheter  Left lung chronic atelectasis  Previous history of tobacco abuse  COPD not in exacerbation  Chronic hypoxic respiratory failure    RECOMMENDATION:     Patient's respiratory status is slowly stabilizing. Pleurx catheter drained 1800 cc overnight once I connected it to the drainage canister with suction. Unfortunately chest imaging repeated this morning does not show any significant difference. As mentioned before this is most likely due to chronic atelectasis of the left lung from left hilar squamous cell cancer obstructing the left main bronchus. This was also seen during the initial diagnostic bronchoscopy. Since April, the left lung atelectasis has been persistent. Today I will remove the suction on the drainage canister. As his pleural fluid drainage decreases, I will disconnect the Pleurx catheter from the drainage canister. Patient has been draining his pleural catheter every other day, which can be resumed once he is discharged from the hospital.  He is planning to undergo palliative chemotherapy in [facility] soon.   Patient has no fever, discolored expectoration with normal procalcitonin levels. Suspicion for pneumonia is low. As his clinical picture stabilizes, and pleural fluid cultures are resulted, antibiotics can be de-escalated/stopped. He can resume regular diet. Also can restart on anticoagulation. Continue with Dulera and DuoNeb nebulization. Thank you for your consultation. We will continue to follow the patient. Joanna Grande MD  Pulmonary Critical Care and Sleep Medicine  8/10/2022, 12:46 PM    This note was completed using dragon medical speech recognition software. Grammatical errors, random word insertions, pronoun errors and incomplete sentences are occasional consequences of this technology due to software limitations. If there are questions or concerns about the content of this note of information contained within the body of this dictation they should be addressed with the provider for clarification.

## 2022-08-12 NOTE — ACP (ADVANCE CARE PLANNING)
Advanced Care Planning Note. Purpose of Encounter: Advanced care planning in light of squamous cell lung cancer  Parties In Attendance: Patient  Decisional Capacity: Yes  Subjective: Patient with L CP and SOB  Objective: Cr 0.6  Goals of Care Determination: Patient wants full support (CPR, vent, surgery, HD, trach, PEG)  Plan:  IV Abx. Wall suction. PT/OT. Pulm and Onc consults  Code Status: Full code   Time spent on Advanced care Plannin minutes  Advanced Care Planning Documents: Completed advanced directives on chart, daughter is the POA.     Nora Up MD  2022 8:47 PM

## 2022-08-12 NOTE — PROGRESS NOTES
Hospitalist Progress Note      PCP: Luann Bradford MD    Date of Admission: 8/10/2022    Chief Complaint: Direct admit from [facility] ER with post obstructive PNA, acute on chronic respiratory failure with hypoxia and malignant L pleural effusion. Hospital Course: 73 yo M with history of squamous cell lung cancer with malignant effusion s/p PleurX, HTN, DM 2, hyperlipidemia, chronic respiratory failure with hypoxia on 2 L O2 via NC, Afib, CAD, chronic diastolic CHF directly admitted from [facility] ER with A on C RF with hypoxia, post obstructive PNA and malignant L pleural effusion. Started on IV Abx. Followed by Pulm and Onc. L PleurX drain connected to suction. Palliative care consult requested. Subjective:  Patient on 3 L NC. Improving SOB. Has tolerable L sided CP. No HA, abdominal pain or fevers.       Medications:  Reviewed    Infusion Medications    dextrose      sodium chloride 25 mL (08/12/22 1132)     Scheduled Medications    vancomycin  1,250 mg IntraVENous Q12H    levothyroxine  250 mcg Oral Daily    furosemide  40 mg Oral Daily    folic acid  1 mg Oral Daily    dilTIAZem  120 mg Oral Daily    digoxin  0.125 mg Oral Daily    buPROPion  100 mg Oral Daily    mometasone-formoterol  2 puff Inhalation BID    atorvastatin  80 mg Oral Daily    aspirin  81 mg Oral Daily    heparin (porcine)  5,000 Units SubCUTAneous 3 times per day    sodium chloride flush  10 mL IntraVENous 2 times per day    insulin lispro  0-8 Units SubCUTAneous Q4H    cefepime  2,000 mg IntraVENous Q8H    insulin glargine  64 Units SubCUTAneous BID     PRN Meds: oxyCODONE **OR** oxyCODONE, HYDROmorphone, tiZANidine, dextrose bolus **OR** dextrose bolus, glucagon (rDNA), dextrose, sodium chloride flush, sodium chloride, ondansetron, polyethylene glycol, acetaminophen **OR** acetaminophen      Intake/Output Summary (Last 24 hours) at 8/12/2022 1406  Last data filed at 8/12/2022 1018  Gross per 24 hour   Intake 990.01 ml   Output 1880 ml   Net -889.99 ml         Physical Exam Performed:    /82   Pulse 89   Temp 97.6 °F (36.4 °C) (Oral)   Resp 18   Ht 6' (1.829 m)   Wt 252 lb 6.4 oz (114.5 kg)   SpO2 96%   BMI 34.23 kg/m²     General appearance: No apparent distress, appears stated age and cooperative. HEENT: Pupils equal, round, and reactive to light. Conjunctivae/corneas clear. Neck: Supple, with full range of motion. No jugular venous distention. Trachea midline. Respiratory:  L PleurX drain. Decreased in L base. Rhonchi on R. No wheezing or rales. Cardiovascular: Regular rate and rhythm with normal S1/S2 without murmurs, rubs or gallops. Abdomen: Soft, non-tender, non-distended with normal bowel sounds. Musculoskeletal: No clubbing, cyanosis or edema bilaterally. Full range of motion without deformity. Skin: Skin color, texture, turgor normal.  No rashes or lesions. Neurologic:  Neurovascularly intact without any focal sensory/motor deficits. Cranial nerves: II-XII intact, grossly non-focal.  Psychiatric: Alert and oriented, thought content appropriate, normal insight  Capillary Refill: Brisk,3 seconds, normal   Peripheral Pulses: +2 palpable, equal bilaterally       Labs:   Recent Labs     08/10/22  2240 08/11/22  0630 08/11/22  2250   WBC 15.7* 20.2* 30.1*   HGB 11.4* 11.4* 12.6*   HCT 35.9* 35.1* 39.5*    408 591*       Recent Labs     08/10/22  2240 08/11/22  0630 08/11/22 2250 08/12/22  0615    136 136 136   K 4.5 4.1 4.5 4.5   CL 99 97* 97* 97*   CO2 29 31 30 31   BUN 16 13 17 17   CREATININE 0.8 0.6* 1.0 0.7*   CALCIUM 9.2 9.1 9.1 8.6   PHOS 2.1* 3.9  --  3.2       Recent Labs     08/10/22  2240 08/11/22  2250   AST 18 19   ALT 22 18   BILITOT 0.3 0.3   ALKPHOS 158* 162*       No results for input(s): INR in the last 72 hours.   Recent Labs     08/10/22  2240 08/11/22  2250   CKTOTAL 31*  --    TROPONINI  --  0.02*         Urinalysis:    No results found for: NITRU, WBCUA, BACTERIA, Elisha Wolfgang, [address], [place] [facility] [place] [address]    Radiology:  XR CHEST PORTABLE   Final Result   Stable opacification of the left hemithorax that reportedly is due to pleural   fluid and atelectasis. XR CHEST PORTABLE   Final Result   No improvement. Persistent complete opacification left hemithorax. Mild   congestion right lung. Assessment/Plan:    Active Hospital Problems    Diagnosis     Hypophosphatemia [E83.39]      Priority: Medium    Protein-calorie malnutrition, moderate ([facility]) [E44.0]      Priority: Medium    Essential hypertension [I10]      Priority: Medium    Hyperlipidemia [E78.5]      Priority: Medium    DMII (diabetes mellitus, type 2) ([facility]) [E11.9]      Priority: Medium    Acquired hypothyroidism [E03.9]      Priority: Medium    Malignant pleural effusion [J91.0]      Priority: Medium    Neutrophilic leukocytosis [D37.4]      Priority: Medium    Chronic diastolic congestive heart failure ([facility]) [I50.32]     Squamous cell carcinoma of left lung ([facility]) [C34.92]     COPD (chronic obstructive pulmonary disease) ([facility]) [J44.9]     Atrial fibrillation, permanent ([facility]) [I48.21]     Postobstructive pneumonia [J18.9]     Acute on chronic respiratory failure with hypoxia ([facility]) [J96.21]      Continue IV Vanco and Cefepime for post obstructive PNA  L PleurX to gravity per Pulm  Dilaudid IV PRN pain  Wean O2 as tolerated  Resume Eliquis   Pulm input appreciated  Onc input appreciated  PT/OT eval  Palliative care consult    DVT Prophylaxis: SCD (resume Eliquis tomorrow if Hg stable)  Diet: ADULT DIET; Regular; 5 carb choices (75 gm/meal)  Code Status: DNR-CCA    PT/OT Eval Status: Following    Dispo - SNF vs home    Discussed with patient, nursing and CM. Will try to discharge to home on Sunday and have him start chemo as OP on Monday.     Rosalia Zuñiga MD

## 2022-08-12 NOTE — PROGRESS NOTES
discharges prior to next session this note will serve as a discharge summary. Please see below for the latest assessment towards goals. DME Required For Discharge: DME to be determined at next level of care, DME to be determined pending patient progress    Precautions/Restrictions: high fall risk, up as tolerated, . Comment: 3L of O2, diet-5 carb  Weight Bearing Restrictions: no restrictions     Required Braces/Orthotics: no braces required  Positional Restrictions:no positional restrictions    Pre-Admission Information   Lives With: alone    Type of Home: house  Home Layout: one level, able to live on main level  Home Access:  15 ? step to enter with handrail. Handrails are located on both side. Pt stated he had an ramped entry on previous OT evaluation on 5/2/22  Bathroom Layout: tub/shower unit  [employer]: grab bars in shower, grab bars around toilet, hand held shower head  Toilet Height: standard height  Home Equipment: rolling walker, single point cane, manual wheelchair, hospital bed, oxygen, alert button, . Comment: 2 L of O2 at home  Transfer Assistance: modified independent with use of RW  Ambulation Assistance:modified independent with use of SPC,  ADL Assistance: requires assistance with bathing, requires assistance with dressing  IADL Assistance: requires assistance with all homemaking tasks, requires assistance with meal prep, requires assistance with laundry, requires assistance with cleaning, requires assistance with driving/transportation, requires assistance for medication management  Active :        [] Yes  [x] No  Hand Dominance: [] Left  [x] Right  Current Employment: disabled  Hobbies: watch TV  Recent Falls: pt stated \"it is hard to say. ..my knees give out. Jaqueline Crape Jaqueline Crape \"  Comment: Pt has a home health aide 5x a week, two hours a day. Nurse comes 1-2x per week. Dtr comes [facility] day or two\". Pt is a questionable historian.     Examination   Vision:   Vision Corrective Device: wears glasses for reading  Comment: pt stated he has glasses for distance but they are broken  Hearing:   WFL  Observation:   General Observation:  Pt supine in bed upon arrival, leaning to the R and near R edge of hospital bed  Edema: Edema located in (B) UE. Edema Level: 1+: trace with minimal depression  Posture:   Fair, noted increased thoracic curvature  Sensation:   denies numbness and tingling  Comment: pt stated \"sometimes\"  Tone:   Normotonic  ROM:   Impaired in (B) UE shoulders for bathing, possibly d/t drains, will continue to assess  Strength:   Impaired in (B) UE shoulders for bathing, possibly d/t drains, will continue to assess    Decision Making: medium complexity  Clinical Presentation: evolving      Subjective  General: Pt agreeable to OT/PT evaluations. Pt is pleasant and cooperative despite feelings of nausea and pain. Pain: 7/10. Location: per nursing report  Pain Interventions: repositioned         Activities of Daily Living  Basic Activities of Daily Living  Grooming: moderate assistance  Grooming Comments: denied mouth wash/brushing mouth, mod A for washing face/neck/ears  Upper Extremity Bathing: maximum assistance  Bathing Comments: sponge bathing on EOB  Upper Extremity Dressing: moderate assistance  Dressing Comments: pt not wearing a gown upon arrival, pt donned gown with mod A  General Comments: pt with good participation and attempted to complete above ADLs, however, he is limited by fatigue and decreased endurance  Instrumental Activities of Daily Living  No IADL completed on this date. Functional Mobility  Bed Mobility  Supine to Sit: stand by assistance  Comments: HOB elevated, used hand rails, SBA d/t lines/drains  Transfers  Sit to stand transfer:contact guard assistance  Stand to sit transfer: contact guard assistance  Bed / Chair transfer: contact guard assistance.     Bed / Chair equipment: rolling walker  Bed / Chair comments: stand pivot transfer to the L  Comments: decreased endurance, pt noted nausea once seated in chair, no emesis  Functional Mobility:  Sitting Balance: contact guard assistance. Sitting Balance Comment: ~15 minutes on EOB for UB ADLs, utilized bed rail on the L  Standing Balance: contact guard assistance. Standing Balance Comment: ~1 1/2 minutes on EOB with RW for support, pt cued to grossly grasp RW rather than only use his index fingers, pt fatigues very quickly     Functional Outcomes  AM-PAC Inpatient Daily Activity Raw Score: 14    Cognition  Overall Cognitive Status: Impaired  Arousal/Alterness: appropriate responses to stimuli  Following Commands: follows one step commands consistently  Attention Span: attends with cues to redirect  Memory: decreased recall of recent events, decreased short term memory, decreased long term memory  Safety Judgement: decreased awareness of need for assistance  Problem Solving: decreased awareness of errors  Insights: decreased awareness of deficits  Initiation: requires cues for some  Sequencing: requires cues for some  Comments:  pt inconsistently responding to pre-admission questions, pt inconsistently responds to questions, requires increased time for response  Orientation:    oriented to person, oriented to place, oriented to time, and disoriented to situation  Required one cue for place, pt stated \"[place]\" but did not state \"hospital\"  Command Following:   accurately follows one step commands     Education  Barriers To Learning: cognition, visual, and hearing  Patient Education: patient educated on plan of care, precautions, ADL adaptive strategies, transfer training, discharge recommendations  Learning Assessment:  patient will require reinforcement due to cognitive deficits    Assessment  Activity Tolerance: while EOB pt's HR increased to 169 briefly, SpO2 dropped to 90%, BP dlmmnqa=377/83, HR returned to 93, SpO2=98%. ADLs completed.  In chair=98%, HR 91  Impairments Requiring Therapeutic Intervention: decreased functional mobility, decreased ADL status, decreased ROM, decreased strength, decreased safety awareness, decreased cognition, decreased endurance, decreased balance, decreased vision, decreased IADL, decreased fine motor control, decreased coordination, decreased posture  Prognosis: fair  Clinical Assessment: PTA, pt reports he received assistance for bathing and dressing tasks and was independent with functional transfers and functional mobility with a cane during ADLs. Pt now requires max A for UB ADLs while seated and CGA for functional transfers and mobility during ADL d/t decreased endurance, activity tolerance, fatigue, weakness, and impaired balance. Pt would benefit from skill OT to improve independent and safety during all occupational pursuits.    Safety Interventions: patient left in chair, chair alarm in place, call light within reach, patient at risk for falls, and nurse notified    Plan  Frequency: 3-5 x/per week  Current Treatment Recommendations: strengthening, ROM, functional mobility training, transfer training, neuromuscular re-education, patient/caregiver education, ADL/self-care training, IADL training, pain management, home exercise program, safety education, and positioning    Goals  Patient Goals: none stated   Short Term Goals:  Time Frame: upon d/c  Patient will complete toileting at stand by assistance   Patient will complete functional transfers at supervision   Patient will increase functional standing balance to SBA for ~5 minutes for improved ADL completion  Patient will increase [facility] ADL score = to or > than 18/24    Therapy Session Time     Individual Group Co-treatment   Time In    0919   Time Out    1003   Minutes    44        Timed Code Treatment Minutes:  Timed Code Treatment Minutes: 29 Minutes    Total Treatment Minutes:  44 minutes       Electronically Signed By: TERRANCE Schaeffer OTR/JONO, [id number]

## 2022-08-12 NOTE — PROGRESS NOTES
[facility]   Phone: [phone]    Physical Therapy    [x] Initial Evaluation            [] Daily Treatment Note         [] Discharge Summary      Patient: Ashley Fairbanks   : 1955   MRN: [id number]   Date of Service:  2022  Admitting Diagnosis: Postobstructive pneumonia  Current Admission Summary: 75 yo M with history of squamous cell lung cancer with malignant effusion s/p PleurX, HTN, DM 2, hyperlipidemia, chronic respiratory failure with hypoxia on 2 L O2 via NC, Afib, CAD, chronic diastolic CHF directly admitted from [facility] ER with A on C RF with hypoxia, post obstructive PNA and malignant L pleural effusion. Started on IV Abx. Followed by Pulm and Onc. L PleurX drain connected to suction. Palliative care consult requested. Past Medical History:  has a past medical history of A-fib ([address] CAD (coronary artery disease), Cancer ([address] CHF (congestive heart failure) ([address] COPD (chronic obstructive pulmonary disease) ([address] CVA (cerebral vascular accident) ([address] Diabetes mellitus ([address] Hyperlipidemia, Hypertension, and Thyroid disease. Past Surgical History:  has a past surgical history that includes bronchoscopy (N/A, 2022); bronchoscopy (2022); bronchoscopy (2022); bronchoscopy (2022); bronchoscopy (N/A, 2022); bronchoscopy (N/A, 2022); Chest surgery (Left, 2022); and joint replacement. Discharge Recommendations: Ashley Fairbanks scored a 14/24 on the AM-PAC short mobility form. Current research shows that an AM-PAC score of 17 or less is typically not associated with a discharge to the patient's home setting. Based on the patient's AM-PAC score and their current functional mobility deficits, it is recommended that the patient have 3-5 sessions per week of Physical Therapy at d/c to increase the patient's independence. Please see assessment section for further patient specific details.     If patient discharges prior to next session this note will serve as a discharge summary. Please see below for the latest assessment towards goals. DME Required For Discharge: DME to be determined at next level of care  Precautions/Restrictions: high fall risk  Positional Restrictions:no positional restrictions    Pre-Admission Information   Lives With: alone                     Type of Home: house  Home Layout: one level, able to live on main level  Home Access:  Per previous chart, pt has ramped entrance; pt told therapy today that he has 15 GIS[employer]LL   Bathroom Layout: tub/shower unit  Bathroom Equipment: grab bars in shower, grab bars around toilet, hand held shower head  Toilet Height: standard height  Home Equipment: rolling walker, single point cane, manual wheelchair, hospital bed, oxygen, alert button, . Comment: 2 L of O2 at home  Transfer Assistance: modified independent with use of RW  Ambulation Assistance:modified independent with use of SPC or RW  ADL Assistance: requires assistance with bathing, requires assistance with dressing  IADL Assistance: requires assistance with all homemaking tasks, requires assistance with meal prep, requires assistance with laundry, requires assistance with cleaning, requires assistance with driving/transportation, requires assistance for medication management  Active :        [] Yes                 [x] No  Hand Dominance: [] Left                 [x] Right  Current Employment: disabled  Hobbies: watch TV  Recent Falls: pt stated \"it is hard to say. ..my knees give out. Pinky Angles Pinky Angles \"  Comment: Pt has a home health aide 5x a week, two hours a day. Nurse comes 1-2x per week. Dtr comes [facility] day or two\". Pt is a questionable historian. Pt initially reports independence with ADLs, then later states that he has assistance.     Examination   Vision:   Vision Gross Assessment: Impaired and Vision Corrective Device: wears glasses for reading  Hearing:   [facility]  Observation:   General Observation:  L PleurX drain  Posture:   Flexed posture  Sensation:   WFL  ROM:   (B) LE AROM WFL  Strength:   (B) LE strength grossly WFL  Decision Making: medium complexity  Clinical Presentation: evolving      Subjective  General: Pt supine in bed upon arrival; agreeable to PT/OT; on 3L; RN in room, meds administered prior to PT/OT  Pain: Patient does not rate upon questioning  Pain Interventions: not applicable       Functional Mobility  Bed Mobility  Supine to Sit: stand by assistance  Scooting: stand by assistance, contact guard assistance  Comments: HOB elevated, extra time and encouragement  Transfers  Sit to stand transfer: contact guard assistance  Stand to sit transfer: contact guard assistance  Stand pivot transfer: contact guard assistance  Comments: with RW  Ambulation  Ambulation not tested on this date. Distance:   Gait Mechanics:   Comments:  Pt fatigued very quickly; HR elevated with sitting EOB for ADLs; HR back to resting level prior to transfer to chair  Stair Mobility  Stair mobility not completed on this date. Comments:  Balance  Static Sitting Balance: fair (-): maintains balance at SBA with use of UE support  Dynamic Sitting Balance: fair (-): maintains balance at CGA with use of UE support  Static Standing Balance: fair (-): maintains balance at CGA with use of UE support  Dynamic Standing Balance: fair (-): maintains balance at CGA with use of UE support  Comments: Pt sits with UE support of bedrail; performed bathing with warm wipes of upper body with some assistance as pt fatigued.  Pt stood with RW for transfer to chair    Other Therapeutic Interventions    Functional Outcomes  AM-PAC Inpatient Mobility Raw Score : 14              Cognition  Overall Cognitive Status: Impaired  Orientation:    oriented to person, oriented to place, oriented to time, and disoriented to situation, required extra time and cues for place, then stated \"hospital\"  Command Following:   accurately follows one step commands    Education  Barriers To Learning: cognition  Patient Education: patient educated on PT role and benefits, plan of care, functional mobility training, transfer training, discharge recommendations  Learning Assessment:  patient verbalizes understanding, would benefit from continued reinforcement    Assessment  Activity Tolerance: while EOB pt's HR increased to 169 briefly, SpO2 dropped to 90%, BP bhlbnck=661/83, HR returned to 93, SpO2=98%. ADLs completed. In chair=98%, HR 91  Impairments Requiring Therapeutic Intervention: decreased functional mobility, decreased ADL status, decreased safety awareness, decreased cognition, decreased endurance, decreased balance, decreased posture  Prognosis: good and fair  Clinical Assessment:  Pt is limited by the above deficits and would benefit from skilled PT services to maximize pt functional mobility and safety prior to discharge. Pt is very limited by fatigue and some cognitive deficits. Pt requires assist for ADLs on EOB, and CGA for transfer to chair with RW. Pt too fatigued for ambulation in room today. Pt is unsafe to discharge home alone. Recommend SNF at discharge.     Safety Interventions: patient left in chair, chair alarm in place, call light within reach, gait belt, patient at risk for falls, and nurse notified    Plan  Frequency: 3-5 x/per week  Current Treatment Recommendations: strengthening, balance training, functional mobility training, transfer training, gait training, endurance training, and patient/caregiver education    Goals  Patient Goals: none stated   Short Term Goals:  Time Frame: discharge  Patient will complete bed mobility at supervision   Patient will complete transfers at stand by assistance   Patient will ambulate 25 ft with use of rolling walker at stand by assistance    Therapy Session Time      Individual Group Co-treatment   Time In     0919   Time Out     1003   Minutes     44     Timed Code Treatment Minutes:  29 Minutes  Total Treatment Minutes:  44       Electronically Signed By: Sushma Adhikari PT

## 2022-08-12 NOTE — PROGRESS NOTES
Intake 980.01 ml   Output 2250 ml   Net -1269.99 ml       Physical Exam Performed:    /78   Pulse 84   Temp 97.6 °F (36.4 °C) (Oral)   Resp 18   Ht 6' (1.829 m)   Wt 253 lb 6.4 oz (114.9 kg)   SpO2 96%   BMI 34.37 kg/m²     General appearance: No apparent distress, appears stated age and cooperative. HEENT: Pupils equal, round, and reactive to light. Conjunctivae/corneas clear. Neck: Supple, with full range of motion. No jugular venous distention. Trachea midline. Respiratory:  L PleurX drain. Decreased in L base. Rhonchi on R. No wheezing or rales. Cardiovascular: Regular rate and rhythm with normal S1/S2 without murmurs, rubs or gallops. Abdomen: Soft, non-tender, non-distended with normal bowel sounds. Musculoskeletal: No clubbing, cyanosis or edema bilaterally. Full range of motion without deformity. Skin: Skin color, texture, turgor normal.  No rashes or lesions. Neurologic:  Neurovascularly intact without any focal sensory/motor deficits. Cranial nerves: II-XII intact, grossly non-focal.  Psychiatric: Alert and oriented, thought content appropriate, normal insight  Capillary Refill: Brisk,3 seconds, normal   Peripheral Pulses: +2 palpable, equal bilaterally       Labs:   Recent Labs     08/10/22  2240 08/11/22  0630   WBC 15.7* 20.2*   HGB 11.4* 11.4*   HCT 35.9* 35.1*    408     Recent Labs     08/10/22  2240 08/11/22  0630    136   K 4.5 4.1   CL 99 97*   CO2 29 31   BUN 16 13   CREATININE 0.8 0.6*   CALCIUM 9.2 9.1   PHOS 2.1* 3.9     Recent Labs     08/10/22  2240   AST 18   ALT 22   BILITOT 0.3   ALKPHOS 158*     No results for input(s): INR in the last 72 hours. Recent Labs     08/10/22  2240   CKTOTAL 31*       Urinalysis:    No results found for: [place], BACTERIA, RBCUA, BLOODU, SPECGRAV, GLUCOSEU    Radiology:  XR CHEST PORTABLE   Final Result   No improvement. Persistent complete opacification left hemithorax. Mild   congestion right lung. Assessment/Plan:    Active Hospital Problems    Diagnosis     Hypophosphatemia [E83.39]      Priority: Medium    Protein-calorie malnutrition, moderate (HCC) [E44.0]      Priority: Medium    Essential hypertension [I10]      Priority: Medium    Hyperlipidemia [E78.5]      Priority: Medium    DMII (diabetes mellitus, type 2) (HCC) [E11.9]      Priority: Medium    Acquired hypothyroidism [E03.9]      Priority: Medium    Malignant pleural effusion [J91.0]      Priority: Medium    Neutrophilic leukocytosis [N78.8]      Priority: Medium    Chronic diastolic congestive heart failure (HCC) [I50.32]     Squamous cell carcinoma of left lung (HCC) [C34.92]     COPD (chronic obstructive pulmonary disease) ([facility]) [J44.9]     Atrial fibrillation, permanent ([facility]) [I48.21]     Postobstructive pneumonia [J18.9]     Acute on chronic respiratory failure with hypoxia ([facility]) [J96.21]      Continue IV Vanco and Cefepime for post obstructive PNA  L PleurX to suction per Pulm  Dilaudid IV PRN pain  Wean O2 as tolerated  Resume Eliquis tomorrow if Hg stable  Pulm input appreciated  Onc input appreciated  PT/OT eval  Palliative care consult    DVT Prophylaxis: SCD (resume Eliquis tomorrow if Hg stable)  Diet: ADULT DIET; Regular; 5 carb choices (75 gm/meal)  Code Status: Full Code    PT/OT Eval Status: Requested    Dispo - Home vs SNF    Discussed with patient, nursing and CM. Might need SNF upon DC.     Vamsi Currie MD

## 2022-08-13 LAB
ANION GAP SERPL CALCULATED.3IONS-SCNC: 4 MMOL/L (ref 3–16)
BUN BLDV-MCNC: 12 MG/DL (ref 7–20)
CALCIUM SERPL-MCNC: 8.3 MG/DL (ref 8.3–10.6)
CHLORIDE BLD-SCNC: 100 MMOL/L (ref 99–110)
CO2: 35 MMOL/L (ref 21–32)
CREAT SERPL-MCNC: 0.5 MG/DL (ref 0.8–1.3)
GFR AFRICAN AMERICAN: >60
GFR NON-AFRICAN AMERICAN: >60
GLUCOSE BLD-MCNC: 118 MG/DL (ref 70–99)
GLUCOSE BLD-MCNC: 156 MG/DL (ref 70–99)
GLUCOSE BLD-MCNC: 39 MG/DL (ref 70–99)
GLUCOSE BLD-MCNC: 40 MG/DL (ref 70–99)
GLUCOSE BLD-MCNC: 77 MG/DL (ref 70–99)
GLUCOSE BLD-MCNC: 79 MG/DL (ref 70–99)
MAGNESIUM: 2.1 MG/DL (ref 1.8–2.4)
PERFORMED ON: ABNORMAL
PERFORMED ON: NORMAL
PERFORMED ON: NORMAL
PHOSPHORUS: 2.2 MG/DL (ref 2.5–4.9)
POTASSIUM REFLEX MAGNESIUM: 3.5 MMOL/L (ref 3.5–5.1)
SODIUM BLD-SCNC: 139 MMOL/L (ref 136–145)

## 2022-08-13 PROCEDURE — 94640 AIRWAY INHALATION TREATMENT: CPT

## 2022-08-13 PROCEDURE — 2580000003 HC RX 258: Performed by: INTERNAL MEDICINE

## 2022-08-13 PROCEDURE — 6360000002 HC RX W HCPCS: Performed by: FAMILY MEDICINE

## 2022-08-13 PROCEDURE — 2060000000 HC ICU INTERMEDIATE R&B

## 2022-08-13 PROCEDURE — 97530 THERAPEUTIC ACTIVITIES: CPT

## 2022-08-13 PROCEDURE — 6360000002 HC RX W HCPCS: Performed by: INTERNAL MEDICINE

## 2022-08-13 PROCEDURE — 80048 BASIC METABOLIC PNL TOTAL CA: CPT

## 2022-08-13 PROCEDURE — 94761 N-INVAS EAR/PLS OXIMETRY MLT: CPT

## 2022-08-13 PROCEDURE — 84100 ASSAY OF PHOSPHORUS: CPT

## 2022-08-13 PROCEDURE — 83735 ASSAY OF MAGNESIUM: CPT

## 2022-08-13 PROCEDURE — 2700000000 HC OXYGEN THERAPY PER DAY

## 2022-08-13 PROCEDURE — 6370000000 HC RX 637 (ALT 250 FOR IP): Performed by: INTERNAL MEDICINE

## 2022-08-13 PROCEDURE — 99233 SBSQ HOSP IP/OBS HIGH 50: CPT | Performed by: INTERNAL MEDICINE

## 2022-08-13 PROCEDURE — 2580000003 HC RX 258: Performed by: FAMILY MEDICINE

## 2022-08-13 PROCEDURE — 97116 GAIT TRAINING THERAPY: CPT

## 2022-08-13 PROCEDURE — 97535 SELF CARE MNGMENT TRAINING: CPT

## 2022-08-13 RX ORDER — INSULIN GLARGINE 100 [IU]/ML
7 INJECTION, SOLUTION SUBCUTANEOUS 2 TIMES DAILY
Status: DISCONTINUED | OUTPATIENT
Start: 2022-08-13 | End: 2022-08-15

## 2022-08-13 RX ORDER — INSULIN GLARGINE 100 [IU]/ML
9 INJECTION, SOLUTION SUBCUTANEOUS 2 TIMES DAILY
Status: DISCONTINUED | OUTPATIENT
Start: 2022-08-13 | End: 2022-08-13

## 2022-08-13 RX ADMIN — APIXABAN 5 MG: 5 TABLET, FILM COATED ORAL at 09:11

## 2022-08-13 RX ADMIN — VANCOMYCIN HYDROCHLORIDE 1250 MG: 10 INJECTION, POWDER, LYOPHILIZED, FOR SOLUTION INTRAVENOUS at 02:53

## 2022-08-13 RX ADMIN — DILTIAZEM HYDROCHLORIDE 120 MG: 120 CAPSULE, COATED, EXTENDED RELEASE ORAL at 09:10

## 2022-08-13 RX ADMIN — OXYCODONE 10 MG: 5 TABLET ORAL at 11:50

## 2022-08-13 RX ADMIN — Medication 10 ML: at 09:11

## 2022-08-13 RX ADMIN — Medication 2 PUFF: at 08:03

## 2022-08-13 RX ADMIN — CEFEPIME 2000 MG: 2 INJECTION, POWDER, FOR SOLUTION INTRAVENOUS at 09:21

## 2022-08-13 RX ADMIN — HYDROMORPHONE HYDROCHLORIDE 0.5 MG: 1 INJECTION, SOLUTION INTRAMUSCULAR; INTRAVENOUS; SUBCUTANEOUS at 17:32

## 2022-08-13 RX ADMIN — CEFEPIME 2000 MG: 2 INJECTION, POWDER, FOR SOLUTION INTRAVENOUS at 17:32

## 2022-08-13 RX ADMIN — INSULIN GLARGINE 9 UNITS: 100 INJECTION, SOLUTION SUBCUTANEOUS at 12:18

## 2022-08-13 RX ADMIN — BUPROPION HYDROCHLORIDE 100 MG: 100 TABLET, FILM COATED, EXTENDED RELEASE ORAL at 09:11

## 2022-08-13 RX ADMIN — Medication 10 ML: at 21:39

## 2022-08-13 RX ADMIN — HYDROMORPHONE HYDROCHLORIDE 0.5 MG: 1 INJECTION, SOLUTION INTRAMUSCULAR; INTRAVENOUS; SUBCUTANEOUS at 21:34

## 2022-08-13 RX ADMIN — FOLIC ACID 1 MG: 1 TABLET ORAL at 09:10

## 2022-08-13 RX ADMIN — SODIUM CHLORIDE 25 ML/HR: 9 INJECTION, SOLUTION INTRAVENOUS at 02:53

## 2022-08-13 RX ADMIN — APIXABAN 5 MG: 5 TABLET, FILM COATED ORAL at 21:29

## 2022-08-13 RX ADMIN — ASPIRIN 81 MG: 81 TABLET, CHEWABLE ORAL at 09:10

## 2022-08-13 RX ADMIN — DIGOXIN 0.12 MG: 125 TABLET ORAL at 09:10

## 2022-08-13 RX ADMIN — DEXTROSE MONOHYDRATE 250 ML: 100 INJECTION, SOLUTION INTRAVENOUS at 07:24

## 2022-08-13 RX ADMIN — LEVOTHYROXINE SODIUM 250 MCG: 0.12 TABLET ORAL at 07:35

## 2022-08-13 RX ADMIN — ATORVASTATIN CALCIUM 80 MG: 80 TABLET, FILM COATED ORAL at 09:11

## 2022-08-13 RX ADMIN — Medication 2 PUFF: at 21:20

## 2022-08-13 RX ADMIN — FUROSEMIDE 40 MG: 40 TABLET ORAL at 09:11

## 2022-08-13 ASSESSMENT — PAIN SCALES - GENERAL
PAINLEVEL_OUTOF10: 0
PAINLEVEL_OUTOF10: 7
PAINLEVEL_OUTOF10: 7
PAINLEVEL_OUTOF10: 8

## 2022-08-13 ASSESSMENT — PAIN DESCRIPTION - ORIENTATION: ORIENTATION: RIGHT

## 2022-08-13 NOTE — PROGRESS NOTES
[facility] [facility]   Phone: [phone]    Occupational Therapy    [] Initial Evaluation            [x] Daily Treatment Note         [] Discharge Summary      Patient: Hayden Johnson   : 1955   MRN: [id number]   Date of Service:  2022    Admitting Diagnosis:  Postobstructive pneumonia  Current Admission Summary: 75 yo M with history of squamous cell lung cancer with malignant effusion s/p PleurX, HTN, DM 2, hyperlipidemia, chronic respiratory failure with hypoxia on 2 L O2 via NC, Afib, CAD, chronic diastolic CHF directly admitted from [facility] ER with A on C RF with hypoxia, post obstructive PNA and malignant L pleural effusion. Started on IV Abx. Followed by Pulm and Onc. L PleurX drain connected to suction. Palliative care consult requested. Past Medical History:  has a past medical history of A-fib ([address] CAD (coronary artery disease), Cancer ([address] CHF (congestive heart failure) ([address] COPD (chronic obstructive pulmonary disease) ([address] CVA (cerebral vascular accident) ([address] Diabetes mellitus ([address] Hyperlipidemia, Hypertension, and Thyroid disease. Past Surgical History:  has a past surgical history that includes bronchoscopy (N/A, 2022); bronchoscopy (2022); bronchoscopy (2022); bronchoscopy (2022); bronchoscopy (N/A, 2022); bronchoscopy (N/A, 2022); Chest surgery (Left, 2022); and joint replacement. Discharge Recommendations: Hayden Johnson scored a 18/24 on the AM-PAC ADL Inpatient form. Current research shows that an AM-PAC score of 17 or less is typically not associated with a discharge to the patient's home setting. Based on the patient's AM-PAC score and their current ADL deficits, it is recommended that the patient have 3-5 sessions per week of Occupational Therapy at d/c to increase the patient's independence. Please see assessment section for further patient specific details.     Even though pt's AMPAC score higher than 17, pt with decreased safety awareness with functional mobility and transfers, as well as oxygen line management. Pt also gets SOB and desats into the mid 80s with activity, requiring cues for pursed lip breathing    If patient discharges prior to next session this note will serve as a discharge summary. Please see below for the latest assessment towards goals. If pt declines SNF, recommend HHOT, S3 and increased supervision. DME Required For Discharge: DME to be determined at next level of care, DME to be determined pending patient progress, tub transfer bench    Precautions/Restrictions: high fall risk, up as tolerated, . Comment: 3L of O2, diet-5 carb  Weight Bearing Restrictions: no restrictions     Required Braces/Orthotics: no braces required  Positional Restrictions:no positional restrictions    Pre-Admission Information   Lives With: alone    Type of Home: house  Home Layout: one level, able to live on main level  Home Access:  15 ? step to enter with handrail. Handrails are located on both side. Pt stated he had an ramped entry on previous OT evaluation on 5/2/22  Bathroom Layout: tub/shower unit  [employer]: grab bars in shower, grab bars around toilet, hand held shower head  Toilet Height: standard height  Home Equipment: rolling walker, single point cane, manual wheelchair, hospital bed, oxygen, alert button, .   Comment: 2 L of O2 at home  Transfer Assistance: modified independent with use of RW  Ambulation Assistance:modified independent with use of SPC,  ADL Assistance: requires assistance with bathing, requires assistance with dressing  IADL Assistance: requires assistance with all homemaking tasks, requires assistance with meal prep, requires assistance with laundry, requires assistance with cleaning, requires assistance with driving/transportation, requires assistance for medication management  Active :        [] Yes  [x] No  Hand Dominance: [] Left  [x] Right  Current Employment: disabled  Hobbies: watch TV  Recent Falls: pt stated \"it is hard to say. ..my knees give out. Zuly Reyna \"  Comment: Pt has a home health aide 5x a week, two hours a day. Nurse comes 1-2x per week. Dtr comes [facility] day or two\". Pt reports that his family lives near him. Pt is a questionable historian. Pt reports that he gets HHOT/PT. Subjective  General: Pt agreeable to OT/ treatment. Pt is pleasant and cooperative reporting 6/10 pain L side chest. RN awre. Pain: 6/10. Location: L chest area  Pain Interventions: RN notified and repositioned         Activities of Daily Living  Basic Activities of Daily Living  Grooming: stand by assistance  Grooming Comments: in stance at sink to wash hands with verbal cues  Upper Extremity Bathing: setup assistance stand by assistance . Comment: seated on EOB  Upper Extremity Dressing: moderate assistance . Comment: gown  Lower Extremity Dressing: stand by assistance . Comment: don/doff socks seated on EOB  Toileting: stand by assistance . Comment: standing urination. Toileting Equipment: none  General Comments: Pt SOB with activity. Needed rest breaks. Instrumental Activities of Daily Living  No IADL completed on this date. Functional Mobility  Bed Mobility  Supine to Sit: stand by assistance  Comments: HOB elevated, used hand rails, SBA d/t lines/drains  Transfers  Sit to stand transfer:contact guard assistance  Stand to sit transfer: contact guard assistance  Toilet transfer: contact guard assistance  Toilet transfer equipment: standard toilet, walker  Comments: Verbal cues for hand placement sit to stand, stand to sit, but pt ignored. Functional Mobility:  Sitting Balance: stand by assistance. Sitting Balance Comment: seated on EOB  Standing Balance: contact guard assistance.     Standing Balance Comment:  Pt fatigues quickly   Functional Mobility: .  contact guard assistance  Functional Mobility Activity: to/from bathroom, to/from door, standing ~3 min X 2  Functional Mobility Device Use: rolling walker  Comment: No LOB, but limited awareness of IV/oxygen cords. Pt ambulated 10 ft to bathroom, then 126 ft down lawrence and to chair. 1 brief standing rest break, 1 seated rest break in chair, then on toilet. Functional Outcomes  AM-PAC Inpatient Daily Activity Raw Score: 18    Cognition  Overall Cognitive Status: Impaired  Arousal/Alterness: appropriate responses to stimuli  Following Commands: follows one step commands consistently  Attention Span: attends with cues to redirect  Memory: decreased recall of recent events, decreased short term memory, decreased long term memory  Safety Judgement: decreased awareness of need for assistance, decreased awareness of need for safety  Problem Solving: decreased awareness of errors  Insights: decreased awareness of deficits  Initiation: requires cues for some  Sequencing: requires cues for some  Comments:  Increased time for responses, inconsistently responding to questions, responses not always clear, difficulty making himself understood, e.g., confusion regarding pt wanting to stand up to use the toilet, but then turned to sit down; not stating he needed a rest break. Later ready to stand up to urinate. Orientation:    oriented to person, oriented to place, oriented to time, and disoriented to situation  Required one cue for place, pt stated \"[place]\" but did not state \"hospital\"  Command Following:   accurately follows one step commands     Education  Barriers To Learning: cognition, visual, and hearing  Patient Education: patient educated on plan of care, precautions, ADL adaptive strategies, transfer training, discharge recommendations  Learning Assessment:  patient will require reinforcement due to cognitive deficits    Assessment  Patient requiring increased time to complete all tasks, SOB nearly constant, O2 levels dropping to mid-high 80's despite 2L supplemental O2.  Recovered quickly  Impairments Requiring Therapeutic Intervention: decreased functional mobility, decreased ADL status, decreased ROM, decreased strength, decreased safety awareness, decreased cognition, decreased endurance, decreased balance, decreased vision, decreased IADL, decreased fine motor control, decreased coordination, decreased posture  Prognosis: fair  Clinical Assessment: PTA, pt reports he received assistance for bathing and dressing tasks and was independent with functional transfers and functional mobility with a cane during ADLs. Pt SBA for UB bathing and standing urination, but demonstrates decreased safety awareness with transfers and functional mobility. Pt with decreased activity tolerance, requiring rest breaks and with SPO2 dropping into the mid-high 80s, but recovering quickly. Pt now requires CGA for functional transfers and mobility during ADL d/t decreased endurance, activity tolerance, fatigue, weakness, and impaired balance. Pt would benefit from skill OT to improve independent and safety during all occupational pursuits.    Safety Interventions: patient left in chair, chair alarm in place, call light within reach, patient at risk for falls, and nurse notified    Plan  Frequency: 3-5 x/per week  Current Treatment Recommendations: strengthening, ROM, functional mobility training, transfer training, endurance training, neuromuscular re-education, patient/caregiver education, ADL/self-care training, IADL training, pain management, home exercise program, safety education, equipment evaluation/education, and positioning    Goals  Patient Goals: none stated   Short Term Goals:  Time Frame: upon d/c  Patient will complete toileting at stand by assistance --SBA standing urination 8/13: ongoing for consistency  Patient will complete functional transfers at supervision--CGA w/RW 8/13; ongoing   Patient will increase functional standing balance to SBA for ~5 minutes for improved ADL completion--CGA ~3 min X 2 8/13; ongoing  Patient will increase [facility] ADL score = to or > than 18/24--18/24 on 8/13--GOAL MET  NEW GOAL: Patient will complete LB ADLs at stand by assistance    Therapy Session Time     Individual Group Co-treatment   Time In    1237   Time Out    1331   Minutes    54        Timed Code Treatment Minutes:  54 min    Total Treatment Minutes:  54 minutes       Electronically Signed By: TERRANCE Lyle., OTR/L, [id number]

## 2022-08-13 NOTE — DISCHARGE SUMMARY
Patient: Caterina Other     Gender: male  : 1955   Age: 74 y.o.   MRN: [id number]    Admitting Physician: Sandrita Patricia MD  Discharge Physician: Renzo Agustin MD     Code Status: DNR-CCA     Admit Date: 8/10/2022   Discharge Date:  8/15/2022     Disposition:  Home    Discharge Diagnoses:  Dyspnea secondary to left-sided malignant pleural effusion accumulation s/p Pleurx catheter with underlying left lung squamous cell cancer stage IV with chronic left lung atelectasis  Chronic hypoxic respiratory failure secondary to COPD  Type 2 diabetes mellitus on insulin    Active Hospital Problems    Diagnosis Date Noted    Hypophosphatemia [E83.39] 2022     Priority: Medium    Protein-calorie malnutrition, moderate ([facility] [E44.0] 2022     Priority: Medium    Essential hypertension [I10] 2022     Priority: Medium    Hyperlipidemia [E78.5] 2022     Priority: Medium    DMII (diabetes mellitus, type 2) ([facility] [E11.9] 2022     Priority: Medium    Acquired hypothyroidism [E03.9] 2022     Priority: Medium    Malignant pleural effusion [J91.0] 08/10/2022     Priority: Medium    Neutrophilic leukocytosis [Q82.4] 2022     Priority: Medium    Chronic diastolic congestive heart failure (HCC) [I50.32]     Squamous cell carcinoma of left lung (HCC) [C34.92]     COPD (chronic obstructive pulmonary disease) ([facility] [J44.9] 2022    Atrial fibrillation, permanent (HCC) [I48.21]     Postobstructive pneumonia [J18.9]     Acute on chronic respiratory failure with hypoxia [facility]) [J96.21] 2022       Follow-up appointments:  one week    Outpatient to do list: none     Condition at Discharge:  [facility] Course:     Hospital Course: 75 yo M with history of squamous cell lung cancer with malignant effusion s/p PleurX, HTN, DM 2, hyperlipidemia, chronic respiratory failure with hypoxia on 2 L O2 via NC, Afib, CAD, chronic diastolic CHF directly admitted from [facility] ER with A on C RF with hypoxia, post obstructive PNA and malignant L pleural effusion. Started on IV Abx. Followed by Pulm and Onc. L PleurX drain connected to suction. Palliative care consult requested.   After having drained his Pleurx catheter adequately his breathing improved  He was at baseline oxygen requirement  Initially started on IV antibiotics having discussed with pulmonary no procalcitonin low otherwise no fever unlikely that this is pneumonia  X-ray looked unchanged but that is probably underlying atelectasis  It has been recommended that he change and drain his Pleurx cath on a daily basis he voiced understanding he has been restarted on Eliquis  Patient's blood sugars also running low I have decreased his Lantus to 5 units daily from 9 units twice daily that he is on in fact he is on [place]  We may need to hold off on the Lantus entirely if his blood sugars remain low  By on the day of discharge his white cell count had improved to 12 he was otherwise doing well he was given an enema    Discharge Medications:   Current Discharge Medication List        Current Discharge Medication List        CONTINUE these medications which have CHANGED    Details   Insulin Degludec (TRESIBA FLEXTOUCH) 100 UNIT/ML SOPN Inject 5 Units into the skin daily  Qty: 2 pen, Refills: 1           Current Discharge Medication List        CONTINUE these medications which have NOT CHANGED    Details   furosemide (LASIX) 40 MG tablet Take 1 tablet by mouth daily  Qty: 60 tablet, Refills: 0      fluticasone (FLOVENT HFA) 110 MCG/ACT inhaler Inhale 1 puff into the lungs 2 times daily      folic acid (FOLVITE) 1 MG tablet Take 1 mg by mouth daily      HYDROcodone-acetaminophen (NORCO) 5-325 MG per tablet Take 1 tablet by mouth every 12 hours as needed for Pain.      meloxicam (MOBIC) 15 MG tablet Take 15 mg by mouth daily as needed for Pain      budesonide-formoterol (SYMBICORT) 160-4.5 MCG/ACT AERO Inhale 2 puffs into the lungs 2 times daily tiZANidine (ZANAFLEX) 2 MG tablet Take 2 mg by mouth every 12 hours as needed      vitamin D (ERGOCALCIFEROL) 1.25 MG (19298 UT) CAPS capsule Take 50,000 Units by mouth once a week      apixaban (ELIQUIS) 5 MG TABS tablet Take 1 tablet by mouth 2 times daily  Qty: 60 tablet, Refills: 0      dilTIAZem (CARDIZEM CD) 120 MG extended release capsule Take 1 capsule by mouth daily  Qty: 30 capsule, Refills: 3      TECHLITE PEN NEEDLES 31G X 8 MM MISC USE 1 FIVE TIMES DAILY      !! levothyroxine (SYNTHROID) 200 MCG tablet Take 150 mcg by mouth Daily       !! EUTHYROX 50 MCG tablet Take 50 mcg by mouth Daily       digoxin (LANOXIN) 125 MCG tablet Take 0.125 mg by mouth daily      atorvastatin (LIPITOR) 20 MG tablet Take 80 mg by mouth daily       buPROPion (WELLBUTRIN SR) 100 MG extended release tablet Take 100 mg by mouth 2 times daily       metFORMIN (GLUCOPHAGE) 850 MG tablet Take 850 mg by mouth daily (with breakfast)       nitroGLYCERIN (NITROSTAT) 0.4 MG SL tablet Place 0.4 mg under the tongue every 5 minutes as needed       Omega-3 Fatty Acids (FISH OIL) 1000 MG CAPS Take 3,000 mg by mouth 3 times daily      aspirin 81 MG tablet Take 81 mg by mouth daily       ! ! - Potential duplicate medications found. Please discuss with provider.         Current Discharge Medication List        STOP taking these medications       insulin glargine (LANTUS) 100 UNIT/ML injection vial Comments:   Reason for Stopping:         insulin lispro (HUMALOG) 100 UNIT/ML SOLN injection vial Comments:   Reason for Stopping:         insulin lispro (HUMALOG) 100 UNIT/ML SOLN injection vial Comments:   Reason for Stopping:         insulin regular (HUMULIN R;NOVOLIN R) 100 UNIT/ML injection Comments:   Reason for Stopping:         guaiFENesin (MUCINEX) 600 MG extended release tablet Comments:   Reason for Stopping:         tiotropium (SPIRIVA RESPIMAT) 2.5 MCG/ACT AERS inhaler Comments:   Reason for Stopping:               Discharge ROS:  A complete review of systems was asked and negative     Discharge Exam:    /78   Pulse 87   Temp 98.3 °F (36.8 °C) (Oral)   Resp 18   Ht 6' (1.829 m)   Wt 249 lb 3.2 oz (113 kg)   SpO2 96%   BMI 33.80 kg/m²   General appearance:  NAD  HEENT:   Normal cephalic, atraumatic, moist mucous membranes, no oropharyngeal erythema or exudate  Heart[de-identified] Normal s1/s2, RRR, no murmurs, gallops, or rubs. Absent air entry at lung base on the left side  Lungs:  Normal respiratory effort. Abdomen: Soft, non-tender, non-distended, bowel sounds present, no masses  Musculoskeletal:  No clubbing, no cyanosis, *  Neurologic:  Neurovascularly intact without any focal sensory/motor deficits. Cranial nerves: II-XII intact, grossly non-focal.    Labs: For convenience and continuity at follow-up the following most recent labs are provided:    Lab Results   Component Value Date/Time    WBC 30.1 08/11/2022 10:50 PM    HGB 12.6 08/11/2022 10:50 PM    HCT 39.5 08/11/2022 10:50 PM    MCV 90.8 08/11/2022 10:50 PM     08/11/2022 10:50 PM     08/13/2022 06:27 AM    K 3.5 08/13/2022 06:27 AM     08/13/2022 06:27 AM    CO2 35 08/13/2022 06:27 AM    BUN 12 08/13/2022 06:27 AM    CREATININE 0.5 08/13/2022 06:27 AM    CALCIUM 8.3 08/13/2022 06:27 AM    PHOS 2.2 08/13/2022 06:27 AM    ALKPHOS 162 08/11/2022 10:50 PM    ALT 18 08/11/2022 10:50 PM    AST 19 08/11/2022 10:50 PM    BILITOT 0.3 08/11/2022 10:50 PM    BILIDIR <0.2 04/12/2022 05:00 PM    LABALBU 3.1 08/11/2022 10:50 PM     Lab Results   Component Value Date    INR 1.30 (H) 06/19/2022    INR 1.14 (H) 04/12/2022           The patient was seen and examined on day of discharge and this discharge summary is in conjunction with any daily progress note from day of discharge. Time Spent on discharge is 45 minutes  in the examination, evaluation, counseling and review of medications and discharge plan.       Note that greater  than 30 minutes was spent in preparing discharge papers, discussing discharge with patient, medication review, etc.       Signed:    Lauri Hardy MD   8/15/2022      Thank you Zen Beck MD for the opportunity to be involved in this patient's care.  If you have any questions or concerns please feel free to contact me

## 2022-08-13 NOTE — DISCHARGE INSTR - COC
Continuity of Care Form    Patient Name: Mc Brooks   :  1955  MRN:  [id number]    Admit date:  8/10/2022  Discharge date:  08/15/2022      Code Status Order: DNR-CCA   Advance Directives:     Admitting Physician:  Khris Moyer MD  PCP: Jessica Sibley MD    Discharging Nurse: Gary Jacome RN    [address] Unit/Room#: 8FW-2495/9589-87  Discharging Unit Phone Number: [phone]    Emergency Contact:   Extended Emergency Contact Information  Primary Emergency Contact: Edwar [employer]  Mobile Phone: [phone]  Relation: Child   needed? No    Past Surgical History:  Past Surgical History:   Procedure Laterality Date    BRONCHOSCOPY N/A 2022    EBUS WF W/ANES. (11:00) performed by Angeles Olguin MD at [place] [address]  2022    BRONCHOSCOPY ALVEOLAR LAVAGE performed by Angeles Olguin MD at [place] [address]  2022    BRONCHOSCOPY BIOPSY BRONCHUS performed by Angeles Olguin MD at [place] [address]  2022    BRONCHOSCOPY/TRANSBRONCHIAL NEEDLE BIOPSY performed by Angeles Olguin MD at [place] [address] N/A 2022    Vivi Hany NF performed by Angeles Olguin MD at [place] [address] N/A 2022    BRONCHOSCOPY WITH ENDOBRONCHIAL YAG LASER AND CHEST TUBE PLACEMENT performed by Shelley Starr MD at [facility] 2022    LEFT PLEUR-X CATHETER PLACEMENT performed by Jemal Villalta MD at [facility] 38 knee       Immunization History: There is no immunization history on file for this patient.     Active Problems:  Patient Active Problem List   Diagnosis Code    Spinal stenosis of lumbar region with neurogenic claudication M48.062    Mediastinal adenopathy R59.0    Lung mass R91.8    Multiple tracheobronchial mucus plugs T17.800A    Mass of left lung R91.8    Acute on chronic respiratory failure with hypoxia (HCC) J96.21    Acute hypercapnic respiratory failure (HCC) J96.02    COPD with acute exacerbation (HCC) J44.1    Acute encephalopathy R22.43    Metabolic encephalopathy I86.43    Atrial fibrillation, permanent ([facility]) I48.21    Acute respiratory failure with hypoxia and hypercapnia (HCC) J96.01, J96.02    Lung collapse J98.19    Postobstructive pneumonia J18.9    Mucus plugging of bronchi T17.500A    Endobronchial mass R91.8    Hypoxia R09.02    COPD (chronic obstructive pulmonary disease) (HCC) J44.9    Acute hypoxemic respiratory failure (HCC) J96.01    Squamous cell carcinoma of left lung ([facility]) C34.92    Acute on chronic respiratory failure with hypoxia and hypercapnia (HCC) J96.21, J96.22    Chronic diastolic congestive heart failure ([facility]) I50.32    Small cell lung cancer ([facility]) C34.90    CAP (community acquired pneumonia) due to Pneumococcus ([address] J13    Lung consolidation ([address] J18.1    Metastatic squamous cell carcinoma ([facility]) C79.9    Current smoker E86.086    Neutrophilic leukocytosis O98.9    SOB (shortness of breath) R06.02    Malignant pleural effusion J91.0    Pneumonia J18.9    Hypophosphatemia E83.39    Protein-calorie malnutrition, moderate ([facility]) E44.0    Essential hypertension I10    Hyperlipidemia E78.5    DMII (diabetes mellitus, type 2) ([facility]) E11.9    Acquired hypothyroidism E03.9       Isolation/Infection:   Isolation            No Isolation          Patient Infection Status       None to display            Nurse Assessment:  Last Vital Signs: /85   Pulse 85   Temp 97.8 °F (36.6 °C) (Axillary)   Resp 17   Ht 6' (1.829 m)   Wt 249 lb 3.2 oz (113 kg)   SpO2 97%   BMI 33.80 kg/m²     Last documented pain score (0-10 scale): Pain Level: 7  Last Weight:   Wt Readings from Last 1 Encounters:   08/15/22 249 lb 3.2 oz (113 kg)     Mental Status:  oriented, alert, coherent, logical, thought processes intact, and able to concentrate and follow conversation    IV Access:  - None    Nursing Mobility/ADLs:  Walking Independent  Transfer  Independent  Bathing  Assisted  Dressing  Assisted  Toileting  Independent  Feeding  Independent  Med [address] [place]  Med Delivery   whole    Wound Care Documentation and Therapy:  Wound 04/28/22 Scrotum Anterior (Active)   Number of days: 109       Incision 08/10/22 Back Lateral;Left (Active)   Dressing Status Clean;Dry; Intact 08/15/22 0738   Incision Cleansed Not Cleansed 08/14/22 0726   Dressing/Treatment Dry dressing 08/15/22 0738   Drainage Amount None 08/15/22 0738   Odor None 08/14/22 0726   Number of days: 4        Elimination:  Continence: Bowel: Yes  Bladder: episodes of incontinence    Urinary Catheter: None   Colostomy/Ileostomy/Ileal Conduit: No       Date of Last BM: 08/15/2022      Intake/Output Summary (Last 24 hours) at 8/15/2022 1124  Last data filed at 8/15/2022 1050  Gross per 24 hour   Intake 840 ml   Output 2320 ml   Net -1480 ml     I/O last 3 completed shifts: In: 260 [P.O.:240; I.V.:20]  Out: 2000 [Urine:1550; Chest Tube:450]    Safety Concerns:     None    Impairments/Disabilities:      Hearing    Nutrition Therapy:  Current Nutrition Therapy:   - Oral Diet:  Carb Control 5 carbs/meal (2000kcals/day)  - Oral Nutrition Supplement:  Standard  three times a day    Routes of Feeding: Oral  Liquids: Thin Liquids  Daily Fluid Restriction: no  Last Modified Barium Swallow with Video (Video Swallowing Test): not done    Treatments at the Time of Hospital Discharge:   Respiratory Treatments:     Oxygen Therapy:  is on oxygen at 2 L/min per nasal cannula.   Ventilator:    - No ventilator support    Rehab Therapies:      Weight Bearing Status/Restrictions: No weight bearing restrictions  Other Medical Equipment (for information only, NOT a DME order):  none    Other Treatments: none      Patient's personal belongings (please select all that are sent with patient):  None    RN SIGNATURE:  Electronically signed by Magalie Poole RN on 8/15/22 at 11:33 AM EDT    CASE MANAGEMENT/SOCIAL WORK SECTION    Inpatient Status Date: 8/10/22    Readmission Risk Assessment Score:  Readmission Risk              Risk of Unplanned Readmission:  22           Discharging to Facility/ Agency    Name:    [facility]ann[facility]   Phone: [phone]  Fax: [phone]    Dialysis Facility (if applicable)     / signature: Electronically signed by Sita Cervantes RN on 8/14/22 at 9:51 AM EDT    PHYSICIAN SECTION    Prognosis: Good    Condition at Discharge: Stable    Rehab Potential (if transferring to Rehab): Good    Recommended Labs or Other Treatments After Discharge: You will need to drain your left Pleurx catheter on a daily basis to avoid accumulation of the fluid around your lung    Physician Certification: I certify the above information and transfer of Meliton Baxter  is necessary for the continuing treatment of the diagnosis listed and that he requires [facility] for greater 30 days.      Update Admission H&P: No change in H&P    PHYSICIAN SIGNATURE:  Electronically signed by Richard Ferrera MD on 8/13/22 at 2:23 PM EDT

## 2022-08-13 NOTE — CARE COORDINATION
Discharge Planning Note:    Megan He, called Case Management's office left message.     - Patient is ACTIVE with [facility], [address]    Phone: [phone]  Fax: [phone]    DUNCAN MarshallN RN    [facility]  Phone: [phone]

## 2022-08-13 NOTE — PROGRESS NOTES
[facility]  Oncology Hematology Care  Progress Note      SUBJECTIVE:   Pt is stable-  Pleurex out   States he may go home   Pt is dnr   ROS: No fever chills sweats, no nausea, vomiting, diarrhea    OBJECTIVE      Medications    Current Facility-Administered Medications: insulin glargine (LANTUS) injection vial 7 Units, 7 Units, SubCUTAneous, BID  apixaban (ELIQUIS) tablet 5 mg, 5 mg, Oral, BID  insulin lispro (HUMALOG) injection vial 0-8 Units, 0-8 Units, SubCUTAneous, TID WC  oxyCODONE (ROXICODONE) immediate release tablet 5 mg, 5 mg, Oral, Q4H PRN **OR** oxyCODONE (ROXICODONE) immediate release tablet 10 mg, 10 mg, Oral, Q4H PRN  HYDROmorphone HCl PF (DILAUDID) injection 0.5 mg, 0.5 mg, IntraVENous, Q4H PRN  tiZANidine (ZANAFLEX) tablet 2 mg, 2 mg, Oral, Q12H PRN  levothyroxine (SYNTHROID) tablet 250 mcg, 250 mcg, Oral, Daily  furosemide (LASIX) tablet 40 mg, 40 mg, Oral, Daily  folic acid (FOLVITE) tablet 1 mg, 1 mg, Oral, Daily  dilTIAZem (CARDIZEM CD) extended release capsule 120 mg, 120 mg, Oral, Daily  digoxin (LANOXIN) tablet 0.125 mg, 0.125 mg, Oral, Daily  buPROPion (WELLBUTRIN SR) extended release tablet 100 mg, 100 mg, Oral, Daily  mometasone-formoterol (DULERA) 200-5 MCG/ACT inhaler 2 puff, 2 puff, Inhalation, BID  atorvastatin (LIPITOR) tablet 80 mg, 80 mg, Oral, Daily  aspirin chewable tablet 81 mg, 81 mg, Oral, Daily  dextrose bolus 10% 125 mL, 125 mL, IntraVENous, PRN **OR** dextrose bolus 10% 250 mL, 250 mL, IntraVENous, PRN  glucagon (rDNA) injection 1 mg, 1 mg, SubCUTAneous, PRN  dextrose 10 % infusion, , IntraVENous, Continuous PRN  sodium chloride flush 0.9 % injection 10 mL, 10 mL, IntraVENous, 2 times per day  sodium chloride flush 0.9 % injection 10 mL, 10 mL, IntraVENous, PRN  0.9 % sodium chloride infusion, , IntraVENous, PRN  ondansetron (ZOFRAN) injection 4 mg, 4 mg, IntraVENous, Q4H PRN  polyethylene glycol (GLYCOLAX) packet 17 g, 17 g, Oral, Daily PRN  acetaminophen (TYLENOL) tablet 650 mg, 650 mg, Oral, Q4H PRN **OR** acetaminophen (TYLENOL) suppository 650 mg, 650 mg, Rectal, Q4H PRN  [COMPLETED] cefepime (MAXIPIME) 2000 mg IVPB minibag, 2,000 mg, IntraVENous, Once **FOLLOWED BY** cefepime (MAXIPIME) 2000 mg IVPB minibag, 2,000 mg, IntraVENous, Q8H  Physical    VITALS:  /73   Pulse 87   Temp 97.3 °F (36.3 °C) (Oral)   Resp 20   Ht 6' (1.829 m)   Wt 249 lb 4.8 oz (113.1 kg)   SpO2 96%   BMI 33.81 kg/m²   TEMPERATURE:  Current - Temp: 97.3 °F (36.3 °C); Max - Temp  Av.8 °F (36.6 °C)  Min: 97.2 °F (36.2 °C)  Max: 98.2 °F (36.8 °C)  PULSE OXIMETRY RANGE: SpO2  Av.3 %  Min: 94 %  Max: 99 %  24HR INTAKE/OUTPUT:    Intake/Output Summary (Last 24 hours) at 2022 1253  Last data filed at 2022 1110  Gross per 24 hour   Intake 5 ml   Output 2500 ml   Net -2495 ml       CONSTITUTIONAL:  awake, alert, cooperative, no apparent distress, HEENT oral pharynx , no scleral icterus  HEMATOLOGIC/LYMPHATICS:  no cervical lymphadenopathy, no supraclavicular lymphadenopathy,   LUNGS:  diminished throughout   CARDIOVASCULAR:  , regular rate and rhythm, normal S1 and S2, no S3 or S4, and no murmur noted  ABDOMEN:  No scars, normal bowel sounds, soft, non-distended, non-tender, no masses palpated, no hepatosplenomegally  MUSCULOSKELETAL:  There is no redness, warmth, or swelling of the joints. EXTREMETIES: No clubbing cynosis or   NEUROLOGIC:  Awake, alert, oriented to name, place and time. Cranial nerves II-XII are grossly intact.  SKIN:  no bruising or bleeding      Data      Recent Labs     08/10/22  2240 22  0630 22  2250   WBC 15.7* 20.2* 30.1*   HGB 11.4* 11.4* 12.6*   HCT 35.9* 35.1* 39.5*    408 591*   MCV 90.0 89.9 90.8        Recent Labs     22  0630 22  2250 22  0615 22  0627    136 136 139   K 4.1 4.5 4.5 3.5   CL 97* 97* 97* 100   CO2 31 30 31 35*   PHOS 3.9  --  3.2 2.2*   BUN 13 17 17 12   CREATININE 0.6* 1.0 0.7* 0.5* Recent Labs     08/10/22  2240 08/11/22  2250   AST 18 19   ALT 22 18   BILITOT 0.3 0.3   ALKPHOS 158* 162*       Magnesium:    Lab Results   Component Value Date/Time    MG 2.10 08/13/2022 06:27 AM    MG 1.90 06/22/2022 05:28 AM    MG 1.90 06/19/2022 02:30 PM       Imaging XR CHEST PORTABLE    Result Date: 8/12/2022  EXAMINATION: ONE XRAY VIEW OF THE CHEST 8/12/2022 9:01 am COMPARISON: 08/11/2022 radiograph HISTORY: ORDERING SYSTEM PROVIDED HISTORY: left pleural effusion/chronic atelectasis TECHNOLOGIST PROVIDED HISTORY: Reason for exam:->left pleural effusion/chronic atelectasis FINDINGS: Complete opacification of the left hemithorax with no interval improvement. A chest tube is partially visualized, stable in position on the left. The right lung is relatively clear with a mild degree of perihilar and basilar ground-glass attenuation centrally. No evidence of significant mediastinal shift. Right port catheter stable. No significant skeletal finding. Stable opacification of the left hemithorax that reportedly is due to pleural fluid and atelectasis. XR CHEST PORTABLE    Result Date: 8/11/2022  EXAMINATION: ONE XRAY VIEW OF THE CHEST 8/11/2022 5:57 am COMPARISON: June 21, 2022 HISTORY: ORDERING SYSTEM PROVIDED HISTORY: Evaluate pleural effusions TECHNOLOGIST PROVIDED HISTORY: Reason for exam:->Evaluate pleural effusions Reason for Exam: Evaluate pleural effusions FINDINGS: Complete opacification of the left hemithorax similar to prior study. Left-sided chest tube is noted. Mild congestion noted in the right lung. Cardiac silhouette is obscured. Right-sided port tip is in the superior vena cava. There is no pneumothorax. Moderate osteopenic changes and degenerative changes noted in the bony structures. .     No improvement. Persistent complete opacification left hemithorax. Mild congestion right lung.        Problem List  Patient Active Problem List   Diagnosis    Spinal stenosis of lumbar region with neurogenic claudication    Mediastinal adenopathy    Lung mass    Multiple tracheobronchial mucus plugs    Mass of left lung    Acute on chronic respiratory failure with hypoxia (HCC)    Acute hypercapnic respiratory failure (HCC)    COPD with acute exacerbation (HCC)    Acute encephalopathy    Metabolic encephalopathy    Atrial fibrillation, permanent (HCC)    Acute respiratory failure with hypoxia and hypercapnia (HCC)    Lung collapse    Postobstructive pneumonia    Mucus plugging of bronchi    Endobronchial mass    Hypoxia    COPD (chronic obstructive pulmonary disease) (HCC)    Acute hypoxemic respiratory failure (HCC)    Squamous cell carcinoma of left lung (HCC)    Acute on chronic respiratory failure with hypoxia and hypercapnia (HCC)    Chronic diastolic congestive heart failure (HCC)    Small cell lung cancer (HCC)    CAP (community acquired pneumonia) due to Pneumococcus ([address]    Lung consolidation ([address]    Metastatic squamous cell carcinoma ([address]    Current smoker    Neutrophilic leukocytosis    SOB (shortness of breath)    Malignant pleural effusion    Pneumonia    Hypophosphatemia    Protein-calorie malnutrition, moderate (HCC)    Essential hypertension    Hyperlipidemia    DMII (diabetes mellitus, type 2) (HCC)    Acquired hypothyroidism       ASSESSMENT AND PLAN      Squamous cell lung cancer  - diagnosed 4/12/22  - PET/CT, 4/08/2022, showed a large mass in the left hilum extending into the LLL with complete obstruction of the left mainstem bronchus. A hypermetabolic subcarinal LN was identified as well.   - sees Dr. Maile Escobar -  No inpt needs from this -he will fu as outpt   Does not need to stay inpt       Malignant pleural effusion  - sp drain -its now out -no chronic drain in      Post obstructive pneumonia  - on IV antibiotics per primary team      A fib  - eliquis can be resumed if no procedures    Pt thinks he may go home-no objection but needs close fu -     Cooper Wells Jerson Soler MD, MD

## 2022-08-13 NOTE — PROGRESS NOTES
patient discharges prior to next session this note will serve as a discharge summary. Please see below for the latest assessment towards goals. If patient elects to return home, recommend home health PT as follows:  HOME HEALTH CARE: LEVEL 3 SAFETY  - Initial home health evaluation to occur within 24-48 hours, in patient home   - Therapy evaluations in home within 24-48 hours of discharge; including DME and home safety   - Frontload therapy 5 days, then 3x a week   - Therapy to evaluate if patient has [address] needs for personal care   -  evaluation within 24-48 hours, includes evaluation of resources and insurance to determine [place], [place], LTC, and Medicaid options     DME Required For Discharge: DME to be determined at next level of care  Precautions/Restrictions: high fall risk  Positional Restrictions:no positional restrictions    Pre-Admission Information   Lives With: alone                     Type of Home: house  Home Layout: one level, able to live on main level  Home Access:  Per previous chart, pt has ramped entrance; pt told therapy today that he has 15 [address]   Bathroom Layout: tub/shower unit  Bathroom Equipment: grab bars in shower, grab bars around toilet, hand held shower head  Toilet Height: standard height  Home Equipment: rolling walker, single point cane, manual wheelchair, hospital bed, oxygen, alert button, .   Comment: 2 L of O2 at home  Transfer Assistance: modified independent with use of RW  Ambulation Assistance:modified independent with use of SPC or RW  ADL Assistance: requires assistance with bathing, requires assistance with dressing  IADL Assistance: requires assistance with all homemaking tasks, requires assistance with meal prep, requires assistance with laundry, requires assistance with cleaning, requires assistance with driving/transportation, requires assistance for medication management  Active :        [] Yes                 [x] No  Hand Dominance: [] Left                 [x] Right  Current Employment: disabled  Hobbies: watch TV  Recent Falls: pt stated \"it is hard to say. ..my knees give out. [place] [employer] Ak[place] [employer] \"  Comment: Pt has a home health aide 5x a week, two hours a day. Nurse comes 1-2x per week. Dtr comes [facility] day or two\". Pt is a questionable historian. Pt initially reports independence with ADLs, then later states that he has assistance. Examination   Vision:   Vision Gross Assessment: Impaired and Vision Corrective Device: wears glasses for reading  [facility]  Observation:   General Observation:  L PleurX drain  Posture:   Flexed posture  Sensation:   WFL  ROM:   (B) LE AROM WFL  Strength:   (B) LE strength grossly WFL  Decision Making: medium complexity  Clinical Presentation: evolving      Subjective  General: Pt supine in bed upon arrival; agreeable to PT/OT; on 3L; RN in room, meds administered prior to PT/OT  Pain: Patient does not rate upon questioning  Pain Interventions: not applicable       Functional Mobility  Bed Mobility  Supine to Sit: stand by assistance  Scooting: stand by assistance  Comments: Increased effort, increased time to complete, use of bed rail. Transfers  Sit to stand transfer: contact guard assistance  Stand to sit transfer: contact guard assistance  Stand pivot transfer: contact guard assistance  Comments: Gait belt donned. Patient standing impulsively. SOB noted. Ambulation  Surface:level surface  Assistive Device: rolling walker  Assistance: contact guard assistance  Distance:  10' to bathroom, 116' total in hallway back to chair (Standing rest break after 90', sat in chair after 116' d/t fatigue.)  Patient reported needing to use bathroom, doubtful he could have ambulated further given his fatigue. Gait Mechanics: dec'd nhi, forward flexed posture  Comments:  Pt fatigued very quickly, O2 began at 93% at rest on 2L O2, heart rate 106 bpm, dropped to 87% on 2L, heart rate 121 after 10' of ambulation.   Stair Mobility  Stair mobility not completed on this date. Comments:  Unable to safely attempt. Patient reports he has ramp to enter, doesn't need to do stairs. Balance  Static Sitting Balance: good: independent with functional balance in unsupported position  Dynamic Sitting Balance: fair (+): maintains balance at SBA/supervision without use of UE support  Static Standing Balance: fair (-): maintains balance at CGA with use of UE support  Dynamic Standing Balance: fair (-): maintains balance at CGA with use of UE support  Comments:     Other Therapeutic Interventions    Functional Outcomes  AM-PAC Inpatient Mobility Raw Score : 16              Cognition  Overall Cognitive Status: Impaired  Orientation:    Not asked this session. Patient appears oriented to self and situation at minimum. Command Following:   accurately follows one step commands    Education  Barriers To Learning: cognition  Patient Education: patient educated on PT role and benefits, plan of care, functional mobility training, transfer training, discharge recommendations  Learning Assessment:  patient verbalizes understanding, would benefit from continued reinforcement    Assessment  Activity Tolerance: Patient requiring increased time to complete all tasks, SOB nearly constant, O2 levels dropping to high 80's despite 2L supplemental O2. Impairments Requiring Therapeutic Intervention: decreased functional mobility, decreased ADL status, decreased safety awareness, decreased cognition, decreased endurance, decreased balance, decreased posture  Prognosis: good and fair  Clinical Assessment:  Patient demonstrates poor activity tolerance, severely impaired cardiovascular endurance, and general weakness with impaired safety awareness and insight into deficits. Patient lives alone, has no family who can be with him 24/7. Discussed with patient therapy recommendations would not change.   24 hour assist is recommended along with continued therapy to address deficits. Patient does not appear safe to return home at this time. Safety Interventions: patient left in chair, chair alarm in place, call light within reach, gait belt, patient at risk for falls, and nurse notified    Plan  Frequency: 3-5 x/per week  Current Treatment Recommendations: strengthening, balance training, functional mobility training, transfer training, gait training, endurance training, and patient/caregiver education    Goals  Patient Goals: none stated   Short Term Goals:  Time Frame: discharge  Patient will complete bed mobility at supervision   Patient will complete transfers at stand by assistance   Patient will ambulate 25 ft with use of rolling walker at stand by assistance  No goals met this session.     Therapy Session Time      Individual Group Co-treatment   Time In     7239   Time Out     1331   Minutes     54     Timed Code Treatment Minutes:  54 Minutes  Total Treatment Minutes:  47       Electronically Signed By: Marion Vela, PT, DPT, ATC-R [id number]

## 2022-08-13 NOTE — PROGRESS NOTES
Lab called with a panic fasting glucose result of 39. Went to bedside, checked fingerstick BS was 40. Pt awake, A&O x4, slight shiver. Cup of apple juice given to sip on while waiting on D10 from pharmacy. D10 started. Will continue to monitor pt condition and recheck BS once infusion completed.

## 2022-08-13 NOTE — CARE COORDINATION
Discharge Planning  Discharge order noted , therapy recommending SNF placement. CM  met with patient. Introduced self and explained role of CM and discharge planning. Patient  was agreeable to SNF placement and would prefer facilities in [facility]. CM called patient's daughterShukri Pelayo regarding the SNF choices, spoke to her spouse he stated she was at work- provided -   [employer]- [address] - [phone]  Referral called , Jacoby Crowell -spoke with [facility] in admissions - [phone] who stated have beds available and requested clinicals faxed to [phone] done.

## 2022-08-13 NOTE — PROGRESS NOTES
PULMONARY AND CRITICAL CARE MEDICINE PROGRESS NOTE      SUBJECTIVE: Patient denies any complaints. He drained only 300 cc via chest tube. When examined this morning, chest tube was disconnected from the Pleurx catheter. Remains on 3 L nasal cannula and saturating well. REVIEW OF SYSTEMS:   CONSTITUTIONAL SYMPTOMS: The patient denies fever, fatigue, night sweats, weight loss or weight gain. HEENT: No vision changes. No tinnitus, Denies sinus pain. No hoarseness, or dysphagia. CARDIOVASCULAR: Denies chest pain, palpitation, syncope. RESPIRATORY: Denies shortness of breath or cough. GASTROINTESTINAL: Denies nausea, abdominal pain or change in bowel function. SKIN: No rashes or itching. MUSCULOSKELETAL: Denies weakness or bone pain. NEUROLOGICAL: No headaches or seizures. MEDICATIONS:      apixaban  5 mg Oral BID    insulin lispro  0-8 Units SubCUTAneous TID WC    levothyroxine  250 mcg Oral Daily    furosemide  40 mg Oral Daily    folic acid  1 mg Oral Daily    dilTIAZem  120 mg Oral Daily    digoxin  0.125 mg Oral Daily    buPROPion  100 mg Oral Daily    mometasone-formoterol  2 puff Inhalation BID    atorvastatin  80 mg Oral Daily    aspirin  81 mg Oral Daily    sodium chloride flush  10 mL IntraVENous 2 times per day    cefepime  2,000 mg IntraVENous Q8H    insulin glargine  64 Units SubCUTAneous BID      dextrose      sodium chloride 25 mL/hr (08/13/22 0253)     oxyCODONE **OR** oxyCODONE, HYDROmorphone, tiZANidine, dextrose bolus **OR** dextrose bolus, glucagon (rDNA), dextrose, sodium chloride flush, sodium chloride, ondansetron, polyethylene glycol, acetaminophen **OR** acetaminophen     ALLERGIES:   Allergies as of 08/10/2022 - Fully Reviewed 08/10/2022   Allergen Reaction Noted    Codeine  01/28/2019        OBJECTIVE:   height is 6' (1.829 m) and weight is 249 lb 4.8 oz (113.1 kg). His oral temperature is 97.2 °F (36.2 °C). His blood pressure is 123/75 and his pulse is 87.  His respiration is 18 and oxygen saturation is 99%. I/O this shift:  In: -   Out: 900 [Chest Tube:900]     PHYSICAL EXAM:  CONSTITUTIONAL: He is a 74y.o.-year-old who appears his stated age. He is alert and oriented x 3 and in no acute distress. CARDIOVASCULAR: S1 S2 RRR. Without murmer  RESPIRATORY & CHEST: Decreased breath on the left side. No wheezing, no crackles. GASTROINTESTINAL & ABDOMEN: Soft, nontender, positive bowel sounds in all quadrants, non-distended, without hepatosplenomegaly. GENITOURINARY: Deferred. MUSCULOSKELETAL: No tenderness to palpation of the axial skeleton. There is no clubbing. No cyanosis. No edema of the lower extremities. SKIN OF BODY: No rash or jaundice. PSYCHIATRIC EVALUATION: Normal affect. Patient answers questions appropriately. HEMATOLOGIC/LYMPHATIC/ IMMUNOLOGIC: No palpable lymphadenopathy. NEUROLOGIC: Alert and oriented x 3. Groslly non-focal. Motor strength is 5+/5 in all muscle groups. The patient has a normal sensorium globally. LABS:   Lab Results   Component Value Date    WBC 30.1 (H) 08/11/2022    HGB 12.6 (L) 08/11/2022    HCT 39.5 (L) 08/11/2022     (H) 08/11/2022    ALT 18 08/11/2022    AST 19 08/11/2022     08/13/2022    K 3.5 08/13/2022     08/13/2022    CREATININE 0.5 (L) 08/13/2022    BUN 12 08/13/2022    CO2 35 (H) 08/13/2022    INR 1.30 (H) 06/19/2022       Lab Results   Component Value Date    GLUCOSE 39 (LL) 08/13/2022    CALCIUM 8.3 08/13/2022     08/13/2022    K 3.5 08/13/2022    CO2 35 (H) 08/13/2022     08/13/2022    BUN 12 08/13/2022    CREATININE 0.5 (L) 08/13/2022       Lab Results   Component Value Date    GLUCOSE 39 (LL) 08/13/2022    CALCIUM 8.3 08/13/2022     08/13/2022    K 3.5 08/13/2022    CO2 35 (H) 08/13/2022     08/13/2022    BUN 12 08/13/2022    CREATININE 0.5 (L) 08/13/2022       IMAGING:   I reviewed the chest x-ray and my interpretation is as follows.   Left lung opaque hemithorax. IMPRESSION:   Acute respiratory distress, improved  Left lung squamous cell cancer, stage IV  Left-sided malignant pleural effusion s/p Pleurx catheter  Left lung chronic atelectasis  Previous history of tobacco abuse  COPD not in exacerbation  Chronic hypoxic respiratory failure      RECOMMENDATION:     Patient's respiratory status has improved  Pleurx catheter drained only 300 cc overnight. I drained additional fluid via Pleurx drainage bottle. I would recommend that patient undergoes Pleurx catheter drainage by drainage bottle every day. Unfortunately chest imaging repeated this morning does not show any significant difference. As mentioned before this is most likely due to chronic atelectasis of the left lung from left hilar squamous cell cancer obstructing the left main bronchus. This was also seen during the initial diagnostic bronchoscopy. Since April, the left lung atelectasis has been persistent. He will continue to drain Pleurx catheter while drainage bottle every day even at home. He is planning to undergo palliative chemotherapy in [facility] soon. Patient has no fever, discolored expectoration with normal procalcitonin levels. Suspicion for pneumonia is low. As his clinical picture stabilizes, and pleural fluid cultures are resulted, antibiotics can be de-escalated/stopped. Continue with Dulera and DuoNeb nebulization. Pulm does not have any further recommendations. Pulmonary will sign off. Sunitha Haskins MD  Pulmonary Critical Care and Sleep Medicine  8/13/2022, 11:41 AM    This note was completed using dragon medical speech recognition software. Grammatical errors, random word insertions, pronoun errors and incomplete sentences are occasional consequences of this technology due to software limitations.  If there are questions or concerns about the content of this note of information contained within the body of this dictation they should be addressed with the

## 2022-08-13 NOTE — PROGRESS NOTES
Clinical Pharmacy Note:   Non Formulary Medication to Formulary Interchange    Home medication:  Insulin Degludec George Rumps) 9 units sq twice a day    Formulary Interchange:  Insulin glargine (Lantus) ,use 80% of the dose of Tresiba     Plan: Give lantus 7 units twice a day    Thank you for allowing pharmacy to participate in the care of this patient.   Bobbi Awan, PharmD

## 2022-08-14 LAB
GLUCOSE BLD-MCNC: 107 MG/DL (ref 70–99)
GLUCOSE BLD-MCNC: 109 MG/DL (ref 70–99)
GLUCOSE BLD-MCNC: 110 MG/DL (ref 70–99)
GLUCOSE BLD-MCNC: 129 MG/DL (ref 70–99)
GLUCOSE BLD-MCNC: 130 MG/DL (ref 70–99)
GLUCOSE BLD-MCNC: 188 MG/DL (ref 70–99)
GLUCOSE BLD-MCNC: 67 MG/DL (ref 70–99)
GLUCOSE BLD-MCNC: 83 MG/DL (ref 70–99)
PERFORMED ON: ABNORMAL
PERFORMED ON: NORMAL

## 2022-08-14 PROCEDURE — 2580000003 HC RX 258: Performed by: INTERNAL MEDICINE

## 2022-08-14 PROCEDURE — 6360000002 HC RX W HCPCS: Performed by: INTERNAL MEDICINE

## 2022-08-14 PROCEDURE — 94640 AIRWAY INHALATION TREATMENT: CPT

## 2022-08-14 PROCEDURE — 94761 N-INVAS EAR/PLS OXIMETRY MLT: CPT

## 2022-08-14 PROCEDURE — 6370000000 HC RX 637 (ALT 250 FOR IP): Performed by: INTERNAL MEDICINE

## 2022-08-14 PROCEDURE — 2700000000 HC OXYGEN THERAPY PER DAY

## 2022-08-14 PROCEDURE — 6360000002 HC RX W HCPCS: Performed by: FAMILY MEDICINE

## 2022-08-14 PROCEDURE — 2060000000 HC ICU INTERMEDIATE R&B

## 2022-08-14 PROCEDURE — 2580000003 HC RX 258: Performed by: FAMILY MEDICINE

## 2022-08-14 RX ADMIN — OXYCODONE 5 MG: 5 TABLET ORAL at 00:18

## 2022-08-14 RX ADMIN — ASPIRIN 81 MG: 81 TABLET, CHEWABLE ORAL at 10:16

## 2022-08-14 RX ADMIN — DEXTROSE MONOHYDRATE 125 ML: 100 INJECTION, SOLUTION INTRAVENOUS at 07:26

## 2022-08-14 RX ADMIN — CEFEPIME 2000 MG: 2 INJECTION, POWDER, FOR SOLUTION INTRAVENOUS at 10:27

## 2022-08-14 RX ADMIN — CEFEPIME 2000 MG: 2 INJECTION, POWDER, FOR SOLUTION INTRAVENOUS at 17:39

## 2022-08-14 RX ADMIN — HYDROMORPHONE HYDROCHLORIDE 0.5 MG: 1 INJECTION, SOLUTION INTRAMUSCULAR; INTRAVENOUS; SUBCUTANEOUS at 04:54

## 2022-08-14 RX ADMIN — CEFEPIME 2000 MG: 2 INJECTION, POWDER, FOR SOLUTION INTRAVENOUS at 00:22

## 2022-08-14 RX ADMIN — SODIUM CHLORIDE 25 ML: 9 INJECTION, SOLUTION INTRAVENOUS at 10:26

## 2022-08-14 RX ADMIN — FOLIC ACID 1 MG: 1 TABLET ORAL at 10:16

## 2022-08-14 RX ADMIN — LEVOTHYROXINE SODIUM 250 MCG: 0.12 TABLET ORAL at 05:02

## 2022-08-14 RX ADMIN — Medication 2 PUFF: at 10:34

## 2022-08-14 RX ADMIN — ONDANSETRON 4 MG: 2 INJECTION INTRAMUSCULAR; INTRAVENOUS at 07:22

## 2022-08-14 RX ADMIN — SODIUM CHLORIDE 25 ML/HR: 9 INJECTION, SOLUTION INTRAVENOUS at 00:21

## 2022-08-14 RX ADMIN — ONDANSETRON 4 MG: 2 INJECTION INTRAMUSCULAR; INTRAVENOUS at 17:32

## 2022-08-14 RX ADMIN — SODIUM CHLORIDE, PRESERVATIVE FREE 10 ML: 5 INJECTION INTRAVENOUS at 04:56

## 2022-08-14 RX ADMIN — DILTIAZEM HYDROCHLORIDE 120 MG: 120 CAPSULE, COATED, EXTENDED RELEASE ORAL at 10:16

## 2022-08-14 RX ADMIN — HYDROMORPHONE HYDROCHLORIDE 0.5 MG: 1 INJECTION, SOLUTION INTRAMUSCULAR; INTRAVENOUS; SUBCUTANEOUS at 14:35

## 2022-08-14 RX ADMIN — ATORVASTATIN CALCIUM 80 MG: 80 TABLET, FILM COATED ORAL at 10:16

## 2022-08-14 RX ADMIN — ONDANSETRON 4 MG: 2 INJECTION INTRAMUSCULAR; INTRAVENOUS at 22:07

## 2022-08-14 RX ADMIN — Medication 10 ML: at 22:07

## 2022-08-14 RX ADMIN — Medication 10 ML: at 10:30

## 2022-08-14 RX ADMIN — APIXABAN 5 MG: 5 TABLET, FILM COATED ORAL at 22:07

## 2022-08-14 RX ADMIN — DIGOXIN 0.12 MG: 125 TABLET ORAL at 10:16

## 2022-08-14 RX ADMIN — Medication 2 PUFF: at 19:48

## 2022-08-14 RX ADMIN — BUPROPION HYDROCHLORIDE 100 MG: 100 TABLET, FILM COATED, EXTENDED RELEASE ORAL at 10:16

## 2022-08-14 RX ADMIN — APIXABAN 5 MG: 5 TABLET, FILM COATED ORAL at 10:16

## 2022-08-14 RX ADMIN — SODIUM CHLORIDE 25 ML: 9 INJECTION, SOLUTION INTRAVENOUS at 17:37

## 2022-08-14 RX ADMIN — FUROSEMIDE 40 MG: 40 TABLET ORAL at 10:16

## 2022-08-14 ASSESSMENT — PAIN SCALES - GENERAL
PAINLEVEL_OUTOF10: 6
PAINLEVEL_OUTOF10: 6
PAINLEVEL_OUTOF10: 5
PAINLEVEL_OUTOF10: 6
PAINLEVEL_OUTOF10: 6
PAINLEVEL_OUTOF10: 0
PAINLEVEL_OUTOF10: 7
PAINLEVEL_OUTOF10: 5
PAINLEVEL_OUTOF10: 6

## 2022-08-14 ASSESSMENT — PAIN DESCRIPTION - LOCATION
LOCATION: BACK
LOCATION: FLANK

## 2022-08-14 ASSESSMENT — PAIN DESCRIPTION - DESCRIPTORS
DESCRIPTORS: ACHING
DESCRIPTORS: SHARP

## 2022-08-14 ASSESSMENT — PAIN DESCRIPTION - ORIENTATION
ORIENTATION: LEFT

## 2022-08-14 NOTE — PROGRESS NOTES
Patient blood sugar 67 at shift change, 4 ounces of orange juice given. Will recheck in 15 min. Patient not tolerating orange juice. Patient nauseated and vomiting. IV zofran given, d10 125 ml started. Will recheck in 15 minutes. Blood sugar recheck after d10 bolus of 125ml is 130.

## 2022-08-14 NOTE — PROGRESS NOTES
Patient complaining of nausea and vomiting when trying to eat food. He is able to keep down fluids. IV zofran given without any relief. Perfect serve sent to MD. Blood sugar 107 and trending down.

## 2022-08-14 NOTE — CARE COORDINATION
DISCHARGE PLANNING:   CM spoke with Cyndi Sage at [facility]  admissions - [phone] , stated not able to accept patient today but able to accept him tomorrow , because the bed that patient needs will be available tomorrow . Patient will require a HENS completed but no Covid-19 test.   Transport was scheduled with  time of 1330 via [facility] . Patient and patient's daughter Mouna Drew were updated and in agreement. Cyndi Sage at the facility also aware of the  time.

## 2022-08-14 NOTE — PROGRESS NOTES
Hospitalist Progress Note      PCP: Kita Oconnor MD    Date of Admission: 8/10/2022    Chief Complaint: Direct admit from [facility] ER with post obstructive PNA, acute on chronic respiratory failure with hypoxia and malignant L pleural effusion. Hospital Course: 73 yo M with history of squamous cell lung cancer with malignant effusion s/p PleurX, HTN, DM 2, hyperlipidemia, chronic respiratory failure with hypoxia on 2 L O2 via NC, Afib, CAD, chronic diastolic CHF directly admitted from [facility] ER with A on C RF with hypoxia, post obstructive PNA and malignant L pleural effusion. Started on IV Abx. Followed by Pulm and Onc. L PleurX drain connected to suction. Palliative care consult requested. Subjective:  In bed this denies any chest pain shortness breath fevers or chills nausea or vomiting      Medications:  Reviewed    Infusion Medications    dextrose      sodium chloride 25 mL (08/14/22 1026)     Scheduled Medications    insulin glargine  7 Units SubCUTAneous BID    apixaban  5 mg Oral BID    insulin lispro  0-8 Units SubCUTAneous TID WC    levothyroxine  250 mcg Oral Daily    furosemide  40 mg Oral Daily    folic acid  1 mg Oral Daily    dilTIAZem  120 mg Oral Daily    digoxin  0.125 mg Oral Daily    buPROPion  100 mg Oral Daily    mometasone-formoterol  2 puff Inhalation BID    atorvastatin  80 mg Oral Daily    aspirin  81 mg Oral Daily    sodium chloride flush  10 mL IntraVENous 2 times per day    cefepime  2,000 mg IntraVENous Q8H     PRN Meds: oxyCODONE **OR** oxyCODONE, HYDROmorphone, tiZANidine, dextrose bolus **OR** dextrose bolus, glucagon (rDNA), dextrose, sodium chloride flush, sodium chloride, ondansetron, polyethylene glycol, acetaminophen **OR** acetaminophen      Intake/Output Summary (Last 24 hours) at 8/14/2022 1350  Last data filed at 8/14/2022 1225  Gross per 24 hour   Intake 20 ml   Output 1000 ml   Net -980 ml         Physical Exam Performed:    BP (!) 126/58 Pulse 92   Temp 97.4 °F (36.3 °C) (Oral)   Resp 16   Ht 6' (1.829 m)   Wt 246 lb 12.8 oz (111.9 kg)   SpO2 92%   BMI 33.47 kg/m²     General appearance: No apparent distress, appears stated age and cooperative. HEENT: Pupils equal, round, and reactive to light. Conjunctivae/corneas clear. Neck: Supple, with full range of motion. No jugular venous distention. Trachea midline. Respiratory:  L PleurX drain. Decreased in L base. Rhonchi on R. No wheezing or rales. Cardiovascular: Regular rate and rhythm with normal S1/S2 without murmurs, rubs or gallops. Abdomen: Soft, non-tender, non-distended with normal bowel sounds. Musculoskeletal: No clubbing, cyanosis or edema bilaterally. Full range of motion without deformity. Skin: Skin color, texture, turgor normal.  No rashes or lesions. Neurologic:  Neurovascularly intact without any focal sensory/motor deficits. Cranial nerves: II-XII intact, grossly non-focal.  Psychiatric: Alert and oriented, thought content appropriate, normal insight  Capillary Refill: Brisk,3 seconds, normal   Peripheral Pulses: +2 palpable, equal bilaterally       Labs:   Recent Labs     08/11/22 2250   WBC 30.1*   HGB 12.6*   HCT 39.5*   *       Recent Labs     08/11/22  2250 08/12/22  0615 08/13/22  0627    136 139   K 4.5 4.5 3.5   CL 97* 97* 100   CO2 30 31 35*   BUN 17 17 12   CREATININE 1.0 0.7* 0.5*   CALCIUM 9.1 8.6 8.3   PHOS  --  3.2 2.2*       Recent Labs     08/11/22 2250   AST 19   ALT 18   BILITOT 0.3   ALKPHOS 162*       No results for input(s): INR in the last 72 hours. Recent Labs     08/11/22 2250   TROPONINI 0.02*         Urinalysis:    No results found for: Jerolyn Cowboy, BACTERIA, RBCUA, BLOODU, SPECGRAV, GLUCOSEU    Radiology:  XR CHEST PORTABLE   Final Result   Stable opacification of the left hemithorax that reportedly is due to pleural   fluid and atelectasis. XR CHEST PORTABLE   Final Result   No improvement.   Persistent complete opacification left hemithorax. Mild   congestion right lung. Assessment/Plan:    Active Hospital Problems    Diagnosis     Hypophosphatemia [E83.39]      Priority: Medium    Protein-calorie malnutrition, moderate (HCC) [E44.0]      Priority: Medium    Essential hypertension [I10]      Priority: Medium    Hyperlipidemia [E78.5]      Priority: Medium    DMII (diabetes mellitus, type 2) (HCC) [E11.9]      Priority: Medium    Acquired hypothyroidism [E03.9]      Priority: Medium    Malignant pleural effusion [J91.0]      Priority: Medium    Neutrophilic leukocytosis [B29.3]      Priority: Medium    Chronic diastolic congestive heart failure (HCC) [I50.32]     Squamous cell carcinoma of left lung (HCC) [C34.92]     COPD (chronic obstructive pulmonary disease) (HCC) [J44.9]     Atrial fibrillation, permanent (HCC) [I48.21]     Postobstructive pneumonia [J18.9]     Acute on chronic respiratory failure with hypoxia (HCC) [J96.21]      Continue IVCefepime for post obstructive PNA  L PleurX to gravity per Pulm  Dilaudid IV PRN pain  Wean O2 as tolerated  Resume Eliquis   Pulm input appreciated  Onc input appreciated  PT/OT eval  Palliative care consult    DVT Prophylaxis: SCD (resume Eliquis tomorrow if Hg stable)  Diet: ADULT DIET; Regular; 5 carb choices (75 gm/meal)  Code Status: DNR-CCA    PT/OT Eval Status:  Following    Await precert    Adenike Viera MD

## 2022-08-15 ENCOUNTER — APPOINTMENT (OUTPATIENT)
Dept: GENERAL RADIOLOGY | Age: 67
DRG: 181 | End: 2022-08-15
Attending: FAMILY MEDICINE
Payer: MEDICARE

## 2022-08-15 VITALS
HEIGHT: 72 IN | WEIGHT: 249.2 LBS | OXYGEN SATURATION: 97 % | HEART RATE: 85 BPM | SYSTOLIC BLOOD PRESSURE: 132 MMHG | TEMPERATURE: 97.8 F | BODY MASS INDEX: 33.75 KG/M2 | RESPIRATION RATE: 18 BRPM | DIASTOLIC BLOOD PRESSURE: 85 MMHG

## 2022-08-15 LAB
A/G RATIO: 0.7 (ref 1.1–2.2)
ALBUMIN SERPL-MCNC: 2.6 G/DL (ref 3.4–5)
ALP BLD-CCNC: 124 U/L (ref 40–129)
ALT SERPL-CCNC: 17 U/L (ref 10–40)
ANION GAP SERPL CALCULATED.3IONS-SCNC: 6 MMOL/L (ref 3–16)
AST SERPL-CCNC: 13 U/L (ref 15–37)
BASOPHILS ABSOLUTE: 0.1 K/UL (ref 0–0.2)
BASOPHILS RELATIVE PERCENT: 0.6 %
BILIRUB SERPL-MCNC: 0.5 MG/DL (ref 0–1)
BLOOD CULTURE, ROUTINE: NORMAL
BUN BLDV-MCNC: 7 MG/DL (ref 7–20)
CALCIUM SERPL-MCNC: 8.8 MG/DL (ref 8.3–10.6)
CHLORIDE BLD-SCNC: 98 MMOL/L (ref 99–110)
CO2: 35 MMOL/L (ref 21–32)
CREAT SERPL-MCNC: 0.5 MG/DL (ref 0.8–1.3)
CULTURE, BLOOD 2: NORMAL
EOSINOPHILS ABSOLUTE: 0.2 K/UL (ref 0–0.6)
EOSINOPHILS RELATIVE PERCENT: 1.9 %
GFR AFRICAN AMERICAN: >60
GFR NON-AFRICAN AMERICAN: >60
GLUCOSE BLD-MCNC: 103 MG/DL (ref 70–99)
GLUCOSE BLD-MCNC: 120 MG/DL (ref 70–99)
GLUCOSE BLD-MCNC: 78 MG/DL (ref 70–99)
GLUCOSE BLD-MCNC: 81 MG/DL (ref 70–99)
GLUCOSE BLD-MCNC: 92 MG/DL (ref 70–99)
GLUCOSE BLD-MCNC: 97 MG/DL (ref 70–99)
HCT VFR BLD CALC: 34.2 % (ref 40.5–52.5)
HEMOGLOBIN: 11.1 G/DL (ref 13.5–17.5)
LYMPHOCYTES ABSOLUTE: 0.6 K/UL (ref 1–5.1)
LYMPHOCYTES RELATIVE PERCENT: 4.3 %
MAGNESIUM: 2.1 MG/DL (ref 1.8–2.4)
MCH RBC QN AUTO: 29.4 PG (ref 26–34)
MCHC RBC AUTO-ENTMCNC: 32.6 G/DL (ref 31–36)
MCV RBC AUTO: 90.2 FL (ref 80–100)
MONOCYTES ABSOLUTE: 0.9 K/UL (ref 0–1.3)
MONOCYTES RELATIVE PERCENT: 7.1 %
NEUTROPHILS ABSOLUTE: 11.1 K/UL (ref 1.7–7.7)
NEUTROPHILS RELATIVE PERCENT: 86.1 %
PDW BLD-RTO: 17.8 % (ref 12.4–15.4)
PERFORMED ON: ABNORMAL
PERFORMED ON: ABNORMAL
PERFORMED ON: NORMAL
PLATELET # BLD: 410 K/UL (ref 135–450)
PMV BLD AUTO: 6.5 FL (ref 5–10.5)
POTASSIUM REFLEX MAGNESIUM: 3.3 MMOL/L (ref 3.5–5.1)
RBC # BLD: 3.79 M/UL (ref 4.2–5.9)
SODIUM BLD-SCNC: 139 MMOL/L (ref 136–145)
TOTAL PROTEIN: 6.4 G/DL (ref 6.4–8.2)
WBC # BLD: 12.9 K/UL (ref 4–11)

## 2022-08-15 PROCEDURE — 6360000002 HC RX W HCPCS: Performed by: FAMILY MEDICINE

## 2022-08-15 PROCEDURE — 85025 COMPLETE CBC W/AUTO DIFF WBC: CPT

## 2022-08-15 PROCEDURE — 94640 AIRWAY INHALATION TREATMENT: CPT

## 2022-08-15 PROCEDURE — 80053 COMPREHEN METABOLIC PANEL: CPT

## 2022-08-15 PROCEDURE — 2580000003 HC RX 258: Performed by: INTERNAL MEDICINE

## 2022-08-15 PROCEDURE — 6370000000 HC RX 637 (ALT 250 FOR IP): Performed by: INTERNAL MEDICINE

## 2022-08-15 PROCEDURE — 83735 ASSAY OF MAGNESIUM: CPT

## 2022-08-15 PROCEDURE — 94761 N-INVAS EAR/PLS OXIMETRY MLT: CPT

## 2022-08-15 PROCEDURE — 2580000003 HC RX 258: Performed by: FAMILY MEDICINE

## 2022-08-15 PROCEDURE — 74018 RADEX ABDOMEN 1 VIEW: CPT

## 2022-08-15 PROCEDURE — 2700000000 HC OXYGEN THERAPY PER DAY

## 2022-08-15 RX ORDER — SODIUM PHOSPHATE, DIBASIC AND SODIUM PHOSPHATE, MONOBASIC 7; 19 G/133ML; G/133ML
1 ENEMA RECTAL
Status: DISCONTINUED | OUTPATIENT
Start: 2022-08-15 | End: 2022-08-15 | Stop reason: HOSPADM

## 2022-08-15 RX ORDER — POTASSIUM CHLORIDE 20 MEQ/1
40 TABLET, EXTENDED RELEASE ORAL
Status: DISCONTINUED | OUTPATIENT
Start: 2022-08-15 | End: 2022-08-15 | Stop reason: HOSPADM

## 2022-08-15 RX ORDER — INSULIN GLARGINE 100 [IU]/ML
5 INJECTION, SOLUTION SUBCUTANEOUS DAILY
Status: DISCONTINUED | OUTPATIENT
Start: 2022-08-15 | End: 2022-08-15

## 2022-08-15 RX ORDER — INSULIN DEGLUDEC INJECTION 100 U/ML
5 INJECTION, SOLUTION SUBCUTANEOUS DAILY
Qty: 2 PEN | Refills: 1 | Status: SHIPPED | OUTPATIENT
Start: 2022-08-15

## 2022-08-15 RX ADMIN — ATORVASTATIN CALCIUM 80 MG: 80 TABLET, FILM COATED ORAL at 08:36

## 2022-08-15 RX ADMIN — DIGOXIN 0.12 MG: 125 TABLET ORAL at 08:36

## 2022-08-15 RX ADMIN — Medication 2 PUFF: at 08:28

## 2022-08-15 RX ADMIN — OXYCODONE 10 MG: 5 TABLET ORAL at 13:51

## 2022-08-15 RX ADMIN — SODIUM CHLORIDE 25 ML: 9 INJECTION, SOLUTION INTRAVENOUS at 00:59

## 2022-08-15 RX ADMIN — FUROSEMIDE 40 MG: 40 TABLET ORAL at 08:37

## 2022-08-15 RX ADMIN — SODIUM CHLORIDE, PRESERVATIVE FREE 10 ML: 5 INJECTION INTRAVENOUS at 00:57

## 2022-08-15 RX ADMIN — OXYCODONE 10 MG: 5 TABLET ORAL at 08:37

## 2022-08-15 RX ADMIN — APIXABAN 5 MG: 5 TABLET, FILM COATED ORAL at 08:36

## 2022-08-15 RX ADMIN — BUPROPION HYDROCHLORIDE 100 MG: 100 TABLET, FILM COATED, EXTENDED RELEASE ORAL at 08:36

## 2022-08-15 RX ADMIN — Medication 10 ML: at 08:38

## 2022-08-15 RX ADMIN — ASPIRIN 81 MG: 81 TABLET, CHEWABLE ORAL at 08:36

## 2022-08-15 RX ADMIN — CEFEPIME 2000 MG: 2 INJECTION, POWDER, FOR SOLUTION INTRAVENOUS at 01:01

## 2022-08-15 RX ADMIN — FOLIC ACID 1 MG: 1 TABLET ORAL at 08:36

## 2022-08-15 RX ADMIN — DILTIAZEM HYDROCHLORIDE 120 MG: 120 CAPSULE, COATED, EXTENDED RELEASE ORAL at 08:36

## 2022-08-15 RX ADMIN — MAGNESIUM HYDROXIDE 30 ML: 400 SUSPENSION ORAL at 09:17

## 2022-08-15 RX ADMIN — LEVOTHYROXINE SODIUM 250 MCG: 0.12 TABLET ORAL at 08:36

## 2022-08-15 ASSESSMENT — ENCOUNTER SYMPTOMS
BACK PAIN: 1
SHORTNESS OF BREATH: 1
ALLERGIC/IMMUNOLOGIC NEGATIVE: 1
ABDOMINAL DISTENTION: 1
TROUBLE SWALLOWING: 1
EYES NEGATIVE: 1
COUGH: 1

## 2022-08-15 ASSESSMENT — PAIN DESCRIPTION - LOCATION
LOCATION: CHEST;BACK
LOCATION: FLANK
LOCATION: BACK;CHEST

## 2022-08-15 ASSESSMENT — PAIN SCALES - GENERAL
PAINLEVEL_OUTOF10: 7
PAINLEVEL_OUTOF10: 7
PAINLEVEL_OUTOF10: 5

## 2022-08-15 ASSESSMENT — PAIN DESCRIPTION - DESCRIPTORS
DESCRIPTORS: BURNING
DESCRIPTORS: SHARP
DESCRIPTORS: BURNING

## 2022-08-15 ASSESSMENT — PAIN - FUNCTIONAL ASSESSMENT: PAIN_FUNCTIONAL_ASSESSMENT: ACTIVITIES ARE NOT PREVENTED

## 2022-08-15 ASSESSMENT — PAIN DESCRIPTION - ORIENTATION
ORIENTATION: LEFT

## 2022-08-15 ASSESSMENT — PAIN DESCRIPTION - ONSET: ONSET: GRADUAL

## 2022-08-15 ASSESSMENT — PAIN DESCRIPTION - PAIN TYPE: TYPE: ACUTE PAIN

## 2022-08-15 NOTE — PROGRESS NOTES
Occupational Therapy/ Physical Therapy  Saul Treviño    Attempted to see for cotx for PT and OT. Nurse in room stating in process of getting patient d/c to a SNF. Planned  in 30 minutes. No therapy this date due to pending d/c. Thank you,   Diallo Lamb.  Beth Zaragoza, OTR/L [address] [facility], DPT [id number]

## 2022-08-15 NOTE — CARE COORDINATION
JERALD spoke with Chin Sanders at [facility]  admissions - [phone] to confirm  transport at 1330. Chin Sanders requested patient be sent with 2 pleurx catheters. He will call the nurse Albina Kennedy to clarify This request.  Name:    Jannet Oleary   Phone: [phone]  Fax: [phone]  HENS submitted. Family notified,  [phone]    Notified  [facility], [address] [place] [address], [phone]. .[phone]

## 2022-08-15 NOTE — PROGRESS NOTES
Patient discharged to [facility]. Port was de-accessed without complication. Patient was transported to [facility] via transport.

## 2022-08-15 NOTE — PALLIATIVE CARE
appetite change and unexpected weight change. HENT:  Positive for trouble swallowing. Eyes: Negative. Respiratory:  Positive for cough and shortness of breath. Cardiovascular:  Positive for leg swelling. Gastrointestinal:  Positive for abdominal distention. Endocrine: Negative. Genitourinary: Negative. Musculoskeletal:  Positive for arthralgias and back pain. Skin:  Positive for pallor. Allergic/Immunologic: Negative. Neurological:  Positive for weakness. Psychiatric/Behavioral:  Negative for confusion. OBJECTIVE   /78   Pulse 97   Temp 98.3 °F (36.8 °C) (Oral)   Resp 18   Ht 6' (1.829 m)   Wt 249 lb 3.2 oz (113 kg)   SpO2 97%   BMI 33.80 kg/m²   I/O last 3 completed shifts: In: 260 [P.O.:240; I.V.:20]  Out: 2000 [Urine:1550; Chest Tube:450]  I/O this shift:  In: -   Out: 320 [Urine:320]      Physical Exam  Constitutional:       Appearance: He is ill-appearing. HENT:      Head: Normocephalic and atraumatic. Nose: Nose normal.      Mouth/Throat:      Mouth: Mucous membranes are dry. Eyes:      Pupils: Pupils are equal, round, and reactive to light. Cardiovascular:      Rate and Rhythm: Normal rate. Pulses: Normal pulses. Heart sounds: No murmur heard. No gallop. Pulmonary:      Effort: Pulmonary effort is normal.   Chest:      Chest wall: Tenderness present. Abdominal:      General: There is distension. Musculoskeletal:      Cervical back: Neck supple. Right lower leg: Edema present. Left lower leg: Edema present. Skin:     General: Skin is dry. Coloration: Skin is pale. Neurological:      Mental Status: He is oriented to person, place, and time. Mental status is at baseline.             Total time: 35 minutes  >50% of time spent counseling patient at bedside or POA/family member if applicable , reviewing information and discussing care, coordinating with care team       Signed By: Electronically signed by Veronica Stovall

## 2022-08-15 NOTE — PLAN OF CARE
Problem: Discharge Planning  Goal: Discharge to home or other facility with appropriate resources  8/13/2022 2157 by Nancy Martines RN  Outcome: Progressing     Problem: Pain  Goal: Verbalizes/displays adequate comfort level or baseline comfort level  8/13/2022 2157 by Nancy Martines RN  Outcome: Progressing  Flowsheets (Taken 8/13/2022 2157)  Verbalizes/displays adequate comfort level or baseline comfort level:   Encourage patient to monitor pain and request assistance   Assess pain using appropriate pain scale   Administer analgesics based on type and severity of pain and evaluate response   Implement non-pharmacological measures as appropriate and evaluate response  Note: Pain managed with prn pain meds per [facility]     Problem: Safety - Adult  Goal: Free from fall injury  8/13/2022 2157 by Nancy Martines RN  Outcome: Progressing  Note: Safety precautions in place. Bed locked, alarmed, lowest position. Call light in reach, educated on use. Gripper socks on. No falls or physical injury.      Problem: Respiratory - Adult  Goal: Achieves optimal ventilation and oxygenation  8/13/2022 2157 by Nancy Martines RN  Outcome: Progressing  Flowsheets (Taken 8/13/2022 2157)  Achieves optimal ventilation and oxygenation:   Assess for changes in respiratory status   Position to facilitate oxygenation and minimize respiratory effort   Oxygen supplementation based on oxygen saturation or arterial blood gases  Note: Pt on 3L NC     Problem: Gastrointestinal - Adult  Goal: Maintains adequate nutritional intake  8/13/2022 2157 by Nancy Martines RN  Outcome: Progressing     Problem: Metabolic/Fluid and Electrolytes - Adult  Goal: Electrolytes maintained within normal limits  8/13/2022 2157 by Nancy Martines RN  Outcome: Progressing
Problem: Discharge Planning  Goal: Discharge to home or other facility with appropriate resources  8/15/2022 0345 by Ryan Palomino RN  Outcome: Progressing     Problem: Pain  Goal: Verbalizes/displays adequate comfort level or baseline comfort level  8/15/2022 0345 by Ryan Palomino RN  Outcome: Progressing     Problem: Safety - Adult  Goal: Free from fall injury  8/15/2022 0345 by Ryan Palomino RN  Outcome: Progressing     Problem: ABCDS Injury Assessment  Goal: Absence of physical injury  8/15/2022 0345 by Ryan Palomino RN  Outcome: Progressing     Problem: Respiratory - Adult  Goal: Achieves optimal ventilation and oxygenation  8/15/2022 0345 by Ryan Palomino RN  Outcome: Progressing     Problem: Skin/Tissue Integrity - Adult  Goal: Skin integrity remains intact  8/15/2022 0345 by Ryan Palomino RN  Outcome: Progressing  Flowsheets (Taken 8/14/2022 1920)  Skin Integrity Remains Intact: Monitor for areas of redness and/or skin breakdown     Problem: Gastrointestinal - Adult  Goal: Maintains adequate nutritional intake  8/15/2022 0345 by Ryan Palomino RN  Outcome: Progressing     Problem: Metabolic/Fluid and Electrolytes - Adult  Goal: Electrolytes maintained within normal limits  8/15/2022 0345 by Ryan Palomino RN  Outcome: Progressing
Problem: Discharge Planning  Goal: Discharge to home or other facility with appropriate resources  Outcome: Progressing     Problem: Pain  Goal: Verbalizes/displays adequate comfort level or baseline comfort level  8/13/2022 0955 by Nichol Edmond RN  Outcome: Progressing  Note: Pt. Sandy Gtz no pain at the moment, will continue to monitor and offer PRN medications   8/13/2022 0530 by Jada Campo RN  Outcome: Progressing  Note: Pain managed with PRN pain meds per [facility]     Problem: Safety - Adult  Goal: Free from fall injury  8/13/2022 0955 by Nichol Edmond RN  Outcome: Progressing  8/13/2022 0530 by Jada Campo RN  Outcome: Progressing  Note: Safety precautions in place. Bed locked, lowest position, call light in reach. Pt able to use urinal at bedside. No falls or physical injury noted.      Problem: ABCDS Injury Assessment  Goal: Absence of physical injury  8/13/2022 0955 by Nichol Edmond RN  Outcome: Progressing  8/13/2022 0530 by Jada Campo RN  Outcome: Progressing     Problem: Respiratory - Adult  Goal: Achieves optimal ventilation and oxygenation  8/13/2022 0955 by Nichol Edmond RN  Outcome: Progressing  Note: O2 being administered   8/13/2022 0530 by Jada Campo RN  Outcome: Progressing  Note: Currently on 3L NC w/ sat >92%     Problem: Skin/Tissue Integrity - Adult  Goal: Skin integrity remains intact  Outcome: Progressing     Problem: Gastrointestinal - Adult  Goal: Maintains adequate nutritional intake  8/13/2022 0955 by Nichol Edmond RN  Outcome: Progressing  8/13/2022 0530 by Jada Campo RN  Outcome: Progressing     Problem: Metabolic/Fluid and Electrolytes - Adult  Goal: Electrolytes maintained within normal limits  Outcome: Progressing
Problem: Discharge Planning  Goal: Discharge to home or other facility with appropriate resources  Outcome: Progressing     Problem: Pain  Goal: Verbalizes/displays adequate comfort level or baseline comfort level  Outcome: Progressing     Problem: Safety - Adult  Goal: Free from fall injury  Outcome: Progressing     Problem: ABCDS Injury Assessment  Goal: Absence of physical injury  Outcome: Progressing     Problem: Respiratory - Adult  Goal: Achieves optimal ventilation and oxygenation  Outcome: Progressing     Problem: Skin/Tissue Integrity - Adult  Goal: Skin integrity remains intact  Outcome: Progressing     Problem: Gastrointestinal - Adult  Goal: Maintains adequate nutritional intake  Outcome: Progressing     Problem: Metabolic/Fluid and Electrolytes - Adult  Goal: Electrolytes maintained within normal limits  Outcome: Progressing
Problem: Discharge Planning  Goal: Discharge to home or other facility with appropriate resources  Outcome: Progressing     Problem: Pain  Goal: Verbalizes/displays adequate comfort level or baseline comfort level  Outcome: Progressing     Problem: Safety - Adult  Goal: Free from fall injury  Outcome: Progressing     Problem: ABCDS Injury Assessment  Goal: Absence of physical injury  Outcome: Progressing     Problem: Respiratory - Adult  Goal: Achieves optimal ventilation and oxygenation  Outcome: Progressing     Problem: Skin/Tissue Integrity - Adult  Goal: Skin integrity remains intact  Outcome: Progressing  Flowsheets (Taken 8/12/2022 0900)  Skin Integrity Remains Intact: Monitor for areas of redness and/or skin breakdown     Problem: Gastrointestinal - Adult  Goal: Maintains adequate nutritional intake  Outcome: Progressing  Flowsheets (Taken 8/12/2022 0900)  Maintains adequate nutritional intake:   Monitor percentage of each meal consumed   Identify factors contributing to decreased intake, treat as appropriate   Assist with meals as needed   Monitor intake and output, weight and lab values     Problem: Metabolic/Fluid and Electrolytes - Adult  Goal: Electrolytes maintained within normal limits  Outcome: Progressing  Flowsheets (Taken 8/12/2022 0900)  Electrolytes maintained within normal limits:   Monitor labs and assess patient for signs and symptoms of electrolyte imbalances   Administer electrolyte replacement as ordered   Monitor response to electrolyte replacements, including repeat lab results as appropriate
Problem: Pain  Goal: Verbalizes/displays adequate comfort level or baseline comfort level  Outcome: Progressing  Note: Pain managed with PRN pain meds per [facility]     Problem: Safety - Adult  Goal: Free from fall injury  Outcome: Progressing  Note: Safety precautions in place. Bed locked, lowest position, call light in reach. Pt able to use urinal at bedside. No falls or physical injury noted.      Problem: ABCDS Injury Assessment  Goal: Absence of physical injury  Outcome: Progressing     Problem: Respiratory - Adult  Goal: Achieves optimal ventilation and oxygenation  Outcome: Progressing  Note: Currently on 3L NC w/ sat >92%     Problem: Gastrointestinal - Adult  Goal: Maintains adequate nutritional intake  Outcome: Progressing
Monitor for areas of redness and/or skin breakdown  Taken 8/14/2022 0726  Skin Integrity Remains Intact: Monitor for areas of redness and/or skin breakdown     Problem: Gastrointestinal - Adult  Goal: Maintains adequate nutritional intake  Outcome: Progressing  Flowsheets (Taken 8/14/2022 1129)  Maintains adequate nutritional intake:   Monitor percentage of each meal consumed   Identify factors contributing to decreased intake, treat as appropriate   Assist with meals as needed   Monitor intake and output, weight and lab values     Problem: Metabolic/Fluid and Electrolytes - Adult  Goal: Electrolytes maintained within normal limits  Outcome: Progressing  Flowsheets  Taken 8/14/2022 1129  Electrolytes maintained within normal limits:   Monitor labs and assess patient for signs and symptoms of electrolyte imbalances   Administer electrolyte replacement as ordered  Taken 8/14/2022 0726  Electrolytes maintained within normal limits:   Monitor labs and assess patient for signs and symptoms of electrolyte imbalances   Administer electrolyte replacement as ordered

## 2022-08-26 ENCOUNTER — TELEPHONE (OUTPATIENT)
Dept: CARDIOTHORACIC SURGERY | Age: 67
End: 2022-08-26

## 2022-08-26 NOTE — TELEPHONE ENCOUNTER
I contacted [address] [place] [address] to check in on the patient. She stated he is draining approximately 300-400 ml every night. He has been having some nausea but it is relieved with zofran. I let the RN know that I would be checking in every other week to see how the patient is and they could call as well if they need anything.      Connie White RN

## 2022-09-26 ENCOUNTER — TELEPHONE (OUTPATIENT)
Dept: CARDIOTHORACIC SURGERY | Age: 67
End: 2022-09-26

## 2022-10-05 NOTE — PROGRESS NOTES
RT Nebulizer Bronchodilator Protocol Note    There is a bronchodilator order in the chart from a provider indicating to follow the RT Bronchodilator Protocol and there is an Initiate RT Bronchodilator Protocol order as well (see protocol at bottom of note). CXR Findings:  No results found. The findings from the last RT Protocol Assessment were as follows:  Smoking: Chronic pulmonary disease  Respiratory Pattern: Dyspnea on exertion or RR 21-25 bpm  Breath Sounds: Slightly diminished and/or crackles  Cough: Strong, spontaneous, non-productive  Indication for Bronchodilator Therapy: Wheezing associated with pulm disorder  Bronchodilator Assessment Score: 6    Aerosolized bronchodilator medication orders have been revised according to the RT Nebulizer Bronchodilator Protocol below. Respiratory Therapist to perform RT Therapy Protocol Assessment initially then follow the protocol. Repeat RT Therapy Protocol Assessment PRN for score 0-3 or on second treatment, BID, and PRN for scores above 3. No Indications - adjust the frequency to every 6 hours PRN wheezing or bronchospasm, if no treatments needed after 48 hours then discontinue using Per Protocol order mode. If indication present, adjust the RT bronchodilator orders based on the Bronchodilator Assessment Score as indicated below. If a patient is on this medication at home then do not decrease Frequency below that used at home. 0-3 - enter or revise RT bronchodilator order(s) to equivalent RT Bronchodilator order with Frequency of every 4 hours PRN for wheezing or increased work of breathing using Per Protocol order mode. 4-6 - enter or revise RT Bronchodilator order(s) to two equivalent RT bronchodilator orders with one order with BID Frequency and one order with Frequency of every 4 hours PRN wheezing or increased work of breathing using Per Protocol order mode.          7-10 - enter or revise RT Bronchodilator order(s) to two equivalent RT bronchodilator orders with one order with TID Frequency and one order with Frequency of every 4 hours PRN wheezing or increased work of breathing using Per Protocol order mode. 11-13 - enter or revise RT Bronchodilator order(s) to one equivalent RT bronchodilator order with QID Frequency and an Albuterol order with Frequency of every 4 hours PRN wheezing or increased work of breathing using Per Protocol order mode. Greater than 13 - enter or revise RT Bronchodilator order(s) to one equivalent RT bronchodilator order with every 4 hours Frequency and an Albuterol order with Frequency of every 2 hours PRN wheezing or increased work of breathing using Per Protocol order mode. RT to enter RT Home Evaluation for COPD & MDI Assessment order using Per Protocol order mode.     Electronically signed by Thi Cowart RCP on 4/19/2022 at 1:03 PM No

## 2022-10-06 ENCOUNTER — TELEPHONE (OUTPATIENT)
Dept: CARDIOTHORACIC SURGERY | Age: 67
End: 2022-10-06

## 2022-10-06 NOTE — TELEPHONE ENCOUNTER
Called nursing facility to check in on patient and was notified that he had passed away on 09/27/22.      Logan Martinez RN

## 2023-01-20 NOTE — FLOWSHEET NOTE
04/17/22 0246   Oxygen Therapy   SpO2 96 %   Pulse Oximeter Device Mode Continuous   Pulse Oximeter Device Location Finger   O2 Device Nasal cannula   O2 Flow Rate (L/min) 3 L/min   O2 titrated down to 3L @ this time. 20-Jan-2023
